# Patient Record
Sex: FEMALE | Race: WHITE | NOT HISPANIC OR LATINO | Employment: OTHER | URBAN - METROPOLITAN AREA
[De-identification: names, ages, dates, MRNs, and addresses within clinical notes are randomized per-mention and may not be internally consistent; named-entity substitution may affect disease eponyms.]

---

## 2017-02-23 DIAGNOSIS — F41.1 GENERALIZED ANXIETY DISORDER: ICD-10-CM

## 2017-02-23 RX ORDER — ESCITALOPRAM OXALATE 10 MG/1
10 TABLET ORAL DAILY
Qty: 30 TABLET | Refills: 0 | Status: SHIPPED | OUTPATIENT
Start: 2017-02-23 | End: 2017-04-25

## 2017-02-23 NOTE — TELEPHONE ENCOUNTER
escitalopram (LEXAPRO) 10 MG tablet  Last Written Prescription Date: 3/14/2016  Last Fill Quantity: 90, # refills: 3  Last Office Visit with [facility] primary care provider:  3/14/2016        Last PHQ-9 score on record= No flowsheet data found.

## 2017-03-13 DIAGNOSIS — I10 ESSENTIAL HYPERTENSION, BENIGN: ICD-10-CM

## 2017-03-13 NOTE — TELEPHONE ENCOUNTER
atenolol (TENORMIN) 100 MG tablet      Last Written Prescription Date: 3/14/16  Last Fill Quantity: 90, # refills: 3    Last Office Visit with [facility], [facility] or [facility] prescribing provider:  3/14/16   Future Office Visit:        BP Readings from Last 3 Encounters:   03/14/16 139/80   10/12/15 131/75   08/11/15 138/79

## 2017-03-14 RX ORDER — ATENOLOL 100 MG/1
100 TABLET ORAL DAILY
Qty: 30 TABLET | Refills: 0 | Status: SHIPPED | OUTPATIENT
Start: 2017-03-14 | End: 2017-04-25

## 2017-04-25 ENCOUNTER — OFFICE VISIT (OUTPATIENT)
Dept: FAMILY MEDICINE | Facility: CLINIC | Age: 82
End: 2017-04-25
Payer: COMMERCIAL

## 2017-04-25 VITALS — HEIGHT: 68 IN | BODY MASS INDEX: 29.22 KG/M2 | RESPIRATION RATE: 16 BRPM | WEIGHT: 192.8 LBS

## 2017-04-25 DIAGNOSIS — Z13.6 ENCOUNTER FOR LIPID SCREENING FOR CARDIOVASCULAR DISEASE: ICD-10-CM

## 2017-04-25 DIAGNOSIS — F41.1 GENERALIZED ANXIETY DISORDER: ICD-10-CM

## 2017-04-25 DIAGNOSIS — Z00.00 MEDICARE ANNUAL WELLNESS VISIT, SUBSEQUENT: Primary | ICD-10-CM

## 2017-04-25 DIAGNOSIS — I10 ESSENTIAL HYPERTENSION, BENIGN: ICD-10-CM

## 2017-04-25 DIAGNOSIS — Z13.220 ENCOUNTER FOR LIPID SCREENING FOR CARDIOVASCULAR DISEASE: ICD-10-CM

## 2017-04-25 LAB
ANION GAP SERPL CALCULATED.3IONS-SCNC: 8 MMOL/L (ref 3–14)
BUN SERPL-MCNC: 25 MG/DL (ref 7–30)
CALCIUM SERPL-MCNC: 9.3 MG/DL (ref 8.5–10.1)
CHLORIDE SERPL-SCNC: 104 MMOL/L (ref 94–109)
CHOLEST SERPL-MCNC: 223 MG/DL
CO2 SERPL-SCNC: 26 MMOL/L (ref 20–32)
CREAT SERPL-MCNC: 0.91 MG/DL (ref 0.52–1.04)
GFR SERPL CREATININE-BSD FRML MDRD: 60 ML/MIN/1.7M2
GLUCOSE SERPL-MCNC: 112 MG/DL (ref 70–99)
HDLC SERPL-MCNC: 70 MG/DL
POTASSIUM SERPL-SCNC: 4.4 MMOL/L (ref 3.4–5.3)
SODIUM SERPL-SCNC: 138 MMOL/L (ref 133–144)

## 2017-04-25 PROCEDURE — 83718 ASSAY OF LIPOPROTEIN: CPT | Performed by: FAMILY MEDICINE

## 2017-04-25 PROCEDURE — 82465 ASSAY BLD/SERUM CHOLESTEROL: CPT | Performed by: FAMILY MEDICINE

## 2017-04-25 PROCEDURE — G0439 PPPS, SUBSEQ VISIT: HCPCS | Performed by: FAMILY MEDICINE

## 2017-04-25 PROCEDURE — 80048 BASIC METABOLIC PNL TOTAL CA: CPT | Performed by: FAMILY MEDICINE

## 2017-04-25 PROCEDURE — 36415 COLL VENOUS BLD VENIPUNCTURE: CPT | Performed by: FAMILY MEDICINE

## 2017-04-25 RX ORDER — ATENOLOL 100 MG/1
100 TABLET ORAL DAILY
Qty: 90 TABLET | Refills: 3 | Status: SHIPPED | OUTPATIENT
Start: 2017-04-25 | End: 2018-05-11

## 2017-04-25 RX ORDER — ESCITALOPRAM OXALATE 10 MG/1
10 TABLET ORAL DAILY
Qty: 90 TABLET | Refills: 3 | Status: SHIPPED | OUTPATIENT
Start: 2017-04-25 | End: 2018-05-11

## 2017-04-25 RX ORDER — LOSARTAN POTASSIUM AND HYDROCHLOROTHIAZIDE 12.5; 1 MG/1; MG/1
1 TABLET ORAL DAILY
Qty: 90 TABLET | Refills: 3 | Status: SHIPPED | OUTPATIENT
Start: 2017-04-25 | End: 2018-05-11

## 2017-04-25 NOTE — PROGRESS NOTES
SUBJECTIVE:                                                            Bonita Villarreal is a 81 year old female who presents for Preventive Visit.      Are you in the first 12 months of your Medicare Part B coverage?  No    Healthy Habits:    Do you get at least three servings of calcium containing foods daily (dairy, green leafy vegetables, etc.)? yes    Amount of exercise or daily activities, outside of work: 3 day(s) per week    Problems taking medications regularly No    Medication side effects: No    Have you had an eye exam in the past two years? yes    Do you see a dentist twice per year? yes    Do you have sleep apnea, excessive snoring or daytime drowsiness?no    COGNITIVE SCREEN  1) Repeat 3 items (Banana, Sunrise, Chair)    2) Clock draw: NORMAL  3) 3 item recall: Recalls 3 objects  Results: 3 items recalled: COGNITIVE IMPAIRMENT LESS LIKELY    Mini-CogTM Copyright S Jonny. Licensed by the author for use in [facility]; reprinted with permission (juany@[facility]). All rights reserved.                Reviewed and updated as needed this visit by clinical staff  Tobacco  Allergies  Meds  Med Hx  Surg Hx  Fam Hx  Soc Hx        Reviewed and updated as needed this visit by Provider        Social History   Substance Use Topics     Smoking status: Never Smoker     Smokeless tobacco: Never Used     Alcohol use Yes      Comment: rare       The patient does not drink >3 drinks per day nor >7 drinks per week.    Today's PHQ-2 Score:   PHQ-2 ( 1999 Pfizer) 4/25/2017 3/14/2016   Q1: Little interest or pleasure in doing things 0 0   Q2: Feeling down, depressed or hopeless 0 0   PHQ-2 Score 0 0       Do you feel safe in your environment - Yes    Do you have a Health Care Directive?: Yes: Advance Directive has been received and scanned.    Current providers sharing in care for this patient include:   Patient Care Team:  Kiersten Lenz MD as PCP - General      Hearing impairment: No    Ability  "to successfully perform activities of daily living: Yes, no assistance needed     Fall risk:       Home safety:  none identified      The following health maintenance items are reviewed in Epic and correct as of today:  Health Maintenance   Topic Date Due     LIPID SCREEN Q5 YR FEMALE (SYSTEM ASSIGNED)  02/28/2017     FALL RISK ASSESSMENT  03/14/2017     INFLUENZA VACCINE (SYSTEM ASSIGNED)  09/01/2017     ADVANCE DIRECTIVE PLANNING Q5 YRS (NO INBASKET)  03/20/2021     TETANUS IMMUNIZATION (SYSTEM ASSIGNED)  03/14/2026     DEXA SCAN SCREENING (SYSTEM ASSIGNED)  Completed     PNEUMOCOCCAL  Completed         Mammogram Screening: Patient over age 75, has elected to stop mammography screening.     ROS:  Constitutional, HEENT, cardiovascular, pulmonary, GI, , musculoskeletal, neuro, skin, endocrine and psych systems are negative, except as otherwise noted.  She is feeling healthy, sleeps well, walks daily and plays golf, enjoys her [employer]'s social and [employer] activities very much.    OBJECTIVE:                                                            Resp 16  Ht 5' 7.5\" (1.715 m)  Wt 192 lb 12.8 oz (87.5 kg)  BMI 29.75 kg/m2 Estimated body mass index is 29.75 kg/(m^2) as calculated from the following:    Height as of this encounter: 5' 7.5\" (1.715 m).    Weight as of this encounter: 192 lb 12.8 oz (87.5 kg).  EXAM:   GENERAL APPEARANCE: healthy, alert and no distress  RESP: lungs clear to auscultation - no rales, rhonchi or wheezes  CV: regular rate and rhythm, normal S1 S2, no S3 or S4, no murmur, click or rub, no peripheral edema and peripheral pulses strong  MS: no musculoskeletal defects are noted and gait is age appropriate without ataxia  PSYCH: mentation appears normal and affect normal/bright      Results for orders placed or performed in visit on 04/25/17   Basic metabolic panel   Result Value Ref Range    Sodium 138 133 - 144 mmol/L    Potassium 4.4 3.4 - 5.3 mmol/L    Chloride 104 94 - 109 mmol/L    " "Carbon Dioxide 26 20 - 32 mmol/L    Anion Gap 8 3 - 14 mmol/L    Glucose 112 (H) 70 - 99 mg/dL    Urea Nitrogen 25 7 - 30 mg/dL    Creatinine 0.91 0.52 - 1.04 mg/dL    GFR Estimate 60 (L) >60 mL/min/1.7m2    GFR Estimate If Black 72 >60 mL/min/1.7m2    Calcium 9.3 8.5 - 10.1 mg/dL   HDL cholesterol   Result Value Ref Range    HDL Cholesterol 70 >49 mg/dL   Cholesterol   Result Value Ref Range    Cholesterol 223 (H) <200 mg/dL       ASSESSMENT / PLAN:                                                                ICD-10-CM    1. Medicare annual wellness visit, subsequent Z00.00    2. Essential hypertension, benign I10 atenolol (TENORMIN) 100 MG tablet     losartan-hydrochlorothiazide (HYZAAR) 100-12.5 MG per tablet     Basic metabolic panel   3. GENERALIZED ANXIETY DIS F41.1 escitalopram (LEXAPRO) 10 MG tablet   4. Encounter for lipid screening for cardiovascular disease Z13.220 HDL cholesterol    Z13.6 Cholesterol       End of Life Planning:  Patient currently has an advanced directive: Yes.  Practitioner is supportive of decision.    COUNSELING:  Continue current healthy lifestyle.        Estimated body mass index is 29.75 kg/(m^2) as calculated from the following:    Height as of this encounter: 5' 7.5\" (1.715 m).    Weight as of this encounter: 192 lb 12.8 oz (87.5 kg).     reports that she has never smoked. She has never used smokeless tobacco.      Appropriate preventive services were discussed with this patient, including applicable screening as appropriate for cardiovascular disease, diabetes, osteopenia/osteoporosis, and glaucoma.  As appropriate for age/gender, discussed screening for colorectal cancer, prostate cancer, breast cancer, and cervical cancer. Checklist reviewing preventive services available has been given to the patient.    Reviewed patients plan of care and provided an AVS. The Basic Care Plan (routine screening as documented in Health Maintenance) for Bonita meets the Care Plan requirement. " This Care Plan has been established and reviewed with the Patient.    Counseling Resources:  ATP IV Guidelines  Pooled Cohorts Equation Calculator  Breast Cancer Risk Calculator  FRAX Risk Assessment  ICSI Preventive Guidelines  Dietary Guidelines for Americans, 2010  USDA's MyPlate  ASA Prophylaxis  Lung CA Screening    Kiersten Lenz MD  [facility]

## 2017-04-25 NOTE — NURSING NOTE
"Chief Complaint   Patient presents with     Medicare Visit     medications refilled       Initial Resp 16  Ht 5' 7.5\" (1.715 m)  Wt 192 lb 12.8 oz (87.5 kg)  BMI 29.75 kg/m2 Estimated body mass index is 29.75 kg/(m^2) as calculated from the following:    Height as of this encounter: 5' 7.5\" (1.715 m).    Weight as of this encounter: 192 lb 12.8 oz (87.5 kg).  Medication Reconciliation: complete    "

## 2017-04-25 NOTE — LETTER
[facility]  [address]  [facility] [address]  Phone: [phone]    April 26, 2017    Bonita Villarreal  [address] [address] [place] [id number]          Dear Bonita,    The results of your recent lab tests were your basic chemistries, kidney functions, and cholesterol levels are all fine.  Enclosed is a copy of these results.  If you have any further questions or problems, please contact our office.  Results for orders placed or performed in visit on 04/25/17   Basic metabolic panel   Result Value Ref Range    Sodium 138 133 - 144 mmol/L    Potassium 4.4 3.4 - 5.3 mmol/L    Chloride 104 94 - 109 mmol/L    Carbon Dioxide 26 20 - 32 mmol/L    Anion Gap 8 3 - 14 mmol/L    Glucose 112 (H) 70 - 99 mg/dL    Urea Nitrogen 25 7 - 30 mg/dL    Creatinine 0.91 0.52 - 1.04 mg/dL    GFR Estimate 60 (L) >60 mL/min/1.7m2    GFR Estimate If Black 72 >60 mL/min/1.7m2    Calcium 9.3 8.5 - 10.1 mg/dL   HDL cholesterol   Result Value Ref Range    HDL Cholesterol 70 >49 mg/dL   Cholesterol   Result Value Ref Range    Cholesterol 223 (H) <200 mg/dL     Sincerely,      Kiersten Lenz MD/ llc

## 2017-04-25 NOTE — MR AVS SNAPSHOT
After Visit Summary   4/25/2017    Bonita Villarreal    MRN: [id number]           Patient Information     Date Of Birth          1935        Visit Information        Provider Department      4/25/2017 1:20 PM Kiersten Lenz MD [facility]        Today's Diagnoses     Medicare annual wellness visit, subsequent    -  1    Essential hypertension, benign        GENERALIZED ANXIETY DIS        Encounter for lipid screening for cardiovascular disease          Care Instructions      Preventive Health Recommendations    Female Ages 65 +    Yearly exam:     See your health care provider every year in order to  o Review health changes.   o Discuss preventive care.    o Review your medicines if your doctor has prescribed any.      You no longer need a yearly Pap test unless you've had an abnormal Pap test in the past 10 years. If you have vaginal symptoms, such as bleeding or discharge, be sure to talk with your provider about a Pap test.      Every 1 to 2 years, have a mammogram.  If you are over 69, talk with your health care provider about whether or not you want to continue having screening mammograms.      Every 10 years, have a colonoscopy. Or, have a yearly FIT test (stool test). These exams will check for colon cancer.       Have a cholesterol test every 5 years, or more often if your doctor advises it.       Have a diabetes test (fasting glucose) every three years. If you are at risk for diabetes, you should have this test more often.       At age 65, have a bone density scan (DEXA) to check for osteoporosis (brittle bone disease).    Shots:    Get a flu shot each year.    Get a tetanus shot every 10 years.    Talk to your doctor about your pneumonia vaccines. There are now two you should receive - Pneumovax (PPSV 23) and Prevnar (PCV 13).    Talk to your doctor about the shingles vaccine.    Talk to your doctor about the hepatitis B vaccine.    Nutrition:     Eat at least 5  "servings of fruits and vegetables each day.      Eat whole-grain bread, whole-wheat pasta and brown rice instead of white grains and rice.      Talk to your provider about Calcium and Vitamin D.     Lifestyle    Exercise at least 150 minutes a week (30 minutes a day, 5 days a week). This will help you control your weight and prevent disease.      Limit alcohol to one drink per day.      No smoking.       Wear sunscreen to prevent skin cancer.       See your dentist twice a year for an exam and cleaning.      See your eye doctor every 1 to 2 years to screen for conditions such as glaucoma, macular degeneration and cataracts.        Follow-ups after your visit        Who to contact     If you have questions or need follow up information about today's clinic visit or your schedule please contact [facility] directly at [phone].  Normal or non-critical lab and imaging results will be communicated to you by [employer], letter or phone within 4 business days after the clinic has received the results. If you do not hear from us within 7 days, please contact the clinic through [employer] or phone. If you have a critical or abnormal lab result, we will notify you by phone as soon as possible.  Submit refill requests through [employer] or call your pharmacy and they will forward the refill request to us. Please allow 3 business days for your refill to be completed.          Additional Information About Your Visit        [employer] Information     [employer] lets you send messages to your doctor, view your test results, renew your prescriptions, schedule appointments and more. To sign up, go to [email or web address] . Click on \"Log in\" on the left side of the screen, which will take you to the Welcome page. Then click on \"Sign up Now\" on the right side of the page.     You will be asked to enter the access code listed below, as well as some personal information. Please follow the directions to create your username " "and password.     Your access code is: [email or web address]  Expires: 2017  2:27 PM     Your access code will  in 90 days. If you need help or a new code, please call your [facility] clinic or [phone].        Care EveryWhere ID     This is your Care EveryWhere ID. This could be used by other organizations to access your [facility] medical records  [id number]        Your Vitals Were     Respirations Height BMI (Body Mass Index)             16 5' 7.5\" (1.715 m) 29.75 kg/m2          Blood Pressure from Last 3 Encounters:   16 139/80   10/12/15 131/75   08/11/15 138/79    Weight from Last 3 Encounters:   17 192 lb 12.8 oz (87.5 kg)   16 197 lb 9.6 oz (89.6 kg)   10/12/15 193 lb (87.5 kg)              We Performed the Following     Basic metabolic panel     Cholesterol     HDL cholesterol          Where to get your medicines      These medications were sent to [place] [facility] [place] [place] - [address]  [address], [place] [address]    Hours:  [place] [facility] Phone:  [phone]     atenolol 100 MG tablet    escitalopram 10 MG tablet    losartan-hydrochlorothiazide 100-12.5 MG per tablet          Primary Care Provider Office Phone # Fax #    Kiersten Lenz [phone]       [facility] [id number] [place] [address]        Thank you!     Thank you for choosing [facility]  for your care. Our goal is always to provide you with excellent care. Hearing back from our patients is one way we can continue to improve our services. Please take a few minutes to complete the written survey that you may receive in the mail after your visit with us. Thank you!             Your Updated Medication List - Protect others around you: Learn how to safely use, store and throw away your medicines at www.disposemymeds.org.          This list is accurate as of: 17  2:27 PM.  Always use your most recent med list. "                   Brand Name Dispense Instructions for use    amoxicillin-clavulanate 875-125 MG per tablet    AUGMENTIN    20 tablet    1 TABLET EVERY 12 HOURS WITH FOOD UNTIL GONE -- to be started in event of recurrent diverticular symptoms       atenolol 100 MG tablet    TENORMIN    90 tablet    Take 1 tablet (100 mg) by mouth daily (Needs follow-up appointment for this medication)       cholecalciferol 1000 UNIT tablet    vitamin D     Take 1 tablet by mouth daily.       escitalopram 10 MG tablet    LEXAPRO    90 tablet    Take 1 tablet (10 mg) by mouth daily (Needs follow-up appointment for this medication)       FISH OIL PO      1 tab daily       losartan-hydrochlorothiazide 100-12.5 MG per tablet    HYZAAR    90 tablet    Take 1 tablet by mouth daily       Multi-vitamin Tabs tablet   Generic drug:  multivitamin, therapeutic with minerals      1 DAILY

## 2017-07-05 DIAGNOSIS — K57.32 DIVERTICULITIS OF LARGE INTESTINE WITHOUT PERFORATION OR ABSCESS WITHOUT BLEEDING: ICD-10-CM

## 2017-07-05 NOTE — TELEPHONE ENCOUNTER
Augmentin      Last Written Prescription Date: 08/30/2016  Last Fill Quantity: 20,  # refills: 2   Last Office Visit with G, P or [facility] prescribing provider: 04/25/2017

## 2017-07-07 NOTE — TELEPHONE ENCOUNTER
Routing refill request to provider for review/approval because:  Drug not on the [facility] refill protocol     Thank you  Stefani PRESCOTT RN

## 2017-08-15 ENCOUNTER — OFFICE VISIT (OUTPATIENT)
Dept: FAMILY MEDICINE | Facility: CLINIC | Age: 82
End: 2017-08-15
Payer: COMMERCIAL

## 2017-08-15 VITALS
DIASTOLIC BLOOD PRESSURE: 75 MMHG | OXYGEN SATURATION: 93 % | TEMPERATURE: 97.4 F | BODY MASS INDEX: 29.01 KG/M2 | SYSTOLIC BLOOD PRESSURE: 125 MMHG | HEART RATE: 75 BPM | WEIGHT: 188 LBS

## 2017-08-15 DIAGNOSIS — R07.0 THROAT PAIN: ICD-10-CM

## 2017-08-15 DIAGNOSIS — K57.32 DIVERTICULITIS OF LARGE INTESTINE WITHOUT PERFORATION OR ABSCESS WITHOUT BLEEDING: ICD-10-CM

## 2017-08-15 DIAGNOSIS — R05.9 COUGH: Primary | ICD-10-CM

## 2017-08-15 LAB
DEPRECATED S PYO AG THROAT QL EIA: NORMAL
SPECIMEN SOURCE: NORMAL

## 2017-08-15 PROCEDURE — 99213 OFFICE O/P EST LOW 20 MIN: CPT | Performed by: FAMILY MEDICINE

## 2017-08-15 PROCEDURE — 87081 CULTURE SCREEN ONLY: CPT | Performed by: FAMILY MEDICINE

## 2017-08-15 PROCEDURE — 87880 STREP A ASSAY W/OPTIC: CPT | Performed by: FAMILY MEDICINE

## 2017-08-15 NOTE — PATIENT INSTRUCTIONS
Thank you for choosing [facility].  You may be receiving a survey in the mail from [facility] regarding your visit today.  Please take a few minutes to complete and return the survey to let us know how we are doing.      Our Clinic hours are:  Mondays    7:20 am - 7 pm  Tues -  Fri  7:20 am - 5 pm    Clinic Phone: [phone]    The clinic lab opens at 7:30 am Mon - Fri and appointments are required.    [facility]. [phone]  Monday-Thursday 8 am - 7pm  Tues/Wed/Fri 8 am - 5:30 pm

## 2017-08-15 NOTE — PROGRESS NOTES
SUBJECTIVE:                                                    Bonita Villarreal is a 82 year old female who presents to clinic today for the following health issues:    Chief Complaint   Patient presents with     Pharyngitis     sore throat and ear pain started last thursday. Great Grandaualidater had strep     ENT Symptoms           Symptoms: cc Present Absent Comment   Fever/Chills  x     Fatigue  x  Loss of appetite   Muscle Aches   x    Eye Irritation   x    Sneezing   x    Nasal Pipo/Drg  x     Sinus Pressure/Pain   x    Loss of smell   x    Dental pain   x    Sore Throat  x     Swollen Glands  x     Ear Pain/Fullness  x  Bilateral ear pain/pressure with swallowing   Cough  x  From dry throat   Wheeze   x    Chest Pain   x    Shortness of breath  x     Rash   x    Other   x      Symptom duration:  last Thursday 8/10/17   Symptom severity:  severe   Treatments tried:  cough drops   Contacts:  great granddaughter had strep     Bonita complains of a dry cough that started five days ago. This was progressive and developed into a sore throat. She now still has a throaty cough, feels her chest is clear. She denies sinus congestion or fever. The cough bothers her equally day and night, but does seem to be better this afternoon. She has been drinking fluids but cut back on eating due to throat pain.    OBJECTIVE: /75 (BP Location: Right arm, Patient Position: Chair, Cuff Size: Adult Regular)  Pulse 75  Temp 97.4  F (36.3  C) (Tympanic)  Wt 188 lb (85.3 kg)  SpO2 93%  BMI 29.01 kg/m2    PERRL, conjunctiva clear.  Nose is clear, sinuses nontender.  Ears are partially blocked with cerumen; visible portions of the TMs appear normal.  Throat appears normal.  Neck is without masses.  The lungs are clear without wheezes, rales, or rhonchi.    Rapid strep is negative.      ASSESSMENT: viral cough and sore throat    PLAN: May use OTC cough lozenges and Chloraseptic prn.   She also requests a refill of Augmentin  to keep on hand in case of recurrence of her divticulitis -- she has used this on occasion for return of symptoms.    Kiersten Lenz md

## 2017-08-15 NOTE — NURSING NOTE
".  Chief Complaint   Patient presents with     Pharyngitis     sore throat and ear pain started last thursday. Great Granddaughter had strep       Initial /75 (BP Location: Right arm, Patient Position: Chair, Cuff Size: Adult Regular)  Pulse 75  Temp 97.4  F (36.3  C) (Tympanic)  Wt 188 lb (85.3 kg)  SpO2 93%  BMI 29.01 kg/m2 Estimated body mass index is 29.01 kg/(m^2) as calculated from the following:    Height as of 4/25/17: 5' 7.5\" (1.715 m).    Weight as of this encounter: 188 lb (85.3 kg).  Medication Reconciliation: complete   Tamela Diana CMA    "

## 2017-08-15 NOTE — MR AVS SNAPSHOT
After Visit Summary   8/15/2017    Bonita Villarreal    MRN: [id number]           Patient Information     Date Of Birth          1935        Visit Information        Provider Department      8/15/2017 2:40 PM Kiersten eLnz MD [facility]        Today's Diagnoses     Cough    -  1    Throat pain        Recurrent diverticulitis          Care Instructions      Thank you for choosing [facility]  You may be receiving a survey in the mail from [facility] regarding your visit today.  Please take a few minutes to complete and return the survey to let us know how we are doing.      Our Clinic hours are:  Mondays    7:20 am - 7 pm  Tues -  Fri  7:20 am - 5 pm    Clinic Phone: [phone]    The clinic lab opens at 7:30 am Mon - Fri and appointments are required.    [facility] [place]  Ph. [phone]  Monday-Thursday 8 am - 7pm  Tues/Wed/Fri 8 am - 5:30 pm               Follow-ups after your visit        Who to contact     If you have questions or need follow up information about today's clinic visit or your schedule please contact [facility] directly at [phone].  Normal or non-critical lab and imaging results will be communicated to you by MyChart, letter or phone within 4 business days after the clinic has received the results. If you do not hear from us within 7 days, please contact the clinic through [employer] or phone. If you have a critical or abnormal lab result, we will notify you by phone as soon as possible.  Submit refill requests through Sendoid or call your pharmacy and they will forward the refill request to us. Please allow 3 business days for your refill to be completed.          Additional Information About Your Visit        MyChart Information     Sendoid lets you send messages to your doctor, view your test results, renew your prescriptions, schedule appointments and more. To sign up, go to [email or web address] .  "Click on \"Log in\" on the left side of the screen, which will take you to the Welcome page. Then click on \"Sign up Now\" on the right side of the page.     You will be asked to enter the access code listed below, as well as some personal information. Please follow the directions to create your username and password.     Your access code is: [email or web address]  Expires: 2017  3:44 PM     Your access code will  in 90 days. If you need help or a new code, please call your [facility] clinic or [phone].        Care EveryWhere ID     This is your Care EveryWhere ID. This could be used by other organizations to access your [facility] medical records  [id number]        Your Vitals Were     Pulse Temperature Pulse Oximetry BMI (Body Mass Index)          75 97.4  F (36.3  C) (Tympanic) 93% 29.01 kg/m2         Blood Pressure from Last 3 Encounters:   08/15/17 125/75   16 139/80   10/12/15 131/75    Weight from Last 3 Encounters:   08/15/17 188 lb (85.3 kg)   17 192 lb 12.8 oz (87.5 kg)   16 197 lb 9.6 oz (89.6 kg)              We Performed the Following     Beta strep group A culture     Strep, Rapid Screen        Primary Care Provider Office Phone # Fax #    Kiersten Adali Lenz [phone] [address] [facility] [address]        Equal Access to Services     [facility] CenterDIONICIO : Hadii aad ku hadasho Soomaali, waaxda luqadaha, qaybta kaalmada adeegyada, waxay caden gutierrez. So [facility] [phone].    ATENCIÓN: Si habla español, tiene a hylton disposición servicios gratuitos de asistencia lingüística. Llame al [phone].    We comply with applicable federal civil rights laws and [place] laws. We do not discriminate on the basis of race, color, national origin, age, disability sex, sexual orientation or gender identity.            Thank you!     Thank you for choosing [facility]  for your care. Our goal is always to provide you with excellent " care. Hearing back from our patients is one way we can continue to improve our services. Please take a few minutes to complete the written survey that you may receive in the mail after your visit with us. Thank you!             Your Updated Medication List - Protect others around you: Learn how to safely use, store and throw away your medicines at www.disposemymeds.org.          This list is accurate as of: 8/15/17  3:46 PM.  Always use your most recent med list.                   Brand Name Dispense Instructions for use Diagnosis    amoxicillin-clavulanate 875-125 MG per tablet    AUGMENTIN    20 tablet    1 TABLET EVERY 12 HOURS WITH FOOD UNTIL GONE -- to be started in event of recurrent diverticular symptoms    Diverticulitis of large intestine without perforation or abscess without bleeding       atenolol 100 MG tablet    TENORMIN    90 tablet    Take 1 tablet (100 mg) by mouth daily (Needs follow-up appointment for this medication)    Essential hypertension, benign       cholecalciferol 1000 UNIT tablet    vitamin D     Take 1 tablet by mouth daily.        escitalopram 10 MG tablet    LEXAPRO    90 tablet    Take 1 tablet (10 mg) by mouth daily (Needs follow-up appointment for this medication)    Generalized anxiety disorder       FISH OIL PO      1 tab daily        losartan-hydrochlorothiazide 100-12.5 MG per tablet    HYZAAR    90 tablet    Take 1 tablet by mouth daily    Essential hypertension, benign       Multi-vitamin Tabs tablet   Generic drug:  multivitamin, therapeutic with minerals      1 DAILY

## 2017-08-15 NOTE — LETTER
August 17, 2017        Bonita Villarreal  [address] [address] [place] [phone]            The results of your 24 hour throat culture were negative. If you have any further questions please contact your clinic.      Sincerely,        Kiersten Lenz MD

## 2017-08-16 LAB
BACTERIA SPEC CULT: NORMAL
SPECIMEN SOURCE: NORMAL

## 2017-09-26 ENCOUNTER — ALLIED HEALTH/NURSE VISIT (OUTPATIENT)
Dept: FAMILY MEDICINE | Facility: CLINIC | Age: 82
End: 2017-09-26
Payer: COMMERCIAL

## 2017-09-26 DIAGNOSIS — Z23 NEED FOR PROPHYLACTIC VACCINATION AND INOCULATION AGAINST INFLUENZA: Primary | ICD-10-CM

## 2017-09-26 PROCEDURE — G0008 ADMIN INFLUENZA VIRUS VAC: HCPCS

## 2017-09-26 PROCEDURE — 99207 ZZC NO CHARGE NURSE ONLY: CPT

## 2017-09-26 PROCEDURE — 90662 IIV NO PRSV INCREASED AG IM: CPT

## 2017-09-26 NOTE — PROGRESS NOTES
Injectable Influenza Immunization Documentation    1.  Is the person to be vaccinated sick today?   No    2. Does the person to be vaccinated have an allergy to a component   of the vaccine?   No    3. Has the person to be vaccinated ever had a serious reaction   to influenza vaccine in the past?   No    4. Has the person to be vaccinated ever had Guillain-Barré syndrome?   No    Form completed by Nica Resendiz CMA

## 2017-10-20 ENCOUNTER — OFFICE VISIT (OUTPATIENT)
Dept: FAMILY MEDICINE | Facility: CLINIC | Age: 82
End: 2017-10-20
Payer: COMMERCIAL

## 2017-10-20 VITALS
HEIGHT: 68 IN | DIASTOLIC BLOOD PRESSURE: 67 MMHG | SYSTOLIC BLOOD PRESSURE: 130 MMHG | HEART RATE: 63 BPM | BODY MASS INDEX: 29.46 KG/M2 | TEMPERATURE: 97.3 F | WEIGHT: 194.4 LBS

## 2017-10-20 DIAGNOSIS — M75.02 ADHESIVE CAPSULITIS OF LEFT SHOULDER: Primary | ICD-10-CM

## 2017-10-20 PROCEDURE — 99213 OFFICE O/P EST LOW 20 MIN: CPT | Performed by: FAMILY MEDICINE

## 2017-10-20 ASSESSMENT — PAIN SCALES - GENERAL: PAINLEVEL: NO PAIN (0)

## 2017-10-20 NOTE — PROGRESS NOTES
"  SUBJECTIVE:   Bonita Villarreal is a 82 year old female who presents to clinic today for the following health issues:      Problem:   Chief Complaint   Patient presents with     Establish Care       Location: left shoulder  Quality: achy and stiff  Severity: moderate  Duration: several months   Timing: persistent  Context: no injury  Modifying factors:   Associated signs and symptoms: decreased rom overhead.    ADDITIONAL HPI: 82 year old female here for above issue.      ROS: 10 point review of systems negative except as per HPI.    PAST MEDICAL HISTORY:  Past Medical History:   Diagnosis Date     Advance Care Planning 2/28/2012    Advance Care Planning 3/20/2016: Receipt of ACP document:  Received: Health Care Directive which was witnessed or notarized on 2/25/16.  Document not previously scanned.  Validation form completed and sent with document to be scanned.  Code Status needs to be updated to reflect choices in most recent ACP document.  Confirmed/documented designated decision maker(s).  Added by Cata Terrell RN, Advance Care Planning Liaison. Advance Care Planning 2/28/12: Patient does not have an Advance/Health Care Directive (HCD), given \"What is Advance Care Planning?\" flyer. Teena Peace       Arthritis      Diverticulitis of colon 6/28/2005    Patient keeps Rx for Augmentin to use PRN Problem list name updated by automated process. Provider to review     Diverticulosis of colon (without mention of hemorrhage)     history of recurrent diverticulitis     Hypertension         ACTIVE MEDICAL PROBLEMS:  Patient Active Problem List   Diagnosis     Essential hypertension, benign     Generalized anxiety disorder     Diverticulosis of large intestine     Osteopenia of spine     Family history of other cardiovascular diseases     CARDIOVASCULAR SCREENING; LDL GOAL LESS THAN 160     Muñoz's cyst of knee, right        FAMILY HISTORY:  Family History   Problem Relation Age of Onset     Lipids Brother  "     HEART DISEASE Brother       at 51 of an MI     CANCER Brother      bone, lung,  82     Prostate Cancer Brother      CANCER Sister      cervical     Lipids Sister      HEART DISEASE Sister      eldest sister, s/p MI x 2 with stents; diagnosed with AAA, postoperative complications and MI,  at 75     HEART DISEASE Brother      MI x 2     Lipids Brother      C.A.D. Father       at 39 of an MI     Family History Negative Mother      lived to age 95     Gynecology Sister      cervical Ca       SOCIAL HISTORY:  Social History     Social History     Marital status:      Spouse name: N/A     Number of children: N/A     Years of education: N/A     Occupational History     Not on file.     Social History Main Topics     Smoking status: Never Smoker     Smokeless tobacco: Never Used     Alcohol use Yes      Comment: rare     Drug use: No     Sexual activity: No     Other Topics Concern     Parent/Sibling W/ Cabg, Mi Or Angioplasty Before 65f 55m? Yes     sister bypass,brother bypass,brother MI     Social History Narrative       MEDICATIONS:  Current Outpatient Prescriptions   Medication Sig Dispense Refill     atenolol (TENORMIN) 100 MG tablet Take 1 tablet (100 mg) by mouth daily (Needs follow-up appointment for this medication) 90 tablet 3     escitalopram (LEXAPRO) 10 MG tablet Take 1 tablet (10 mg) by mouth daily (Needs follow-up appointment for this medication) 90 tablet 3     losartan-hydrochlorothiazide (HYZAAR) 100-12.5 MG per tablet Take 1 tablet by mouth daily 90 tablet 3     cholecalciferol (VITAMIN D) 1000 UNIT tablet Take 1 tablet by mouth daily.       FISH OIL OR 1 tab daily       MULTI-VITAMIN OR TABS 1 DAILY       amoxicillin-clavulanate (AUGMENTIN) 875-125 MG per tablet 1 TABLET EVERY 12 HOURS WITH FOOD UNTIL GONE -- to be started in event of recurrent diverticular symptoms 20 tablet 2       ALLERGIES:     Allergies   Allergen Reactions     Lisinopril Cough       Problem list,  "Medication list, Allergies, and Medical/Social/Surgical histories reviewed in [facility] and updated as appropriate.    OBJECTIVE:                                                    VITALS: /67 (BP Location: Right arm, Cuff Size: Adult Large)  Pulse 63  Temp 97.3  F (36.3  C) (Tympanic)  Ht 5' 7.5\" (1.715 m)  Wt 194 lb 6.4 oz (88.2 kg)  BMI 30 kg/m2 Body mass index is 30 kg/(m^2).  GENERAL: Pleasant, well appearing female.   MUSCULOSKELETAL:  Left shoulder shows no obvious deformities. No ligamentous laxity. No pain to palpation. She is unable to abduct or flex shoulder >90deg.    ASSESSMENT/PLAN:                                                    1. Adhesive capsulitis of left shoulder  Advised ice/heat, NSAIDs. Start physical therapy. Follow-up if not improving or if worsening.   - PHYSICAL THERAPY REFERRAL       "

## 2017-10-20 NOTE — NURSING NOTE
"Chief Complaint   Patient presents with     Establish Care       Initial /67 (BP Location: Right arm, Cuff Size: Adult Large)  Pulse 63  Temp 97.3  F (36.3  C) (Tympanic)  Ht 5' 7.5\" (1.715 m)  Wt 194 lb 6.4 oz (88.2 kg)  BMI 30 kg/m2 Estimated body mass index is 30 kg/(m^2) as calculated from the following:    Height as of this encounter: 5' 7.5\" (1.715 m).    Weight as of this encounter: 194 lb 6.4 oz (88.2 kg).  Medication Reconciliation: complete    "

## 2017-10-20 NOTE — MR AVS SNAPSHOT
After Visit Summary   10/20/2017    Bonita Villarreal    MRN: [id number]           Patient Information     Date Of Birth          1935        Visit Information        Provider Department      10/20/2017 12:40 PM Dewayne Santana MD [facility]        Today's Diagnoses     Adhesive capsulitis of left shoulder    -  1      Care Instructions          Thank you for choosing [facility].  You may be receiving a survey in the mail from [facility] regarding your visit today.  Please take a few minutes to complete and return the survey to let us know how we are doing.      Our Clinic hours are:  Mondays    7:20 am - 7 pm  Tues -  Fri  7:20 am - 5 pm    Clinic Phone: [phone]    The clinic lab opens at 7:30 am Mon - Fri and appointments are required.    [facility] [place]  Ph. [phone]  Monday-Thursday 8 am - 7pm  Tues/Wed/Fri 8 am - 5:30 pm                 Follow-ups after your visit        Additional Services     PHYSICAL THERAPY REFERRAL       *This therapy referral will be filtered to a centralized scheduling office at [facility] and the patient will receive a call to schedule an appointment at a [facility] location most convenient for them. *     [facility] provides Physical Therapy evaluation and treatment and many specialty services across the [facility] system.  If requesting a specialty program, please choose from the list below.    If you have not heard from the scheduling office within 2 business days, please call [phone] for all locations, with the exception of [place], please call [phone].  Treatment: Evaluation & Treatment  Special Instructions/Modalities:   Special Programs: None    Please be aware that coverage of these services is subject to the terms and limitations of your health insurance plan.  Call member services at your health plan with any benefit or coverage questions.   "    **Note to Provider:  If you are referring outside of [facility] for the therapy appointment, please list the name of the location in the \"special instructions\" above, print the referral and give to the patient to schedule the appointment.                  Who to contact     If you have questions or need follow up information about today's clinic visit or your schedule please contact [facility] directly at [phone].  Normal or non-critical lab and imaging results will be communicated to you by Lionicalhart, letter or phone within 4 business days after the clinic has received the results. If you do not hear from us within 7 days, please contact the clinic through [employer] or phone. If you have a critical or abnormal lab result, we will notify you by phone as soon as possible.  Submit refill requests through [employer] or call your pharmacy and they will forward the refill request to us. Please allow 3 business days for your refill to be completed.          Additional Information About Your Visit        LionicalharMatchbook Information     [employer] lets you send messages to your doctor, view your test results, renew your prescriptions, schedule appointments and more. To sign up, go to [email or web address] . Click on \"Log in\" on the left side of the screen, which will take you to the Welcome page. Then click on \"Sign up Now\" on the right side of the page.     You will be asked to enter the access code listed below, as well as some personal information. Please follow the directions to create your username and password.     Your access code is: [email or web address]  Expires: 2017  3:44 PM     Your access code will  in 90 days. If you need help or a new code, please call your [facility] clinic or [phone].        Care EveryWhere ID     This is your Care EveryWhere ID. This could be used by other organizations to access your [facility] medical records  [id number]        Your Vitals Were     Pulse Temperature " "Height BMI (Body Mass Index)          63 97.3  F (36.3  C) (Tympanic) 5' 7.5\" (1.715 m) 30 kg/m2         Blood Pressure from Last 3 Encounters:   10/20/17 130/67   08/15/17 125/75   03/14/16 139/80    Weight from Last 3 Encounters:   10/20/17 194 lb 6.4 oz (88.2 kg)   08/15/17 188 lb (85.3 kg)   04/25/17 192 lb 12.8 oz (87.5 kg)              We Performed the Following     PHYSICAL THERAPY REFERRAL        Primary Care Provider Office Phone # Fax #    Natashakimberly Lara Santana [phone] [address] [facility] [address]        Equal Access to Services     DARIEN YARBROUGH : Hadii aura collins hadasho Soomaali, waaxda luqadaha, qaybta kaalmada adeegyada, cameron negrete haymorales chung . So [facility] [phone].    ATENCIÓN: Si habla español, tiene a hylton disposición servicios gratuitos de asistencia lingüística. Llame al [phone].    We comply with applicable federal civil rights laws and [place] laws. We do not discriminate on the basis of race, color, national origin, age, disability, sex, sexual orientation, or gender identity.            Thank you!     Thank you for choosing [facility]  for your care. Our goal is always to provide you with excellent care. Hearing back from our patients is one way we can continue to improve our services. Please take a few minutes to complete the written survey that you may receive in the mail after your visit with us. Thank you!             Your Updated Medication List - Protect others around you: Learn how to safely use, store and throw away your medicines at www.disposemymeds.org.          This list is accurate as of: 10/20/17  1:09 PM.  Always use your most recent med list.                   Brand Name Dispense Instructions for use Diagnosis    amoxicillin-clavulanate 875-125 MG per tablet    AUGMENTIN    20 tablet    1 TABLET EVERY 12 HOURS WITH FOOD UNTIL GONE -- to be started in event of recurrent diverticular symptoms    " Diverticulitis of large intestine without perforation or abscess without bleeding       atenolol 100 MG tablet    TENORMIN    90 tablet    Take 1 tablet (100 mg) by mouth daily (Needs follow-up appointment for this medication)    Essential hypertension, benign       cholecalciferol 1000 UNIT tablet    vitamin D3     Take 1 tablet by mouth daily.        escitalopram 10 MG tablet    LEXAPRO    90 tablet    Take 1 tablet (10 mg) by mouth daily (Needs follow-up appointment for this medication)    Generalized anxiety disorder       FISH OIL PO      1 tab daily        losartan-hydrochlorothiazide 100-12.5 MG per tablet    HYZAAR    90 tablet    Take 1 tablet by mouth daily    Essential hypertension, benign       Multi-vitamin Tabs tablet   Generic drug:  multivitamin, therapeutic with minerals      1 DAILY

## 2017-10-20 NOTE — PATIENT INSTRUCTIONS
Thank you for choosing [facility].  You may be receiving a survey in the mail from [facility] regarding your visit today.  Please take a few minutes to complete and return the survey to let us know how we are doing.      Our Clinic hours are:  Mondays    7:20 am - 7 pm  Tues -  Fri  7:20 am - 5 pm    Clinic Phone: [phone]    The clinic lab opens at 7:30 am Mon - Fri and appointments are required.    [facility]. [phone]  Monday-Thursday 8 am - 7pm  Tues/Wed/Fri 8 am - 5:30 pm

## 2018-01-31 ENCOUNTER — TRANSFERRED RECORDS (OUTPATIENT)
Dept: HEALTH INFORMATION MANAGEMENT | Facility: CLINIC | Age: 83
End: 2018-01-31

## 2018-02-09 ENCOUNTER — OFFICE VISIT (OUTPATIENT)
Dept: FAMILY MEDICINE | Facility: CLINIC | Age: 83
End: 2018-02-09
Payer: COMMERCIAL

## 2018-02-09 VITALS
SYSTOLIC BLOOD PRESSURE: 133 MMHG | WEIGHT: 189 LBS | HEART RATE: 76 BPM | BODY MASS INDEX: 28.64 KG/M2 | HEIGHT: 68 IN | RESPIRATION RATE: 16 BRPM | DIASTOLIC BLOOD PRESSURE: 76 MMHG | TEMPERATURE: 97.3 F

## 2018-02-09 DIAGNOSIS — M17.11 OSTEOARTHRITIS OF RIGHT KNEE, UNSPECIFIED OSTEOARTHRITIS TYPE: ICD-10-CM

## 2018-02-09 DIAGNOSIS — I10 ESSENTIAL HYPERTENSION, BENIGN: ICD-10-CM

## 2018-02-09 DIAGNOSIS — Z01.818 PREOP GENERAL PHYSICAL EXAM: Primary | ICD-10-CM

## 2018-02-09 DIAGNOSIS — K57.32 DIVERTICULITIS OF LARGE INTESTINE WITHOUT PERFORATION OR ABSCESS WITHOUT BLEEDING: ICD-10-CM

## 2018-02-09 DIAGNOSIS — I44.7 LBBB (LEFT BUNDLE BRANCH BLOCK): ICD-10-CM

## 2018-02-09 LAB
ANION GAP SERPL CALCULATED.3IONS-SCNC: 6 MMOL/L (ref 3–14)
BUN SERPL-MCNC: 20 MG/DL (ref 7–30)
CALCIUM SERPL-MCNC: 9 MG/DL (ref 8.5–10.1)
CHLORIDE SERPL-SCNC: 104 MMOL/L (ref 94–109)
CO2 SERPL-SCNC: 28 MMOL/L (ref 20–32)
CREAT SERPL-MCNC: 0.83 MG/DL (ref 0.52–1.04)
GFR SERPL CREATININE-BSD FRML MDRD: 66 ML/MIN/1.7M2
GLUCOSE SERPL-MCNC: 100 MG/DL (ref 70–99)
POTASSIUM SERPL-SCNC: 4.1 MMOL/L (ref 3.4–5.3)
SODIUM SERPL-SCNC: 138 MMOL/L (ref 133–144)

## 2018-02-09 PROCEDURE — 36415 COLL VENOUS BLD VENIPUNCTURE: CPT | Performed by: FAMILY MEDICINE

## 2018-02-09 PROCEDURE — 93000 ELECTROCARDIOGRAM COMPLETE: CPT | Performed by: FAMILY MEDICINE

## 2018-02-09 PROCEDURE — 99215 OFFICE O/P EST HI 40 MIN: CPT | Performed by: FAMILY MEDICINE

## 2018-02-09 PROCEDURE — 80048 BASIC METABOLIC PNL TOTAL CA: CPT | Performed by: FAMILY MEDICINE

## 2018-02-09 NOTE — PROGRESS NOTES
[facility]  [address]  [facility] [address]  [phone]  Dept: [phone]    PRE-OP EVALUATION:  Today's date: 2018    Bonita Villarreal (: 1935) presents for pre-operative evaluation assessment as requested by Dr. Grover.  She requires evaluation and anesthesia risk assessment prior to undergoing surgery/procedure for treatment of Knee .  Proposed procedure: ARTHROPLASTY RIGHT KNEE    Date of Surgery/ Procedure: 2018  Time of Surgery/ Procedure: 3:00pm  Hospital/Surgical Facility: [facility]  Primary Physician: Dewayne Santana  Type of Anesthesia Anticipated: other    Patient has a Health Care Directive or Living Will:  YES [place]    1. NO - Do you have a history of heart attack, stroke, stent, bypass or surgery on an artery in the head, neck, heart or legs?  2. NO - Do you ever have any pain or discomfort in your chest?  3. NO - Do you have a history of  Heart Failure?  4. NO - Are you troubled by shortness of breath when: walking on the level, up a slight hill or at night?  5. NO - Do you currently have a cold, bronchitis or other respiratory infection?  6. NO - Do you have a cough, shortness of breath or wheezing?  7. NO - Do you sometimes get pains in the calves of your legs when you walk?  8. NO - Do you or anyone in your family have previous history of blood clots?  9. NO - Do you or does anyone in your family have a serious bleeding problem such as prolonged bleeding following surgeries or cuts?  10. NO - Have you ever had problems with anemia or been told to take iron pills?  11. NO - Have you had any abnormal blood loss such as black, tarry or bloody stools, or abnormal vaginal bleeding?  12. NO - Have you ever had a blood transfusion?  13. NO - Have you or any of your relatives ever had problems with anesthesia?  14. NO - Do you have sleep apnea, excessive snoring or daytime drowsiness?  15. NO - Do you have any prosthetic  heart valves?  16. NO - Do you have prosthetic joints?  17. NO - Is there any chance that you may be pregnant?      HPI:                                                      Brief HPI related to upcoming procedure: progressive right knee pain failed conservative therapy.    See problem list for active medical problems.  Problems all longstanding and stable, except as noted/documented.  See ROS for pertinent symptoms related to these conditions.                                                                                                  .  HYPERTENSION - Patient has longstanding history of mod-severe HTN , currently denies any symptoms referable to elevated blood pressure. Specifically denies chest pain, palpitations, dyspnea, orthopnea, PND or peripheral edema. Blood pressure readings have been in normal range. Current medication regimen is as listed below. Patient denies any side effects of medication.                                                                                                                                                                                          .    MEDICAL HISTORY:                                                      Patient Active Problem List    Diagnosis Date Noted     Baker's cyst of knee, right 08/12/2015     Priority: Medium     CARDIOVASCULAR SCREENING; LDL GOAL LESS THAN 160 10/31/2010     Priority: Medium     LDL in 140s, HDL in 60s  Roosevelt risk is 6% (average at her age is 14%)       Essential hypertension, benign 06/28/2005     Priority: Medium     had coronary CT scan 2003 --- no evidence of plaque  started on atenolol and ASA Jan 2005 for /94  physician in [place] switched her to Lotrel 5/10 due to her having dizzy spells  HCTZ added 3/05, pt felt mouth too dried out  4/05 pt more anxious and BP still elevated -- switched back to Atenolol       Diverticulosis of large intestine 06/28/2005     Priority: Medium     colonoscopy 2002, disease mod -  "severe  Problem list name updated by automated process. Provider to review       Osteopenia of spine 2005     Priority: Medium     T score -1.3  2003  Risk calculator based on 2003 T-score and personal data showed 1% risk of hip fracture over next 10 years, 14% risk of any fracture over next ten years; consider recheck DEXA at age 77, continue calcium and exercise until then (current recommendations to consider treatment if hip fx risk reaches 3% or any fracture risk reaches 20%)  Problem list name updated by automated process. Provider to review       Family history of other cardiovascular diseases 2005     Priority: Medium     father  at 39 of MI  brother  at 51 of MI  another brother -- MI x 2  eldest sister -- MI x 2 with stents  mother and maternal aunts lived to 80's and mid 90's  Problem list name updated by automated process. Provider to review and confirm  Problem list name updated by automated process. Provider to review       Generalized anxiety disorder 2005     Priority: Medium     onset after patient was diagnosed with hypertension fall         Past Medical History:   Diagnosis Date     Advance Care Planning 2012    Advance Care Planning 3/20/2016: Receipt of ACP document:  Received: Health Care Directive which was witnessed or notarized on 16.  Document not previously scanned.  Validation form completed and sent with document to be scanned.  Code Status needs to be updated to reflect choices in most recent ACP document.  Confirmed/documented designated decision maker(s).  Added by Cata Terrell RN, Advance Care Planning Liaison. Advance Care Planning 12: Patient does not have an Advance/Health Care Directive (HCD), given \"What is Advance Care Planning?\" flyer. Teena Peace       Arthritis      Diverticulitis of colon 2005    Patient keeps Rx for Augmentin to use PRN Problem list name updated by automated process. Provider to review     " "Diverticulosis of colon (without mention of hemorrhage)     history of recurrent diverticulitis     Hypertension      Past Surgical History:   Procedure Laterality Date     C APPENDECTOMY       COLONOSCOPY  04/15/02     COLONOSCOPY  3/25/2013    Procedure: COLONOSCOPY;  Colonoscopy  ;  Surgeon: Jamil Porras MD;  Location: [facility]     FLEXIBLE SIGMOIDOSCOPY  07/29/96     ORTHOPEDIC SURGERY  22-23 YEARS AGO    BACK     SURGICAL HISTORY OF -       lumbar surgery     Current Outpatient Prescriptions   Medication Sig Dispense Refill     amoxicillin-clavulanate (AUGMENTIN) 875-125 MG per tablet 1 TABLET EVERY 12 HOURS WITH FOOD UNTIL GONE -- to be started in event of recurrent diverticular symptoms 20 tablet 2     atenolol (TENORMIN) 100 MG tablet Take 1 tablet (100 mg) by mouth daily (Needs follow-up appointment for this medication) 90 tablet 3     escitalopram (LEXAPRO) 10 MG tablet Take 1 tablet (10 mg) by mouth daily (Needs follow-up appointment for this medication) 90 tablet 3     losartan-hydrochlorothiazide (HYZAAR) 100-12.5 MG per tablet Take 1 tablet by mouth daily 90 tablet 3     cholecalciferol (VITAMIN D) 1000 UNIT tablet Take 1 tablet by mouth daily.       FISH OIL OR 1 tab daily       MULTI-VITAMIN OR TABS 1 DAILY       OTC products: None, except as noted above    Allergies   Allergen Reactions     Lisinopril Cough      Latex Allergy: NO    Social History   Substance Use Topics     Smoking status: Never Smoker     Smokeless tobacco: Never Used     Alcohol use Yes      Comment: rare     History   Drug Use No       REVIEW OF SYSTEMS:                                                    Constitutional, neuro, ENT, endocrine, pulmonary, cardiac, gastrointestinal, genitourinary, musculoskeletal, integument and psychiatric systems are negative, except as otherwise noted.    EXAM:                                                    /76  Pulse 76  Temp 97.3  F (36.3  C) (Tympanic)  Resp 16  Ht 5' 7.5\" " (1.715 m)  Wt 189 lb (85.7 kg)  BMI 29.16 kg/m2    GENERAL APPEARANCE: healthy, alert and no distress     EYES: EOMI, PERRL     HENT: ear canals and TM's normal and nose and mouth without ulcers or lesions     NECK: no adenopathy, no asymmetry, masses, or scars and thyroid normal to palpation     RESP: lungs clear to auscultation - no rales, rhonchi or wheezes     CV: regular rates and rhythm, normal S1 S2, no S3 or S4 and no murmur, click or rub     ABDOMEN:  soft, nontender, no HSM or masses and bowel sounds normal     MS: extremities normal- no gross deformities noted, no evidence of inflammation in joints, FROM in all extremities.     SKIN: no suspicious lesions or rashes     NEURO: Normal strength and tone, sensory exam grossly normal, mentation intact and speech normal     PSYCH: mentation appears normal. and affect normal/bright     LYMPHATICS: No axillary, cervical, or supraclavicular nodes    DIAGNOSTICS:                                                      EKG: appears normal, NSR, normal axis, normal intervals, no acute ST/T changes c/w ischemia, no LVH by voltage criteria, Left Bundle Branch Block, unchanged from previous tracings  Labs Resulted Today:   Results for orders placed or performed in visit on 08/15/17   Strep, Rapid Screen   Result Value Ref Range    Specimen Description Throat     Rapid Strep A Screen       NEGATIVE: No Group A streptococcal antigen detected by immunoassay, await culture report.   Beta strep group A culture   Result Value Ref Range    Specimen Description Throat     Culture Micro No beta hemolytic Streptococcus Group A isolated        Recent Labs   Lab Test  04/25/17   1424  03/14/16   1048   01/30/14   1234   HGB   --    --    --   14.3   PLT   --    --    --   273   NA  138  139   < >  135   POTASSIUM  4.4  4.6   < >  4.8   CR  0.91  0.68   < >  0.76    < > = values in this interval not displayed.        IMPRESSION:                                                   "  Reason for surgery/procedure: right knee replacement  Diagnosis/reason for consult: Preoperative H&P     The proposed surgical procedure is considered INTERMEDIATE risk.    REVISED CARDIAC RISK INDEX  The patient has the following serious cardiovascular risks for perioperative complications such as (MI, PE, VFib and 3  AV Block):  No serious cardiac risks  INTERPRETATION: 0 risks: Class I (very low risk - 0.4% complication rate)    The patient has the following additional risks for perioperative complications:  Hypertension       ICD-10-CM    1. Preop general physical exam Z01.818    2. Osteoarthritis of right knee, unspecified osteoarthritis type M17.11    3. Essential hypertension, benign I10    4. Recurrent diverticulitis K57.32 amoxicillin-clavulanate (AUGMENTIN) 875-125 MG per tablet     CBC with platelets differential history, is not entirely clear that she is having multiple bouts of diverticulitis every year.  I recommended that with her next episode of typical \"diverticulitis\" symptoms that recheck a CBC and possibly abdominal CT to confirm diagnosis, rather than continue to have her take multiple rounds of antibiotics every few months.   5.  Asymptomatic left bundle branch block       given that she is currently asymptomatic, no additional treatment is needed at this time.  Monitor for symptoms of arrhythmia including lightheadedness, dizziness, palpitations, syncope, chest pain, shortness of breath.  Follow-up if any symptoms occur for further workup and evaluation.    RECOMMENDATIONS:                                                          --Patient is to take all scheduled medications on the day of surgery EXCEPT for modifications listed below.    Stop fish oil 2 weeks prior to surgery    ACE Inhibitor or Angiotensin Receptor Blocker (ARB) Use  Ace inhibitor or Angiotensin Receptor Blocker (ARB) and should HOLD this medication for the 24 hours prior to surgery.      APPROVAL GIVEN to proceed with " proposed procedure, without further diagnostic evaluation       Signed Electronically by: Dewayne Santana MD    Copy of this evaluation report is provided to requesting physician.    [place] Preop Guidelines

## 2018-02-09 NOTE — NURSING NOTE
"Chief Complaint   Patient presents with     Pre-Op Exam       Initial /76  Pulse 76  Temp 97.3  F (36.3  C) (Tympanic)  Resp 16  Ht 5' 7.5\" (1.715 m)  Wt 189 lb (85.7 kg)  BMI 29.16 kg/m2 Estimated body mass index is 29.16 kg/(m^2) as calculated from the following:    Height as of this encounter: 5' 7.5\" (1.715 m).    Weight as of this encounter: 189 lb (85.7 kg).  Medication Reconciliation: complete    "

## 2018-02-09 NOTE — PATIENT INSTRUCTIONS
Hold Hyzaar (losartan-HCTZ) AM of surgery.    Before Your Surgery      Call your surgeon if there is any change in your health. This includes signs of a cold or flu (such as a sore throat, runny nose, cough, rash or fever).    Do not smoke, drink alcohol or take over the counter medicine (unless your surgeon or primary care doctor tells you to) for the 24 hours before and after surgery.    If you take prescribed drugs: Follow your doctor s orders about which medicines to take and which to stop until after surgery.    Eating and drinking prior to surgery: follow the instructions from your surgeon    Take a shower or bath the night before surgery. Use the soap your surgeon gave you to gently clean your skin. If you do not have soap from your surgeon, use your regular soap. Do not shave or scrub the surgery site.  Wear clean pajamas and have clean sheets on your bed.

## 2018-02-09 NOTE — MR AVS SNAPSHOT
After Visit Summary   2/9/2018    Bonita Villarreal    MRN: [id number]           Patient Information     Date Of Birth          1935        Visit Information        Provider Department      2/9/2018 9:00 AM Dewayne Santana [facility]        Today's Diagnoses     Preop general physical exam    -  1    Osteoarthritis of right knee, unspecified osteoarthritis type        Essential hypertension, benign        Recurrent diverticulitis          Care Instructions    Hold Hyzaar (losartan-HCTZ) AM of surgery.    Before Your Surgery      Call your surgeon if there is any change in your health. This includes signs of a cold or flu (such as a sore throat, runny nose, cough, rash or fever).    Do not smoke, drink alcohol or take over the counter medicine (unless your surgeon or primary care doctor tells you to) for the 24 hours before and after surgery.    If you take prescribed drugs: Follow your doctor s orders about which medicines to take and which to stop until after surgery.    Eating and drinking prior to surgery: follow the instructions from your surgeon    Take a shower or bath the night before surgery. Use the soap your surgeon gave you to gently clean your skin. If you do not have soap from your surgeon, use your regular soap. Do not shave or scrub the surgery site.  Wear clean pajamas and have clean sheets on your bed.           Follow-ups after your visit        Your next 10 appointments already scheduled     Feb 28, 2018   Procedure with Anton Grover MD   [facility] (--)    [facility] [facility] [address]   [phone]           The medical center is located at [address] (between [address] in [place], [place] north of [place]).              Future tests that were ordered for you today     Open Future Orders        Priority Expected Expires Ordered    CBC with platelets differential Routine  2/9/2019  "2018            Who to contact     If you have questions or need follow up information about today's clinic visit or your schedule please contact [facility] directly at [phone].  Normal or non-critical lab and imaging results will be communicated to you by MyChart, letter or phone within 4 business days after the clinic has received the results. If you do not hear from us within 7 days, please contact the clinic through MyChart or phone. If you have a critical or abnormal lab result, we will notify you by phone as soon as possible.  Submit refill requests through [employer] or call your pharmacy and they will forward the refill request to us. Please allow 3 business days for your refill to be completed.          Additional Information About Your Visit        "[employer] Information     [employer] lets you send messages to your doctor, view your test results, renew your prescriptions, schedule appointments and more. To sign up, go to [email or web address] . Click on \"Log in\" on the left side of the screen, which will take you to the Welcome page. Then click on \"Sign up Now\" on the right side of the page.     You will be asked to enter the access code listed below, as well as some personal information. Please follow the directions to create your username and password.     Your access code is: [email or web address]  Expires: 5/10/2018  9:54 AM     Your access code will  in 90 days. If you need help or a new code, please call your [facility] or [phone].        Care EveryWhere ID     This is your Care EveryWhere ID. This could be used by other organizations to access your [facility] medical records  [id number]        Your Vitals Were     Pulse Temperature Respirations Height BMI (Body Mass Index)       76 97.3  F (36.3  C) (Tympanic) 16 5' 7.5\" (1.715 m) 29.16 kg/m2        Blood Pressure from Last 3 Encounters:   18 133/76   10/20/17 130/67   08/15/17 125/75    Weight from Last 3 " Encounters:   02/09/18 189 lb (85.7 kg)   10/20/17 194 lb 6.4 oz (88.2 kg)   08/15/17 188 lb (85.3 kg)              We Performed the Following     Basic metabolic panel     EKG 12-lead complete w/read - Clinics          Where to get your medicines      [facility] [place] [address]  [address], [place] [address]    [facility]:  [facility] Phone:  [phone]     amoxicillin-clavulanate 875-125 MG per tablet          Primary Care Provider Office Phone # Fax #    Buzz[facility] Lara Santana [phone] [address] [facility] [address]        Equal Access to Services     [facility]: Hadii aura collins hadasho Soomaali, waaxda luqadaha, qaybta kaalmada adeegyada, waxyessica chung . So [facility] [phone].    ATENCIÓN: Si habla español, tiene a hylton disposición servicios gratuitos de asistencia lingüística. Mika al [phone].    We comply with applicable federal civil rights laws and [place] laws. We do not discriminate on the basis of race, color, national origin, age, disability, sex, sexual orientation, or gender identity.            Thank you!     Thank you for choosing [facility]  for your care. Our goal is always to provide you with excellent care. Hearing back from our patients is one way we can continue to improve our services. Please take a few minutes to complete the written survey that you may receive in the mail after your visit with us. Thank you!             Your Updated Medication List - Protect others around you: Learn how to safely use, store and throw away your medicines at www.disposemymeds.org.          This list is accurate as of 2/9/18  9:54 AM.  Always use your most recent med list.                   Brand Name Dispense Instructions for use Diagnosis    amoxicillin-clavulanate 875-125 MG per tablet    AUGMENTIN    20 tablet    1 TABLET EVERY  12 HOURS WITH FOOD UNTIL GONE -- to be started in event of recurrent diverticular symptoms    Diverticulitis of large intestine without perforation or abscess without bleeding       atenolol 100 MG tablet    TENORMIN    90 tablet    Take 1 tablet (100 mg) by mouth daily (Needs follow-up appointment for this medication)    Essential hypertension, benign       cholecalciferol 1000 UNIT tablet    vitamin D3     Take 1 tablet by mouth daily.        escitalopram 10 MG tablet    LEXAPRO    90 tablet    Take 1 tablet (10 mg) by mouth daily (Needs follow-up appointment for this medication)    Generalized anxiety disorder       FISH OIL PO      1 tab daily        losartan-hydrochlorothiazide 100-12.5 MG per tablet    HYZAAR    90 tablet    Take 1 tablet by mouth daily    Essential hypertension, benign       Multi-vitamin Tabs tablet   Generic drug:  multivitamin, therapeutic with minerals      1 DAILY

## 2018-02-09 NOTE — LETTER
[facility]  [address]  [facility] [address]  Phone: [phone]      2/15/2018     Bonita Villarreal  [address] [place] [address]      Dear Bonita:    Thank you for allowing me to participate in your care. Your recent test results were reviewed and listed below.  Blood sugar is above optimum but not in diabetic range.  Limiting dietary sugar and carbohydrates and getting regular exercise can help improve this.  Monitor yearly. Otherwise labs look good.     Your results are provided below for your review  Results for orders placed or performed in visit on 02/09/18   Basic metabolic panel   Result Value Ref Range    Sodium 138 133 - 144 mmol/L    Potassium 4.1 3.4 - 5.3 mmol/L    Chloride 104 94 - 109 mmol/L    Carbon Dioxide 28 20 - 32 mmol/L    Anion Gap 6 3 - 14 mmol/L    Glucose 100 (H) 70 - 99 mg/dL    Urea Nitrogen 20 7 - 30 mg/dL    Creatinine 0.83 0.52 - 1.04 mg/dL    GFR Estimate 66 >60 mL/min/1.7m2    GFR Estimate If Black 80 >60 mL/min/1.7m2    Calcium 9.0 8.5 - 10.1 mg/dL                 Thank you for choosing [facility]. As a result, please continue with the treatment plan discussed in the office. Return as discussed or sooner if symptoms worsen or fail to improve. If you have any further questions or concerns, please do not hesitate to contact us.      Sincerely,        Dr. Dewayne Santana

## 2018-02-14 NOTE — PROGRESS NOTES
Please mail letter: Blood sugar is above optimum but not in diabetic range.  Limiting dietary sugar and carbohydrates and getting regular exercise can help improve this.  Monitor yearly. Otherwise labs look good.

## 2018-02-22 DIAGNOSIS — K57.32 DIVERTICULITIS OF LARGE INTESTINE WITHOUT PERFORATION OR ABSCESS WITHOUT BLEEDING: Primary | ICD-10-CM

## 2018-02-22 LAB
BASOPHILS # BLD AUTO: 0 10E9/L (ref 0–0.2)
BASOPHILS NFR BLD AUTO: 0.2 %
DIFFERENTIAL METHOD BLD: NORMAL
EOSINOPHIL # BLD AUTO: 0.2 10E9/L (ref 0–0.7)
EOSINOPHIL NFR BLD AUTO: 2.7 %
ERYTHROCYTE [DISTWIDTH] IN BLOOD BY AUTOMATED COUNT: 13 % (ref 10–15)
HCT VFR BLD AUTO: 41.2 % (ref 35–47)
HGB BLD-MCNC: 13.4 G/DL (ref 11.7–15.7)
LYMPHOCYTES # BLD AUTO: 2.3 10E9/L (ref 0.8–5.3)
LYMPHOCYTES NFR BLD AUTO: 27.8 %
MCH RBC QN AUTO: 30.3 PG (ref 26.5–33)
MCHC RBC AUTO-ENTMCNC: 32.5 G/DL (ref 31.5–36.5)
MCV RBC AUTO: 93 FL (ref 78–100)
MONOCYTES # BLD AUTO: 0.9 10E9/L (ref 0–1.3)
MONOCYTES NFR BLD AUTO: 11.4 %
NEUTROPHILS # BLD AUTO: 4.7 10E9/L (ref 1.6–8.3)
NEUTROPHILS NFR BLD AUTO: 57.9 %
PLATELET # BLD AUTO: 301 10E9/L (ref 150–450)
RBC # BLD AUTO: 4.42 10E12/L (ref 3.8–5.2)
WBC # BLD AUTO: 8.2 10E9/L (ref 4–11)

## 2018-02-22 PROCEDURE — 36415 COLL VENOUS BLD VENIPUNCTURE: CPT | Performed by: FAMILY MEDICINE

## 2018-02-22 PROCEDURE — 85025 COMPLETE CBC W/AUTO DIFF WBC: CPT | Performed by: FAMILY MEDICINE

## 2018-02-27 ENCOUNTER — TRANSFERRED RECORDS (OUTPATIENT)
Dept: HEALTH INFORMATION MANAGEMENT | Facility: CLINIC | Age: 83
End: 2018-02-27

## 2018-02-27 ENCOUNTER — ANESTHESIA EVENT (OUTPATIENT)
Dept: SURGERY | Facility: CLINIC | Age: 83
DRG: 470 | End: 2018-02-27
Payer: MEDICARE

## 2018-02-27 NOTE — ANESTHESIA PREPROCEDURE EVALUATION
Anesthesia Evaluation     . Pt has had prior anesthetic.            ROS/MED HX    ENT/Pulmonary:  - neg pulmonary ROS     Neurologic:  - neg neurologic ROS     Cardiovascular:     (+) Dyslipidemia, hypertension----. : . . . :. . Previous cardiac testing date:results:date: results:ECG reviewed date:2/918 results:Sinus Rhythm   -Left bundle branch block.     ABNORMAL date: results:          METS/Exercise Tolerance:  >4 METS   Hematologic:  - neg hematologic  ROS       Musculoskeletal:   (+) arthritis, , , other musculoskeletal- Osteopenia      GI/Hepatic:     (+) Inflammatory bowel disease,       Renal/Genitourinary:  - ROS Renal section negative       Endo:  - neg endo ROS       Psychiatric:     (+) psychiatric history anxiety      Infectious Disease:  - neg infectious disease ROS       Malignancy:      - no malignancy   Other:    - neg other ROS                 Physical Exam  Normal systems: cardiovascular, pulmonary and dental    Airway   Mallampati: II  TM distance: >3 FB  Neck ROM: full    Dental     Cardiovascular       Pulmonary                     Anesthesia Plan      History & Physical Review  History and physical reviewed and following examination; no interval change.    ASA Status:  2 .    NPO Status:  > 6 hours    Plan for Spinal with Intravenous and Propofol induction.   PONV prophylaxis:  Ondansetron (or other 5HT-3) and Dexamethasone or Solumedrol       Postoperative Care  Postoperative pain management:  IV analgesics, Oral pain medications and Multi-modal analgesia.      Consents  Anesthetic plan, risks, benefits and alternatives discussed with:  Patient..                          .

## 2018-02-28 ENCOUNTER — ANESTHESIA (OUTPATIENT)
Dept: SURGERY | Facility: CLINIC | Age: 83
DRG: 470 | End: 2018-02-28
Payer: MEDICARE

## 2018-02-28 ENCOUNTER — HOSPITAL ENCOUNTER (INPATIENT)
Facility: CLINIC | Age: 83
LOS: 2 days | Discharge: HOME | DRG: 470 | End: 2018-03-02
Attending: ORTHOPAEDIC SURGERY | Admitting: ORTHOPAEDIC SURGERY
Payer: MEDICARE

## 2018-02-28 ENCOUNTER — APPOINTMENT (OUTPATIENT)
Dept: GENERAL RADIOLOGY | Facility: CLINIC | Age: 83
DRG: 470 | End: 2018-02-28
Attending: ORTHOPAEDIC SURGERY
Payer: MEDICARE

## 2018-02-28 DIAGNOSIS — Z96.651 STATUS POST TOTAL RIGHT KNEE REPLACEMENT: Primary | ICD-10-CM

## 2018-02-28 PROBLEM — M17.11 RIGHT KNEE DJD: Status: ACTIVE | Noted: 2018-02-28

## 2018-02-28 LAB
BASOPHILS # BLD AUTO: 0 10E9/L (ref 0–0.2)
BASOPHILS NFR BLD AUTO: 0.1 %
DIFFERENTIAL METHOD BLD: NORMAL
EOSINOPHIL # BLD AUTO: 0.1 10E9/L (ref 0–0.7)
EOSINOPHIL NFR BLD AUTO: 1.6 %
ERYTHROCYTE [DISTWIDTH] IN BLOOD BY AUTOMATED COUNT: 12.5 % (ref 10–15)
HCT VFR BLD AUTO: 40.9 % (ref 35–47)
HGB BLD-MCNC: 13.7 G/DL (ref 11.7–15.7)
IMM GRANULOCYTES # BLD: 0 10E9/L (ref 0–0.4)
IMM GRANULOCYTES NFR BLD: 0.3 %
INR PPP: 0.94 (ref 0.86–1.14)
LYMPHOCYTES # BLD AUTO: 1.3 10E9/L (ref 0.8–5.3)
LYMPHOCYTES NFR BLD AUTO: 18.9 %
MCH RBC QN AUTO: 30.9 PG (ref 26.5–33)
MCHC RBC AUTO-ENTMCNC: 33.5 G/DL (ref 31.5–36.5)
MCV RBC AUTO: 92 FL (ref 78–100)
MONOCYTES # BLD AUTO: 0.6 10E9/L (ref 0–1.3)
MONOCYTES NFR BLD AUTO: 9.4 %
NEUTROPHILS # BLD AUTO: 4.6 10E9/L (ref 1.6–8.3)
NEUTROPHILS NFR BLD AUTO: 69.7 %
PLATELET # BLD AUTO: 280 10E9/L (ref 150–450)
RBC # BLD AUTO: 4.44 10E12/L (ref 3.8–5.2)
WBC # BLD AUTO: 6.7 10E9/L (ref 4–11)

## 2018-02-28 PROCEDURE — 25000132 ZZH RX MED GY IP 250 OP 250 PS 637: Mod: GY | Performed by: NURSE ANESTHETIST, CERTIFIED REGISTERED

## 2018-02-28 PROCEDURE — 25000125 ZZHC RX 250: Performed by: PHYSICIAN ASSISTANT

## 2018-02-28 PROCEDURE — A9270 NON-COVERED ITEM OR SERVICE: HCPCS | Mod: GY | Performed by: NURSE ANESTHETIST, CERTIFIED REGISTERED

## 2018-02-28 PROCEDURE — 25000128 H RX IP 250 OP 636: Performed by: PHYSICIAN ASSISTANT

## 2018-02-28 PROCEDURE — 40000305 ZZH STATISTIC PRE PROC ASSESS I: Performed by: ORTHOPAEDIC SURGERY

## 2018-02-28 PROCEDURE — 25000128 H RX IP 250 OP 636: Performed by: NURSE ANESTHETIST, CERTIFIED REGISTERED

## 2018-02-28 PROCEDURE — 27810169 ZZH OR IMPLANT GENERAL: Performed by: ORTHOPAEDIC SURGERY

## 2018-02-28 PROCEDURE — 37000008 ZZH ANESTHESIA TECHNICAL FEE, 1ST 30 MIN: Performed by: ORTHOPAEDIC SURGERY

## 2018-02-28 PROCEDURE — 25800025 ZZH RX 258: Performed by: ORTHOPAEDIC SURGERY

## 2018-02-28 PROCEDURE — A9270 NON-COVERED ITEM OR SERVICE: HCPCS | Mod: GY | Performed by: ORTHOPAEDIC SURGERY

## 2018-02-28 PROCEDURE — 71000012 ZZH RECOVERY PHASE 1 LEVEL 1 FIRST HR: Performed by: ORTHOPAEDIC SURGERY

## 2018-02-28 PROCEDURE — 71000013 ZZH RECOVERY PHASE 1 LEVEL 1 EA ADDTL HR: Performed by: ORTHOPAEDIC SURGERY

## 2018-02-28 PROCEDURE — 27110028 ZZH OR GENERAL SUPPLY NON-STERILE: Performed by: ORTHOPAEDIC SURGERY

## 2018-02-28 PROCEDURE — 25000132 ZZH RX MED GY IP 250 OP 250 PS 637: Mod: GY | Performed by: ORTHOPAEDIC SURGERY

## 2018-02-28 PROCEDURE — C1776 JOINT DEVICE (IMPLANTABLE): HCPCS | Performed by: ORTHOPAEDIC SURGERY

## 2018-02-28 PROCEDURE — 25000125 ZZHC RX 250: Performed by: NURSE ANESTHETIST, CERTIFIED REGISTERED

## 2018-02-28 PROCEDURE — 85025 COMPLETE CBC W/AUTO DIFF WBC: CPT | Performed by: PHYSICIAN ASSISTANT

## 2018-02-28 PROCEDURE — 12000000 ZZH R&B MED SURG/OB

## 2018-02-28 PROCEDURE — 85610 PROTHROMBIN TIME: CPT | Performed by: PHYSICIAN ASSISTANT

## 2018-02-28 PROCEDURE — 25000128 H RX IP 250 OP 636: Performed by: ORTHOPAEDIC SURGERY

## 2018-02-28 PROCEDURE — 25000132 ZZH RX MED GY IP 250 OP 250 PS 637: Mod: GY | Performed by: PHYSICIAN ASSISTANT

## 2018-02-28 PROCEDURE — 36000093 ZZH SURGERY LEVEL 4 1ST 30 MIN: Performed by: ORTHOPAEDIC SURGERY

## 2018-02-28 PROCEDURE — 36000063 ZZH SURGERY LEVEL 4 EA 15 ADDTL MIN: Performed by: ORTHOPAEDIC SURGERY

## 2018-02-28 PROCEDURE — 37000009 ZZH ANESTHESIA TECHNICAL FEE, EACH ADDTL 15 MIN: Performed by: ORTHOPAEDIC SURGERY

## 2018-02-28 PROCEDURE — A9270 NON-COVERED ITEM OR SERVICE: HCPCS | Mod: GY | Performed by: PHYSICIAN ASSISTANT

## 2018-02-28 PROCEDURE — 27210794 ZZH OR GENERAL SUPPLY STERILE: Performed by: ORTHOPAEDIC SURGERY

## 2018-02-28 PROCEDURE — 40000986 XR KNEE PORT RT 1/2 VW: Mod: RT

## 2018-02-28 PROCEDURE — 0SRC0J9 REPLACEMENT OF RIGHT KNEE JOINT WITH SYNTHETIC SUBSTITUTE, CEMENTED, OPEN APPROACH: ICD-10-PCS | Performed by: ORTHOPAEDIC SURGERY

## 2018-02-28 PROCEDURE — 36415 COLL VENOUS BLD VENIPUNCTURE: CPT | Performed by: PHYSICIAN ASSISTANT

## 2018-02-28 DEVICE — IMP COMP FEM STRK TRIATHLN CR RT 5 5510-F-502: Type: IMPLANTABLE DEVICE | Site: KNEE | Status: FUNCTIONAL

## 2018-02-28 DEVICE — BONE CEMENT PALACOS 00-1112-140-01: Type: IMPLANTABLE DEVICE | Site: KNEE | Status: FUNCTIONAL

## 2018-02-28 DEVICE — IMP INSERT TIBIAL HOWM TRI 4X11MM 5530-G-411: Type: IMPLANTABLE DEVICE | Site: KNEE | Status: FUNCTIONAL

## 2018-02-28 DEVICE — IMP BASEPLATE TIBIAL HOWM TRI 4 5520-B-400: Type: IMPLANTABLE DEVICE | Site: KNEE | Status: FUNCTIONAL

## 2018-02-28 DEVICE — IMP COMP PATELLA HOWM TRI ASYM 38X11MM 5551-G-381: Type: IMPLANTABLE DEVICE | Site: KNEE | Status: FUNCTIONAL

## 2018-02-28 RX ORDER — ATENOLOL 100 MG/1
100 TABLET ORAL DAILY
Status: DISCONTINUED | OUTPATIENT
Start: 2018-03-01 | End: 2018-03-02 | Stop reason: HOSPADM

## 2018-02-28 RX ORDER — LIDOCAINE 40 MG/G
CREAM TOPICAL
Status: DISCONTINUED | OUTPATIENT
Start: 2018-02-28 | End: 2018-02-28 | Stop reason: HOSPADM

## 2018-02-28 RX ORDER — AMOXICILLIN 250 MG
1 CAPSULE ORAL 2 TIMES DAILY PRN
Status: DISCONTINUED | OUTPATIENT
Start: 2018-02-28 | End: 2018-03-02 | Stop reason: HOSPADM

## 2018-02-28 RX ORDER — NALOXONE HYDROCHLORIDE 0.4 MG/ML
.1-.4 INJECTION, SOLUTION INTRAMUSCULAR; INTRAVENOUS; SUBCUTANEOUS
Status: DISCONTINUED | OUTPATIENT
Start: 2018-02-28 | End: 2018-03-02 | Stop reason: HOSPADM

## 2018-02-28 RX ORDER — ACETAMINOPHEN 500 MG
1000 TABLET ORAL ONCE
Status: COMPLETED | OUTPATIENT
Start: 2018-02-28 | End: 2018-02-28

## 2018-02-28 RX ORDER — ONDANSETRON 2 MG/ML
4 INJECTION INTRAMUSCULAR; INTRAVENOUS EVERY 30 MIN PRN
Status: DISCONTINUED | OUTPATIENT
Start: 2018-02-28 | End: 2018-02-28 | Stop reason: HOSPADM

## 2018-02-28 RX ORDER — LIDOCAINE 40 MG/G
CREAM TOPICAL
Status: DISCONTINUED | OUTPATIENT
Start: 2018-02-28 | End: 2018-03-02 | Stop reason: HOSPADM

## 2018-02-28 RX ORDER — ONDANSETRON 4 MG/1
4 TABLET, ORALLY DISINTEGRATING ORAL EVERY 30 MIN PRN
Status: DISCONTINUED | OUTPATIENT
Start: 2018-02-28 | End: 2018-02-28 | Stop reason: HOSPADM

## 2018-02-28 RX ORDER — SODIUM CHLORIDE, SODIUM LACTATE, POTASSIUM CHLORIDE, CALCIUM CHLORIDE 600; 310; 30; 20 MG/100ML; MG/100ML; MG/100ML; MG/100ML
INJECTION, SOLUTION INTRAVENOUS CONTINUOUS
Status: DISCONTINUED | OUTPATIENT
Start: 2018-02-28 | End: 2018-02-28 | Stop reason: HOSPADM

## 2018-02-28 RX ORDER — FENTANYL CITRATE 50 UG/ML
INJECTION, SOLUTION INTRAMUSCULAR; INTRAVENOUS PRN
Status: DISCONTINUED | OUTPATIENT
Start: 2018-02-28 | End: 2018-02-28

## 2018-02-28 RX ORDER — BUPIVACAINE HYDROCHLORIDE 7.5 MG/ML
INJECTION, SOLUTION INTRASPINAL PRN
Status: DISCONTINUED | OUTPATIENT
Start: 2018-02-28 | End: 2018-02-28

## 2018-02-28 RX ORDER — DIMENHYDRINATE 50 MG/ML
25 INJECTION, SOLUTION INTRAMUSCULAR; INTRAVENOUS
Status: DISCONTINUED | OUTPATIENT
Start: 2018-02-28 | End: 2018-02-28 | Stop reason: HOSPADM

## 2018-02-28 RX ORDER — FENTANYL CITRATE 50 UG/ML
25-50 INJECTION, SOLUTION INTRAMUSCULAR; INTRAVENOUS
Status: DISCONTINUED | OUTPATIENT
Start: 2018-02-28 | End: 2018-02-28 | Stop reason: HOSPADM

## 2018-02-28 RX ORDER — LOSARTAN POTASSIUM AND HYDROCHLOROTHIAZIDE 12.5; 1 MG/1; MG/1
1 TABLET ORAL DAILY
Status: DISCONTINUED | OUTPATIENT
Start: 2018-03-01 | End: 2018-02-28 | Stop reason: RX

## 2018-02-28 RX ORDER — NAPROXEN 250 MG/1
250 TABLET ORAL EVERY 12 HOURS PRN
Status: DISCONTINUED | OUTPATIENT
Start: 2018-02-28 | End: 2018-03-02 | Stop reason: HOSPADM

## 2018-02-28 RX ORDER — LIDOCAINE HYDROCHLORIDE 10 MG/ML
INJECTION, SOLUTION INFILTRATION; PERINEURAL PRN
Status: DISCONTINUED | OUTPATIENT
Start: 2018-02-28 | End: 2018-02-28

## 2018-02-28 RX ORDER — HYDROCODONE BITARTRATE AND ACETAMINOPHEN 5; 325 MG/1; MG/1
1-2 TABLET ORAL EVERY 4 HOURS PRN
Status: DISCONTINUED | OUTPATIENT
Start: 2018-02-28 | End: 2018-03-02 | Stop reason: HOSPADM

## 2018-02-28 RX ORDER — MEPERIDINE HYDROCHLORIDE 25 MG/ML
12.5 INJECTION INTRAMUSCULAR; INTRAVENOUS; SUBCUTANEOUS EVERY 5 MIN PRN
Status: DISCONTINUED | OUTPATIENT
Start: 2018-02-28 | End: 2018-02-28 | Stop reason: HOSPADM

## 2018-02-28 RX ORDER — ZOLPIDEM TARTRATE 5 MG/1
5 TABLET ORAL
Status: DISCONTINUED | OUTPATIENT
Start: 2018-03-01 | End: 2018-03-02 | Stop reason: HOSPADM

## 2018-02-28 RX ORDER — GABAPENTIN 100 MG/1
100 CAPSULE ORAL
Status: COMPLETED | OUTPATIENT
Start: 2018-02-28 | End: 2018-02-28

## 2018-02-28 RX ORDER — DEXAMETHASONE SODIUM PHOSPHATE 4 MG/ML
4 INJECTION, SOLUTION INTRA-ARTICULAR; INTRALESIONAL; INTRAMUSCULAR; INTRAVENOUS; SOFT TISSUE
Status: DISCONTINUED | OUTPATIENT
Start: 2018-02-28 | End: 2018-02-28 | Stop reason: HOSPADM

## 2018-02-28 RX ORDER — CEFAZOLIN SODIUM 2 G/100ML
2 INJECTION, SOLUTION INTRAVENOUS
Status: COMPLETED | OUTPATIENT
Start: 2018-02-28 | End: 2018-02-28

## 2018-02-28 RX ORDER — ACETAMINOPHEN 325 MG/1
975 TABLET ORAL ONCE
Status: DISCONTINUED | OUTPATIENT
Start: 2018-02-28 | End: 2018-02-28 | Stop reason: HOSPADM

## 2018-02-28 RX ORDER — ONDANSETRON 2 MG/ML
INJECTION INTRAMUSCULAR; INTRAVENOUS PRN
Status: DISCONTINUED | OUTPATIENT
Start: 2018-02-28 | End: 2018-02-28

## 2018-02-28 RX ORDER — HYDROMORPHONE HYDROCHLORIDE 1 MG/ML
.3-.5 INJECTION, SOLUTION INTRAMUSCULAR; INTRAVENOUS; SUBCUTANEOUS
Status: DISCONTINUED | OUTPATIENT
Start: 2018-02-28 | End: 2018-03-02 | Stop reason: HOSPADM

## 2018-02-28 RX ORDER — GABAPENTIN 100 MG/1
100 CAPSULE ORAL 3 TIMES DAILY
Status: DISCONTINUED | OUTPATIENT
Start: 2018-02-28 | End: 2018-03-02 | Stop reason: HOSPADM

## 2018-02-28 RX ORDER — HYDROXYZINE HYDROCHLORIDE 10 MG/1
10 TABLET, FILM COATED ORAL EVERY 6 HOURS PRN
Status: DISCONTINUED | OUTPATIENT
Start: 2018-02-28 | End: 2018-03-02 | Stop reason: HOSPADM

## 2018-02-28 RX ORDER — ONDANSETRON 2 MG/ML
4 INJECTION INTRAMUSCULAR; INTRAVENOUS EVERY 6 HOURS PRN
Status: DISCONTINUED | OUTPATIENT
Start: 2018-02-28 | End: 2018-03-02 | Stop reason: HOSPADM

## 2018-02-28 RX ORDER — HYDROXYZINE HYDROCHLORIDE 25 MG/1
25 TABLET, FILM COATED ORAL
Status: DISCONTINUED | OUTPATIENT
Start: 2018-02-28 | End: 2018-02-28 | Stop reason: DRUGHIGH

## 2018-02-28 RX ORDER — HYDROCHLOROTHIAZIDE 25 MG/1
25 TABLET ORAL DAILY
Status: DISCONTINUED | OUTPATIENT
Start: 2018-03-01 | End: 2018-03-02 | Stop reason: HOSPADM

## 2018-02-28 RX ORDER — ACETAMINOPHEN 325 MG/1
975 TABLET ORAL ONCE
Status: DISCONTINUED | OUTPATIENT
Start: 2018-02-28 | End: 2018-02-28

## 2018-02-28 RX ORDER — HYDROMORPHONE HYDROCHLORIDE 1 MG/ML
.3-.5 INJECTION, SOLUTION INTRAMUSCULAR; INTRAVENOUS; SUBCUTANEOUS EVERY 5 MIN PRN
Status: DISCONTINUED | OUTPATIENT
Start: 2018-02-28 | End: 2018-02-28 | Stop reason: HOSPADM

## 2018-02-28 RX ORDER — LOSARTAN POTASSIUM 50 MG/1
100 TABLET ORAL DAILY
Status: DISCONTINUED | OUTPATIENT
Start: 2018-03-01 | End: 2018-03-02 | Stop reason: HOSPADM

## 2018-02-28 RX ORDER — CEFAZOLIN SODIUM 2 G/100ML
2 INJECTION, SOLUTION INTRAVENOUS EVERY 8 HOURS
Status: COMPLETED | OUTPATIENT
Start: 2018-03-01 | End: 2018-03-01

## 2018-02-28 RX ORDER — CELECOXIB 200 MG/1
200 CAPSULE ORAL ONCE
Status: DISCONTINUED | OUTPATIENT
Start: 2018-02-28 | End: 2018-02-28

## 2018-02-28 RX ORDER — ONDANSETRON 4 MG/1
4 TABLET, ORALLY DISINTEGRATING ORAL EVERY 6 HOURS PRN
Status: DISCONTINUED | OUTPATIENT
Start: 2018-02-28 | End: 2018-03-02 | Stop reason: HOSPADM

## 2018-02-28 RX ORDER — DEXAMETHASONE SODIUM PHOSPHATE 4 MG/ML
INJECTION, SOLUTION INTRA-ARTICULAR; INTRALESIONAL; INTRAMUSCULAR; INTRAVENOUS; SOFT TISSUE PRN
Status: DISCONTINUED | OUTPATIENT
Start: 2018-02-28 | End: 2018-02-28

## 2018-02-28 RX ORDER — NALOXONE HYDROCHLORIDE 0.4 MG/ML
.1-.4 INJECTION, SOLUTION INTRAMUSCULAR; INTRAVENOUS; SUBCUTANEOUS
Status: DISCONTINUED | OUTPATIENT
Start: 2018-02-28 | End: 2018-02-28

## 2018-02-28 RX ORDER — PROPOFOL 10 MG/ML
INJECTION, EMULSION INTRAVENOUS CONTINUOUS PRN
Status: DISCONTINUED | OUTPATIENT
Start: 2018-02-28 | End: 2018-02-28

## 2018-02-28 RX ORDER — AMOXICILLIN 250 MG
2 CAPSULE ORAL 2 TIMES DAILY PRN
Status: DISCONTINUED | OUTPATIENT
Start: 2018-02-28 | End: 2018-03-02 | Stop reason: HOSPADM

## 2018-02-28 RX ORDER — ESCITALOPRAM OXALATE 10 MG/1
10 TABLET ORAL DAILY
Status: DISCONTINUED | OUTPATIENT
Start: 2018-03-01 | End: 2018-03-02 | Stop reason: HOSPADM

## 2018-02-28 RX ORDER — CELECOXIB 200 MG/1
200 CAPSULE ORAL ONCE
Status: COMPLETED | OUTPATIENT
Start: 2018-02-28 | End: 2018-02-28

## 2018-02-28 RX ADMIN — ONDANSETRON 4 MG: 2 INJECTION INTRAMUSCULAR; INTRAVENOUS at 17:05

## 2018-02-28 RX ADMIN — BUPIVACAINE HYDROCHLORIDE IN DEXTROSE 1.6 ML: 7.5 INJECTION, SOLUTION SUBARACHNOID at 17:19

## 2018-02-28 RX ADMIN — EPINEPHRINE 0.2 MG: 0.1 INJECTION, SOLUTION ENDOTRACHEAL; INTRACARDIAC; INTRAVENOUS at 17:19

## 2018-02-28 RX ADMIN — FENTANYL CITRATE 100 MCG: 50 INJECTION, SOLUTION INTRAMUSCULAR; INTRAVENOUS at 17:07

## 2018-02-28 RX ADMIN — RANITIDINE 150 MG: 150 TABLET ORAL at 20:49

## 2018-02-28 RX ADMIN — SODIUM CHLORIDE, POTASSIUM CHLORIDE, SODIUM LACTATE AND CALCIUM CHLORIDE: 600; 310; 30; 20 INJECTION, SOLUTION INTRAVENOUS at 14:50

## 2018-02-28 RX ADMIN — PHENYLEPHRINE HYDROCHLORIDE 150 MCG: 10 INJECTION, SOLUTION INTRAMUSCULAR; INTRAVENOUS; SUBCUTANEOUS at 17:57

## 2018-02-28 RX ADMIN — SODIUM CHLORIDE, POTASSIUM CHLORIDE, SODIUM LACTATE AND CALCIUM CHLORIDE: 600; 310; 30; 20 INJECTION, SOLUTION INTRAVENOUS at 17:26

## 2018-02-28 RX ADMIN — GABAPENTIN 100 MG: 100 CAPSULE ORAL at 20:49

## 2018-02-28 RX ADMIN — DEXAMETHASONE SODIUM PHOSPHATE 4 MG: 4 INJECTION, SOLUTION INTRA-ARTICULAR; INTRALESIONAL; INTRAMUSCULAR; INTRAVENOUS; SOFT TISSUE at 17:22

## 2018-02-28 RX ADMIN — LIDOCAINE HYDROCHLORIDE 30 MG: 10 INJECTION, SOLUTION INFILTRATION; PERINEURAL at 17:14

## 2018-02-28 RX ADMIN — PHENYLEPHRINE HYDROCHLORIDE 150 MCG: 10 INJECTION, SOLUTION INTRAMUSCULAR; INTRAVENOUS; SUBCUTANEOUS at 18:06

## 2018-02-28 RX ADMIN — PHENYLEPHRINE HYDROCHLORIDE 150 MCG: 10 INJECTION, SOLUTION INTRAMUSCULAR; INTRAVENOUS; SUBCUTANEOUS at 18:14

## 2018-02-28 RX ADMIN — LIDOCAINE HYDROCHLORIDE 100 MG: 10 INJECTION, SOLUTION INFILTRATION; PERINEURAL at 17:22

## 2018-02-28 RX ADMIN — PHENYLEPHRINE HYDROCHLORIDE 100 MCG: 10 INJECTION, SOLUTION INTRAMUSCULAR; INTRAVENOUS; SUBCUTANEOUS at 17:28

## 2018-02-28 RX ADMIN — TRANEXAMIC ACID 1 G: 100 INJECTION, SOLUTION INTRAVENOUS at 17:27

## 2018-02-28 RX ADMIN — PHENYLEPHRINE HYDROCHLORIDE 200 MCG: 10 INJECTION, SOLUTION INTRAMUSCULAR; INTRAVENOUS; SUBCUTANEOUS at 17:36

## 2018-02-28 RX ADMIN — ACETAMINOPHEN 1000 MG: 500 TABLET ORAL at 14:13

## 2018-02-28 RX ADMIN — PHENYLEPHRINE HYDROCHLORIDE 200 MCG: 10 INJECTION, SOLUTION INTRAMUSCULAR; INTRAVENOUS; SUBCUTANEOUS at 18:43

## 2018-02-28 RX ADMIN — PHENYLEPHRINE HYDROCHLORIDE 100 MCG: 10 INJECTION, SOLUTION INTRAMUSCULAR; INTRAVENOUS; SUBCUTANEOUS at 18:22

## 2018-02-28 RX ADMIN — LIDOCAINE HYDROCHLORIDE 1 ML: 10 INJECTION, SOLUTION EPIDURAL; INFILTRATION; INTRACAUDAL; PERINEURAL at 14:50

## 2018-02-28 RX ADMIN — GABAPENTIN 100 MG: 100 CAPSULE ORAL at 14:13

## 2018-02-28 RX ADMIN — PHENYLEPHRINE HYDROCHLORIDE 150 MCG: 10 INJECTION, SOLUTION INTRAMUSCULAR; INTRAVENOUS; SUBCUTANEOUS at 17:42

## 2018-02-28 RX ADMIN — PROPOFOL 150 MCG/KG/MIN: 10 INJECTION, EMULSION INTRAVENOUS at 17:22

## 2018-02-28 RX ADMIN — CEFAZOLIN SODIUM 2 G: 2 INJECTION, SOLUTION INTRAVENOUS at 17:02

## 2018-02-28 RX ADMIN — CELECOXIB 200 MG: 200 CAPSULE ORAL at 14:13

## 2018-02-28 NOTE — ANESTHESIA PROCEDURE NOTES
Peripheral nerve/Neuraxial procedure note : intrathecal  Pre-Procedure  Performed by  ALBERTA CHAUDHARI   Location: OR      Pre-Anesthestic Checklist: patient identified, IV checked, risks and benefits discussed, informed consent, monitors and equipment checked and pre-op evaluation    Timeout  Correct Patient: Yes   Correct Procedure: Yes   Correct Site: Yes   Correct Laterality: N/A   Correct Position: Yes   Site Marked: N/A   .   Procedure Documentation  ASA 2  .    Procedure:    Intrathecal.  Insertion Site:L3-4  (midline approach)      Patient Prep;mask, sterile gloves, povidone-iodine 7.5% surgical scrub, patient draped.  .  Needle: Ellen tip Spinal Needle (gauge): 22  Spinal/LP Needle Length (inches): 3.5 # of attempts: 1 and  # of redirects:  2 Introducer used Introducer: 20 G .       Assessment/Narrative  .  .  clear CSF fluid removed while sitting   . Time Injected: 17:19

## 2018-02-28 NOTE — DISCHARGE INSTRUCTIONS
1.  Follow up with Dante Butler PA-C.  in 2 weeks for post op check and x rays as scheduled.  Call [phone] if appointment needed or questions  2.  Use pain medication as directed  3.  Keep incision clean, covered and dry until post op appointment.  You may shower and get incision wet if no drainage is present.  You may change your dressing as needed.  No additional adhesives to be put on knee (including bandages).  Only use dry  guaze over Prineo glue tape and then apply 4 inch ACE to hold dressings in place.   4.  Continue physical therapy as soon as possible.  You will need to call a therapy department of your choice to arrange future appointments.  Your order for physical therapy is included in your discharge paperwork.   5.  Take Aspirin 325 mg  daily  for 42 days for anticoagulation. If you develop abdominal pain or have signs of bleeding - blood in stool or black stools, stop taking the aspirin and seek medical care.

## 2018-02-28 NOTE — IP AVS SNAPSHOT
MRN:[id number]                      After Visit Summary   2/28/2018    Bonita Villarreal    MRN: [id number]           Thank you!     Thank you for choosing [facility] for your care. Our goal is always to provide you with excellent care. Hearing back from our patients is one way we can continue to improve our services. Please take a few minutes to complete the written survey that you may receive in the mail after you visit with us. Thank you!        Patient Information     Date Of Birth          1935        Designated Caregiver       Most Recent Value    Caregiver    Will someone help with your care after discharge? yes    Name of designated caregiver Rocio    Phone number of caregiver [phone]    Caregiver address [place]      About your hospital stay     You were admitted on:  February 28, 2018 You last received care in the:  [facility]    You were discharged on:  March 2, 2018        Reason for your hospital stay       Right total knee arthroplasty                  Who to Call     For medical emergencies, please call 911.  For non-urgent questions about your medical care, please call your primary care provider or clinic, [phone]  For questions related to your surgery, please call your surgery clinic        Attending Provider     Provider Specialty    Nica Chris MD Internal Medicine    Comfort, Anton MIJARES MD Orthopedics       Primary Care Provider Office Phone # Fax #    Dewayne Lara Santana [phone]       When to contact your care team       Call your Orthopedic surgeon at [facility]  if you have any of the following: temperature greater than 100.4,  increased shortness of breath, increased drainage, increased swelling or increased pain.                  After Care Instructions     Activity       Your activity upon discharge:   Activity as tolerated, no driving until off narcotic pain medication. You may return to work  when cleared by your orthopedic surgeon or physician assistant.   You may weight bear as tolerated on your affected extremity.            Diet       Follow this diet upon discharge: Orders Placed This Encounter      Advance Diet as Tolerated: Regular Diet Adult              Wound care and dressings       Instructions to care for your wound at home: as directed, daily dressing changes, ice to area for comfort, keep wound clean and dry and may get incision wet in shower but do not soak or scrub. Prenio dressing to remain intact until follow up.                  Follow-up Appointments     Follow-up and recommended labs and tests       Follow up with  Dante Butler PA-C at  [facility], within 2 weeks to evaluate after surgery and for hospital follow- up.                  Your next 10 appointments already scheduled     Mar 06, 2018  8:30 AM CST   Ortho Eval with Oli Osman PT   [facility]    [address] [address]   [phone]            Mar 08, 2018  1:30 PM CST   Ortho Treatment with Oli Osman PT   [facility] ([facility])    [address] [address]   [phone]              Additional Services     Physical Therapy Referral       *This therapy referral will be filtered to a centralized scheduling office at [facility] and the patient will receive a call to schedule an appointment at a [facility] location most convenient for them. *     [facility] provides Physical Therapy evaluation and treatment and many specialty services across the [facility] system.  If requesting a specialty program, please choose from the list below.    If you have not heard from the scheduling office within 2 business days, please call [phone] for all locations, with the exception of [place], please call [phone].  Treatment: Evaluation &  "Treatment  Special Instructions/Modalities: none  Special Programs: None    Please be aware that coverage of these services is subject to the terms and limitations of your health insurance plan.  Call member services at your health plan with any benefit or coverage questions.      **Note to Provider:  If you are referring outside of [facility] for the therapy appointment, please list the name of the location in the \"special instructions\" above, print the referral and give to the patient to schedule the appointment.                  Further instructions from your care team       1.  Follow up with Dante Butler PA-C.  in 2 weeks for post op check and x rays as scheduled.  Call [phone] if appointment needed or questions  2.  Use pain medication as directed  3.  Keep incision clean, covered and dry until post op appointment.  You may shower and get incision wet if no drainage is present.  You may change your dressing as needed.  No additional adhesives to be put on knee (including bandages).  Only use dry  guaze over Prineo glue tape and then apply 4 inch ACE to hold dressings in place.   4.  Continue physical therapy as soon as possible.  You will need to call a therapy department of your choice to arrange future appointments.  Your order for physical therapy is included in your discharge paperwork.   5.  Take Aspirin 325 mg  daily  for 42 days for anticoagulation. If you develop abdominal pain or have signs of bleeding - blood in stool or black stools, stop taking the aspirin and seek medical care.              Pending Results     No orders found from 2/26/2018 to 3/1/2018.            Statement of Approval     Ordered          03/02/18 1049  I have reviewed and agree with all the recommendations and orders detailed in this document.  EFFECTIVE NOW     Approved and electronically signed by:  Deanna Higgins PA-C             Admission Information     Date & Time Provider Department Dept. Phone    2/28/2018 " "Comfort, Anton MIJARES MD [facility] [phone]      Your Vitals Were     Blood Pressure Pulse Temperature Respirations Height Weight    125/61 (BP Location: Left arm) 71 97.9  F (36.6  C) (Oral) 18 1.702 m (5' 7\") 86.4 kg (190 lb 7.6 oz)    Pulse Oximetry BMI (Body Mass Index)                93% 29.83 kg/m2          MyChart Information     [employer] lets you send messages to your doctor, view your test results, renew your prescriptions, schedule appointments and more. To sign up, go to [email or web address] . Click on \"Log in\" on the left side of the screen, which will take you to the Welcome page. Then click on \"Sign up Now\" on the right side of the page.     You will be asked to enter the access code listed below, as well as some personal information. Please follow the directions to create your username and password.     Your access code is: [email or web address]  Expires: 5/10/2018  9:54 AM     Your access code will  in 90 days. If you need help or a new code, please call your [facility] clinic or [phone].        Care EveryWhere ID     This is your Care EveryWhere ID. This could be used by other organizations to access your [facility] medical records  [id number]        Equal Access to Services     DARIEN YARBROUGH AH: Hadken Rodriges, waaxda luqadaha, qaybta kaalmada jaden, cameron gutierrez. So [facility] [phone].    ATENCIÓN: Si habla español, tiene a hylton disposición servicios gratuitos de asistencia lingüística. Mika al [phone].    We comply with applicable federal civil rights laws and [place] laws. We do not discriminate on the basis of race, color, national origin, age, disability, sex, sexual orientation, or gender identity.               Review of your medicines      START taking        Dose / Directions    aspirin  MG EC tablet        Dose:  325 mg   Take 1 tablet (325 mg) by mouth daily   Quantity:  42 tablet   Refills:  0       " HYDROcodone-acetaminophen 5-325 MG per tablet   Commonly known as:  NORCO        Dose:  1-2 tablet   Take 1-2 tablets by mouth every 4 hours as needed for moderate to severe pain   Quantity:  40 tablet   Refills:  0       order for DME        Equipment being ordered: Walker Wheels () and Walker () Treatment Diagnosis: s/p TKA   Quantity:  1 Units   Refills:  0       senna-docusate 8.6-50 MG per tablet   Commonly known as:  SENOKOT-S;PERICOLACE        Dose:  2 tablet   Take 2 tablets by mouth 2 times daily as needed for constipation   Quantity:  100 tablet   Refills:  0         CONTINUE these medicines which have NOT CHANGED        Dose / Directions    atenolol 100 MG tablet   Commonly known as:  TENORMIN   Used for:  Essential hypertension, benign        Dose:  100 mg   Take 1 tablet (100 mg) by mouth daily (Needs follow-up appointment for this medication)   Quantity:  90 tablet   Refills:  3       cholecalciferol 1000 UNIT tablet   Commonly known as:  vitamin D3        Dose:  1 tablet   Take 1 tablet by mouth daily.   Refills:  0       escitalopram 10 MG tablet   Commonly known as:  LEXAPRO   Used for:  Generalized anxiety disorder        Dose:  10 mg   Take 1 tablet (10 mg) by mouth daily (Needs follow-up appointment for this medication)   Quantity:  90 tablet   Refills:  3       FISH OIL PO        1 tab daily   Refills:  0       losartan-hydrochlorothiazide 100-12.5 MG per tablet   Commonly known as:  HYZAAR   Used for:  Essential hypertension, benign        Dose:  1 tablet   Take 1 tablet by mouth daily   Quantity:  90 tablet   Refills:  3       Multi-vitamin Tabs tablet   Generic drug:  multivitamin, therapeutic with minerals        1 DAILY   Refills:  0            Where to get your medicines      These medications were sent to [facility] [place], [place] - [address]  [address] [address]     Phone:  [phone]     aspirin  MG EC tablet    senna-docusate  8.6-50 MG per tablet         Some of these will need a paper prescription and others can be bought over the counter. Ask your nurse if you have questions.     Bring a paper prescription for each of these medications     HYDROcodone-acetaminophen 5-325 MG per tablet    order for DME                Protect others around you: Learn how to safely use, store and throw away your medicines at www.disposemymeds.org.        Information about OPIOIDS     PRESCRIPTION OPIOIDS: WHAT YOU NEED TO KNOW    Prescription opioids can be used to help relieve moderate to severe pain and are often prescribed following a surgery or injury, or for certain health conditions. These medications can be an important part of treatment but also come with serious risks. It is important to work with your health care provider to make sure you are getting the safest, most effective care.    WHAT ARE THE RISKS AND SIDE EFFECTS OF OPIOID USE?  Prescription opioids carry serious risks of addiction and overdose, especially with prolonged use. An opioid overdose, often marked by slowed breathing can cause sudden death. The use of prescription opioids can have a number of side effects as well, even when taken as directed:      Tolerance - meaning you might need to take more of a medication for the same pain relief    Physical dependence - meaning you have symptoms of withdrawal when a medication is stopped    Increased sensitivity to pain    Constipation    Nausea, vomiting, and dry mouth    Sleepiness and dizziness    Confusion    Depression    Low levels of testosterone that can result in lower sex drive, energy, and strength    Itching and sweating    RISKS ARE GREATER WITH:    History of drug misuse, substance use disorder, or overdose    Mental health conditions (such as depression or anxiety)    Sleep apnea    Older age (65 years or older)    Pregnancy    Avoid alcohol while taking prescription opioids.   Also, unless specifically advised by your  health care provider, medications to avoid include:    Benzodiazepines (such as Xanax or Valium)    Muscle relaxants (such as Soma or Flexeril)    Hypnotics (such as Ambien or Lunesta)    Other prescription opioids    KNOW YOUR OPTIONS:  Talk to your health care provider about ways to manage your pain that do not involve prescription opioids. Some of these options may actually work better and have fewer risks and side effects:    Pain relievers such as acetaminophen, ibuprofen, and naproxen    Some medications that are also used for depression or seizures    Physical therapy and exercise    Cognitive behavioral therapy, a psychological, goal-directed approach, in which patients learn how to modify physical, behavioral, and emotional triggers of pain and stress    IF YOU ARE PRESCRIBED OPIOIDS FOR PAIN:    Never take opioids in greater amounts or more often than prescribed    Follow up with your primary health care provider and work together to create a plan on how to manage your pain.    Talk about ways to help manage your pain that do not involve prescription opioids    Talk about all concerns and side effects    Help prevent misuse and abuse    Never sell or share prescription opioids    Never use another person's prescription opioids    Store prescription opioids in a secure place and out of reach of others (this may include visitors, children, friends, and family)    Visit www.cdc.gov/drugoverdose to learn about risks of opioid abuse and overdose    If you believe you may be struggling with addiction, tell your health care provider and ask for guidance or call [facility]'s National Helpline at 4-816-081-HELP    LEARN MORE / www.cdc.gov/drugoverdose/prescribing/guideline.html    Safely dispose of unused prescription opioids: Find your local drug take-back programs and more information about the importance of safe disposal at www.doseofreality.[place].gov             Medication List: This is a list of all your medications  and when to take them. Check marks below indicate your daily home schedule. Keep this list as a reference.      Medications           Morning Afternoon Evening Bedtime As Needed    aspirin  MG EC tablet   Take 1 tablet (325 mg) by mouth daily                                atenolol 100 MG tablet   Commonly known as:  TENORMIN   Take 1 tablet (100 mg) by mouth daily (Needs follow-up appointment for this medication)   Last time this was given:  100 mg on 3/2/2018  8:30 AM                                cholecalciferol 1000 UNIT tablet   Commonly known as:  vitamin D3   Take 1 tablet by mouth daily.                                escitalopram 10 MG tablet   Commonly known as:  LEXAPRO   Take 1 tablet (10 mg) by mouth daily (Needs follow-up appointment for this medication)   Last time this was given:  10 mg on 3/2/2018  8:30 AM                                FISH OIL PO   1 tab daily                                HYDROcodone-acetaminophen 5-325 MG per tablet   Commonly known as:  NORCO   Take 1-2 tablets by mouth every 4 hours as needed for moderate to severe pain   Last time this was given:  2 tablets on 3/2/2018  2:44 PM                                losartan-hydrochlorothiazide 100-12.5 MG per tablet   Commonly known as:  HYZAAR   Take 1 tablet by mouth daily                                Multi-vitamin Tabs tablet   1 DAILY   Generic drug:  multivitamin, therapeutic with minerals                                order for DME   Equipment being ordered: Walker Wheels () and Walker () Treatment Diagnosis: s/p TKA                                senna-docusate 8.6-50 MG per tablet   Commonly known as:  SENOKOT-S;PERICOLACE   Take 2 tablets by mouth 2 times daily as needed for constipation

## 2018-02-28 NOTE — IP AVS SNAPSHOT
[facility]    [address] [address]    Phone:  [phone]    Fax:  [phone]                                       After Visit Summary   2/28/2018    Bonita Villarreal    MRN: [id number]           After Visit Summary Signature Page     I have received my discharge instructions, and my questions have been answered. I have discussed any challenges I see with this plan with the nurse or doctor.    ..........................................................................................................................................  Patient/Patient Representative Signature      ..........................................................................................................................................  Patient Representative Print Name and Relationship to Patient    ..................................................               ................................................  Date                                            Time    ..........................................................................................................................................  Reviewed by Signature/Title    ...................................................              ..............................................  Date                                                            Time

## 2018-02-28 NOTE — H&P (VIEW-ONLY)
[facility]  [address]  [facility] [address]  [phone]  Dept: [phone]    PRE-OP EVALUATION:  Today's date: 2018    Bonita Villarreal (: 1935) presents for pre-operative evaluation assessment as requested by Dr. Grover.  She requires evaluation and anesthesia risk assessment prior to undergoing surgery/procedure for treatment of Knee .  Proposed procedure: ARTHROPLASTY RIGHT KNEE    Date of Surgery/ Procedure: 2018  Time of Surgery/ Procedure: 3:00pm  Hospital/Surgical Facility: [facility]  Primary Physician: Dewayne Santana  Type of Anesthesia Anticipated: other    Patient has a Health Care Directive or Living Will:  YES [place]    1. NO - Do you have a history of heart attack, stroke, stent, bypass or surgery on an artery in the head, neck, heart or legs?  2. NO - Do you ever have any pain or discomfort in your chest?  3. NO - Do you have a history of  Heart Failure?  4. NO - Are you troubled by shortness of breath when: walking on the level, up a slight hill or at night?  5. NO - Do you currently have a cold, bronchitis or other respiratory infection?  6. NO - Do you have a cough, shortness of breath or wheezing?  7. NO - Do you sometimes get pains in the calves of your legs when you walk?  8. NO - Do you or anyone in your family have previous history of blood clots?  9. NO - Do you or does anyone in your family have a serious bleeding problem such as prolonged bleeding following surgeries or cuts?  10. NO - Have you ever had problems with anemia or been told to take iron pills?  11. NO - Have you had any abnormal blood loss such as black, tarry or bloody stools, or abnormal vaginal bleeding?  12. NO - Have you ever had a blood transfusion?  13. NO - Have you or any of your relatives ever had problems with anesthesia?  14. NO - Do you have sleep apnea, excessive snoring or daytime drowsiness?  15. NO - Do you have any prosthetic  heart valves?  16. NO - Do you have prosthetic joints?  17. NO - Is there any chance that you may be pregnant?      HPI:                                                      Brief HPI related to upcoming procedure: progressive right knee pain failed conservative therapy.    See problem list for active medical problems.  Problems all longstanding and stable, except as noted/documented.  See ROS for pertinent symptoms related to these conditions.                                                                                                  .  HYPERTENSION - Patient has longstanding history of mod-severe HTN , currently denies any symptoms referable to elevated blood pressure. Specifically denies chest pain, palpitations, dyspnea, orthopnea, PND or peripheral edema. Blood pressure readings have been in normal range. Current medication regimen is as listed below. Patient denies any side effects of medication.                                                                                                                                                                                          .    MEDICAL HISTORY:                                                      Patient Active Problem List    Diagnosis Date Noted     Baker's cyst of knee, right 08/12/2015     Priority: Medium     CARDIOVASCULAR SCREENING; LDL GOAL LESS THAN 160 10/31/2010     Priority: Medium     LDL in 140s, HDL in 60s  Apex risk is 6% (average at her age is 14%)       Essential hypertension, benign 06/28/2005     Priority: Medium     had coronary CT scan 2003 --- no evidence of plaque  started on atenolol and ASA Jan 2005 for /94  physician in [place] switched her to Lotrel 5/10 due to her having dizzy spells  HCTZ added 3/05, pt felt mouth too dried out  4/05 pt more anxious and BP still elevated -- switched back to Atenolol       Diverticulosis of large intestine 06/28/2005     Priority: Medium     colonoscopy 2002, disease mod -  "severe  Problem list name updated by automated process. Provider to review       Osteopenia of spine 2005     Priority: Medium     T score -1.3  2003  Risk calculator based on 2003 T-score and personal data showed 1% risk of hip fracture over next 10 years, 14% risk of any fracture over next ten years; consider recheck DEXA at age 77, continue calcium and exercise until then (current recommendations to consider treatment if hip fx risk reaches 3% or any fracture risk reaches 20%)  Problem list name updated by automated process. Provider to review       Family history of other cardiovascular diseases 2005     Priority: Medium     father  at 39 of MI  brother  at 51 of MI  another brother -- MI x 2  eldest sister -- MI x 2 with stents  mother and maternal aunts lived to 80's and mid 90's  Problem list name updated by automated process. Provider to review and confirm  Problem list name updated by automated process. Provider to review       Generalized anxiety disorder 2005     Priority: Medium     onset after patient was diagnosed with hypertension fall         Past Medical History:   Diagnosis Date     Advance Care Planning 2012    Advance Care Planning 3/20/2016: Receipt of ACP document:  Received: Health Care Directive which was witnessed or notarized on 16.  Document not previously scanned.  Validation form completed and sent with document to be scanned.  Code Status needs to be updated to reflect choices in most recent ACP document.  Confirmed/documented designated decision maker(s).  Added by Cata Terrell RN, Advance Care Planning Liaison. Advance Care Planning 12: Patient does not have an Advance/Health Care Directive (HCD), given \"What is Advance Care Planning?\" flyer. Teena Peace       Arthritis      Diverticulitis of colon 2005    Patient keeps Rx for Augmentin to use PRN Problem list name updated by automated process. Provider to review     " "Diverticulosis of colon (without mention of hemorrhage)     history of recurrent diverticulitis     Hypertension      Past Surgical History:   Procedure Laterality Date     C APPENDECTOMY       COLONOSCOPY  04/15/02     COLONOSCOPY  3/25/2013    Procedure: COLONOSCOPY;  Colonoscopy  ;  Surgeon: Jamil Porras MD;  Location: [facility]     FLEXIBLE SIGMOIDOSCOPY  07/29/96     ORTHOPEDIC SURGERY  22-23 YEARS AGO    BACK     SURGICAL HISTORY OF -       lumbar surgery     Current Outpatient Prescriptions   Medication Sig Dispense Refill     amoxicillin-clavulanate (AUGMENTIN) 875-125 MG per tablet 1 TABLET EVERY 12 HOURS WITH FOOD UNTIL GONE -- to be started in event of recurrent diverticular symptoms 20 tablet 2     atenolol (TENORMIN) 100 MG tablet Take 1 tablet (100 mg) by mouth daily (Needs follow-up appointment for this medication) 90 tablet 3     escitalopram (LEXAPRO) 10 MG tablet Take 1 tablet (10 mg) by mouth daily (Needs follow-up appointment for this medication) 90 tablet 3     losartan-hydrochlorothiazide (HYZAAR) 100-12.5 MG per tablet Take 1 tablet by mouth daily 90 tablet 3     cholecalciferol (VITAMIN D) 1000 UNIT tablet Take 1 tablet by mouth daily.       FISH OIL OR 1 tab daily       MULTI-VITAMIN OR TABS 1 DAILY       OTC products: None, except as noted above    Allergies   Allergen Reactions     Lisinopril Cough      Latex Allergy: NO    Social History   Substance Use Topics     Smoking status: Never Smoker     Smokeless tobacco: Never Used     Alcohol use Yes      Comment: rare     History   Drug Use No       REVIEW OF SYSTEMS:                                                    Constitutional, neuro, ENT, endocrine, pulmonary, cardiac, gastrointestinal, genitourinary, musculoskeletal, integument and psychiatric systems are negative, except as otherwise noted.    EXAM:                                                    /76  Pulse 76  Temp 97.3  F (36.3  C) (Tympanic)  Resp 16  Ht 5' 7.5\" " (1.715 m)  Wt 189 lb (85.7 kg)  BMI 29.16 kg/m2    GENERAL APPEARANCE: healthy, alert and no distress     EYES: EOMI, PERRL     HENT: ear canals and TM's normal and nose and mouth without ulcers or lesions     NECK: no adenopathy, no asymmetry, masses, or scars and thyroid normal to palpation     RESP: lungs clear to auscultation - no rales, rhonchi or wheezes     CV: regular rates and rhythm, normal S1 S2, no S3 or S4 and no murmur, click or rub     ABDOMEN:  soft, nontender, no HSM or masses and bowel sounds normal     MS: extremities normal- no gross deformities noted, no evidence of inflammation in joints, FROM in all extremities.     SKIN: no suspicious lesions or rashes     NEURO: Normal strength and tone, sensory exam grossly normal, mentation intact and speech normal     PSYCH: mentation appears normal. and affect normal/bright     LYMPHATICS: No axillary, cervical, or supraclavicular nodes    DIAGNOSTICS:                                                      EKG: appears normal, NSR, normal axis, normal intervals, no acute ST/T changes c/w ischemia, no LVH by voltage criteria, Left Bundle Branch Block, unchanged from previous tracings  Labs Resulted Today:   Results for orders placed or performed in visit on 08/15/17   Strep, Rapid Screen   Result Value Ref Range    Specimen Description Throat     Rapid Strep A Screen       NEGATIVE: No Group A streptococcal antigen detected by immunoassay, await culture report.   Beta strep group A culture   Result Value Ref Range    Specimen Description Throat     Culture Micro No beta hemolytic Streptococcus Group A isolated        Recent Labs   Lab Test  04/25/17   1424  03/14/16   1048   01/30/14   1234   HGB   --    --    --   14.3   PLT   --    --    --   273   NA  138  139   < >  135   POTASSIUM  4.4  4.6   < >  4.8   CR  0.91  0.68   < >  0.76    < > = values in this interval not displayed.        IMPRESSION:                                                   "  Reason for surgery/procedure: right knee replacement  Diagnosis/reason for consult: Preoperative H&P     The proposed surgical procedure is considered INTERMEDIATE risk.    REVISED CARDIAC RISK INDEX  The patient has the following serious cardiovascular risks for perioperative complications such as (MI, PE, VFib and 3  AV Block):  No serious cardiac risks  INTERPRETATION: 0 risks: Class I (very low risk - 0.4% complication rate)    The patient has the following additional risks for perioperative complications:  Hypertension       ICD-10-CM    1. Preop general physical exam Z01.818    2. Osteoarthritis of right knee, unspecified osteoarthritis type M17.11    3. Essential hypertension, benign I10    4. Recurrent diverticulitis K57.32 amoxicillin-clavulanate (AUGMENTIN) 875-125 MG per tablet     CBC with platelets differential history, is not entirely clear that she is having multiple bouts of diverticulitis every year.  I recommended that with her next episode of typical \"diverticulitis\" symptoms that recheck a CBC and possibly abdominal CT to confirm diagnosis, rather than continue to have her take multiple rounds of antibiotics every few months.   5.  Asymptomatic left bundle branch block       given that she is currently asymptomatic, no additional treatment is needed at this time.  Monitor for symptoms of arrhythmia including lightheadedness, dizziness, palpitations, syncope, chest pain, shortness of breath.  Follow-up if any symptoms occur for further workup and evaluation.    RECOMMENDATIONS:                                                          --Patient is to take all scheduled medications on the day of surgery EXCEPT for modifications listed below.    Stop fish oil 2 weeks prior to surgery    ACE Inhibitor or Angiotensin Receptor Blocker (ARB) Use  Ace inhibitor or Angiotensin Receptor Blocker (ARB) and should HOLD this medication for the 24 hours prior to surgery.      APPROVAL GIVEN to proceed with " proposed procedure, without further diagnostic evaluation       Signed Electronically by: Dewayne Santana MD    Copy of this evaluation report is provided to requesting physician.    [facility] Preop Guidelines

## 2018-03-01 ENCOUNTER — APPOINTMENT (OUTPATIENT)
Dept: OCCUPATIONAL THERAPY | Facility: CLINIC | Age: 83
DRG: 470 | End: 2018-03-01
Attending: ORTHOPAEDIC SURGERY
Payer: MEDICARE

## 2018-03-01 ENCOUNTER — APPOINTMENT (OUTPATIENT)
Dept: PHYSICAL THERAPY | Facility: CLINIC | Age: 83
DRG: 470 | End: 2018-03-01
Attending: ORTHOPAEDIC SURGERY
Payer: MEDICARE

## 2018-03-01 LAB — HGB BLD-MCNC: 11.7 G/DL (ref 11.7–15.7)

## 2018-03-01 PROCEDURE — A9270 NON-COVERED ITEM OR SERVICE: HCPCS | Mod: GY | Performed by: ORTHOPAEDIC SURGERY

## 2018-03-01 PROCEDURE — 25000132 ZZH RX MED GY IP 250 OP 250 PS 637: Mod: GY | Performed by: ORTHOPAEDIC SURGERY

## 2018-03-01 PROCEDURE — 40000133 ZZH STATISTIC OT WARD VISIT

## 2018-03-01 PROCEDURE — 85018 HEMOGLOBIN: CPT | Performed by: ORTHOPAEDIC SURGERY

## 2018-03-01 PROCEDURE — 97165 OT EVAL LOW COMPLEX 30 MIN: CPT | Mod: GO

## 2018-03-01 PROCEDURE — 97116 GAIT TRAINING THERAPY: CPT | Mod: GP

## 2018-03-01 PROCEDURE — 97161 PT EVAL LOW COMPLEX 20 MIN: CPT | Mod: GP

## 2018-03-01 PROCEDURE — 40000193 ZZH STATISTIC PT WARD VISIT

## 2018-03-01 PROCEDURE — 99231 SBSQ HOSP IP/OBS SF/LOW 25: CPT | Performed by: PHYSICIAN ASSISTANT

## 2018-03-01 PROCEDURE — 25000132 ZZH RX MED GY IP 250 OP 250 PS 637: Mod: GY | Performed by: PHYSICIAN ASSISTANT

## 2018-03-01 PROCEDURE — 12000000 ZZH R&B MED SURG/OB

## 2018-03-01 PROCEDURE — 25000128 H RX IP 250 OP 636: Performed by: ORTHOPAEDIC SURGERY

## 2018-03-01 PROCEDURE — A9270 NON-COVERED ITEM OR SERVICE: HCPCS | Mod: GY | Performed by: PHYSICIAN ASSISTANT

## 2018-03-01 PROCEDURE — 97110 THERAPEUTIC EXERCISES: CPT | Mod: GP

## 2018-03-01 PROCEDURE — 97535 SELF CARE MNGMENT TRAINING: CPT | Mod: GO

## 2018-03-01 RX ADMIN — HYDROCODONE BITARTRATE AND ACETAMINOPHEN 1 TABLET: 5; 325 TABLET ORAL at 06:22

## 2018-03-01 RX ADMIN — HYDROCODONE BITARTRATE AND ACETAMINOPHEN 1 TABLET: 5; 325 TABLET ORAL at 13:09

## 2018-03-01 RX ADMIN — GABAPENTIN 100 MG: 100 CAPSULE ORAL at 20:08

## 2018-03-01 RX ADMIN — ATENOLOL 100 MG: 100 TABLET ORAL at 09:05

## 2018-03-01 RX ADMIN — LOSARTAN POTASSIUM 100 MG: 50 TABLET ORAL at 09:04

## 2018-03-01 RX ADMIN — ENOXAPARIN SODIUM 40 MG: 40 INJECTION SUBCUTANEOUS at 18:57

## 2018-03-01 RX ADMIN — CEFAZOLIN SODIUM 2 G: 2 INJECTION, SOLUTION INTRAVENOUS at 00:27

## 2018-03-01 RX ADMIN — ESCITALOPRAM OXALATE 10 MG: 10 TABLET ORAL at 09:04

## 2018-03-01 RX ADMIN — HYDROCODONE BITARTRATE AND ACETAMINOPHEN 1 TABLET: 5; 325 TABLET ORAL at 21:34

## 2018-03-01 RX ADMIN — HYDROCHLOROTHIAZIDE 25 MG: 25 TABLET ORAL at 09:04

## 2018-03-01 RX ADMIN — RANITIDINE 150 MG: 150 TABLET ORAL at 20:08

## 2018-03-01 RX ADMIN — GABAPENTIN 100 MG: 100 CAPSULE ORAL at 15:10

## 2018-03-01 RX ADMIN — CEFAZOLIN SODIUM 2 G: 2 INJECTION, SOLUTION INTRAVENOUS at 09:08

## 2018-03-01 RX ADMIN — RANITIDINE 150 MG: 150 TABLET ORAL at 09:04

## 2018-03-01 RX ADMIN — GABAPENTIN 100 MG: 100 CAPSULE ORAL at 09:04

## 2018-03-01 RX ADMIN — HYDROCODONE BITARTRATE AND ACETAMINOPHEN 1 TABLET: 5; 325 TABLET ORAL at 17:02

## 2018-03-01 NOTE — PROGRESS NOTES
SPIRITUAL HEALTH SERVICES  SPIRITUAL ASSESSMENT Progress Note  [facility] [place]    PRIMARY FOCUS:     Assessment of emotional/spiritual/Temple distress    Support for coping    ILLNESS CIRCUMSTANCES:   Reviewed documentation. Reflective conversation shared with Bonita Villarreal which integrated elements of illness and family narratives.     Context of Serious Illness/Symptom(s) - TKA    Resources for Support - Avinash stated she built a mother-in-law apartment on her son's house so she has someone always nearby    DISTRESS:     Emotional/Existential/Relational Distress - Just the opposite - she was very happy with how she was doing following surgery     Spiritual/Buddhist Distress - None identified    Social/Cultural/Economic Distress - None identified    SPIRITUAL/Sabianism COPING:     Rastafari/Veronica - Evangelical Latter-day in [place]    Spiritual Practice(s) - Avinash stated she enjoys her Latter-day very much and has been involved in many different areas; she welcomed prayer together    Emotional/Existential/Relational Connections - She stated her  of many years is going to be retiring soon.  She is going to miss him and his wife greatly.  But she reflected that God will provide another.  [place] much she enjoys golfing and taking walks.    GOALS OF CARE:    Goals of Care - Getting home and going through rehab    Meaning/Sense-Making - Family and veronica, plus outdoor activity are some of her significant sources of meaning    PLAN: No follow up visit planned but patient is aware of the availability of spiritual support on request.    Cristhian Watkins M.A., [facility]  Staff   [facility]  Office: [phone]  Cell: [phone]  Pager [phone]

## 2018-03-01 NOTE — PROGRESS NOTES
"[facility] ADMISSION NOTE    Patient admitted to room 2300 at approximately 2045 via cart from surgery. Patient was accompanied by nurse.     Verbal SBAR report received from AFIA Fraser  prior to patient arrival.     Patient trasferred to bed via air julissa. Patient alert and oriented X 3. The patient is not having any pain.  . Admission vital signs: Blood pressure 158/75, pulse 79, temperature 97.7  F (36.5  C), temperature source Oral, resp. rate 18, height 1.702 m (5' 7\"), weight 86.4 kg (190 lb 7.6 oz), SpO2 96 %, not currently breastfeeding. Patient was oriented to plan of care, call light, bed controls, tv, telephone, bathroom and visiting hours.     The following safety risks were identified during admission: fall. Yellow risk band applied: YES.     Pt has cold sore on upper lip and new incision. Skin intact all other areas.     Ana Velázquez"

## 2018-03-01 NOTE — PLAN OF CARE
Problem: Knee Arthroplasty (Total, Partial) (Adult)  Goal: Signs and Symptoms of Listed Potential Problems Will be Absent, Minimized or Managed (Knee Arthroplasty)  Signs and symptoms of listed potential problems will be absent, minimized or managed by discharge/transition of care (reference Knee Arthroplasty (Total, Partial) (Adult) CPG).   Outcome: Improving  Patient has been up in room, ambulating and providing self cares. She states she has very little pain (has only taken one Norco this shift). She is in agreement to probable DC tomorrow and voices no concerns today. She is doing extremely well post op and should continue to do well once home/TCU.

## 2018-03-01 NOTE — OP NOTE
Total Knee Arthroplasty Operative Note        PLAN:  Weight bearing status: Weight bearing as tolerated   Activity: Activity as tolerated  Patient may move about with assist as indicated or with supervision   Anticoagulation plan:                 Lovenox inpatient and then  mg daily at discharge  for 42 days  Follow up plan                           Follow up in 2 week(s)        Name: Bonita Villarreal    PCP: Dewayne Santana    Procedure Date: 2/28/2018    Pre-operative diagnosis: Right knee degenerative joint disease   Post-operative diagnosis: Same   Procedure: Total knee arthoplasty (Right)   Surgeon: Anton Grover MD     Assistant(s): Dante Butler PA-C   Anesthesia: Spinal Anesthesia   Estimated blood loss: Less than 50 ml   Drains: Hemovac   Specimens: None       Findings: See full dictated operative note for details   Complications: None       Comments: See dictated operative report for full details         Procedure and Findings:    After being informed of risks, benefits, alternatives to the procedure, patient desired to proceed, brought to the operating suite where they were placed under spinal anesthetic. Patient received 2 grams of intravenous Ancef.  Dante Butler PA-C was present for the entire length of the case for the purposes of proper patient positioning, surgical exposure, and patient safety. A time-out verification step was completed.    Attention was directed towards the patella. A midline incision, vastus splitting minimally invasive approach was utilized. The patella was everted. It was measured at 38 mm. It was resected, remeasured and a 38 mm asymmetric patella was positioned. A protector plate was placed on the patella. Attention was directed toward the distal femur. IM guider farhad was placed. An 8 mm 5 degree distal femoral cut was completed. The IM guide farhad was then placed in the tibia. External guide farhad and tower were utilized and 9 mm button stylus was referenced off  the lateral tibial plateau and cuts were completed. The tibia was sized to a 5. Attention was directed towards the distal femur. It was sized to a 4. The 4-in-1 cutting block was placed along the epicondylar axis. It was secured with 2 pins, anterior, posterior and chamfer cuts were completed. Soft tissue balancing was then carried out. Medial release was completed. Ultimately a 11 mm insert gave excellent range of motion and stability throughout the range of motion. Patella was noted to track symmetrically throughout the range to motion. The tibial tray rotation was marked, size was verified, it was secured with 2 pins and punched. Trial components were then removed. The cancellous surfaces were pulsatile lavaged. One batch of Palacos cement was mixed under vacuum. The tibia, femur and patella were sequentially cemented in place. The knee was held in extension with axial compression until the cement had hardened. The 11 mm insert was ultimately selected and secured Care was taken to remove any excess cement. Joint capsular injection with anesthetic solution was performed. Excellent range of motion and stability was demonstrated. A Hemovac drain was placed. The joint was copiously irrigated. Joint capsules were closed with interrupted number 1 running Stratafix. Subcutaneous tissues were closed with 2-0 Vicryl and skin was closed with 3-0 running Stratifix and Prineo wound closure. A sterile dressing was applied. Patient tolerated the procedure well without complication and returned to the PAR in stable condition.      Anton Grover    Date: 2/28/2018 Time: 6:44 PM  ?    CONFIDENTIALITY NOTICE This message and any included attachments are from [facility] and are intended only for the addressee. The information contained in this message is confidential and may constitute inside or non-public information under international, federal, or state securities laws. Unauthorized forwarding, printing,  copying, distribution, or use of such information is strictly prohibited and may be unlawful. If you are not the addressee, please promptly delete this message and notify the sender of the delivery error by e-mail.

## 2018-03-01 NOTE — ANESTHESIA POSTPROCEDURE EVALUATION
Patient: Bonita Villarreal    Procedure(s):  Right total knee arthroplasty - Wound Class: I-Clean    Diagnosis:Right knee degenerative joint disease  Diagnosis Additional Information: No value filed.    Anesthesia Type:  Spinal    Note:  Anesthesia Post Evaluation    Patient location during evaluation: Bedside  Patient participation: Able to fully participate in evaluation  Level of consciousness: awake and alert  Pain management: adequate  Airway patency: patent  Cardiovascular status: acceptable  Respiratory status: acceptable  Hydration status: acceptable  PONV: none     Anesthetic complications: None          Last vitals:  Vitals:    02/28/18 1933 02/28/18 1946 02/28/18 2048   BP: 131/69 130/74 163/72   Pulse:   64   Resp: 16 16 16   Temp:   36.5  C (97.7  F)   SpO2: 97% 96% 96%         Electronically Signed By: America Clifford CRNA, APRN VICENTE  February 28, 2018  9:41 PM

## 2018-03-01 NOTE — PROGRESS NOTES
"[facility] Orthopaedics Progress Note      Post-operative Day: 1 Day Post-Op    Procedure(s):  Right total knee arthroplasty - Wound Class: I-Clean      Subjective:    Pain: moderate  aching to R knee. Denies shooting pain, parasthesias, numbness and weakness.   denies Chest pain, SOB, O2 required: None  denies nausea/ emesis  denies lightheadedness, dizziness and weakness  BM -, passing gas +. Urinating well, Fong in place: no.       Objective:  Blood pressure 144/74, pulse 71, temperature 96.6  F (35.9  C), temperature source Oral, resp. rate 18, height 1.702 m (5' 7\"), weight 86.4 kg (190 lb 7.6 oz), SpO2 94 %, not currently breastfeeding.    Patient Vitals for the past 24 hrs:   BP Temp Temp src Pulse Heart Rate Resp SpO2 Height Weight   03/01/18 0730 144/74 96.6  F (35.9  C) Oral 71 - 18 94 % - -   03/01/18 0539 141/72 97.2  F (36.2  C) Oral 74 - 18 96 % - -   03/01/18 0018 158/75 - - - - - - - -   03/01/18 0006 (!) 133/95 97.7  F (36.5  C) Oral 79 - 18 96 % - -   03/01/18 0000 - - - 75 - - 93 % - -   02/28/18 2345 - - - 73 - - 93 % - -   02/28/18 2330 - - - 73 - - 93 % - -   02/28/18 2315 - - - 66 - - 95 % - -   02/28/18 2305 128/62 - - - - - 96 % - -   02/28/18 2300 - - - 69 - - 95 % - -   02/28/18 2245 - - - 68 - - 96 % - -   02/28/18 2230 - - - 67 - - 96 % - -   02/28/18 2215 - - - 67 - - 96 % - -   02/28/18 2205 129/64 - - 68 - - 96 % - -   02/28/18 2200 - - - 68 - - 96 % - -   02/28/18 2145 - - - 66 - - 97 % - -   02/28/18 2130 - - - 60 - - 98 % - -   02/28/18 2105 142/72 - - 66 - - 97 % - -   02/28/18 2100 142/72 - - 66 - - 97 % - -   02/28/18 2050 173/88 - - 63 - - 98 % - -   02/28/18 2048 163/72 97.7  F (36.5  C) Oral 64 - 16 96 % 1.702 m (5' 7\") 86.4 kg (190 lb 7.6 oz)   02/28/18 1946 130/74 - - - 67 16 96 % - -   02/28/18 1933 131/69 - - - 69 16 97 % - -   02/28/18 1920 95/50 - - - 73 16 95 % - -   02/28/18 1905 117/67 98.7  F (37.1  C) Oral - 81 16 96 % - -   02/28/18 1344 (!) 184/108 98.1  F " "(36.7  C) Oral 84 - 16 98 % 1.714 m (5' 7.48\") 85.7 kg (188 lb 15 oz)       Wt Readings from Last 4 Encounters:   02/28/18 86.4 kg (190 lb 7.6 oz)   02/09/18 85.7 kg (189 lb)   10/20/17 88.2 kg (194 lb 6.4 oz)   08/15/17 85.3 kg (188 lb)     General: Alert and orientated. No apparent distress. Non-labored breathing. Appropriate affect.   MSK: R knee: dressing Clean, dry, and intact. Skin intact, no ecchymosis, no erythema. nontender to palpation to incision site. ROM appropriate for post op. Distal neurovascularly intact. Compartments soft and non-tender. No calf pain. Homans negative. PF/DF and EHL intact.       Pertinent Labs   Lab Results: personally reviewed.     Recent Labs   Lab Test  02/28/18   1349  02/22/18   1639  02/09/18   1012  04/25/17   1424  03/14/16   1048   01/30/14   1234   INR  0.94   --    --    --    --    --    --    HGB  13.7  13.4   --    --    --    --   14.3   HCT  40.9  41.2   --    --    --    --   41.6   MCV  92  93   --    --    --    --   88   PLT  280  301   --    --    --    --   273   NA   --    --   138  138  139   < >  135    < > = values in this interval not displayed.         Procedure(s):  Right total knee arthroplasty - Wound Class: I-Clean  Plan: Anticoagulation protocol: Lovenox inpatient and then  mg daily at discharge  x 42  days            Pain medications:  norco            Weight bearing status:  WBAT            Dressing Change:dry dressing change with gauze and ACE. Pranayo to remain intact until follow up.            Disposition:  Home in 1-2 days             Follow up: 2 week with ALLEY            Continue cares and rehabilitation     Report completed by:  Deanna Higgins PA-C  Date: 3/1/2018  Time: 1:17 PM    "

## 2018-03-01 NOTE — ANESTHESIA CARE TRANSFER NOTE
Patient: Bonita Villarreal    Procedure(s):  Right total knee arthroplasty - Wound Class: I-Clean    Diagnosis: Right knee degenerative joint disease  Diagnosis Additional Information: No value filed.    Anesthesia Type:   Spinal     Note:    Patient transferred to:Phase II  Handoff Report: Identifed the Patient, Identified the Reponsible Provider, Reviewed the pertinent medical history, Discussed the surgical course, Reviewed Intra-OP anesthesia mangement and issues during anesthesia, Set expectations for post-procedure period and Allowed opportunity for questions and acknowledgement of understanding      Vitals: (Last set prior to Anesthesia Care Transfer)    CRNA VITALS  2/28/2018 1831 - 2/28/2018 1931      2/28/2018             Pulse: 73    SpO2: 98 %                Electronically Signed By: America Clifford CRNA, APRN VICENTE  February 28, 2018  9:41 PM

## 2018-03-01 NOTE — PROGRESS NOTES
03/01/18 1300   Quick Adds   Type of Visit Initial Occupational Therapy Evaluation   Living Environment   Lives With alone   Living Arrangements house   Home Accessibility stairs to enter home  (walk-in shower with shower chair. )   Number of Stairs to Enter Home 1   Number of Stairs Within Home 0   Living Environment Comment Pt lives in mother-in-law suit attached to son's home. She lives in her own separate area. Reports son and daughter-in-law can assist with ADLs/IADLs as needed.    Self-Care   Equipment Currently Used at Home shower chair   Functional Level Prior   Ambulation 0-->independent   Transferring 0-->independent   Toileting 0-->independent  (high rise toilet with vanity to the Left)   Bathing 0-->independent   Dressing 0-->independent   Eating 0-->independent   Communication 0-->understands/communicates without difficulty   Swallowing 0-->swallows foods/liquids without difficulty   Cognition 0 - no cognition issues reported   Fall history within last six months no   Which of the above functional risks had a recent onset or change? none   General Information   Onset of Illness/Injury or Date of Surgery - Date 02/28/18   Referring Physician Anton Grover MD   Patient/Family Goals Statement To return home.    Additional Occupational Profile Info/Pertinent History of Current Problem s/p R TKA   Precautions/Limitations (knee precautions)   Weight-Bearing Status - RLE weight-bearing as tolerated   Cognitive Status Examination   Orientation orientation to person, place and time   Level of Consciousness alert   Pain Assessment   Patient Currently in Pain Yes, see Vital Sign flowsheet   Transfer Skill: Bed to Chair/Chair to Bed   Level of [place]: Bed to Chair stand-by assist   Physical Assist/Nonphysical Assist: Bed to Chair 1 person assist   Weight-Bearing Restrictions weight-bearing as tolerated   Assistive Device - Transfer Skill Bed to Chair Chair to Bed Rehab Eval standard walker   Transfer  "Skill: Sit to Stand   Level of [place]: Sit/Stand stand-by assist   Physical Assist/Nonphysical Assist: Sit/Stand 1 person assist   Transfer Skill: Sit to Stand weight-bearing as tolerated   Assistive Device for Transfer: Sit/Stand standard walker   Transfer Skill: Toilet Transfer   Level of [place]: Toilet stand-by assist   Physical Assist/Nonphysical Assist: Toilet 1 person assist   Weight-Bearing Restrictions: Toilet weight-bearing as tolerated   Assistive Device standard walker   Upper Body Dressing   Level of [place]: Dress Upper Body independent   Lower Body Dressing   Level of [place]: Dress Lower Body minimum assist (75% patients effort)  (assist with R sock only)   Physical Assist/Nonphysical Assist: Dress Lower Body 1 person assist   Assistive Device (no interested in AE- reports daughter-in-law can assist. )   Instrumental Activities of Daily Living (IADL)   IADL Comments Pt reports family can assist with IADLs as needed upon discharge.    Activities of Daily Living Analysis   Impairments Contributing to Impaired Activities of Daily Living pain;post surgical precautions   General Therapy Interventions   Planned Therapy Interventions ADL retraining   Clinical Impression   Criteria for Skilled Therapeutic Interventions Met yes, treatment indicated   OT Diagnosis decreased independence with ADLs.    Influenced by the following impairments decreased ROM in knee, pain   Assessment of Occupational Performance 1-3 Performance Deficits   Identified Performance Deficits LB dressing, toilet transfer, shower transfer   Clinical Decision Making (Complexity) Low complexity   Therapy Frequency daily   Predicted Duration of Therapy Intervention (days/wks) 1x treat   Anticipated Discharge Disposition Home with Assist   Risks and Benefits of Treatment have been explained. Yes   Patient, Family & other staff in agreement with plan of care Yes   [facility] AM-PAC  \"6 Clicks\" Daily Activity " Inpatient Short Form   1. Putting on and taking off regular lower body clothing? 3 - A Little   2. Bathing (including washing, rinsing, drying)? 3 - A Little   3. Toileting, which includes using toilet, bedpan or urinal? 4 - None   4. Putting on and taking off regular upper body clothing? 4 - None   5. Taking care of personal grooming such as brushing teeth? 4 - None   6. Eating meals? 4 - None   Daily Activity Raw Score (Score out of 24.Lower scores equate to lower levels of function) 22   Total Evaluation Time   Total Evaluation Time (Minutes) 10

## 2018-03-01 NOTE — PLAN OF CARE
"Problem: Knee Arthroplasty (Total, Partial) (Adult)  Goal: Signs and Symptoms of Listed Potential Problems Will be Absent, Minimized or Managed (Knee Arthroplasty)  Signs and symptoms of listed potential problems will be absent, minimized or managed by discharge/transition of care (reference Knee Arthroplasty (Total, Partial) (Adult) CPG).   Outcome: Improving  Patient is alert, oriented, assist 1 with walker. Fong removed this am. Prn norco given x 1. LS clear. Hemovac intact, 200 ml output. Denies nausea, SOB. CMS +, dressing CDI. /72 (BP Location: Left arm)  Pulse 74  Temp 97.2  F (36.2  C) (Oral)  Resp 18  Ht 1.702 m (5' 7\")  Wt 86.4 kg (190 lb 7.6 oz)  SpO2 96%  BMI 29.83 kg/m2        "

## 2018-03-01 NOTE — PROGRESS NOTES
I did do the skin assessment with the admitting nurse.  Other than the cold sore and new incision, her skin is in great shape and I agree with the admitting nurses' assessment.  PORFIRIO Pérez RN

## 2018-03-01 NOTE — PLAN OF CARE
Problem: Patient Care Overview  Goal: Plan of Care/Patient Progress Review  Discharge Planner OT   Patient plan for discharge: Home with assist from family    Current status: Completes ADLs with supervision using FWW. Does need assist don/doff R sock- pt not interested in AE reports family can assist upon discharge.     Barriers to return to prior living situation: None, has good family support and walk-in shower with shower chair to use upon discharge.     Recommendations for discharge: Home with assist from family as needed.     Rationale for recommendations: Able to complete ADLs in order to return home safely. Met all IP OT goals.     Occupational Therapy Discharge Summary    Reason for therapy discharge:    All goals and outcomes met, no further needs identified.    Progress towards therapy goal(s). See goals on Care Plan in [facility] electronic health record for goal details.  Goals met    Therapy recommendation(s):    No further OT recommended at this time.              Entered by: Cyn Peace 03/01/2018 2:48 PM

## 2018-03-01 NOTE — PROGRESS NOTES
Discharge Planner PT   Patient plan for discharge: Home with OP PT  Current status: Transfers sit <> supine with min to CGA, sit <>stand with CGA. Amb with RW and CGA 50'. Performed LE exs x 10. R knee ROM 15-75*.  Barriers to return to prior living situation: none  Recommendations for discharge: Home with OP PT  Rationale for recommendations: good mobility and motivation       Entered by: Elsy Murdock 03/01/2018 10:21 AM     Physical Therapy Evaluation     03/01/18 0800   Quick Adds   Type of Visit Initial PT Evaluation   Living Environment   Lives With child(obdulio), adult   Living Arrangements house   Home Accessibility stairs to enter home   Number of Stairs to Enter Home 1   Number of Stairs Within Home 0   Functional Level Prior   Ambulation 0-->independent   Transferring 0-->independent   Toileting 0-->independent   Bathing 0-->independent   Dressing 0-->independent   Eating 0-->independent   Communication 0-->understands/communicates without difficulty   Swallowing 0-->swallows foods/liquids without difficulty   Cognition 0 - no cognition issues reported   Fall history within last six months no   Which of the above functional risks had a recent onset or change? none   General Information   Onset of Illness/Injury or Date of Surgery - Date 02/28/18   Referring Physician Dr Grover   Patient/Family Goals Statement wants to return home to independent living   Pertinent History of Current Problem (include personal factors and/or comorbidities that impact the POC) Pt admitted with R TKA   Weight-Bearing Status - RLE weight-bearing as tolerated   Cognitive Status Examination   Orientation orientation to person, place and time   Level of Consciousness alert   Follows Commands and Answers Questions 100% of the time   Personal Safety and Judgment intact   Pain Assessment   Patient Currently in Pain Yes, see Vital Sign flowsheet  (R knee pain rated at 2-3/10 at rest, 4-5/10 w/amb)   Range of Motion (ROM)   ROM Quick Adds  "Knee, Right   ROM Comment WFL except R knee   Right Knee Extension/Flexion ROM   Right Knee Extension/Flexion PROM - degrees 15-75*   Strength   Strength Comments WFL except R LE at least antigravity strength   Bed Mobility   Bed Mobility Comments sit <> supine with min to SBA   Transfer Skills   Transfer Comments sit <> stand with CGA   Gait   Gait Gait Skill   Gait Skills   Level of [place]: Gait contact guard   Physical Assist/Nonphysical Assist: Gait 1 person assist   Weight-Bearing Restrictions: Gait weight-bearing as tolerated   Assistive Device for Transfer: Gait rolling walker   Gait Distance 50 feet   Balance   Balance Comments good with RW   General Therapy Interventions   Planned Therapy Interventions gait training;ROM;strengthening;stretching   Clinical Impression   Criteria for Skilled Therapeutic Intervention yes, treatment indicated   PT Diagnosis R TKA   Influenced by the following impairments pain and weakness   Functional limitations due to impairments mobility and gait   Clinical Presentation Stable/Uncomplicated   Clinical Presentation Rationale clinical judgement   Clinical Decision Making (Complexity) Low complexity   Therapy Frequency` 2 times/day   Predicted Duration of Therapy Intervention (days/wks) 3 days   Anticipated Discharge Disposition Home with Outpatient Therapy   Risk & Benefits of therapy have been explained Yes   Patient, Family & other staff in agreement with plan of care Yes   [facility] [employer] TM \"6 Clicks\"   2016, Trustees of [facility], under license to [employer].  All rights reserved.   6 Clicks Short Forms Basic Mobility Inpatient Short Form   [facility] [employer]AC  \"6 Clicks\" V.2 Basic Mobility Inpatient Short Form   1. Turning from your back to your side while in a flat bed without using bedrails? 3 - A Little   2. Moving from lying on your back to sitting on the side of a flat bed without using bedrails? 3 - A Little   3. Moving to and from a " bed to a chair (including a wheelchair)? 3 - A Little   4. Standing up from a chair using your arms (e.g., wheelchair, or bedside chair)? 4 - None   5. To walk in hospital room? 3 - A Little   6. Climbing 3-5 steps with a railing? 3 - A Little   Basic Mobility Raw Score (Score out of 24.Lower scores equate to lower levels of function) 19   Total Evaluation Time   Total Evaluation Time (Minutes) 15     See Care Plan for Goals.    Elsy Murdock PT

## 2018-03-01 NOTE — PROGRESS NOTES
"[facility] Medicine Progress Note  Date of Service: 03/01/2018    Assessment & Plan   Bonita Villarreal is a 82 year old female who presented on 2/28/2018 for scheduled Procedure(s):  ARTHROPLASTY KNEE by Nica Chris MD and is being followed by the hospital medicine service for co-management of acute and/or chronic perioperative medical problems.      S/p Procedure(s):  ARTHROPLASTY KNEE   1 Day Post-Op    Pain little more sore after therapy but overall well managed. Hg 11.7. Pre-op 13.7.   - pain control, wound cares, physical therapy, occupational therapy and DVT prophylaxis per orthopedic surgery service    Hypertension   Chronic and stable. Blood pressures reviewed, stable.   -continue PTA losartan-HCTZ, atenolol    Generalized anxiety disorder  - continue home escitalopram    History of asymptomatic left bundle branch block  Asymptomatic, monitoring for symptoms.    DVT Prophylaxis: as per orthopedic surgery service - Defer to primary service, enoxaparin inpatient, then ASA at discharge  Code Status: Full Code    Lines: PIV left hand   Fong catheter: None    Discussion: Medically, the patient appears stable. VSS.    Disposition: Anticipate discharge 1-2 days, per primary service     Attestation:  I have reviewed today's vital signs, notes, medications, labs and imaging.  Total time: 15 minutes    This patient was discussed with Dr. Nica Chris. Plan as above.    Anita Jimenez PA-C  [facility] Medicine      Interval History   Patient was seen this afternoon. She is feeling \"really good\".    Pain has been well managed, a little more sore after therapy.  Has been doing well getting up to use the bathroom and in therapy.  Fair appetite.  Sleeping well.  No BM, is passing gas. Voiding regularly.    Denies HA, lightheadedness, dizziness, fever, chills, chest pain, palpitations, SOB, cough, wheezes, abdominal pain, N/V/D, numbness or tingling in bilateral lower " extremities.    Physical Exam   Temp:  [96.6  F (35.9  C)-98.7  F (37.1  C)] 96.6  F (35.9  C)  Pulse:  [60-79] 71  Heart Rate:  [67-81] 67  Resp:  [16-18] 18  BP: ()/(50-95) 144/74  SpO2:  [93 %-98 %] 94 %    Weights:   Vitals:    02/28/18 1344 02/28/18 2048   Weight: 85.7 kg (188 lb 15 oz) 86.4 kg (190 lb 7.6 oz)    Body mass index is 29.83 kg/(m^2).    General: Appears well, sitting up comfortably in bed. Alert and orientated. Pleasant and cooperative. NAD. Non-toxic. Appears stated age.   CV: Regular rate, normal rhythm. Radial pulses are 2+ bilaterally. Distal pulses intact. Slightly Lower extremity edema to lower calf.  Respiratory: No accessory muscle usage. Clear to auscultation bilaterally. No wheezes, crackles or rhonchi.   GI: Soft, non-tender, non-distended. Bowel sounds are normoactive in a quadrants.  Skin: Warm, dry, intact. Did not assess incision, dressing appear clean and dry.  Musculoskeletal: Muscle tone is appropriate. Moves all extremities freely with limited range of motion right lower extremity due to pain and bandaged.  Neuro: Sensation to light touch of bilateral lower extremities is grossly intact.     Data     Recent Labs  Lab 03/01/18  0639 02/28/18  1349 02/22/18  1639   WBC  --  6.7 8.2   HGB 11.7 13.7 13.4   MCV  --  92 93   PLT  --  280 301   INR  --  0.94  --      No results for input(s): GLC, BGM in the last 168 hours.     Unresulted Labs Ordered in the Past 30 Days of this Admission     No orders found for last 61 day(s).         Imaging  Recent Results (from the past 24 hour(s))   XR Knee Port Right 1/2 Views    Narrative    KNEE PORTABLE RIGHT ONE - TWO VIEW  2/28/2018 7:18 PM     HISTORY: Postop total knee.        Impression    IMPRESSION: Two views of the right knee are performed. Patient is  status post total right knee replacement. No evidence of hardware  complication. Bones appear in near-anatomic alignment.    LAURA GARCIA MD      I reviewed all new labs and imaging  results over the last 24 hours. I personally reviewed no images or EKG's today.    Medications       atenolol  100 mg Oral Daily     escitalopram  10 mg Oral Daily     sodium chloride (PF)  3 mL Intracatheter Q8H     enoxaparin  40 mg Subcutaneous Q24H     ranitidine  150 mg Oral Q12H    Or     famotidine  20 mg Intravenous Q12H     gabapentin  100 mg Oral TID     losartan  100 mg Oral Daily    And     hydrochlorothiazide  25 mg Oral Daily     I have discussed patient with Dr. Nica Chris.    Anita Jimenez, [facility] Medicine

## 2018-03-02 ENCOUNTER — APPOINTMENT (OUTPATIENT)
Dept: PHYSICAL THERAPY | Facility: CLINIC | Age: 83
DRG: 470 | End: 2018-03-02
Attending: ORTHOPAEDIC SURGERY
Payer: MEDICARE

## 2018-03-02 VITALS
BODY MASS INDEX: 29.9 KG/M2 | DIASTOLIC BLOOD PRESSURE: 61 MMHG | RESPIRATION RATE: 18 BRPM | TEMPERATURE: 97.9 F | SYSTOLIC BLOOD PRESSURE: 125 MMHG | HEART RATE: 71 BPM | HEIGHT: 67 IN | OXYGEN SATURATION: 93 % | WEIGHT: 190.48 LBS

## 2018-03-02 LAB — HGB BLD-MCNC: 11.7 G/DL (ref 11.7–15.7)

## 2018-03-02 PROCEDURE — 97110 THERAPEUTIC EXERCISES: CPT | Mod: GP

## 2018-03-02 PROCEDURE — 97116 GAIT TRAINING THERAPY: CPT | Mod: GP

## 2018-03-02 PROCEDURE — 36415 COLL VENOUS BLD VENIPUNCTURE: CPT | Performed by: ORTHOPAEDIC SURGERY

## 2018-03-02 PROCEDURE — A9270 NON-COVERED ITEM OR SERVICE: HCPCS | Mod: GY | Performed by: PHYSICIAN ASSISTANT

## 2018-03-02 PROCEDURE — 25000132 ZZH RX MED GY IP 250 OP 250 PS 637: Mod: GY | Performed by: PHYSICIAN ASSISTANT

## 2018-03-02 PROCEDURE — 99231 SBSQ HOSP IP/OBS SF/LOW 25: CPT | Performed by: PHYSICIAN ASSISTANT

## 2018-03-02 PROCEDURE — A9270 NON-COVERED ITEM OR SERVICE: HCPCS | Mod: GY | Performed by: ORTHOPAEDIC SURGERY

## 2018-03-02 PROCEDURE — 85018 HEMOGLOBIN: CPT | Performed by: ORTHOPAEDIC SURGERY

## 2018-03-02 PROCEDURE — 25000132 ZZH RX MED GY IP 250 OP 250 PS 637: Mod: GY | Performed by: ORTHOPAEDIC SURGERY

## 2018-03-02 PROCEDURE — 40000193 ZZH STATISTIC PT WARD VISIT

## 2018-03-02 RX ORDER — HYDROCODONE BITARTRATE AND ACETAMINOPHEN 5; 325 MG/1; MG/1
1-2 TABLET ORAL EVERY 4 HOURS PRN
Qty: 40 TABLET | Refills: 0 | Status: SHIPPED | OUTPATIENT
Start: 2018-03-02 | End: 2018-06-28

## 2018-03-02 RX ORDER — AMOXICILLIN 250 MG
2 CAPSULE ORAL 2 TIMES DAILY PRN
Qty: 100 TABLET | Refills: 0 | Status: SHIPPED | OUTPATIENT
Start: 2018-03-02 | End: 2018-06-28

## 2018-03-02 RX ADMIN — HYDROCODONE BITARTRATE AND ACETAMINOPHEN 2 TABLET: 5; 325 TABLET ORAL at 06:25

## 2018-03-02 RX ADMIN — RANITIDINE 150 MG: 150 TABLET ORAL at 08:30

## 2018-03-02 RX ADMIN — ATENOLOL 100 MG: 100 TABLET ORAL at 08:30

## 2018-03-02 RX ADMIN — HYDROCHLOROTHIAZIDE 25 MG: 25 TABLET ORAL at 08:30

## 2018-03-02 RX ADMIN — GABAPENTIN 100 MG: 100 CAPSULE ORAL at 08:30

## 2018-03-02 RX ADMIN — HYDROCODONE BITARTRATE AND ACETAMINOPHEN 2 TABLET: 5; 325 TABLET ORAL at 02:06

## 2018-03-02 RX ADMIN — HYDROCODONE BITARTRATE AND ACETAMINOPHEN 2 TABLET: 5; 325 TABLET ORAL at 10:42

## 2018-03-02 RX ADMIN — GABAPENTIN 100 MG: 100 CAPSULE ORAL at 14:44

## 2018-03-02 RX ADMIN — LOSARTAN POTASSIUM 100 MG: 50 TABLET ORAL at 08:30

## 2018-03-02 RX ADMIN — ESCITALOPRAM OXALATE 10 MG: 10 TABLET ORAL at 08:30

## 2018-03-02 RX ADMIN — HYDROCODONE BITARTRATE AND ACETAMINOPHEN 2 TABLET: 5; 325 TABLET ORAL at 14:44

## 2018-03-02 NOTE — PROGRESS NOTES
"[facility] Orthopaedics Progress Note      Post-operative Day: 2 Days Post-Op    Procedure(s):  Right total knee arthroplasty - Wound Class: I-Clean      Subjective:    Pain: moderate  aching to R knee. Denies shooting pain, parasthesias, numbness and weakness.   denies Chest pain, SOB, O2 required: None  denies nausea/ emesis  denies lightheadedness, dizziness and weakness  BM -, passing gas +. Urinating well, Fong in place: no.       Objective:  Blood pressure 125/61, pulse 71, temperature 97.9  F (36.6  C), temperature source Oral, resp. rate 18, height 1.702 m (5' 7\"), weight 86.4 kg (190 lb 7.6 oz), SpO2 93 %, not currently breastfeeding.    Patient Vitals for the past 24 hrs:   BP Temp Temp src Heart Rate Resp SpO2   03/02/18 0728 125/61 97.9  F (36.6  C) Oral 70 18 93 %   03/01/18 2342 155/75 98.8  F (37.1  C) Oral 60 16 97 %   03/01/18 1700 132/76 98  F (36.7  C) Oral 73 16 96 %       Wt Readings from Last 4 Encounters:   02/28/18 86.4 kg (190 lb 7.6 oz)   02/09/18 85.7 kg (189 lb)   10/20/17 88.2 kg (194 lb 6.4 oz)   08/15/17 85.3 kg (188 lb)     General: Alert and orientated. No apparent distress. Non-labored breathing. Appropriate affect.   MSK: R knee: dressing Clean, dry, and intact. Skin intact, no ecchymosis, no erythema. nontender to palpation to incision site. ROM appropriate for post op. Distal neurovascularly intact. Compartments soft and non-tender. No calf pain. Homans negative. PF/DF and EHL intact.       Pertinent Labs   Lab Results: personally reviewed.     Recent Labs   Lab Test  03/02/18   0628  03/01/18   0639  02/28/18   1349  02/22/18   1639  02/09/18   1012  04/25/17   1424  03/14/16   1048   01/30/14   1234   INR   --    --   0.94   --    --    --    --    --    --    HGB  11.7  11.7  13.7  13.4   --    --    --    --   14.3   HCT   --    --   40.9  41.2   --    --    --    --   41.6   MCV   --    --   92  93   --    --    --    --   88   PLT   --    --   280  301   --    --    --    " --   273   NA   --    --    --    --   138  138  139   < >  135    < > = values in this interval not displayed.         Procedure(s):  Right total knee arthroplasty - Wound Class: I-Clean  Plan: Anticoagulation protocol: Lovenox inpatient and then  mg daily at discharge  x 42  days            Pain medications:  norco            Weight bearing status:  WBAT            Dressing Change: dry dressing change with gauze and ACE. Prineo to remain intact until follow up.             Disposition:  Home today             Follow up: 2 week with ALLEY            Continue cares and rehabilitation     Report completed by:  Deanna Higigns PA-C  Date: 3/2/2018  Time: 10:46 AM

## 2018-03-02 NOTE — PLAN OF CARE
Problem: Patient Care Overview  Goal: Plan of Care/Patient Progress Review  Physical Therapy Discharge Summary    Reason for therapy discharge:    Discharged to home with outpatient therapy.    Progress towards therapy goal(s). See goals on Care Plan in [facility] electronic health record for goal details.  Goals met    Therapy recommendation(s):    Continued therapy is recommended.  Rationale/Recommendations:  R knee ROM and strengthening, and gait training.     Elsy Murdock PT

## 2018-03-02 NOTE — PROGRESS NOTES
[facility] DISCHARGE NOTE    Patient discharged to home at 3:49 PM via wheel chair. Accompanied by son and staff. Discharge instructions reviewed with patient, opportunity offered to ask questions. Prescriptions sent to patients preferred pharmacy. All belongings sent with patient.    Pao Connor RN

## 2018-03-02 NOTE — PROGRESS NOTES
Planning for discharge to home with family about 1600, discharge instructions were reviewed with pt and questions answered.

## 2018-03-02 NOTE — PROGRESS NOTES
[facility] Medicine Progress Note  Date of Service: 03/02/2018    Assessment & Plan   Bonita Villarreal is a 82 year old female who presented on 2/28/2018 for scheduled Procedure(s):  ARTHROPLASTY KNEE by Anton Grover MD and is being followed by the hospital medicine service for co-management of acute and/or chronic perioperative medical problems.      S/p Procedure(s):  ARTHROPLASTY KNEE   2 Days Post-Op    Pain a little worse overnight, improved with medications. Hg 11.7. Stable.   - pain control, wound cares, physical therapy, occupational therapy and DVT prophylaxis per orthopedic surgery service    Hypertension  Chronic and stable. Blood pressures reviewed, stable.   -continue PTA losartan-HCTZ, atenolol     Generalized anxiety disorder  - continue home escitalopram     History of asymptomatic left bundle branch block  Asymptomatic, monitoring for symptoms as outpatient.    DVT Prophylaxis: as per orthopedic surgery service - Enoxaparin (Lovenox) SQ inpatient then  mg at discharge  Code Status: Full Code    Discussion: Medically, the patient appears stable. VSS.    Disposition: Anticipate discharge today, per orthopedic service     Attestation:  I have reviewed today's vital signs, notes, medications, labs and imaging.  Total time: 15 minutes    This patient was discussed with Dr. Pierson. Plan as above.    ALESHA GarciaC  [facility] Medicine      Interval History   Patient was seen this morning. States she is feeling pretty good.     Pain worse overnight. Feels worn out after PT. Improved with medications. Slight nausea during PT after taking 2 pain pills instead of the usual 1 that she had been taking.   She has been doing well moving around and participating in therapies.  Good appetite.  Did not sleep well due to pain  No BM, is passing gas. Voiding regularly.    Denies HA, lightheadedness, dizziness, fever, chills, chest pain, palpitations, SOB,  cough, wheezes, abdominal pain, V/D, numbness or tingling in bilateral lower extremities.    Physical Exam   Temp:  [97.9  F (36.6  C)-98.8  F (37.1  C)] 97.9  F (36.6  C)  Heart Rate:  [60-73] 70  Resp:  [16-18] 18  BP: (125-155)/(61-76) 125/61  SpO2:  [93 %-97 %] 93 %    Weights:   Vitals:    02/28/18 1344 02/28/18 2048   Weight: 85.7 kg (188 lb 15 oz) 86.4 kg (190 lb 7.6 oz)    Body mass index is 29.83 kg/(m^2).    General: Appears well, sitting up comfortably in bed. Alert and orientated. Pleasant and cooperative. NAD. Non-toxic. Appears stated age.   CV: Regular rate, normal rhythm. Radial pulses are 2+ bilaterally. Distal pulses intact. No Lower extremity edema   Respiratory: No accessory muscle usage. Clear to auscultation bilaterally. No wheezes, crackles or rhonchi.   GI: Soft, non-tender, non-distended. Bowel sounds are normoactive in a quadrants.  Skin: Warm, dry, intact. Did not assess incision, dressing appear clean and dry.  Musculoskeletal: Muscle tone is appropriate. Moves all extremities freely with limited range of motion right lower extremity due to pain and bandaged.  Neuro: Sensation to light touch of bilateral lower extremities is grossly intact.     Data     Recent Labs  Lab 03/02/18  0628 03/01/18  0639 02/28/18  1349   WBC  --   --  6.7   HGB 11.7 11.7 13.7   MCV  --   --  92   PLT  --   --  280   INR  --   --  0.94     No results for input(s): GLC, BGM in the last 168 hours.     Unresulted Labs Ordered in the Past 30 Days of this Admission     No orders found from 12/30/2017 to 3/1/2018.           Imaging  No results found for this or any previous visit (from the past 24 hour(s)).     I reviewed all new labs and imaging results over the last 24 hours. I personally reviewed no images or EKG's today.    Medications       atenolol  100 mg Oral Daily     escitalopram  10 mg Oral Daily     sodium chloride (PF)  3 mL Intracatheter Q8H     enoxaparin  40 mg Subcutaneous Q24H     ranitidine  150 mg  Oral Q12H    Or     famotidine  20 mg Intravenous Q12H     gabapentin  100 mg Oral TID     losartan  100 mg Oral Daily    And     hydrochlorothiazide  25 mg Oral Daily     I have discussed patient with Dr. Nica Chris.    Anita Jimenez, [facility] Medicine

## 2018-03-02 NOTE — PLAN OF CARE
Problem: Patient Care Overview  Goal: Plan of Care/Patient Progress Review  Outcome: Improving  VSS, lungs clear, HR regular.  Removed Hemovac drain this evening, had 70 cc in it.  Pain managed well with one tab Norco every 4 hours.  Patient moving around quite well with walker and SBA x1.  Voiding WDL, appetite/intake good.  Will continue to monitor.

## 2018-03-02 NOTE — PROGRESS NOTES
Pt A & O X 3, appropriate mood and affect, pleasant demeanor.  Norco effective pain control at surgical site, (R) knee, dressing and ACE wrap CDI.   CMS intact  SBA w/transfers and ambulation using walker, steady on feet.   VSS

## 2018-03-02 NOTE — DISCHARGE SUMMARY
[facility] Orthopedics Discharge Summary                                  [facility]     KEN IBRAHIM [id number]   Age: 82 year old  PCP: Dewayne Santana, [phone] 1935     Date of Admission:  2/28/2018  Date of Discharge::  3/2/2018  Discharge Physician:  Deanna Higgins    Code status:  Full Code    Admission Information:  Admission Diagnosis:  Right knee degenerative joint disease  Right knee DJD    Post-Operative Day: 2 Days Post-Op     Reason for admission:  The patient was admitted for the following:Procedure(s) (LRB):  ARTHROPLASTY KNEE (Right)    Principal Problem:    Status post total right knee replacement  Active Problems:    Essential hypertension, benign    Generalized anxiety disorder    Right knee DJD      Allergies:  Lisinopril    Following the procedure noted above the patient was transferred to the post-op floor and started on:    Therapy:  physical therapy and occupational therapy  Anticoagulation Plan: Lovenox inpatient and then  mg daily at discharge  for 42 days  Pain Management: norco  Weight bearing status: Weight bearing as tolerated     The patient was followed and co-managed by the hospitalist service during the inpatient treatment course  Complications:  None  Consultations:  None     Pertinent Labs   Lab Results: personally reviewed.     Recent Labs   Lab Test  03/02/18   0628  03/01/18   0639  02/28/18   1349  02/22/18   1639  02/09/18   1012  04/25/17   1424  03/14/16   1048   01/30/14   1234   INR   --    --   0.94   --    --    --    --    --    --    HGB  11.7  11.7  13.7  13.4   --    --    --    --   14.3   HCT   --    --   40.9  41.2   --    --    --    --   41.6   MCV   --    --   92  93   --    --    --    --   88   PLT   --    --   280  301   --    --    --    --   273   NA   --    --    --    --   138  138  139   < >  135    < > = values in this interval not displayed.          Discharge Information:  Condition at discharge:  Stable  Discharge destination:  Discharged to home     Medications at discharge:  Current Discharge Medication List      START taking these medications    Details   HYDROcodone-acetaminophen (NORCO) 5-325 MG per tablet Take 1-2 tablets by mouth every 4 hours as needed for moderate to severe pain  Qty: 40 tablet, Refills: 0    Associated Diagnoses: Status post total right knee replacement      senna-docusate (SENOKOT-S;PERICOLACE) 8.6-50 MG per tablet Take 2 tablets by mouth 2 times daily as needed for constipation  Qty: 100 tablet, Refills: 0    Associated Diagnoses: Status post total right knee replacement      aspirin  MG EC tablet Take 1 tablet (325 mg) by mouth daily  Qty: 42 tablet, Refills: 0    Associated Diagnoses: Status post total right knee replacement      order for DME Equipment being ordered: Walker Wheels () and Walker ()  Treatment Diagnosis: s/p TKA  Qty: 1 Units, Refills: 0    Associated Diagnoses: Status post total right knee replacement         CONTINUE these medications which have NOT CHANGED    Details   atenolol (TENORMIN) 100 MG tablet Take 1 tablet (100 mg) by mouth daily (Needs follow-up appointment for this medication)  Qty: 90 tablet, Refills: 3    Associated Diagnoses: Essential hypertension, benign      escitalopram (LEXAPRO) 10 MG tablet Take 1 tablet (10 mg) by mouth daily (Needs follow-up appointment for this medication)  Qty: 90 tablet, Refills: 3    Associated Diagnoses: Generalized anxiety disorder      losartan-hydrochlorothiazide (HYZAAR) 100-12.5 MG per tablet Take 1 tablet by mouth daily  Qty: 90 tablet, Refills: 3    Associated Diagnoses: Essential hypertension, benign      cholecalciferol (VITAMIN D) 1000 UNIT tablet Take 1 tablet by mouth daily.      FISH OIL OR 1 tab daily      MULTI-VITAMIN OR TABS 1 DAILY                        Follow-Up Care:  Patient should be seen in the office in 10-14 days by the Orthopedic Surgeon/Physician Assistant.  Call  [phone] for appointment or questions.    Deanna Higgins

## 2018-03-05 ENCOUNTER — TELEPHONE (OUTPATIENT)
Dept: FAMILY MEDICINE | Facility: CLINIC | Age: 83
End: 2018-03-05

## 2018-03-05 NOTE — TELEPHONE ENCOUNTER
ED/UC/IP follow up phone call:    Right Knee DJD, Right Knee Degenerative Joint Disease   [facility]   3/2/2018  RN please call to follow up.    Number of ED visits in past 12 months = 0    Bria Bernal

## 2018-03-06 ENCOUNTER — HOSPITAL ENCOUNTER (OUTPATIENT)
Dept: PHYSICAL THERAPY | Facility: CLINIC | Age: 83
Setting detail: THERAPIES SERIES
End: 2018-03-06
Attending: ORTHOPAEDIC SURGERY
Payer: MEDICARE

## 2018-03-06 PROCEDURE — 97110 THERAPEUTIC EXERCISES: CPT | Mod: GP | Performed by: PHYSICAL THERAPIST

## 2018-03-06 PROCEDURE — G8978 MOBILITY CURRENT STATUS: HCPCS | Mod: GP,CK | Performed by: PHYSICAL THERAPIST

## 2018-03-06 PROCEDURE — 40000718 ZZHC STATISTIC PT DEPARTMENT ORTHO VISIT: Performed by: PHYSICAL THERAPIST

## 2018-03-06 PROCEDURE — 97161 PT EVAL LOW COMPLEX 20 MIN: CPT | Mod: GP | Performed by: PHYSICAL THERAPIST

## 2018-03-06 PROCEDURE — G8979 MOBILITY GOAL STATUS: HCPCS | Mod: GP,CI | Performed by: PHYSICAL THERAPIST

## 2018-03-06 NOTE — PROGRESS NOTES
Bonita Villarreal  1935 Physical Therapy Initial Evaluation  03/06/18 0800   General Information   Type of Visit Initial OP Ortho PT Evaluation   Start of Care Date 03/06/18   Referring Physician Deanna Higgins PA-C   Patient/Family Goals Statement Walk without a walker or limp   Orders Evaluate and Treat   Date of Order 03/02/18   Insurance Type Medicare;Other  ([employer])   Insurance Comments/Visits Authorized [id number]   Medical Diagnosis Status post total right knee replacement   Surgical/Medical history reviewed Yes   Precautions/Limitations no known precautions/limitations   Body Part(s)   Body Part(s) Knee   Presentation and Etiology   Pertinent history of current problem (include personal factors and/or comorbidities that impact the POC) Surgery on 2/28/2018. Has had a burning sensation on side of leg which is worse when she lays down at night. HEP going well at home. Has knee wrapped. Told to leave that on until next appointment with surgeon. Using ice and elevating in reclining. Icing 2 times / day.  / Comorbidities - Generalized anxiety disorder, Osteopenia of spine   Impairments C. Swelling;E. Decreased flexibility;J. Burning;L. Tingling   Functional Limitations perform activities of daily living;perform desired leisure / sports activities   Symptom Location Right knee   How/Where did it occur Other  (Surgery)   Onset date of current episode/exacerbation 02/28/18  (Date of Surgery)   Chronicity Chronic   Pain rating (0-10 point scale) Best (/10);Worst (/10)   Best (/10) 3   Worst (/10) 6   Pain quality C. Aching;D. Burning   Frequency of pain/symptoms B. Intermittent   Pain/symptoms are: Worse during the night   Pain/symptoms exacerbated by C. Lifting;E. Rest;G. Certain positions   Pain/symptoms eased by A. Sitting;E. Changing positions;H. Cold   Prior Level of Function   Functional Level Prior Comment Ind   Current Level of Function   Patient role/employment history F. Retired   Living environment  [place]   Home/community accessibility 1 step into the house   Current equipment-Gait/Locomotion Walker  (Not using at today's visit)   Fall Risk Screen   Fall screen completed by PT   Have you fallen 2 or more times in the past year? No   Have you fallen and had an injury in the past year? No   Is patient a fall risk? No   Knee Objective Findings   Side (if bilateral, select both right and left) Right   Integumentary  No signs of infection noted   Gait/Locomotion Presents to physical therapy without AD with noticeable limp and reaching for coutners and walls for support   Right Knee Extension AROM 13 degrees short of TKE   Right Knee Flexion PROM 92   Right Knee Flexion Strength <3/5 due to ROM deficits   Right Knee Extension Strength <3/5 due to ROM deficits   Right Hip Abduction Strength 4-/5   Right Quad Set Strength Moderate deficit   Right Hamstring Flexibility Moderately restricted   Planned Therapy Interventions   Planned Therapy Interventions manual therapy;neuromuscular re-education;ROM;strengthening;stretching   Planned Modality Interventions   Planned Modality Interventions Cryotherapy;Hot packs;TENS;Ultrasound   Clinical Impression   Criteria for Skilled Therapeutic Interventions Met yes, treatment indicated   PT Diagnosis TKA leading to ROM and strength deficits and difficulty with stairs and ambulation.    Influenced by the following impairments Pain, ROM deficits, strength deficits   Functional limitations due to impairments Difficulty ambulating, stairs, sleeping   Clinical Presentation Stable/Uncomplicated   Clinical Presentation Rationale 2 comorbidities impacting PT / 1 body system / Stable   Clinical Decision Making (Complexity) Low complexity   Therapy Frequency 2 times/Week   Predicted Duration of Therapy Intervention (days/wks) 8 weeks   Risk & Benefits of therapy have been explained Yes   Patient, Family & other staff in agreement with plan of care Yes   Education Assessment    Preferred Learning Style Listening;Reading;Pictures/video;Demonstration   Barriers to Learning No barriers   ORTHO GOALS   PT Ortho Eval Goals 1;2;3;4   Ortho Goal 1   Goal Identifier HEP   Goal Description Pt will be independent in HEP in order to achieve and maintain long term treatment goals.   Target Date 03/20/18   Ortho Goal 2   Goal Identifier Ambulation   Goal Description Pt will be able to ambulate community distances in order to run errands without an assitive device safely.   Target Date 05/01/18   Ortho Goal 3   Goal Identifier Stairs   Goal Description Pt will be able to ascend and descend 1 flight of stairs with a handrail assist safely.   Target Date 05/01/18   Ortho Goal 4   Goal Identifier Sleeping   Goal Description Pt will be able to sleep through the night without waking up due to LE pain.   Target Date 05/01/18   Total Evaluation Time   Total Evaluation Time 15   Therapy Certification   Certification date from 03/06/18   Certification date to 05/01/18   Medical Diagnosis Status post total right knee replacement     Kp Osman, PT, DPT

## 2018-03-06 NOTE — PROGRESS NOTES
[facility]          OUTPATIENT PHYSICAL THERAPY ORTHOPEDIC EVALUATION  PLAN OF TREATMENT FOR OUTPATIENT REHABILITATION  (COMPLETE FOR INITIAL CLAIMS ONLY)  Patient's Last Name, First Name, M.I.  YOB: 1935  Bonita Villarreal    Provider s Name:  [facility]   Medical Record No.  [id number]   Start of Care Date:  03/06/18   Onset Date:  02/28/18 (Date of Surgery)   Type:     _X__PT   ___OT   ___SLP Medical Diagnosis:  Status post total right knee replacement     PT Diagnosis:  TKA leading to ROM and strength deficits and difficulty with stairs and ambulation.    Visits from SOC:  1      _________________________________________________________________________________  Plan of Treatment/Functional Goals:  manual therapy, neuromuscular re-education, ROM, strengthening, stretching     Cryotherapy, Hot packs, TENS, Ultrasound     Goals  Goal Identifier: HEP  Goal Description: Pt will be independent in [facility] in order to achieve and maintain long term treatment goals.  Target Date: 03/20/18    Goal Identifier: Ambulation  Goal Description: Pt will be able to ambulate community distances in order to run errands without an assitive device safely.  Target Date: 05/01/18    Goal Identifier: Stairs  Goal Description: Pt will be able to ascend and descend 1 flight of stairs with a handrail assist safely.  Target Date: 05/01/18    Goal Identifier: Sleeping  Goal Description: Pt will be able to sleep through the night without waking up due to LE pain.  Target Date: 05/01/18                                                Therapy Frequency:  2 times/Week  Predicted Duration of Therapy Intervention:  8 weeks    Oli Osman, PT                 I CERTIFY THE NEED FOR THESE SERVICES FURNISHED UNDER        THIS PLAN OF TREATMENT AND WHILE UNDER MY CARE .             Physician Signature               Date    X_____________________________________________________                              Certification Date From:  03/06/18   Certification Date To:  05/01/18    Referring Provider:  Deanna Higgins PA-C    Initial Assessment        See Epic Evaluation Start of Care Date: 03/06/18

## 2018-03-13 ENCOUNTER — HOSPITAL ENCOUNTER (OUTPATIENT)
Dept: PHYSICAL THERAPY | Facility: CLINIC | Age: 83
Setting detail: THERAPIES SERIES
End: 2018-03-13
Attending: ORTHOPAEDIC SURGERY
Payer: MEDICARE

## 2018-03-13 PROCEDURE — 40000718 ZZHC STATISTIC PT DEPARTMENT ORTHO VISIT: Performed by: PHYSICAL THERAPIST

## 2018-03-13 PROCEDURE — 97110 THERAPEUTIC EXERCISES: CPT | Mod: GP | Performed by: PHYSICAL THERAPIST

## 2018-03-13 NOTE — PROGRESS NOTES
PROGRESS NOTE FOR MD 03/13/18 1200   Signing Clinician's Name / Credentials   Signing clinician's name / credentials Conchita Salcido, PT, MTC    Session Number   Session Number 2 - [facility]   Progress Note/Recertification   Progress Note Due Date 04/06/18   Recertification Due Date 05/01/18   PT Medicare Only G-code   G-code Mobility: Walking & Moving Around   Mobility: Walking & Moving Around   Mobility Current Status,  (eval/re-eval & every progress note) CK: 40-59% impairment   Current Mobility Modifier Rationale Based on clinical judgment and the patient's ability to perform transfers, ambulate, and perform exercises within PT session. Also based on the patient's subjective report of ability to perform functional activities.   Mobility Goal,  (eval/re-eval, every progress note, & discharge) CI: 1-19% impairment   Adult Goals   PT Ortho Eval Goals 1;2;3;4   Ortho Goal 1   Goal Identifier HEP   Goal Description Pt will be independent in HEP in order to achieve and maintain long term treatment goals.   Target Date 03/20/18   Ortho Goal 2   Goal Identifier Ambulation   Goal Description Pt will be able to ambulate community distances in order to run errands without an assitive device safely.   Target Date 05/01/18   Ortho Goal 3   Goal Identifier Stairs   Goal Description Pt will be able to ascend and descend 1 flight of stairs with a handrail assist safely.   Target Date 05/01/18   Ortho Goal 4   Goal Identifier Sleeping   Goal Description Pt will be able to sleep through the night without waking up due to LE pain.   Target Date 05/01/18   Subjective Report   Subjective Report Sees MD tomorrow. Do you think I'm doing well? Leg feels tired on leg bike.    Objective Measures   Objective Measures Objective Measure 1;Objective Measure 2;Objective Measure 3   Objective Measure 1   Objective Measure LEFS   Details INITIALLY: 24/80   Objective Measure 2   Objective Measure AROM:    Details 3/13/18 Supine Ext  -16, Supine Flex 108, Sitting Ext -18, Sitting Flex 98.    Objective Measure 3   Objective Measure PROM   Details Supine Ext -12, Supine Flex 113, Sitting Flex 106.    Treatment Interventions   Interventions Therapeutic Procedure/Exercise;Manual Therapy;Gait Training   Therapeutic Procedure/exercise   Minutes 30   Skilled Intervention ROM and strengthening exercise instruction   Patient Response C/O fatigue after short time on bike. Comes to dept w/o walker or cane.    Treatment Detail UBE Bike for legs x 2'. Added Prone Ext stretch and AAROM for Ext in sitting. Went through GS, QS, Ankle Pumps, SLR, Heel slides, Ab/Ad of Hip Sitting AAROM for Flex, HS Stretch in sitting.    Gait Training   Minutes 0   Education   Education Comments H/O's given for ex's started today.    Plan   Home program HEP as above.    Plan for next session ROM for flexion and extenstion / Bike / STM to reduce swelling if indicated   Total Session Time   Timed Code Treatment Minutes 30 Ex x 2   Total Treatment Time (sum of timed and untimed services) 30   Conchita Salcido PT, MTC (#[id number])  [facility]           [phone]  Fax          [phone]  Appt #      [phone]

## 2018-03-15 ENCOUNTER — HOSPITAL ENCOUNTER (OUTPATIENT)
Dept: PHYSICAL THERAPY | Facility: CLINIC | Age: 83
Setting detail: THERAPIES SERIES
End: 2018-03-15
Attending: ORTHOPAEDIC SURGERY
Payer: MEDICARE

## 2018-03-15 PROCEDURE — 97110 THERAPEUTIC EXERCISES: CPT | Mod: GP | Performed by: PHYSICAL THERAPIST

## 2018-03-15 PROCEDURE — 40000718 ZZHC STATISTIC PT DEPARTMENT ORTHO VISIT: Performed by: PHYSICAL THERAPIST

## 2018-03-19 ENCOUNTER — HOSPITAL ENCOUNTER (OUTPATIENT)
Dept: PHYSICAL THERAPY | Facility: CLINIC | Age: 83
Setting detail: THERAPIES SERIES
End: 2018-03-19
Attending: ORTHOPAEDIC SURGERY
Payer: MEDICARE

## 2018-03-19 PROCEDURE — 40000718 ZZHC STATISTIC PT DEPARTMENT ORTHO VISIT: Performed by: PHYSICAL THERAPIST

## 2018-03-19 PROCEDURE — 97110 THERAPEUTIC EXERCISES: CPT | Mod: GP | Performed by: PHYSICAL THERAPIST

## 2018-03-22 ENCOUNTER — HOSPITAL ENCOUNTER (OUTPATIENT)
Dept: PHYSICAL THERAPY | Facility: CLINIC | Age: 83
Setting detail: THERAPIES SERIES
End: 2018-03-22
Attending: ORTHOPAEDIC SURGERY
Payer: MEDICARE

## 2018-03-22 PROCEDURE — 97110 THERAPEUTIC EXERCISES: CPT | Mod: GP | Performed by: PHYSICAL THERAPIST

## 2018-03-22 PROCEDURE — 40000718 ZZHC STATISTIC PT DEPARTMENT ORTHO VISIT: Performed by: PHYSICAL THERAPIST

## 2018-03-26 ENCOUNTER — HOSPITAL ENCOUNTER (OUTPATIENT)
Dept: PHYSICAL THERAPY | Facility: CLINIC | Age: 83
Setting detail: THERAPIES SERIES
End: 2018-03-26
Attending: PHYSICIAN ASSISTANT
Payer: MEDICARE

## 2018-03-26 PROCEDURE — 97110 THERAPEUTIC EXERCISES: CPT | Mod: GP | Performed by: PHYSICAL THERAPIST

## 2018-03-26 PROCEDURE — 40000718 ZZHC STATISTIC PT DEPARTMENT ORTHO VISIT: Performed by: PHYSICAL THERAPIST

## 2018-03-29 ENCOUNTER — HOSPITAL ENCOUNTER (OUTPATIENT)
Dept: PHYSICAL THERAPY | Facility: CLINIC | Age: 83
Setting detail: THERAPIES SERIES
End: 2018-03-29
Attending: PHYSICIAN ASSISTANT
Payer: MEDICARE

## 2018-03-29 PROCEDURE — 97110 THERAPEUTIC EXERCISES: CPT | Mod: GP | Performed by: PHYSICAL THERAPIST

## 2018-03-29 PROCEDURE — 40000718 ZZHC STATISTIC PT DEPARTMENT ORTHO VISIT: Performed by: PHYSICAL THERAPIST

## 2018-03-29 PROCEDURE — 97140 MANUAL THERAPY 1/> REGIONS: CPT | Mod: GP | Performed by: PHYSICAL THERAPIST

## 2018-04-05 ENCOUNTER — HOSPITAL ENCOUNTER (OUTPATIENT)
Dept: PHYSICAL THERAPY | Facility: CLINIC | Age: 83
Setting detail: THERAPIES SERIES
End: 2018-04-05
Attending: PHYSICIAN ASSISTANT
Payer: MEDICARE

## 2018-04-05 PROCEDURE — 97140 MANUAL THERAPY 1/> REGIONS: CPT | Mod: GP | Performed by: PHYSICAL THERAPIST

## 2018-04-05 PROCEDURE — 40000718 ZZHC STATISTIC PT DEPARTMENT ORTHO VISIT: Performed by: PHYSICAL THERAPIST

## 2018-04-05 PROCEDURE — 97110 THERAPEUTIC EXERCISES: CPT | Mod: GP | Performed by: PHYSICAL THERAPIST

## 2018-04-05 PROCEDURE — G8978 MOBILITY CURRENT STATUS: HCPCS | Mod: GP,CI | Performed by: PHYSICAL THERAPIST

## 2018-04-05 PROCEDURE — G8979 MOBILITY GOAL STATUS: HCPCS | Mod: GP,CI | Performed by: PHYSICAL THERAPIST

## 2018-04-05 NOTE — PROGRESS NOTES
Bonita Villarreal  1935  Diagnosis - Status post total right knee replacement Physical Therapy Progress Note  04/05/18 1100   Signing Clinician's Name / Credentials   Signing clinician's name / credentials Kp Osman, PT, DPT   Session Number   Session Number 8 (Start of Care Date - 3/6/2018)   Progress Note/Recertification   Progress Note Due Date 04/06/18   Progress Note Completed Date 04/05/18   Recertification Due Date 05/01/18   PT Medicare Only G-code   G-code Mobility: Walking & Moving Around   Mobility: Walking & Moving Around   Mobility Current Status,  (eval/re-eval & every progress note) CI: 1-19% impairment   Current Mobility Modifier Rationale Based on clinical judgment and the patient's ability to perform transfers, ambulate, and perform exercises within PT session. Also based on the patient's subjective report of ability to perform functional activities.   Mobility Goal,  (eval/re-eval, every progress note, & discharge) CI: 1-19% impairment   Adult Goals   PT Ortho Eval Goals 1;2;3;4   Ortho Goal 1   Goal Identifier HEP   Goal Description Pt will be independent in HEP in order to achieve and maintain long term treatment goals.   Target Date 03/20/18   Date Met 03/19/18   Ortho Goal 2   Goal Identifier Ambulation   Goal Description Pt will be able to ambulate community distances in order to run errands without an assitive device safely.   Target Date 05/01/18   Date Met 03/29/18   Ortho Goal 3   Goal Identifier Stairs   Goal Description Pt will be able to ascend and descend 1 flight of stairs with a handrail assist safely.   Target Date 05/01/18   Date Met 03/29/18   Ortho Goal 4   Goal Identifier Sleeping   Goal Description Pt will be able to sleep through the night without waking up due to LE pain.  (Mostly met. Able to go back to sleep quickly.)   Target Date 05/01/18   Subjective Report   Subjective Report Worked on straightening leg out this week. Walking has been going  better.    Objective Measures   Objective Measures Objective Measure 1;Objective Measure 2;Objective Measure 3   Objective Measure 2   Objective Measure ROM   Details    Objective Measure 3   Objective Measure AROM (uninvolved LE)   Details Extension on left - 10 degrees short of TKE / Flexion - 123   Treatment Interventions   Interventions Therapeutic Procedure/Exercise;Manual Therapy;Gait Training   Therapeutic Procedure/exercise   Minutes 15   Skilled Intervention ROM and strengthening exercise instruction   Patient Response Cues to perform squats slower to emphasize good technique   Treatment Detail AAROM for knee flexion x6 with 15 second holds / Overpressure for knee extension / Mini Squats at sink 2x15 / Mini Lunges at counter x15 B /    Manual Therapy   Minutes 12   Skilled Intervention STM   Patient Response Tone reduction   Treatment Detail STM to hamstrings to improve flexibility and extension ROM   Plan   Homework Sees surgeon on April 11th   Home program Mini lunges / LAQ / Mini Squats at sink / Standing hip abduction / Standing hip extension / LAQ / Knee extension in prone / Quad sets x10 with 5 second holds with overpressure from uninolved LE / Heel slides x15 / SLR in supine x10 / AAROM for knee flexion x10 with 10 second holds   Updates to plan of care 1 time / week   Plan for next session ROM for flexion and extenstion / STM to reduce swelling if indicated   Comments   Comments Pt is doing well in physical therapy and has met most goals. Pt still has restricted extension, however this is comparable to uninvolved LE. Pt would benefit from skilled PT in order to meet remaining goal and further improve extension ROM.   Total Session Time   Timed Code Treatment Minutes 27   Total Treatment Time (sum of timed and untimed services) 27 (1TE 1MT)     Referring Physician - Deanna Higgins

## 2018-04-12 ENCOUNTER — HOSPITAL ENCOUNTER (OUTPATIENT)
Dept: PHYSICAL THERAPY | Facility: CLINIC | Age: 83
Setting detail: THERAPIES SERIES
End: 2018-04-12
Attending: PHYSICIAN ASSISTANT
Payer: MEDICARE

## 2018-04-12 PROCEDURE — 40000718 ZZHC STATISTIC PT DEPARTMENT ORTHO VISIT: Performed by: PHYSICAL THERAPIST

## 2018-04-12 PROCEDURE — G8979 MOBILITY GOAL STATUS: HCPCS | Mod: GP,CI | Performed by: PHYSICAL THERAPIST

## 2018-04-12 PROCEDURE — G8980 MOBILITY D/C STATUS: HCPCS | Mod: GP,CI | Performed by: PHYSICAL THERAPIST

## 2018-04-12 PROCEDURE — 97110 THERAPEUTIC EXERCISES: CPT | Mod: GP | Performed by: PHYSICAL THERAPIST

## 2018-04-12 NOTE — PROGRESS NOTES
Bonita Villarreal  1935  Diagnosis - Status post total right knee replacement Physical Therapy Discharge Note  04/12/18 1600   Signing Clinician's Name / Credentials   Signing clinician's name / credentials Kp Osman, PT, DPT   Session Number   Session Number 9 (Start of Care Date - 3/6/2018)   Progress Note/Recertification   Progress Note Due Date 05/01/18   Progress Note Completed Date 04/12/18   Recertification Due Date 05/01/18   PT Medicare Only G-code   G-code Mobility: Walking & Moving Around   Mobility: Walking & Moving Around   Mobility Goal,  (eval/re-eval, every progress note, & discharge) CI: 1-19% impairment   Mobility Discharge Status,  (discharge) CI: 1-19% impairment   Discharge Mobility Modifier Rationale Based on clinical judgment and the patient's ability to perform transfers, ambulate, and perform exercises within PT session. Also based on the patient's subjective report of ability to perform functional activities.   Adult Goals   PT Ortho Eval Goals 1;2;3;4   Ortho Goal 1   Goal Identifier HEP   Goal Description Pt will be independent in HEP in order to achieve and maintain long term treatment goals.   Target Date 03/20/18   Date Met 03/19/18   Ortho Goal 2   Goal Identifier Ambulation   Goal Description Pt will be able to ambulate community distances in order to run errands without an assitive device safely.   Target Date 05/01/18   Date Met 03/29/18   Ortho Goal 3   Goal Identifier Stairs   Goal Description Pt will be able to ascend and descend 1 flight of stairs with a handrail assist safely.   Target Date 05/01/18   Date Met 03/29/18   Ortho Goal 4   Goal Identifier Sleeping   Goal Description Pt will be able to sleep through the night without waking up due to LE pain.   Target Date 05/01/18   Date Met 04/12/18   Subjective Report   Subjective Report Saw the surgeon and was told that everything looked good. Having an easier time bringing her foot up to her opposite knee  to tie her shoes.    Objective Measures   Objective Measures Objective Measure 1;Objective Measure 2;Objective Measure 3   Objective Measure 1   Objective Measure Gait   Details Ambulates without AD with good balance and good heel strike and toe off   Objective Measure 2   Objective Measure ROM   Details 9-124   Objective Measure 3   Objective Measure AROM (uninvolved LE)   Details Extension on left - 10 degrees short of TKE / Flexion - 123   Treatment Interventions   Interventions Therapeutic Procedure/Exercise;Manual Therapy;Gait Training   Therapeutic Procedure/exercise   Minutes 29   Skilled Intervention ROM and strengthening exercise instruction   Patient Response Performed well with minimal cueing required regarding proper technique for hip abduction and extension   Treatment Detail AAROM for knee flexion x7 with 15 second holds / Overpressure for knee extension x10 with 10 second holds / Standing hip abduction 2x10 B with YTB / Standing hip extension 2x10 B with YTB / Mini Squats at sink 2x15 /    Plan   Homework Sees surgeon on April 11th   Home program Mini lunges / LAQ / Mini Squats at sink / Standing hip abduction / Standing hip extension / LAQ / Knee extension in prone / Quad sets x10 with 5 second holds with overpressure from uninolved LE / Heel slides x15 / SLR in supine x10 / AAROM for knee flexion x10 with 10 second holds   Updates to plan of care 1 time / week   Plan for next session ROM for flexion and extenstion / STM to reduce swelling if indicated   Comments   Comments Pt is doing well and has met all physical therapy goals. Pt does not have full extension ROM however it is equal to uninvolved LE. Pt has no difficulty with ambulation at this time. Pt will be [facility] to [facility] at this time. Pt is in agreement with this plan.   Total Session Time   Timed Code Treatment Minutes 29   Total Treatment Time (sum of timed and untimed services) 29 (2TE)     Referring Physician - Deanna Higgins

## 2018-05-11 ENCOUNTER — OFFICE VISIT (OUTPATIENT)
Dept: FAMILY MEDICINE | Facility: CLINIC | Age: 83
End: 2018-05-11
Payer: COMMERCIAL

## 2018-05-11 VITALS
TEMPERATURE: 98.2 F | BODY MASS INDEX: 29.79 KG/M2 | DIASTOLIC BLOOD PRESSURE: 78 MMHG | HEART RATE: 74 BPM | RESPIRATION RATE: 16 BRPM | WEIGHT: 189.8 LBS | HEIGHT: 67 IN | SYSTOLIC BLOOD PRESSURE: 127 MMHG | OXYGEN SATURATION: 95 %

## 2018-05-11 DIAGNOSIS — F41.1 GENERALIZED ANXIETY DISORDER: ICD-10-CM

## 2018-05-11 DIAGNOSIS — Z00.00 WELL ADULT EXAM: Primary | ICD-10-CM

## 2018-05-11 DIAGNOSIS — Z13.220 LIPID SCREENING: ICD-10-CM

## 2018-05-11 DIAGNOSIS — I10 ESSENTIAL HYPERTENSION, BENIGN: ICD-10-CM

## 2018-05-11 LAB
CHOLEST SERPL-MCNC: 242 MG/DL
HDLC SERPL-MCNC: 62 MG/DL
LDLC SERPL CALC-MCNC: 142 MG/DL
NONHDLC SERPL-MCNC: 180 MG/DL
TRIGL SERPL-MCNC: 189 MG/DL

## 2018-05-11 PROCEDURE — 36415 COLL VENOUS BLD VENIPUNCTURE: CPT | Performed by: FAMILY MEDICINE

## 2018-05-11 PROCEDURE — 80061 LIPID PANEL: CPT | Performed by: FAMILY MEDICINE

## 2018-05-11 PROCEDURE — 99397 PER PM REEVAL EST PAT 65+ YR: CPT | Performed by: FAMILY MEDICINE

## 2018-05-11 RX ORDER — ESCITALOPRAM OXALATE 10 MG/1
10 TABLET ORAL DAILY
Qty: 90 TABLET | Refills: 3 | Status: SHIPPED | OUTPATIENT
Start: 2018-05-11 | End: 2018-07-13

## 2018-05-11 RX ORDER — ATENOLOL 100 MG/1
100 TABLET ORAL DAILY
Qty: 90 TABLET | Refills: 3 | Status: SHIPPED | OUTPATIENT
Start: 2018-05-11 | End: 2019-05-30

## 2018-05-11 RX ORDER — LOSARTAN POTASSIUM AND HYDROCHLOROTHIAZIDE 12.5; 1 MG/1; MG/1
1 TABLET ORAL DAILY
Qty: 90 TABLET | Refills: 3 | Status: SHIPPED | OUTPATIENT
Start: 2018-05-11 | End: 2019-05-22

## 2018-05-11 ASSESSMENT — ANXIETY QUESTIONNAIRES
1. FEELING NERVOUS, ANXIOUS, OR ON EDGE: NOT AT ALL
7. FEELING AFRAID AS IF SOMETHING AWFUL MIGHT HAPPEN: NOT AT ALL
GAD7 TOTAL SCORE: 0
2. NOT BEING ABLE TO STOP OR CONTROL WORRYING: NOT AT ALL
3. WORRYING TOO MUCH ABOUT DIFFERENT THINGS: NOT AT ALL
5. BEING SO RESTLESS THAT IT IS HARD TO SIT STILL: NOT AT ALL
6. BECOMING EASILY ANNOYED OR IRRITABLE: NOT AT ALL

## 2018-05-11 ASSESSMENT — PAIN SCALES - GENERAL: PAINLEVEL: NO PAIN (0)

## 2018-05-11 ASSESSMENT — PATIENT HEALTH QUESTIONNAIRE - PHQ9: 5. POOR APPETITE OR OVEREATING: NOT AT ALL

## 2018-05-11 NOTE — LETTER
[facility]  [address]  [facility] [address]  Phone: [phone]      5/25/2018     Bonita Villarreal  [address] [facility] [address]      Dear Bonita:    Thank you for allowing me to participate in your care. Your recent test results were reviewed and listed below.  Cholesterol mildly elevated.  Not abnormal enough at this point to warrant medication changes but I would recommend: increasing daily exercise, increasing dietary fiber, eliminating trans fats and reducing saturated fats.    Your results are provided below for your review  Results for orders placed or performed in visit on 05/11/18   Lipid Profile (Chol, Trig, HDL, LDL calc)   Result Value Ref Range    Cholesterol 242 (H) <200 mg/dL    Triglycerides 189 (H) <150 mg/dL    HDL Cholesterol 62 >49 mg/dL    LDL Cholesterol Calculated 142 (H) <100 mg/dL    Non HDL Cholesterol 180 (H) <130 mg/dL                 Thank you for choosing [facility]. As a result, please continue with the treatment plan discussed in the office. Return as discussed or sooner if symptoms worsen or fail to improve. If you have any further questions or concerns, please do not hesitate to contact us.      Sincerely,        Dr. Dewayne Santana

## 2018-05-11 NOTE — PROGRESS NOTES
SUBJECTIVE:   Bonita Villarreal is a 82 year old female who presents for Preventive Visit.      Are you in the first 12 months of your Medicare Part B coverage?  No    Healthy Habits:    Do you get at least three servings of calcium containing foods daily (dairy, green leafy vegetables, etc.)? yes    Amount of exercise or daily activities, outside of work: 3 day(s) per week    Problems taking medications regularly No    Medication side effects: No    Have you had an eye exam in the past two years? yes    Do you see a dentist twice per year? Once yearly    Do you have sleep apnea, excessive snoring or daytime drowsiness?no      Ability to successfully perform activities of daily living: Yes, no assistance needed    Home safety:  lack of grab bars in the bathroom     Hearing impairment: No    Fall risk:          COGNITIVE SCREEN  1) Repeat 3 items (Banana, Sunrise, Chair)    2) Clock draw: NORMAL  3) 3 item recall: Recalls 3 objects  Results: 3 items recalled: COGNITIVE IMPAIRMENT LESS LIKELY    Mini-CogTM Copyright ISAC Fuller. Licensed by the author for use in [facility]; reprinted with permission (juany@[email or web address]). All rights reserved.            Chief Complaint   Patient presents with     Wellness Visit     rx refill on all medications        Reviewed and updated as needed this visit by clinical staff  Tobacco  Allergies  Meds  Med Hx  Surg Hx  Fam Hx  Soc Hx        Reviewed and updated as needed this visit by Provider        Social History   Substance Use Topics     Smoking status: Never Smoker     Smokeless tobacco: Never Used     Alcohol use Yes      Comment: rare       If you drink alcohol do you typically have >3 drinks per day or >7 drinks per week? No                        Today's PHQ-2 Score:   PHQ-2 ( 1999 [employer]) 10/20/2017 4/25/2017   Q1: Little interest or pleasure in doing things 0 0   Q2: Feeling down, depressed or hopeless 0 0   PHQ-2 Score 0 0       Do you feel safe in your  environment - Yes    Do you have a Health Care Directive?: Yes: Advance Directive has been received and scanned.    Current providers sharing in care for this patient include:   Patient Care Team:  Dewayne Santana MD as PCP - General (Family Practice)    The following health maintenance items are reviewed in Epic and correct as of today:  Health Maintenance   Topic Date Due     FALL RISK ASSESSMENT  10/20/2018     ADVANCE DIRECTIVE PLANNING Q5 YRS  10/20/2022     TETANUS IMMUNIZATION (SYSTEM ASSIGNED)  2026     DEXA SCAN SCREENING (SYSTEM ASSIGNED)  Completed     PNEUMOCOCCAL  Completed     INFLUENZA VACCINE  Completed     Labs reviewed in EPIC  Patient Active Problem List   Diagnosis     Essential hypertension, benign     Generalized anxiety disorder     Diverticulosis of large intestine     Osteopenia of spine     Family history of other cardiovascular diseases     CARDIOVASCULAR SCREENING; LDL GOAL LESS THAN 160     Status post total right knee replacement     Right knee DJD     Past Surgical History:   Procedure Laterality Date     ARTHROPLASTY KNEE Right 2018    Procedure: ARTHROPLASTY KNEE;  Right total knee arthroplasty;  Surgeon: Anton Grover MD;  Location: [facility]      APPENDECTOMY       COLONOSCOPY  04/15/02     COLONOSCOPY  3/25/2013    Procedure: COLONOSCOPY;  Colonoscopy  ;  Surgeon: Jamil Porras MD;  Location: [facility]     FLEXIBLE SIGMOIDOSCOPY  96     ORTHOPEDIC SURGERY  22-23 YEARS AGO    BACK     SURGICAL HISTORY OF -       lumbar surgery       Social History   Substance Use Topics     Smoking status: Never Smoker     Smokeless tobacco: Never Used     Alcohol use Yes      Comment: rare     Family History   Problem Relation Age of Onset     Lipids Brother      HEART DISEASE Brother       at 51 of an MI     CANCER Brother      bone, lung,  82     Prostate Cancer Brother      CANCER Sister      cervical     Lipids Sister      HEART DISEASE Sister       "eldest sister, s/p MI x 2 with stents; diagnosed with AAA, postoperative complications and MI,  at 75     HEART DISEASE Brother      MI x 2     Lipids Brother      C.A.D. Father       at 39 of an MI     Family History Negative Mother      lived to age 95     Gynecology Sister      cervical Ca         Current Outpatient Prescriptions   Medication Sig Dispense Refill     atenolol (TENORMIN) 100 MG tablet Take 1 tablet (100 mg) by mouth daily 90 tablet 3     cholecalciferol (VITAMIN D) 1000 UNIT tablet Take 1 tablet by mouth daily.       escitalopram (LEXAPRO) 10 MG tablet Take 1 tablet (10 mg) by mouth daily 90 tablet 3     FISH OIL OR 1 tab daily       HYDROcodone-acetaminophen (NORCO) 5-325 MG per tablet Take 1-2 tablets by mouth every 4 hours as needed for moderate to severe pain 40 tablet 0     losartan-hydrochlorothiazide (HYZAAR) 100-12.5 MG per tablet Take 1 tablet by mouth daily 90 tablet 3     MULTI-VITAMIN OR TABS 1 DAILY       order for DME Equipment being ordered: Walker Wheels () and Walker ()  Treatment Diagnosis: s/p TKA 1 Units 0     senna-docusate (SENOKOT-S;PERICOLACE) 8.6-50 MG per tablet Take 2 tablets by mouth 2 times daily as needed for constipation (Patient not taking: Reported on 2018) 100 tablet 0     Allergies   Allergen Reactions     Lisinopril Cough       Mammogram Screening: Patient over age 75, has elected to stop mammography screening.    ROS:  Constitutional, neuro, ENT, endocrine, pulmonary, cardiac, gastrointestinal, genitourinary, musculoskeletal, integument and psychiatric systems are negative, except as otherwise noted.     OBJECTIVE:   /78 (BP Location: Right arm, Cuff Size: Adult Regular)  Pulse 74  Temp 98.2  F (36.8  C) (Tympanic)  Resp 16  Ht 5' 7\" (1.702 m)  Wt 189 lb 12.8 oz (86.1 kg)  SpO2 95%  Breastfeeding? No  BMI 29.73 kg/m2 Estimated body mass index is 29.73 kg/(m^2) as calculated from the following:    Height as of this encounter: " "5' 7\" (1.702 m).    Weight as of this encounter: 189 lb 12.8 oz (86.1 kg).  EXAM:   GENERAL APPEARANCE: healthy, alert and no distress  EYES: Eyes grossly normal to inspection, PERRL and conjunctivae and sclerae normal  HENT: ear canals and TM's normal, nose and mouth without ulcers or lesions, oropharynx clear and oral mucous membranes moist  NECK: no adenopathy, no asymmetry, masses, or scars and thyroid normal to palpation  RESP: lungs clear to auscultation - no rales, rhonchi or wheezes  BREAST: normal without masses, tenderness or nipple discharge and no palpable axillary masses or adenopathy  CV: regular rate and rhythm, normal S1 S2, no S3 or S4, no murmur, click or rub, no peripheral edema and peripheral pulses strong  ABDOMEN: soft, nontender, no hepatosplenomegaly, no masses and bowel sounds normal  MS: no musculoskeletal defects are noted and gait is age appropriate without ataxia  SKIN: no suspicious lesions or rashes  NEURO: Normal strength and tone, sensory exam grossly normal, mentation intact and speech normal  PSYCH: mentation appears normal and affect normal/bright    ASSESSMENT / PLAN:   1. Well adult exam    2. Essential hypertension, benign  Well controlled. Refilled medication.   Labs up to date   - atenolol (TENORMIN) 100 MG tablet; Take 1 tablet (100 mg) by mouth daily  Dispense: 90 tablet; Refill: 3  - losartan-hydrochlorothiazide (HYZAAR) 100-12.5 MG per tablet; Take 1 tablet by mouth daily  Dispense: 90 tablet; Refill: 3    3. GENERALIZED ANXIETY DIS  Well controlled. Refilled medication.     - escitalopram (LEXAPRO) 10 MG tablet; Take 1 tablet (10 mg) by mouth daily  Dispense: 90 tablet; Refill: 3    4. Lipid screening  - Lipid Profile (Chol, Trig, HDL, LDL calc)    End of Life Planning:  Patient currently has an advanced directive: Yes.  Practitioner is supportive of decision.    COUNSELING:  Reviewed preventive health counseling, as reflected in patient instructions        Estimated " "body mass index is 29.73 kg/(m^2) as calculated from the following:    Height as of this encounter: 5' 7\" (1.702 m).    Weight as of this encounter: 189 lb 12.8 oz (86.1 kg).       reports that she has never smoked. She has never used smokeless tobacco.      Appropriate preventive services were discussed with this patient, including applicable screening as appropriate for cardiovascular disease, diabetes, osteopenia/osteoporosis, and glaucoma.  As appropriate for age/gender, discussed screening for colorectal cancer, prostate cancer, breast cancer, and cervical cancer. Checklist reviewing preventive services available has been given to the patient.    Reviewed patients plan of care and provided an AVS. The Intermediate Care Plan ( asthma action plan, low back pain action plan, and migraine action plan) for Bonita meets the Care Plan requirement. This Care Plan has been established and reviewed with the Patient.    Counseling Resources:  ATP IV Guidelines  Pooled Cohorts Equation Calculator  Breast Cancer Risk Calculator  FRAX Risk Assessment  ICSI Preventive Guidelines  Dietary Guidelines for Americans, 2010  USDA's MyPlate  ASA Prophylaxis  Lung CA Screening    Dewayne Santana MD  [facility]      "

## 2018-05-11 NOTE — MR AVS SNAPSHOT
After Visit Summary   5/11/2018    Bonita Villarreal    MRN: [id number]           Patient Information     Date Of Birth          1935        Visit Information        Provider Department      5/11/2018 9:20 AM Dewayne Santana MD [facility]        Today's Diagnoses     Well adult exam    -  1    Essential hypertension, benign        GENERALIZED ANXIETY DIS        Lipid screening          Care Instructions      Preventive Health Recommendations    Female Ages 65 +    Yearly exam:     See your health care provider every year in order to  o Review health changes.   o Discuss preventive care.    o Review your medicines if your doctor has prescribed any.      You no longer need a yearly Pap test unless you've had an abnormal Pap test in the past 10 years. If you have vaginal symptoms, such as bleeding or discharge, be sure to talk with your provider about a Pap test.      Every 1 to 2 years, have a mammogram.  If you are over 69, talk with your health care provider about whether or not you want to continue having screening mammograms.      Every 10 years, have a colonoscopy. Or, have a yearly FIT test (stool test). These exams will check for colon cancer.       Have a cholesterol test every 5 years, or more often if your doctor advises it.       Have a diabetes test (fasting glucose) every three years. If you are at risk for diabetes, you should have this test more often.       At age 65, have a bone density scan (DEXA) to check for osteoporosis (brittle bone disease).    Shots:    Get a flu shot each year.    Get a tetanus shot every 10 years.    Talk to your doctor about your pneumonia vaccines. There are now two you should receive - Pneumovax (PPSV 23) and Prevnar (PCV 13).    Talk to your doctor about the shingles vaccine.    Talk to your doctor about the hepatitis B vaccine.    Nutrition:     Eat at least 5 servings of fruits and vegetables each day.      Eat  "whole-grain bread, whole-wheat pasta and brown rice instead of white grains and rice.      Talk to your provider about Calcium and Vitamin D.     Lifestyle    Exercise at least 150 minutes a week (30 minutes a day, 5 days a week). This will help you control your weight and prevent disease.      Limit alcohol to one drink per day.      No smoking.       Wear sunscreen to prevent skin cancer.       See your dentist twice a year for an exam and cleaning.      See your eye doctor every 1 to 2 years to screen for conditions such as glaucoma, macular degeneration and cataracts.          Follow-ups after your visit        Who to contact     If you have questions or need follow up information about today's clinic visit or your schedule please contact [facility]note 3[facility] directly at [phone].  Normal or non-critical lab and imaging results will be communicated to you by 4Soilshart, letter or phone within 4 business days after the clinic has received the results. If you do not hear from us within 7 days, please contact the clinic through 4Soilshart or phone. If you have a critical or abnormal lab result, we will notify you by phone as soon as possible.  Submit refill requests through hereO or call your pharmacy and they will forward the refill request to us. Please allow 3 business days for your refill to be completed.          Additional Information About Your Visit        hereO Information     hereO lets you send messages to your doctor, view your test results, renew your prescriptions, schedule appointments and more. To sign up, go to [email or web address] . Click on \"Log in\" on the left side of the screen, which will take you to the Welcome page. Then click on \"Sign up Now\" on the right side of the page.     You will be asked to enter the access code listed below, as well as some personal information. Please follow the directions to create your username and password.     Your access code is: " "[email or web address]  Expires: 2018 10:03 AM     Your access code will  in 90 days. If you need help or a new code, please call your [facility] clinic or [phone].        Care EveryWhere ID     This is your Care EveryWhere ID. This could be used by other organizations to access your [facility] medical records  [id number]        Your Vitals Were     Pulse Temperature Respirations Height Pulse Oximetry Breastfeeding?    74 98.2  F (36.8  C) (Tympanic) 16 5' 7\" (1.702 m) 95% No    BMI (Body Mass Index)                   29.73 kg/m2            Blood Pressure from Last 3 Encounters:   18 127/78   18 125/61   18 133/76    Weight from Last 3 Encounters:   18 189 lb 12.8 oz (86.1 kg)   18 190 lb 7.6 oz (86.4 kg)   18 189 lb (85.7 kg)              We Performed the Following     Lipid Profile (Chol, Trig, HDL, LDL calc)          Today's Medication Changes          These changes are accurate as of 18 10:03 AM.  If you have any questions, ask your nurse or doctor.               These medicines have changed or have updated prescriptions.        Dose/Directions    atenolol 100 MG tablet   Commonly known as:  TENORMIN   This may have changed:  additional instructions   Used for:  Essential hypertension, benign   Changed by:  Dewayne Santana MD        Dose:  100 mg   Take 1 tablet (100 mg) by mouth daily   Quantity:  90 tablet   Refills:  3       escitalopram 10 MG tablet   Commonly known as:  LEXAPRO   This may have changed:  additional instructions   Used for:  Generalized anxiety disorder   Changed by:  Dewayne Santana MD        Dose:  10 mg   Take 1 tablet (10 mg) by mouth daily   Quantity:  90 tablet   Refills:  3            Where to get your medicines      These medications were sent to [facility] - [address]  [address] [address]    Hours:  [facility] Phone:  [phone]     atenolol " 100 MG tablet    escitalopram 10 MG tablet    losartan-hydrochlorothiazide 100-12.5 MG per tablet                Primary Care Provider Office Phone # Fax #    [facility] Lara Santana [phone] [address] [facility] [address]        Equal Access to Services     SARILANRE ZENON : Hadii aad ku hadasho Soomaali, waaxda luqadaha, qaybta kaalmada adeegyada, waxay idiin hayaan adesapna khelianash larima gutierrez. So [facility] [phone].    ATENCIÓN: Si habla español, tiene a hylton disposición servicios gratuitos de asistencia lingüística. Mika al [phone].    We comply with applicable federal civil rights laws and [place] laws. We do not discriminate on the basis of race, color, national origin, age, disability, sex, sexual orientation, or gender identity.            Thank you!     Thank you for choosing [facility]  for your care. Our goal is always to provide you with excellent care. Hearing back from our patients is one way we can continue to improve our services. Please take a few minutes to complete the written survey that you may receive in the mail after your visit with us. Thank you!             Your Updated Medication List - Protect others around you: Learn how to safely use, store and throw away your medicines at www.disposemymeds.org.          This list is accurate as of 5/11/18 10:03 AM.  Always use your most recent med list.                   Brand Name Dispense Instructions for use Diagnosis    atenolol 100 MG tablet    TENORMIN    90 tablet    Take 1 tablet (100 mg) by mouth daily    Essential hypertension, benign       cholecalciferol 1000 UNIT tablet    vitamin D3     Take 1 tablet by mouth daily.        escitalopram 10 MG tablet    LEXAPRO    90 tablet    Take 1 tablet (10 mg) by mouth daily    Generalized anxiety disorder       FISH OIL PO      1 tab daily        HYDROcodone-acetaminophen 5-325 MG per tablet    NORCO    40 tablet    Take 1-2 tablets by mouth every 4  hours as needed for moderate to severe pain    Status post total right knee replacement       losartan-hydrochlorothiazide 100-12.5 MG per tablet    HYZAAR    90 tablet    Take 1 tablet by mouth daily    Essential hypertension, benign       Multi-vitamin Tabs tablet   Generic drug:  multivitamin, therapeutic with minerals      1 DAILY        order for DME     1 Units    Equipment being ordered: Walker Wheels () and Walker () Treatment Diagnosis: s/p TKA    Status post total right knee replacement       senna-docusate 8.6-50 MG per tablet    SENOKOT-S;PERICOLACE    100 tablet    Take 2 tablets by mouth 2 times daily as needed for constipation    Status post total right knee replacement

## 2018-05-12 ASSESSMENT — ANXIETY QUESTIONNAIRES: GAD7 TOTAL SCORE: 0

## 2018-05-12 ASSESSMENT — PATIENT HEALTH QUESTIONNAIRE - PHQ9: SUM OF ALL RESPONSES TO PHQ QUESTIONS 1-9: 0

## 2018-05-25 NOTE — PROGRESS NOTES
Please mail letter: Cholesterol mildly elevated.  Not abnormal enough at this point to warrant medication changes but I would recommend: increasing daily exercise, increasing dietary fiber, eliminating trans fats and reducing saturated fats.

## 2018-06-28 ENCOUNTER — HOSPITAL ENCOUNTER (INPATIENT)
Facility: CLINIC | Age: 83
LOS: 2 days | Discharge: HOME | DRG: 690 | End: 2018-06-30
Attending: EMERGENCY MEDICINE | Admitting: INTERNAL MEDICINE
Payer: MEDICARE

## 2018-06-28 ENCOUNTER — ANESTHESIA EVENT (OUTPATIENT)
Dept: EMERGENCY MEDICINE | Facility: CLINIC | Age: 83
DRG: 690 | End: 2018-06-28
Payer: MEDICARE

## 2018-06-28 ENCOUNTER — ANESTHESIA (OUTPATIENT)
Dept: EMERGENCY MEDICINE | Facility: CLINIC | Age: 83
DRG: 690 | End: 2018-06-28
Payer: MEDICARE

## 2018-06-28 ENCOUNTER — APPOINTMENT (OUTPATIENT)
Dept: CT IMAGING | Facility: CLINIC | Age: 83
DRG: 690 | End: 2018-06-28
Attending: EMERGENCY MEDICINE
Payer: MEDICARE

## 2018-06-28 ENCOUNTER — APPOINTMENT (OUTPATIENT)
Dept: GENERAL RADIOLOGY | Facility: CLINIC | Age: 83
DRG: 690 | End: 2018-06-28
Attending: EMERGENCY MEDICINE
Payer: MEDICARE

## 2018-06-28 ENCOUNTER — OFFICE VISIT (OUTPATIENT)
Dept: FAMILY MEDICINE | Facility: CLINIC | Age: 83
End: 2018-06-28
Payer: COMMERCIAL

## 2018-06-28 VITALS
DIASTOLIC BLOOD PRESSURE: 87 MMHG | OXYGEN SATURATION: 100 % | SYSTOLIC BLOOD PRESSURE: 158 MMHG | TEMPERATURE: 101 F | HEART RATE: 99 BPM

## 2018-06-28 DIAGNOSIS — R93.89 ABNORMAL CXR: ICD-10-CM

## 2018-06-28 DIAGNOSIS — R50.9 ACUTE FEBRILE ILLNESS: ICD-10-CM

## 2018-06-28 DIAGNOSIS — R51.9 NONINTRACTABLE HEADACHE, UNSPECIFIED CHRONICITY PATTERN, UNSPECIFIED HEADACHE TYPE: ICD-10-CM

## 2018-06-28 DIAGNOSIS — R50.9 FEVER, UNSPECIFIED FEVER CAUSE: Primary | ICD-10-CM

## 2018-06-28 DIAGNOSIS — R51.9 ACUTE INTRACTABLE HEADACHE, UNSPECIFIED HEADACHE TYPE: ICD-10-CM

## 2018-06-28 DIAGNOSIS — E87.1 HYPONATREMIA: ICD-10-CM

## 2018-06-28 DIAGNOSIS — R82.81 BACTERIURIA WITH PYURIA: Primary | ICD-10-CM

## 2018-06-28 DIAGNOSIS — R11.2 NAUSEA AND VOMITING, INTRACTABILITY OF VOMITING NOT SPECIFIED, UNSPECIFIED VOMITING TYPE: ICD-10-CM

## 2018-06-28 DIAGNOSIS — R51.9 HEADACHE: ICD-10-CM

## 2018-06-28 DIAGNOSIS — R82.71 BACTERIURIA WITH PYURIA: Primary | ICD-10-CM

## 2018-06-28 PROBLEM — G03.9 MENINGITIS: Status: ACTIVE | Noted: 2018-06-28

## 2018-06-28 LAB
ALBUMIN SERPL-MCNC: 2.9 G/DL (ref 3.4–5)
ALBUMIN UR-MCNC: 30 MG/DL
ALP SERPL-CCNC: 121 U/L (ref 40–150)
ALT SERPL W P-5'-P-CCNC: 31 U/L (ref 0–50)
ANION GAP SERPL CALCULATED.3IONS-SCNC: 7 MMOL/L (ref 3–14)
ANION GAP SERPL CALCULATED.3IONS-SCNC: 8 MMOL/L (ref 3–14)
APPEARANCE CSF: CLEAR
APPEARANCE UR: ABNORMAL
AST SERPL W P-5'-P-CCNC: 39 U/L (ref 0–45)
BACTERIA #/AREA URNS HPF: ABNORMAL /HPF
BASOPHILS # BLD AUTO: 0 10E9/L (ref 0–0.2)
BASOPHILS # BLD AUTO: 0 10E9/L (ref 0–0.2)
BASOPHILS NFR BLD AUTO: 0.2 %
BASOPHILS NFR BLD AUTO: 0.2 %
BILIRUB SERPL-MCNC: 0.9 MG/DL (ref 0.2–1.3)
BILIRUB UR QL STRIP: NEGATIVE
BUN SERPL-MCNC: 21 MG/DL (ref 7–30)
BUN SERPL-MCNC: 21 MG/DL (ref 7–30)
CALCIUM SERPL-MCNC: 8.5 MG/DL (ref 8.5–10.1)
CALCIUM SERPL-MCNC: 8.9 MG/DL (ref 8.5–10.1)
CHLORIDE SERPL-SCNC: 97 MMOL/L (ref 94–109)
CHLORIDE SERPL-SCNC: 98 MMOL/L (ref 94–109)
CO2 SERPL-SCNC: 23 MMOL/L (ref 20–32)
CO2 SERPL-SCNC: 25 MMOL/L (ref 20–32)
COLOR CSF: COLORLESS
COLOR UR AUTO: ABNORMAL
CREAT SERPL-MCNC: 0.98 MG/DL (ref 0.52–1.04)
CREAT SERPL-MCNC: 1 MG/DL (ref 0.52–1.04)
CRP SERPL-MCNC: >1900 MG/L (ref 0–8)
DIFFERENTIAL METHOD BLD: ABNORMAL
DIFFERENTIAL METHOD BLD: ABNORMAL
EOSINOPHIL # BLD AUTO: 0 10E9/L (ref 0–0.7)
EOSINOPHIL # BLD AUTO: 0 10E9/L (ref 0–0.7)
EOSINOPHIL NFR BLD AUTO: 0 %
EOSINOPHIL NFR BLD AUTO: 0 %
ERYTHROCYTE [DISTWIDTH] IN BLOOD BY AUTOMATED COUNT: 12.8 % (ref 10–15)
ERYTHROCYTE [DISTWIDTH] IN BLOOD BY AUTOMATED COUNT: 12.9 % (ref 10–15)
ERYTHROCYTE [SEDIMENTATION RATE] IN BLOOD BY WESTERGREN METHOD: 33 MM/H (ref 0–30)
GFR SERPL CREATININE-BSD FRML MDRD: 53 ML/MIN/1.7M2
GFR SERPL CREATININE-BSD FRML MDRD: 55 ML/MIN/1.7M2
GLUCOSE CSF-MCNC: 60 MG/DL (ref 40–70)
GLUCOSE SERPL-MCNC: 111 MG/DL (ref 70–99)
GLUCOSE SERPL-MCNC: 116 MG/DL (ref 70–99)
GLUCOSE UR STRIP-MCNC: NEGATIVE MG/DL
HCT VFR BLD AUTO: 39.5 % (ref 35–47)
HCT VFR BLD AUTO: 39.8 % (ref 35–47)
HGB BLD-MCNC: 12.8 G/DL (ref 11.7–15.7)
HGB BLD-MCNC: 13 G/DL (ref 11.7–15.7)
HGB UR QL STRIP: ABNORMAL
IMM GRANULOCYTES # BLD: 0.1 10E9/L (ref 0–0.4)
IMM GRANULOCYTES # BLD: 0.1 10E9/L (ref 0–0.4)
IMM GRANULOCYTES NFR BLD: 1.1 %
IMM GRANULOCYTES NFR BLD: 1.1 %
KETONES UR STRIP-MCNC: NEGATIVE MG/DL
LACTATE BLD-SCNC: 1.3 MMOL/L (ref 0.7–2)
LEUKOCYTE ESTERASE UR QL STRIP: ABNORMAL
LYMPHOCYTES # BLD AUTO: 0.4 10E9/L (ref 0.8–5.3)
LYMPHOCYTES # BLD AUTO: 0.4 10E9/L (ref 0.8–5.3)
LYMPHOCYTES NFR BLD AUTO: 3.1 %
LYMPHOCYTES NFR BLD AUTO: 3.2 %
MCH RBC QN AUTO: 29 PG (ref 26.5–33)
MCH RBC QN AUTO: 29.4 PG (ref 26.5–33)
MCHC RBC AUTO-ENTMCNC: 32.2 G/DL (ref 31.5–36.5)
MCHC RBC AUTO-ENTMCNC: 32.9 G/DL (ref 31.5–36.5)
MCV RBC AUTO: 89 FL (ref 78–100)
MCV RBC AUTO: 90 FL (ref 78–100)
MONOCYTES # BLD AUTO: 0.6 10E9/L (ref 0–1.3)
MONOCYTES # BLD AUTO: 0.6 10E9/L (ref 0–1.3)
MONOCYTES NFR BLD AUTO: 4.5 %
MONOCYTES NFR BLD AUTO: 5.2 %
MUCOUS THREADS #/AREA URNS LPF: PRESENT /LPF
NEUTROPHILS # BLD AUTO: 11 10E9/L (ref 1.6–8.3)
NEUTROPHILS # BLD AUTO: 12.1 10E9/L (ref 1.6–8.3)
NEUTROPHILS NFR BLD AUTO: 90.3 %
NEUTROPHILS NFR BLD AUTO: 91.1 %
NITRATE UR QL: POSITIVE
NRBC # BLD AUTO: 0 10*3/UL
NRBC # BLD AUTO: 0 10*3/UL
NRBC BLD AUTO-RTO: 0 /100
NRBC BLD AUTO-RTO: 0 /100
OSMOLALITY SERPL: 274 MMOL/KG (ref 280–301)
PH UR STRIP: 5 PH (ref 5–7)
PLATELET # BLD AUTO: 250 10E9/L (ref 150–450)
PLATELET # BLD AUTO: 254 10E9/L (ref 150–450)
POTASSIUM SERPL-SCNC: 4.2 MMOL/L (ref 3.4–5.3)
POTASSIUM SERPL-SCNC: 4.2 MMOL/L (ref 3.4–5.3)
PROT CSF-MCNC: 45 MG/DL (ref 15–60)
PROT SERPL-MCNC: 7.2 G/DL (ref 6.8–8.8)
RBC # BLD AUTO: 4.42 10E12/L (ref 3.8–5.2)
RBC # BLD AUTO: 4.42 10E12/L (ref 3.8–5.2)
RBC # CSF MANUAL: 0 /UL (ref 0–2)
RBC #/AREA URNS AUTO: 2 /HPF (ref 0–2)
SODIUM SERPL-SCNC: 129 MMOL/L (ref 133–144)
SODIUM SERPL-SCNC: 129 MMOL/L (ref 133–144)
SOURCE: ABNORMAL
SP GR UR STRIP: 1.02 (ref 1–1.03)
SPECIMEN VOL CSF: 3 ML
SQUAMOUS #/AREA URNS AUTO: 1 /HPF (ref 0–1)
TUBE # CSF: 4 #
UROBILINOGEN UR STRIP-MCNC: 4 MG/DL (ref 0–2)
WBC # BLD AUTO: 12.2 10E9/L (ref 4–11)
WBC # BLD AUTO: 13.3 10E9/L (ref 4–11)
WBC # CSF MANUAL: 0 /UL (ref 0–5)
WBC #/AREA URNS AUTO: 26 /HPF (ref 0–5)

## 2018-06-28 PROCEDURE — 99285 EMERGENCY DEPT VISIT HI MDM: CPT | Mod: 25 | Performed by: EMERGENCY MEDICINE

## 2018-06-28 PROCEDURE — 99285 EMERGENCY DEPT VISIT HI MDM: CPT | Mod: 25

## 2018-06-28 PROCEDURE — 87070 CULTURE OTHR SPECIMN AEROBIC: CPT | Performed by: EMERGENCY MEDICINE

## 2018-06-28 PROCEDURE — 25000125 ZZHC RX 250: Performed by: NURSE ANESTHETIST, CERTIFIED REGISTERED

## 2018-06-28 PROCEDURE — 36415 COLL VENOUS BLD VENIPUNCTURE: CPT | Performed by: FAMILY MEDICINE

## 2018-06-28 PROCEDURE — 83930 ASSAY OF BLOOD OSMOLALITY: CPT | Performed by: EMERGENCY MEDICINE

## 2018-06-28 PROCEDURE — 93005 ELECTROCARDIOGRAM TRACING: CPT

## 2018-06-28 PROCEDURE — 85025 COMPLETE CBC W/AUTO DIFF WBC: CPT | Performed by: FAMILY MEDICINE

## 2018-06-28 PROCEDURE — 40000671 ZZH STATISTIC ANESTHESIA CASE

## 2018-06-28 PROCEDURE — 87529 HSV DNA AMP PROBE: CPT

## 2018-06-28 PROCEDURE — 99214 OFFICE O/P EST MOD 30 MIN: CPT | Performed by: FAMILY MEDICINE

## 2018-06-28 PROCEDURE — 96375 TX/PRO/DX INJ NEW DRUG ADDON: CPT

## 2018-06-28 PROCEDURE — 81001 URINALYSIS AUTO W/SCOPE: CPT | Performed by: EMERGENCY MEDICINE

## 2018-06-28 PROCEDURE — 80048 BASIC METABOLIC PNL TOTAL CA: CPT | Performed by: FAMILY MEDICINE

## 2018-06-28 PROCEDURE — 12000007 ZZH R&B INTERMEDIATE

## 2018-06-28 PROCEDURE — 70450 CT HEAD/BRAIN W/O DYE: CPT

## 2018-06-28 PROCEDURE — 71046 X-RAY EXAM CHEST 2 VIEWS: CPT

## 2018-06-28 PROCEDURE — 83605 ASSAY OF LACTIC ACID: CPT | Performed by: EMERGENCY MEDICINE

## 2018-06-28 PROCEDURE — 89050 BODY FLUID CELL COUNT: CPT | Performed by: EMERGENCY MEDICINE

## 2018-06-28 PROCEDURE — 25000128 H RX IP 250 OP 636: Performed by: EMERGENCY MEDICINE

## 2018-06-28 PROCEDURE — 009U3ZX DRAINAGE OF SPINAL CANAL, PERCUTANEOUS APPROACH, DIAGNOSTIC: ICD-10-PCS | Performed by: NURSE ANESTHETIST, CERTIFIED REGISTERED

## 2018-06-28 PROCEDURE — 99223 1ST HOSP IP/OBS HIGH 75: CPT | Mod: AI | Performed by: INTERNAL MEDICINE

## 2018-06-28 PROCEDURE — A9270 NON-COVERED ITEM OR SERVICE: HCPCS | Mod: GY | Performed by: EMERGENCY MEDICINE

## 2018-06-28 PROCEDURE — 25000128 H RX IP 250 OP 636: Performed by: INTERNAL MEDICINE

## 2018-06-28 PROCEDURE — 25000132 ZZH RX MED GY IP 250 OP 250 PS 637: Mod: GY | Performed by: EMERGENCY MEDICINE

## 2018-06-28 PROCEDURE — 93010 ELECTROCARDIOGRAM REPORT: CPT | Mod: Z6 | Performed by: EMERGENCY MEDICINE

## 2018-06-28 PROCEDURE — 99207 ZZC CDG-CHARGE REQUIRED MANUAL ENTRY: CPT | Performed by: INTERNAL MEDICINE

## 2018-06-28 PROCEDURE — 87040 BLOOD CULTURE FOR BACTERIA: CPT | Performed by: FAMILY MEDICINE

## 2018-06-28 PROCEDURE — 87040 BLOOD CULTURE FOR BACTERIA: CPT | Performed by: EMERGENCY MEDICINE

## 2018-06-28 PROCEDURE — 85025 COMPLETE CBC W/AUTO DIFF WBC: CPT | Performed by: EMERGENCY MEDICINE

## 2018-06-28 PROCEDURE — 84157 ASSAY OF PROTEIN OTHER: CPT | Performed by: EMERGENCY MEDICINE

## 2018-06-28 PROCEDURE — 86618 LYME DISEASE ANTIBODY: CPT | Performed by: EMERGENCY MEDICINE

## 2018-06-28 PROCEDURE — 86140 C-REACTIVE PROTEIN: CPT | Performed by: EMERGENCY MEDICINE

## 2018-06-28 PROCEDURE — 87086 URINE CULTURE/COLONY COUNT: CPT

## 2018-06-28 PROCEDURE — 84300 ASSAY OF URINE SODIUM: CPT | Performed by: EMERGENCY MEDICINE

## 2018-06-28 PROCEDURE — 82945 GLUCOSE OTHER FLUID: CPT | Performed by: EMERGENCY MEDICINE

## 2018-06-28 PROCEDURE — 80053 COMPREHEN METABOLIC PANEL: CPT | Performed by: EMERGENCY MEDICINE

## 2018-06-28 PROCEDURE — 62270 DX LMBR SPI PNXR: CPT | Performed by: NURSE ANESTHETIST, CERTIFIED REGISTERED

## 2018-06-28 PROCEDURE — 96365 THER/PROPH/DIAG IV INF INIT: CPT

## 2018-06-28 PROCEDURE — 87205 SMEAR GRAM STAIN: CPT | Performed by: EMERGENCY MEDICINE

## 2018-06-28 PROCEDURE — 87498 ENTEROVIRUS PROBE&REVRS TRNS: CPT | Performed by: EMERGENCY MEDICINE

## 2018-06-28 PROCEDURE — 85652 RBC SED RATE AUTOMATED: CPT | Performed by: FAMILY MEDICINE

## 2018-06-28 RX ORDER — ONDANSETRON 4 MG/1
4 TABLET, ORALLY DISINTEGRATING ORAL EVERY 6 HOURS PRN
Status: DISCONTINUED | OUTPATIENT
Start: 2018-06-28 | End: 2018-06-30 | Stop reason: HOSPADM

## 2018-06-28 RX ORDER — NALOXONE HYDROCHLORIDE 0.4 MG/ML
.1-.4 INJECTION, SOLUTION INTRAMUSCULAR; INTRAVENOUS; SUBCUTANEOUS
Status: DISCONTINUED | OUTPATIENT
Start: 2018-06-28 | End: 2018-06-28

## 2018-06-28 RX ORDER — ATENOLOL 100 MG/1
100 TABLET ORAL DAILY
Status: DISCONTINUED | OUTPATIENT
Start: 2018-06-29 | End: 2018-06-30 | Stop reason: HOSPADM

## 2018-06-28 RX ORDER — CEFTRIAXONE SODIUM 2 G/50ML
2 INJECTION, SOLUTION INTRAVENOUS EVERY 12 HOURS
Status: DISCONTINUED | OUTPATIENT
Start: 2018-06-28 | End: 2018-06-29

## 2018-06-28 RX ORDER — ONDANSETRON 2 MG/ML
4 INJECTION INTRAMUSCULAR; INTRAVENOUS EVERY 6 HOURS PRN
Status: DISCONTINUED | OUTPATIENT
Start: 2018-06-28 | End: 2018-06-30 | Stop reason: HOSPADM

## 2018-06-28 RX ORDER — OXYCODONE HYDROCHLORIDE 5 MG/1
5-10 TABLET ORAL
Status: DISCONTINUED | OUTPATIENT
Start: 2018-06-28 | End: 2018-06-30 | Stop reason: HOSPADM

## 2018-06-28 RX ORDER — ONDANSETRON 2 MG/ML
4 INJECTION INTRAMUSCULAR; INTRAVENOUS EVERY 6 HOURS PRN
Status: DISCONTINUED | OUTPATIENT
Start: 2018-06-28 | End: 2018-06-28

## 2018-06-28 RX ORDER — CALCIUM CARBONATE 500(1250)
1 TABLET ORAL DAILY
COMMUNITY
End: 2020-02-27

## 2018-06-28 RX ORDER — PROCHLORPERAZINE MALEATE 5 MG
5 TABLET ORAL EVERY 6 HOURS PRN
Status: DISCONTINUED | OUTPATIENT
Start: 2018-06-28 | End: 2018-06-30 | Stop reason: HOSPADM

## 2018-06-28 RX ORDER — SODIUM CHLORIDE 9 MG/ML
1000 INJECTION, SOLUTION INTRAVENOUS CONTINUOUS
Status: DISCONTINUED | OUTPATIENT
Start: 2018-06-28 | End: 2018-06-28

## 2018-06-28 RX ORDER — HYDROCHLOROTHIAZIDE 12.5 MG/1
12.5 CAPSULE ORAL DAILY
Status: DISCONTINUED | OUTPATIENT
Start: 2018-06-29 | End: 2018-06-30 | Stop reason: HOSPADM

## 2018-06-28 RX ORDER — ONDANSETRON 2 MG/ML
4 INJECTION INTRAMUSCULAR; INTRAVENOUS EVERY 30 MIN PRN
Status: DISCONTINUED | OUTPATIENT
Start: 2018-06-28 | End: 2018-06-30 | Stop reason: DRUGHIGH

## 2018-06-28 RX ORDER — NALOXONE HYDROCHLORIDE 0.4 MG/ML
.1-.4 INJECTION, SOLUTION INTRAMUSCULAR; INTRAVENOUS; SUBCUTANEOUS
Status: DISCONTINUED | OUTPATIENT
Start: 2018-06-28 | End: 2018-06-30 | Stop reason: HOSPADM

## 2018-06-28 RX ORDER — ESCITALOPRAM OXALATE 10 MG/1
10 TABLET ORAL DAILY
Status: DISCONTINUED | OUTPATIENT
Start: 2018-06-29 | End: 2018-06-30 | Stop reason: HOSPADM

## 2018-06-28 RX ORDER — HYDROMORPHONE HYDROCHLORIDE 1 MG/ML
0.5 INJECTION, SOLUTION INTRAMUSCULAR; INTRAVENOUS; SUBCUTANEOUS EVERY 30 MIN PRN
Status: DISCONTINUED | OUTPATIENT
Start: 2018-06-28 | End: 2018-06-28

## 2018-06-28 RX ORDER — ACETAMINOPHEN 325 MG/1
650 TABLET ORAL EVERY 4 HOURS PRN
Status: DISCONTINUED | OUTPATIENT
Start: 2018-06-28 | End: 2018-06-30 | Stop reason: HOSPADM

## 2018-06-28 RX ORDER — SODIUM CHLORIDE 9 MG/ML
INJECTION, SOLUTION INTRAVENOUS CONTINUOUS
Status: DISCONTINUED | OUTPATIENT
Start: 2018-06-28 | End: 2018-06-30 | Stop reason: HOSPADM

## 2018-06-28 RX ORDER — ACETAMINOPHEN 500 MG
1000 TABLET ORAL ONCE
Status: COMPLETED | OUTPATIENT
Start: 2018-06-28 | End: 2018-06-28

## 2018-06-28 RX ORDER — CEFTRIAXONE SODIUM 2 G/50ML
2 INJECTION, SOLUTION INTRAVENOUS EVERY 12 HOURS
Status: DISCONTINUED | OUTPATIENT
Start: 2018-06-28 | End: 2018-06-28

## 2018-06-28 RX ORDER — LOSARTAN POTASSIUM AND HYDROCHLOROTHIAZIDE 12.5; 1 MG/1; MG/1
1 TABLET ORAL DAILY
Status: DISCONTINUED | OUTPATIENT
Start: 2018-06-29 | End: 2018-06-28 | Stop reason: CLARIF

## 2018-06-28 RX ORDER — LIDOCAINE HYDROCHLORIDE 10 MG/ML
INJECTION, SOLUTION EPIDURAL; INFILTRATION; INTRACAUDAL; PERINEURAL PRN
Status: DISCONTINUED | OUTPATIENT
Start: 2018-06-28 | End: 2018-06-28

## 2018-06-28 RX ORDER — PROCHLORPERAZINE 25 MG
12.5 SUPPOSITORY, RECTAL RECTAL EVERY 12 HOURS PRN
Status: DISCONTINUED | OUTPATIENT
Start: 2018-06-28 | End: 2018-06-30 | Stop reason: HOSPADM

## 2018-06-28 RX ORDER — HYDROMORPHONE HYDROCHLORIDE 1 MG/ML
.3-.5 INJECTION, SOLUTION INTRAMUSCULAR; INTRAVENOUS; SUBCUTANEOUS
Status: DISCONTINUED | OUTPATIENT
Start: 2018-06-28 | End: 2018-06-29

## 2018-06-28 RX ORDER — ONDANSETRON 4 MG/1
4 TABLET, ORALLY DISINTEGRATING ORAL EVERY 6 HOURS PRN
Status: DISCONTINUED | OUTPATIENT
Start: 2018-06-28 | End: 2018-06-28

## 2018-06-28 RX ORDER — LOSARTAN POTASSIUM 50 MG/1
100 TABLET ORAL DAILY
Status: DISCONTINUED | OUTPATIENT
Start: 2018-06-29 | End: 2018-06-30 | Stop reason: HOSPADM

## 2018-06-28 RX ADMIN — HYDROMORPHONE HYDROCHLORIDE 0.5 MG: 1 INJECTION, SOLUTION INTRAMUSCULAR; INTRAVENOUS; SUBCUTANEOUS at 17:36

## 2018-06-28 RX ADMIN — ACETAMINOPHEN 1000 MG: 500 TABLET ORAL at 17:35

## 2018-06-28 RX ADMIN — ACETAMINOPHEN 650 MG: 325 TABLET, FILM COATED ORAL at 23:54

## 2018-06-28 RX ADMIN — CEFTRIAXONE SODIUM 2 G: 2 INJECTION, SOLUTION INTRAVENOUS at 20:01

## 2018-06-28 RX ADMIN — ONDANSETRON 4 MG: 2 INJECTION INTRAMUSCULAR; INTRAVENOUS at 17:25

## 2018-06-28 RX ADMIN — VANCOMYCIN HYDROCHLORIDE 1500 MG: 10 INJECTION, POWDER, LYOPHILIZED, FOR SOLUTION INTRAVENOUS at 21:56

## 2018-06-28 RX ADMIN — LIDOCAINE HYDROCHLORIDE 5 ML: 10 INJECTION, SOLUTION EPIDURAL; INFILTRATION; INTRACAUDAL; PERINEURAL at 19:25

## 2018-06-28 RX ADMIN — ACYCLOVIR SODIUM 800 MG: 50 INJECTION, SOLUTION INTRAVENOUS at 20:33

## 2018-06-28 RX ADMIN — SODIUM CHLORIDE: 9 INJECTION, SOLUTION INTRAVENOUS at 21:55

## 2018-06-28 ASSESSMENT — ENCOUNTER SYMPTOMS
MUSCULOSKELETAL NEGATIVE: 1
CARDIOVASCULAR NEGATIVE: 1
EYES NEGATIVE: 1
RESPIRATORY NEGATIVE: 1
NAUSEA: 1
HEMATOLOGIC/LYMPHATIC NEGATIVE: 1
ENDOCRINE NEGATIVE: 1
VOMITING: 1
ALLERGIC/IMMUNOLOGIC NEGATIVE: 1
HEADACHES: 1
PSYCHIATRIC NEGATIVE: 1

## 2018-06-28 NOTE — ANESTHESIA PREPROCEDURE EVALUATION
Anesthesia Evaluation     . Pt has had prior anesthetic. Type: Regional, MAC and General    No history of anesthetic complications          ROS/MED HX    ENT/Pulmonary:       Neurologic: Comment: Current headache for 2 days- not usual for pt      Cardiovascular:     (+) hypertension----. : . . . :. . Previous cardiac testing date:results:date: results:ECG reviewed date:6-2018 results:Sinus Rhythm   -Incomplete left bundle branch block.    -Poor R-wave progression -may be secondary to conduction defect consider old anterior infarct.    -Anterior ST-elevations -nondiagnostic -may be secondary to conduction defect -consider injury.    -  T-abnormality  -Possible lateral ischemia.     ABNORMAL    date: results:          METS/Exercise Tolerance:     Hematologic:         Musculoskeletal:   (+) arthritis, , , -       GI/Hepatic:     (+) Other GI/Hepatic diverticulosis      Renal/Genitourinary:         Endo:         Psychiatric:     (+) psychiatric history anxiety      Infectious Disease:   (+) Recent Fever,       Malignancy:         Other:                                    Anesthesia Plan  Procedure only, no anesthetic delivered    History & Physical Review      ASA Status:  2 .             Postoperative Care      Consents                          .

## 2018-06-28 NOTE — LETTER
"Transition Communication Hand-off for Care Transitions to Next Level of Care Provider    Name: Bonita Villarreal  : 1935  MRN #: [id number]  Primary Care Provider: Dewayne Santana  Primary Care MD Name: Dr. Dewayne Santana  Primary Clinic: [address] [address]  Primary Care Clinic Name: [facility]  Reason for Hospitalization:  Hyponatremia [E87.1]  Abnormal CXR [R93.8]  Acute febrile illness [R50.9]  Headache [R51]  Nonintractable headache, unspecified chronicity pattern, unspecified headache type [R51]  Nausea and vomiting, intractability of vomiting not specified, unspecified vomiting type [R11.2]  Admit Date/Time: 2018  4:59 PM  Discharge Date: 18  Payor Source: Payor: MEDICA / Plan: MEDICA PRIME SOLUTION / Product Type: Indemnity /     Readmission Assessment Measure (BRAEDEN) Risk Score/category: Average         Reason for Communication Hand-off Referral: Fragility    Discharge Plan:    Concern for non-adherence with plan of care:   Y/N - NO    Follow-up plan:  No future appointments.           Key Recommendations:    This writer met with pt  introduced self and role. Discussed discharge planning and medicare guidelines in regards to home care and SNF benefits.  Pt states she lives in a \"mother-in-law\" apartment attached to her son [place] house.  States she is independent with ADL's and IADL's and does not require much help from her son other than transportation.  Offered and discussed home care and [facility] and pt declined both. States is she needs anything her son in on the other side of the door and he checks on her often.  Patients goal is to go home today, family will provide transportation.      Melonie Berg BSN, RN, PHN  RN Care Coordinator  Care Transitions Department  [facility] [phone]  [facility] [phone]      AVS/Discharge Summary is the source of truth; this is a helpful guide for improved communication of " patient story

## 2018-06-28 NOTE — IP AVS SNAPSHOT
[facility]    [address] [address]    Phone:  [phone]    Fax:  [phone]                                       After Visit Summary   6/28/2018    Bonita Villarreal    MRN: [id number]           After Visit Summary Signature Page     I have received my discharge instructions, and my questions have been answered. I have discussed any challenges I see with this plan with the nurse or doctor.    ..........................................................................................................................................  Patient/Patient Representative Signature      ..........................................................................................................................................  Patient Representative Print Name and Relationship to Patient    ..................................................               ................................................  Date                                            Time    ..........................................................................................................................................  Reviewed by Signature/Title    ...................................................              ..............................................  Date                                                            Time

## 2018-06-28 NOTE — IP AVS SNAPSHOT
MRN:[id number]                      After Visit Summary   6/28/2018    Bonita Villarreal    MRN: [id number]           Thank you!     Thank you for choosing [facility] for your care. Our goal is always to provide you with excellent care. Hearing back from our patients is one way we can continue to improve our services. Please take a few minutes to complete the written survey that you may receive in the mail after you visit with us. Thank you!        Patient Information     Date Of Birth          1935        Designated Caregiver       Most Recent Value    Caregiver    Will someone help with your care after discharge? no      About your hospital stay     You were admitted on:  June 28, 2018 You last received care in the:  [facility]    You were discharged on:  June 30, 2018        Reason for your hospital stay       You came in with fever and a headache, causing some concern that you had meningitis.  Fortunately, your spinal tap fluid came back normal, excluding meningitis.  You have a urinary tract infection which explains the fever and your headache.  You have responded well to antibiotics, and are ready for discharge home to complete your course of antibiotic therapy.                  Who to Call     For medical emergencies, please call 911.  For non-urgent questions about your medical care, please call your primary care provider or clinic, [phone]          Attending Provider     Provider Specialty    Raymundo Choudhury MD Emergency Medicine    Jacky Anne MD Internal Medicine    Markie Hogan MD Pulmonary       Primary Care Provider Office Phone # Fax #    Nakulkyle Lara Santana [phone]      After Care Instructions     Activity       Your activity upon discharge: activity as tolerated.            Diet       Follow this diet upon discharge: Orders Placed This Encounter      Advance Diet as Tolerated: Regular Diet Adult              "     Follow-up Appointments     Follow-up and recommended labs and tests        Follow up with primary care provider, Dewayne Santana, within 7 days for hospital follow-up.  No follow up labs or test are needed.                  Pending Results     Date and Time Order Name Status Description    6/28/2018 1821 CSF Culture Aerobic Bacterial Preliminary     6/28/2018 1738 Blood culture (one site) Preliminary     6/28/2018 1622 BLOOD CULTURE Preliminary             Statement of Approval     Ordered          06/30/18 1539  I have reviewed and agree with all the recommendations and orders detailed in this document.  EFFECTIVE NOW     Approved and electronically signed by:  Markie Hogan MD             Admission Information     Date & Time Provider Department Dept. Phone    6/28/2018 Markie Hogan MD [facility] [phone]      Your Vitals Were     Blood Pressure Pulse Temperature Respirations Height Weight    145/81 (BP Location: Left arm) 93 99.5  F (37.5  C) (Oral) 18 1.727 m (5' 8\") 85.6 kg (188 lb 11.4 oz)    Pulse Oximetry BMI (Body Mass Index)                92% 28.69 kg/m2          Care EveryWhere ID     This is your Care EveryWhere ID. This could be used by other organizations to access your [facility] medical records  [id number]        Equal Access to Services     DARIEN YARBROGUH AH: Hadii aura treadwello Soenrike, waaxda luqadaha, qaybta kaalmada adeegyada, cameron gutierrez. So [facility] [phone].    ATENCIÓN: Si habla español, tiene a hylton disposición servicios gratuitos de asistencia lingüística. Llame al [phone].    We comply with applicable federal civil rights laws and [place] laws. We do not discriminate on the basis of race, color, national origin, age, disability, sex, sexual orientation, or gender identity.               Review of your medicines      START taking        Dose / Directions    amoxicillin-clavulanate 875-125 MG per tablet "   Commonly known as:  AUGMENTIN        Dose:  1 tablet   Take 1 tablet by mouth 2 times daily   Quantity:  10 tablet   Refills:  0         CONTINUE these medicines which have NOT CHANGED        Dose / Directions    atenolol 100 MG tablet   Commonly known as:  TENORMIN   Used for:  Essential hypertension, benign        Dose:  100 mg   Take 1 tablet (100 mg) by mouth daily   Quantity:  90 tablet   Refills:  3       calcium carbonate 500 MG tablet   Commonly known as:  OS-JEFF 500 mg [place]. Ca        Dose:  1 tablet   Take 1 tablet by mouth daily   Refills:  0       escitalopram 10 MG tablet   Commonly known as:  LEXAPRO   Used for:  Generalized anxiety disorder        Dose:  10 mg   Take 1 tablet (10 mg) by mouth daily   Quantity:  90 tablet   Refills:  3       FISH OIL PO        1 tab daily   Refills:  0       losartan-hydrochlorothiazide 100-12.5 MG per tablet   Commonly known as:  HYZAAR   Used for:  Essential hypertension, benign        Dose:  1 tablet   Take 1 tablet by mouth daily   Quantity:  90 tablet   Refills:  3       Multi-vitamin Tabs tablet   Generic drug:  multivitamin, therapeutic with minerals        1 DAILY   Refills:  0       order for DME   Used for:  Status post total right knee replacement        Equipment being ordered: Walker Wheels () and Walker () Treatment Diagnosis: s/p TKA   Quantity:  1 Units   Refills:  0            Where to get your medicines      These medications were sent to [facility] [place], [place] - [address]  [address] [address]     Phone:  [phone]     amoxicillin-clavulanate 875-125 MG per tablet                Protect others around you: Learn how to safely use, store and throw away your medicines at www.disposemymeds.org.        ANTIBIOTIC INSTRUCTION     You've Been Prescribed an Antibiotic - Now What?  Your healthcare team thinks that you or your loved one might have an infection. Some infections can be treated with  antibiotics, which are powerful, life-saving drugs. Like all medications, antibiotics have side effects and should only be used when necessary. There are some important things you should know about your antibiotic treatment.      Your healthcare team may run tests before you start taking an antibiotic.    Your team may take samples (e.g., from your blood, urine or other areas) to run tests to look for bacteria. These test can be important to determine if you need an antibiotic at all and, if you do, which antibiotic will work best.      Within a few days, your healthcare team might change or even stop your antibiotic.    Your team may start you on an antibiotic while they are working to find out what is making you sick.    Your team might change your antibiotic because test results show that a different antibiotic would be better to treat your infection.    In some cases, once your team has more information, they learn that you do not need an antibiotic at all. They may find out that you don't have an infection, or that the antibiotic you're taking won't work against your infection. For example, an infection caused by a virus can't be treated with antibiotics. Staying on an antibiotic when you don't need it is more likely to be harmful than helpful.      You may experience side effects from your antibiotic.    Like all medications, antibiotics have side effects. Some of these can be serious.    Let you healthcare team know if you have any known allergies when you are admitted to the hospital.    One significant side effect of nearly all antibiotics is the risk of severe and sometimes deadly diarrhea caused by Clostridium difficile (C. Difficile). This occurs when a person takes antibiotics because some good germs are destroyed. Antibiotic use allows C. diificile to take over, putting patients at high risk for this serious infection.    As a patient or caregiver, it is important to understand your or your loved one's  antibiotic treatment. It is especially important for caregivers to speak up when patients can't speak for themselves. Here are some important questions to ask your healthcare team.    What infection is this antibiotic treating and how do you know I have that infection?    What side effects might occur from this antibiotic?    How long will I need to take this antibiotic?    Is it safe to take this antibiotic with other medications or supplements (e.g., vitamins) that I am taking?     Are there any special directions I need to know about taking this antibiotic? For example, should I take it with food?    How will I be monitored to know whether my infection is responding to the antibiotic?    What tests may help to make sure the right antibiotic is prescribed for me?      Information provided by:  www.cdc.gov/getsmart  U.S. Department of Health and Human Services  Centers for disease Control and Prevention  National Center for Emerging and Zoonotic Infectious Diseases  Division of Healthcare Quality Promotion             Medication List: This is a list of all your medications and when to take them. Check marks below indicate your daily home schedule. Keep this list as a reference.      Medications           Morning Afternoon Evening Bedtime As Needed    amoxicillin-clavulanate 875-125 MG per tablet   Commonly known as:  AUGMENTIN   Take 1 tablet by mouth 2 times daily                                atenolol 100 MG tablet   Commonly known as:  TENORMIN   Take 1 tablet (100 mg) by mouth daily   Last time this was given:  100 mg on 6/30/2018  7:51 AM                                calcium carbonate 500 MG tablet   Commonly known as:  OS-JEFF 500 mg [place]. Ca   Take 1 tablet by mouth daily                                escitalopram 10 MG tablet   Commonly known as:  LEXAPRO   Take 1 tablet (10 mg) by mouth daily   Last time this was given:  10 mg on 6/30/2018  7:51 AM                                FISH OIL PO   1 tab  daily                                losartan-hydrochlorothiazide 100-12.5 MG per tablet   Commonly known as:  HYZAAR   Take 1 tablet by mouth daily                                Multi-vitamin Tabs tablet   1 DAILY   Generic drug:  multivitamin, therapeutic with minerals                                order for DME   Equipment being ordered: Walker Wheels () and Walker () Treatment Diagnosis: s/p TKA

## 2018-06-28 NOTE — MR AVS SNAPSHOT
After Visit Summary   6/28/2018    Bonita Villarreal    MRN: [id number]           Patient Information     Date Of Birth          1935        Visit Information        Provider Department      6/28/2018 3:40 PM DIONICIO Jerome MD [facility]        Today's Diagnoses     Fever, unspecified fever cause    -  1    Acute intractable headache, unspecified headache type           Follow-ups after your visit        Who to contact     If you have questions or need follow up information about today's clinic visit or your schedule please contact [facility] directly at [phone].  Normal or non-critical lab and imaging results will be communicated to you by MyChart, letter or phone within 4 business days after the clinic has received the results. If you do not hear from us within 7 days, please contact the clinic through MyChart or phone. If you have a critical or abnormal lab result, we will notify you by phone as soon as possible.  Submit refill requests through [employer] or call your pharmacy and they will forward the refill request to us. Please allow 3 business days for your refill to be completed.          Additional Information About Your Visit        Care EveryWhere ID     This is your Care EveryWhere ID. This could be used by other organizations to access your [facility] medical records  [id number]        Your Vitals Were     Pulse Temperature Pulse Oximetry             99 101  F (38.3  C) 100%          Blood Pressure from Last 3 Encounters:   06/28/18 158/87   05/11/18 127/78   03/02/18 125/61    Weight from Last 3 Encounters:   05/11/18 189 lb 12.8 oz (86.1 kg)   02/28/18 190 lb 7.6 oz (86.4 kg)   02/09/18 189 lb (85.7 kg)              We Performed the Following     Basic metabolic panel     Blood culture     CBC with platelets differential     Erythrocyte sedimentation rate auto        Primary Care Provider Office Phone # Fax #    Dewayne Santana MD  [phone]       [address] [place] [place] [address]        Equal Access to Services     DARIEN YARBROUGH : Hadii aura collins saloni Rodriges, waraphaelda luqelliott, qaybta kaalmada jaden, cameron Guaajrdo [facility] [phone].    ATENCIÓN: Si habla español, tiene a hylton disposición servicios gratuitos de asistencia lingüística. Llame al [phone].    We comply with applicable federal civil rights laws and [place] laws. We do not discriminate on the basis of race, color, national origin, age, disability, sex, sexual orientation, or gender identity.            Thank you!     Thank you for choosing [facility]  for your care. Our goal is always to provide you with excellent care. Hearing back from our patients is one way we can continue to improve our services. Please take a few minutes to complete the written survey that you may receive in the mail after your visit with us. Thank you!             Your Updated Medication List - Protect others around you: Learn how to safely use, store and throw away your medicines at www.disposemymeds.org.          This list is accurate as of 6/28/18  4:41 PM.  Always use your most recent med list.                   Brand Name Dispense Instructions for use Diagnosis    atenolol 100 MG tablet    TENORMIN    90 tablet    Take 1 tablet (100 mg) by mouth daily    Essential hypertension, benign       cholecalciferol 1000 UNIT tablet    vitamin D3     Take 1 tablet by mouth daily.        escitalopram 10 MG tablet    LEXAPRO    90 tablet    Take 1 tablet (10 mg) by mouth daily    Generalized anxiety disorder       FISH OIL PO      1 tab daily        HYDROcodone-acetaminophen 5-325 MG per tablet    NORCO    40 tablet    Take 1-2 tablets by mouth every 4 hours as needed for moderate to severe pain    Status post total right knee replacement       losartan-hydrochlorothiazide 100-12.5 MG per tablet    HYZAAR    90 tablet    Take 1 tablet  by mouth daily    Essential hypertension, benign       Multi-vitamin Tabs tablet   Generic drug:  multivitamin, therapeutic with minerals      1 DAILY        order for DME     1 Units    Equipment being ordered: Walker Wheels () and Walker () Treatment Diagnosis: s/p TKA    Status post total right knee replacement       senna-docusate 8.6-50 MG per tablet    SENOKOT-S;PERICOLACE    100 tablet    Take 2 tablets by mouth 2 times daily as needed for constipation    Status post total right knee replacement

## 2018-06-28 NOTE — ED NOTES
Chief Complaint   Patient presents with     Blood Draw     VPT right arm, vitals taken      Flower Javier CMA     PT comes in today with a h/a for the past 3 days. She has tried tylenol/ibuprofen with a little help. Has had some nausea and abd pain with a few emesis. She is taking in fluids but not eating well. Is having fever/chills. Denies neck pain, CP, SOB, diarrhea or UTI s/sx, or visual trouble. She states her h/a is across her forehead with no light sensitivity. Pt is very fidgety but states this is her normal. She is a/o x 4. Family at bedside.

## 2018-06-28 NOTE — ED PROVIDER NOTES
"  History     Chief Complaint   Patient presents with     Headache     fever, n/v     HPI  Bonita Villarreal is a 83 year old female with a history of hypertension, diverticulitis, osteopenia of the spine, generalized anxiety disorder who was referred from clinic by Dr. Chilo maxwell for concern for meningitis with fever nausea vomiting and headache.  Report was provided to Stefani Ramirez RN prior to ED arrival.  By report patient had blood work in clinic including one blood culture, sed rate, CBC and a basic metabolic profile.  Sed rate was 33.  Patient arrived by private car with her daughter-in-law stating she has had symptoms for about 2 days.  Her frontal headache began about 2 days ago and is steadily be present.  She rates as a dull discomfort \"it is an ache that is annoying and is always there\".  She reports she has had a fever on and off.  She reports she is felt nauseous but reports she has had no neck pain.  No fall.  She also reports no history of meningitis no history of recent neck manipulation.  She is currently on losartan, metoprolol, Hyzaar.  [place] [employer] but does not report any exposure to ticks.  She has not noticed any rashes.  She does report she has had a mild cough.  She has no abdominal pain.    Problem List:    Patient Active Problem List    Diagnosis Date Noted     Status post total right knee replacement 02/28/2018     Priority: Medium     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     Priority: Medium     CARDIOVASCULAR SCREENING; LDL GOAL LESS THAN 160 10/31/2010     Priority: Medium     LDL in 140s, HDL in 60s  Neponset risk is 6% (average at her age is 14%)       Essential hypertension, benign 06/28/2005     Priority: Medium     had coronary CT scan 2003 --- no evidence of plaque  started on atenolol and ASA Jan 2005 for /94  physician in [place] switched her to Lotrel 5/10 due to her having dizzy spells  HCTZ added 3/05, pt felt mouth " too dried out   pt more anxious and BP still elevated -- switched back to Atenolol       Diverticulosis of large intestine 2005     Priority: Medium     colonoscopy , disease mod - severe  Problem list name updated by automated process. Provider to review       Osteopenia of spine 2005     Priority: Medium     T score -1.3    Risk calculator based on 2003 T-score and personal data showed 1% risk of hip fracture over next 10 years, 14% risk of any fracture over next ten years; consider recheck DEXA at age 77, continue calcium and exercise until then (current recommendations to consider treatment if hip fx risk reaches 3% or any fracture risk reaches 20%)  Problem list name updated by automated process. Provider to review       Family history of other cardiovascular diseases 2005     Priority: Medium     father  at 39 of MI  brother  at 51 of MI  another brother -- MI x 2  eldest sister -- MI x 2 with stents  mother and maternal aunts lived to 80's and mid 90's  Problem list name updated by automated process. Provider to review and confirm  Problem list name updated by automated process. Provider to review       Generalized anxiety disorder 2005     Priority: Medium     onset after patient was diagnosed with hypertension fall           Past Medical History:    Past Medical History:   Diagnosis Date     Advance Care Planning 2012     Arthritis      Diverticulitis of colon 2005     Diverticulosis of colon (without mention of hemorrhage)      Hypertension        Past Surgical History:    Past Surgical History:   Procedure Laterality Date     ARTHROPLASTY KNEE Right 2018    Procedure: ARTHROPLASTY KNEE;  Right total knee arthroplasty;  Surgeon: Anton Grover MD;  Location: [facility]      APPENDECTOMY       COLONOSCOPY  04/15/02     COLONOSCOPY  3/25/2013    Procedure: COLONOSCOPY;  Colonoscopy  ;  Surgeon: Jamil Porras MD;  Location: [facility]      "FLEXIBLE SIGMOIDOSCOPY  96     ORTHOPEDIC SURGERY  22-23 YEARS AGO    BACK     SURGICAL HISTORY OF -       lumbar surgery       Family History:    Family History   Problem Relation Age of Onset     Lipids Brother      HEART DISEASE Brother       at 51 of an MI     Cancer Brother      bone, lung,  82     Prostate Cancer Brother      Cancer Sister      cervical     Lipids Sister      HEART DISEASE Sister      eldest sister, s/p MI x 2 with stents; diagnosed with AAA, postoperative complications and MI,  at 75     HEART DISEASE Brother      MI x 2     Lipids Brother      C.A.D. Father       at 39 of an MI     Family History Negative Mother      lived to age 95     Gynecology Sister      cervical Ca       Social History:  Marital Status:   [5]  Social History   Substance Use Topics     Smoking status: Never Smoker     Smokeless tobacco: Never Used     Alcohol use Yes      Comment: rare        Medications:      atenolol (TENORMIN) 100 MG tablet   calcium carbonate (OS-JEFF 500 MG [place] [place]) 500 MG tablet   escitalopram (LEXAPRO) 10 MG tablet   FISH OIL OR   losartan-hydrochlorothiazide (HYZAAR) 100-12.5 MG per tablet   MULTI-VITAMIN OR TABS   order for DME         Review of Systems   Constitutional:        Feeling unwell.   HENT: Negative.    Eyes: Negative.    Respiratory: Negative.    Cardiovascular: Negative.    Gastrointestinal: Positive for nausea and vomiting.   Endocrine: Negative.    Genitourinary: Negative.    Musculoskeletal: Negative.    Skin: Negative.    Allergic/Immunologic: Negative.    Neurological: Positive for headaches.   Hematological: Negative.    Psychiatric/Behavioral: Negative.    All other systems reviewed and are negative.      Physical Exam   BP: 157/80  Pulse: 110  Heart Rate: 103  Temp: 100.2  F (37.9  C)  Resp: 20  Height: 172.7 cm (5' 8\")  Weight: 83 kg (183 lb)  SpO2: 95 %      Physical Exam   Constitutional: She is oriented to person, place, and time. She " appears well-developed and well-nourished.   HENT:   Head: Normocephalic and atraumatic.   Eyes: Conjunctivae and EOM are normal. Pupils are equal, round, and reactive to light. Right eye exhibits no discharge. Left eye exhibits no discharge. No scleral icterus.   Neck: Normal range of motion. Neck supple. No JVD present. No tracheal deviation present. No thyromegaly present.   Cardiovascular: Tachycardia present.    Pulmonary/Chest: Effort normal and breath sounds normal. No stridor. No respiratory distress. She has no wheezes. She has no rales. She exhibits no tenderness.   Abdominal: Soft. Bowel sounds are normal. She exhibits no distension and no mass. There is no tenderness. There is no rebound and no guarding.   Lymphadenopathy:     She has no cervical adenopathy.   Neurological: She is alert and oriented to person, place, and time. She displays normal reflexes. No cranial nerve deficit. She exhibits normal muscle tone. Coordination normal.   Skin: Skin is warm. No rash noted. No erythema. No pallor.   Psychiatric: She has a normal mood and affect. Her behavior is normal. Judgment and thought content normal.       ED Course     ED Course     Procedures                  EKG Interpretation:      Interpreted by Raymundo Choudhury  Time reviewed:17:25  Symptoms at time of EKG: Fever, headache, nausea and vomiting   Rhythm: Normal sinus  and Sinus tachycardia  Rate: 100-110  Axis: Normal  Ectopy: None  Conduction: Left bundle branch block (complete)  ST Segments/ T Waves: Non-specific ST-T wave changes  Q Waves: None and Nonspecific  Comparison to prior: When compared with EKG dated February 9, 2018 sinus tachycardia is present.  T-wave changes also present with an old left bundle branch block    Clinical Impression: Left bundle branch block with T wave abnormality      Critical Care time:  none               ED medications:  Medications   ondansetron (ZOFRAN) injection 4 mg (4 mg Intravenous Given 6/28/18 4441)    HYDROmorphone (PF) (DILAUDID) injection 0.5 mg (0.5 mg Intravenous Given 6/28/18 1736)   vancomycin (VANCOCIN) 1,500 mg in sodium chloride 0.9 % 250 mL intermittent infusion (not administered)   acyclovir (ZOVIRAX) 800 mg in D5W 250 mL intermittent infusion (not administered)   cefTRIAXone IN D5W (ROCEPHIN) intermittent infusion 2 g (2 g Intravenous New Bag 6/28/18 2001)   acetaminophen (TYLENOL) tablet 1,000 mg (1,000 mg Oral Given 6/28/18 1735)     ED labs and imaging:  Results for orders placed or performed during the hospital encounter of 06/28/18   Head CT w/o contrast    Narrative    CT HEAD W/O CONTRAST  6/28/2018 6:11 PM    HISTORY: Headache and fever for 2 days.    TECHNIQUE: Scans were obtained through the head without IV contrast.   Radiation dose for this scan was reduced using automated exposure  control, adjustment of the mA and/or kV according to patient size, or  iterative reconstruction technique.    COMPARISON: None.    FINDINGS: Mild atrophy. Ventricular size is proportionate to the  atrophy. Mild chronic white matter disease.  No hemorrhage, mass  lesion, or focal area of acute infarction identified. Paranasal  sinuses are normal. No bony abnormality.      Impression    IMPRESSION:   1. Mild atrophy and chronic white matter disease.  2. Nothing acute.    KARLI RENDON MD   Chest XR,  PA & LAT    Narrative    CHEST TWO VIEWS    6/28/2018 6:19 PM     HISTORY: Fever, headache, cough, feeling unwell.  Evaluate for acute  cardiothoracic process.     COMPARISON: None.    FINDINGS: 5 cm area of patchy opacity in the right lung base  posteromedially best seen on the lateral view. This is consistent with  infiltrate. Left lung is clear.      Impression    IMPRESSION: Right base infiltrate.    KARLI RENDON MD   Lactic acid whole blood   Result Value Ref Range    Lactic Acid 1.3 0.7 - 2.0 mmol/L   CRP Inflammation   Result Value Ref Range    CRP Inflammation >1900.0 (H) 0.0 - 8.0 mg/L   CBC with  platelets differential   Result Value Ref Range    WBC 13.3 (H) 4.0 - 11.0 10e9/L    RBC Count 4.42 3.8 - 5.2 10e12/L    Hemoglobin 13.0 11.7 - 15.7 g/dL    Hematocrit 39.5 35.0 - 47.0 %    MCV 89 78 - 100 fl    MCH 29.4 26.5 - 33.0 pg    MCHC 32.9 31.5 - 36.5 g/dL    RDW 12.8 10.0 - 15.0 %    Platelet Count 250 150 - 450 10e9/L    Diff Method Automated Method     % Neutrophils 91.1 %    % Lymphocytes 3.1 %    % Monocytes 4.5 %    % Eosinophils 0.0 %    % Basophils 0.2 %    % Immature Granulocytes 1.1 %    Nucleated RBCs 0 0 /100    Absolute Neutrophil 12.1 (H) 1.6 - 8.3 10e9/L    Absolute Lymphocytes 0.4 (L) 0.8 - 5.3 10e9/L    Absolute Monocytes 0.6 0.0 - 1.3 10e9/L    Absolute Eosinophils 0.0 0.0 - 0.7 10e9/L    Absolute Basophils 0.0 0.0 - 0.2 10e9/L    Abs Immature Granulocytes 0.1 0 - 0.4 10e9/L    Absolute Nucleated RBC 0.0    Comprehensive metabolic panel   Result Value Ref Range    Sodium 129 (L) 133 - 144 mmol/L    Potassium 4.2 3.4 - 5.3 mmol/L    Chloride 98 94 - 109 mmol/L    Carbon Dioxide 23 20 - 32 mmol/L    Anion Gap 8 3 - 14 mmol/L    Glucose 116 (H) 70 - 99 mg/dL    Urea Nitrogen 21 7 - 30 mg/dL    Creatinine 0.98 0.52 - 1.04 mg/dL    GFR Estimate 55 (L) >60 mL/min/1.7m2    GFR Estimate If Black 66 >60 mL/min/1.7m2    Calcium 8.5 8.5 - 10.1 mg/dL    Bilirubin Total 0.9 0.2 - 1.3 mg/dL    Albumin 2.9 (L) 3.4 - 5.0 g/dL    Protein Total 7.2 6.8 - 8.8 g/dL    Alkaline Phosphatase 121 40 - 150 U/L    ALT 31 0 - 50 U/L    AST 39 0 - 45 U/L   UA reflex to Microscopic   Result Value Ref Range    Color Urine Sandra     Appearance Urine Slightly Cloudy     Glucose Urine Negative NEG^Negative mg/dL    Bilirubin Urine Negative NEG^Negative    Ketones Urine Negative NEG^Negative mg/dL    Specific Gravity Urine 1.023 1.003 - 1.035    Blood Urine Small (A) NEG^Negative    pH Urine 5.0 5.0 - 7.0 pH    Protein Albumin Urine 30 (A) NEG^Negative mg/dL    Urobilinogen mg/dL 4.0 (H) 0.0 - 2.0 mg/dL    Nitrite Urine  "Positive (A) NEG^Negative    Leukocyte Esterase Urine Large (A) NEG^Negative    Source Midstream Urine     RBC Urine 2 0 - 2 /HPF    WBC Urine 26 (H) 0 - 5 /HPF    Bacteria Urine Few (A) NEG^Negative /HPF    Squamous Epithelial /HPF Urine 1 0 - 1 /HPF    Mucous Urine Present (A) NEG^Negative /LPF   Osmolality   Result Value Ref Range    Osmolality 274 (L) 280 - 301 mmol/kg   Blood culture (one site)   Result Value Ref Range    Specimen Description Blood Left Arm     Special Requests Aerobic and anaerobic bottles received     Culture Micro No growth after 1 hour    Cell count with differential CSF: Tube 4   Result Value Ref Range    WBC CSF 0 0 - 5 /uL    RBC CSF 0 0 - 2 /uL    Tube Number 4 #    Volume 3 mL    Color CSF Colorless CLRL^Colorless    Appearance CSF Clear CLER^Clear   Glucose CSF: Tube 2   Result Value Ref Range    Glucose CSF 60 40 - 70 mg/dL   Protein total CSF: Tube 2   Result Value Ref Range    Protein Total CSF 45 15 - 60 mg/dL   Gram stain   Result Value Ref Range    Specimen Description Cerebrospinal fluid     Gram Stain       No organisms seen  Gram stain result is preliminary and awaits review of Microbiology Staff.           ED Vitals:  Vitals:    06/28/18 1654 06/28/18 1715 06/28/18 1845   BP: 157/80 152/86 (!) 116/101   Pulse: 110     Resp: 20     Temp: 100.2  F (37.9  C)  99.5  F (37.5  C)   TempSrc:   Axillary   SpO2: 95% 94% 91%   Weight: 83 kg (183 lb)     Height: 1.727 m (5' 8\")           Assessments & Plan (with Medical Decision Making)   Clinical impression: 83-year-old female who was referred from clinic for evaluation for headache, fever nausea and vomiting.  Symptoms are likely multifactorial with hyponatremia which is presumed acute.  Symptoms suspicious for viral encephalitis/meningitis    She was seen in clinic earlier today by Dr. Chilo Jerome for  Fever, headache, and neck discomfort.  She had an elevated sed rate in clinic and had blood work- (CBC and a basic metabolic profile " drawn).  She was referred to the emergency room for additional workup including consideration of a lumbar puncture.  Patient is here by private car with her daughter-in-law.  She tells me over the last 2 days she has felt unwell.  Her headache is in the frontal scalp that began suddenly and is steadily worsened.  She reports it is a dull discomfort that is constantly present.  She rates her headache at 9 out of 10.  She reports no history of headaches.  She reports she is currently on metoprolol, losartan, Hyzaar.  She reports an allergy to lisinopril.  She reports no neck pain.  She also reports no tinnitus, she has no change in her vision, she reports no recent fall or trauma.  She reports she does have a cabin up [facility] and was last at her cabin about 3 weeks ago.  She does not recall if she has had any tick bites but denies any exposures.  She also reports no rashes.  She has had a mild cough but reports no abdominal pain no chest pain, no trouble urinating.  On my exam GCS is 15.  Her pupils are equal reactive to light.  She has a normal complete neurologic exam with no extremity weakness or numbness no tingling numbness normal face.  TMs are clear with soft cerumen.  Her neck is supple with normal range of motion.  No meningismus.  Her initial blood pressure on arrival in triage is 157/80.  She had some resting tachycardia.  EKG on ED arrival confirms an old left bundle branch block with sinus tachycardia.  There was T-wave abnormalities noted.       ED course and Plan:  We discussed differential diagnosis for headache with fever without neck pain with nausea and vomiting and feeling unwell.  Differential diagnosis includes meningitis or encephalitis, viral syndrome, intracranial emergency including hemorrhage versus mass lesion.  We discussed blood work, neuroimaging and lumbar puncture.  Patient was sent from clinic for consideration for lumbar puncture.   She was given IV Dilaudid for headache which  she rated a 9 out of 10.  Head CT was obtained to exclude any acute intracranial process.  Patient was given Tylenol for temperature 100.2 F.  Patient ambulated to the bathroom independently without any gait abnormality. Her labs today show hyponatremia with a sodium of 129.  White count is 13.3.  Review of her medical records does not show any prior history of hyponatremia in the last 2 years. Headache could be related to hyponatremia.  Plasma osmolality and urine sodium pending.  Clinically patient appears euvolemic.  CT of the head today does not show any acute process specifically no mass lesion or bleed no ventriculomegaly.  Patient has an elevated CRP today of > 1900 with elevated sed rate in clinic. With report of headache and fever without neck pain or stiffness I felt a lumbar puncture was the next best step to evaluate for both opening pressure, and to exclude meningitis or encephalitis.    We discussed risk and benefit of complete lumbar puncture given fever with headache.  After discussing risk and benefit patient elected to proceed with lumbar puncture. Lumbar puncture was completed by Aggie Oliver CRNA. See procedure note for additional details.  Opening pressure was 23mmHg, clear fluid by report.  Chest x-ray did show concern for possible right base infiltrate.  Please see the interpreting radiologist report above.    I spoke with Dr DYLLAN Rico- admitting hospitalist at 6.25PM. who agreed to assume care on admission. I reviewed rationale for admission, patient's clinical history,presentation, ED course and workup, interventions in the emergency department.  We discussed steroids prior to IV antibiotics for empiric coverage for meningitis or encephalitis.  I consulted with infectious disease given her elevated CRP, about steroids in addition of ampicillin pending CSF studies. I spoke with Dr. JERMAINE Wallis- on-call infectious disease at 7.10PM she suggested it was reasonable to add ampicillin if there was CSF  pleocytosis.  I elected not to add dexamethasone to her empiric antibiotic management as my clinical suspicion for bacterial meningitis is low. Lyme screen is pending. CSF enterovirus and culture is pending.             Disclaimer: This note consists of symbols derived from keyboarding, dictation and/or voice recognition software. As a result, there may be errors in the script that have gone undetected. Please consider this when interpreting information found in this chart.  I have reviewed the nursing notes.    I have reviewed the findings, diagnosis, plan and need for follow up with the patient.       New Prescriptions    No medications on file       Final diagnoses:   Nonintractable headache, unspecified chronicity pattern, unspecified headache type - Headache over the last 2 days with steady persistence dull over the forehead   Acute febrile illness - 2 days of fever   Nausea and vomiting, intractability of vomiting not specified, unspecified vomiting type   Hyponatremia - presumed acute. No known prior history   Headache   Abnormal CXR - Concern for possible right base infiltrate       6/28/2018   [facility] EMERGENCY DEPARTMENT     Raymundo Choudhury MD  06/29/18 0156

## 2018-06-28 NOTE — NURSING NOTE
RN was asked to triage patient prior to MD evaluation due to HA, abdominal pain and nausea.  Patient reports FARLEY started Tuesday and has continued until today.  HA is relieved by tylenol and ibuprofen however once it wears off the HA will return.  Rates this HA 8/10 on pain scale when HA is present.  She has felt nauseas however this morning she did have one episode of emesis after eating breakfast bar, yogurt and apple juice.  She states she is hydrating well.  Denies blood in emesis, b/b problems, vision changes, one-sided weakness, dizziness/lightheadedness, head injury, chest pain, SOB or any other symptoms at this time.    Theresa GREENE RN

## 2018-06-28 NOTE — PROGRESS NOTES
Subjective:  Bonita Villarreal is a 83 year old female   Chief Complaint   Patient presents with     Headache     And fever     Generally healthy woman who was in her usual state of health until yesterday when she developed a severe headache which has persisted.  This is unusual for her to get headaches.  It is associated with nausea and vomiting ×2 today.  She has tried Tylenol and/or Advil with only temporary relief.  She did not realize until she was checked here at the clinic that she has been running a fever.  Denies any other obvious symptoms of infection such as cough, sore throat, nasal congestion, diarrhea or abdominal pain.  No one else in her family with similar symptoms      Encounter Diagnoses   Name Primary?     Fever, unspecified fever cause Yes     Acute intractable headache, unspecified headache type        ROS:other than noted above, general, HEENT, respiratory, cardiac, gastrointestinal systems are negative    Medical, surgical, social, and family histories, medications and allergies reviewed and updated.    Objective:  Exam:/87 (BP Location: Right arm, Patient Position: Sitting, Cuff Size: Adult Regular)  Pulse 99  Temp 101  F (38.3  C)  SpO2 100%     GENERAL APPEARANCE ADULT: alert, appears ill  EYES: PERRL, EOM normal, conjunctiva and lids normal, Fundi normal  HENT: Ears and TMs normal, oral mucosa and posterior oropharynx normal  NECK: No adenopathy,masses or thyromegaly, no marked nuchal rigidity but she did resist when I tried at the flexor  RESP: lungs clear to auscultation   CV: normal rate, regular rhythm, no murmur or gallop  ABDOMEN: soft, no organomegaly, masses or tenderness  SKIN: no suspicious lesions or rashes  NEURO: Alert, oriented, speech and mentation normal    Lab White count 11.94 with 91.2% neutrophils and 3.2% lymphocytes    Blood cultures were drawn in the clinic    ASSESSMENT:  1. Fever, unspecified fever cause    2. Acute intractable headache, unspecified  headache type      Severe headache in the female who is not prone to headaches with fever and neutrophilia.  Eventual diagnosis would include meningitis or encephalitis.      PLAN:  Orders Placed This Encounter     CBC with platelets differential     Basic metabolic panel     Erythrocyte sedimentation rate auto     I discussed with her in her daughter-in-law that she needs further evaluation cannot be done here in the clinic including possibly a CT scan and lumbar puncture.  I recommend she go directly to the emergency room and her daughter-in-law will drive her there.  I called the ED and talked to the charge nurse.

## 2018-06-28 NOTE — PHARMACY-VANCOMYCIN DOSING SERVICE
Pharmacy Vancomycin Initial Note  Date of Service 2018  Patient's  [id number]  83 year old, female    Indication: Meningitis    Current estimated CrCl = Estimated Creatinine Clearance: 50.8 mL/min (based on Cr of 0.98).    Creatinine for last 3 days  2018:  5:10 PM Creatinine 0.98 mg/dL    Recent Vancomycin Level(s) for last 3 days  No results found for requested labs within last 72 hours.      Vancomycin IV Administrations (past 72 hours)      No vancomycin orders with administrations in past 72 hours.                Nephrotoxins and other renal medications (Future)    Start     Dose/Rate Route Frequency Ordered Stop    18 1831  vancomycin (VANCOCIN) 1,500 mg in sodium chloride 0.9 % 250 mL intermittent infusion      1,500 mg  over 90 Minutes Intravenous EVERY 24 HOURS 18 1830            Contrast Orders - past 72 hours     None                Plan:  1.  Start vancomycin  1500 mg IV q24h.   2.  Goal Trough Level: 15-20 mg/L   3.  Pharmacy will check trough levels as appropriate in 1-3 Days.    4. Serum creatinine levels will be ordered daily for the first week of therapy and at least twice weekly for subsequent weeks.    5. [place] method utilized to dose vancomycin therapy: Method 2    Ashley Schoen

## 2018-06-29 PROBLEM — G03.9 MENINGITIS: Status: RESOLVED | Noted: 2018-06-28 | Resolved: 2018-06-29

## 2018-06-29 PROBLEM — R82.71 BACTERIURIA WITH PYURIA: Status: ACTIVE | Noted: 2018-06-29

## 2018-06-29 PROBLEM — R82.81 BACTERIURIA WITH PYURIA: Status: ACTIVE | Noted: 2018-06-29

## 2018-06-29 PROBLEM — R91.8 PULMONARY INFILTRATE IN RIGHT LUNG ON CHEST X-RAY: Status: ACTIVE | Noted: 2018-06-29

## 2018-06-29 LAB
ANION GAP SERPL CALCULATED.3IONS-SCNC: 6 MMOL/L (ref 3–14)
B BURGDOR IGG+IGM SER QL: <0.01 (ref 0–0.89)
BUN SERPL-MCNC: 16 MG/DL (ref 7–30)
CALCIUM SERPL-MCNC: 7.9 MG/DL (ref 8.5–10.1)
CHLORIDE SERPL-SCNC: 104 MMOL/L (ref 94–109)
CO2 SERPL-SCNC: 24 MMOL/L (ref 20–32)
CREAT SERPL-MCNC: 0.84 MG/DL (ref 0.52–1.04)
CRP SERPL-MCNC: 242 MG/L (ref 0–8)
ERYTHROCYTE [DISTWIDTH] IN BLOOD BY AUTOMATED COUNT: 13.1 % (ref 10–15)
EV RNA SPEC QL NAA+PROBE: NEGATIVE
GFR SERPL CREATININE-BSD FRML MDRD: 65 ML/MIN/1.7M2
GLUCOSE SERPL-MCNC: 85 MG/DL (ref 70–99)
GRAM STN SPEC: NORMAL
HCT VFR BLD AUTO: 37.7 % (ref 35–47)
HGB BLD-MCNC: 11.9 G/DL (ref 11.7–15.7)
LACTATE BLD-SCNC: 1.6 MMOL/L (ref 0.4–1.9)
MCH RBC QN AUTO: 29.2 PG (ref 26.5–33)
MCHC RBC AUTO-ENTMCNC: 31.6 G/DL (ref 31.5–36.5)
MCV RBC AUTO: 93 FL (ref 78–100)
PLATELET # BLD AUTO: 187 10E9/L (ref 150–450)
POTASSIUM SERPL-SCNC: 3.9 MMOL/L (ref 3.4–5.3)
RBC # BLD AUTO: 4.07 10E12/L (ref 3.8–5.2)
SODIUM SERPL-SCNC: 134 MMOL/L (ref 133–144)
SODIUM UR-SCNC: 21 MMOL/L
SPECIMEN SOURCE: NORMAL
SPECIMEN SOURCE: NORMAL
WBC # BLD AUTO: 8.6 10E9/L (ref 4–11)

## 2018-06-29 PROCEDURE — 25000128 H RX IP 250 OP 636

## 2018-06-29 PROCEDURE — 25000128 H RX IP 250 OP 636: Performed by: INTERNAL MEDICINE

## 2018-06-29 PROCEDURE — 80048 BASIC METABOLIC PNL TOTAL CA: CPT | Performed by: INTERNAL MEDICINE

## 2018-06-29 PROCEDURE — A9270 NON-COVERED ITEM OR SERVICE: HCPCS | Mod: GY | Performed by: INTERNAL MEDICINE

## 2018-06-29 PROCEDURE — 36415 COLL VENOUS BLD VENIPUNCTURE: CPT | Performed by: INTERNAL MEDICINE

## 2018-06-29 PROCEDURE — 25000128 H RX IP 250 OP 636: Performed by: EMERGENCY MEDICINE

## 2018-06-29 PROCEDURE — 36415 COLL VENOUS BLD VENIPUNCTURE: CPT

## 2018-06-29 PROCEDURE — 25000125 ZZHC RX 250: Performed by: INTERNAL MEDICINE

## 2018-06-29 PROCEDURE — 86140 C-REACTIVE PROTEIN: CPT | Performed by: INTERNAL MEDICINE

## 2018-06-29 PROCEDURE — 99233 SBSQ HOSP IP/OBS HIGH 50: CPT

## 2018-06-29 PROCEDURE — 12000000 ZZH R&B MED SURG/OB

## 2018-06-29 PROCEDURE — 25000132 ZZH RX MED GY IP 250 OP 250 PS 637: Mod: GY | Performed by: EMERGENCY MEDICINE

## 2018-06-29 PROCEDURE — 83605 ASSAY OF LACTIC ACID: CPT

## 2018-06-29 PROCEDURE — 25000132 ZZH RX MED GY IP 250 OP 250 PS 637: Mod: GY | Performed by: INTERNAL MEDICINE

## 2018-06-29 PROCEDURE — A9270 NON-COVERED ITEM OR SERVICE: HCPCS | Mod: GY | Performed by: EMERGENCY MEDICINE

## 2018-06-29 PROCEDURE — 85027 COMPLETE CBC AUTOMATED: CPT | Performed by: INTERNAL MEDICINE

## 2018-06-29 RX ORDER — CEFTRIAXONE SODIUM 1 G/50ML
1 INJECTION, SOLUTION INTRAVENOUS EVERY 12 HOURS
Status: DISCONTINUED | OUTPATIENT
Start: 2018-06-29 | End: 2018-06-30

## 2018-06-29 RX ADMIN — PROCHLORPERAZINE EDISYLATE 5 MG: 5 INJECTION INTRAMUSCULAR; INTRAVENOUS at 09:40

## 2018-06-29 RX ADMIN — ACETAMINOPHEN 650 MG: 325 TABLET, FILM COATED ORAL at 03:48

## 2018-06-29 RX ADMIN — ONDANSETRON 4 MG: 2 INJECTION INTRAMUSCULAR; INTRAVENOUS at 23:14

## 2018-06-29 RX ADMIN — ACYCLOVIR SODIUM 800 MG: 50 INJECTION, SOLUTION INTRAVENOUS at 17:49

## 2018-06-29 RX ADMIN — ACETAMINOPHEN 650 MG: 325 TABLET, FILM COATED ORAL at 14:08

## 2018-06-29 RX ADMIN — SODIUM CHLORIDE: 9 INJECTION, SOLUTION INTRAVENOUS at 23:17

## 2018-06-29 RX ADMIN — ATENOLOL 100 MG: 100 TABLET ORAL at 11:41

## 2018-06-29 RX ADMIN — PROCHLORPERAZINE EDISYLATE 5 MG: 5 INJECTION INTRAMUSCULAR; INTRAVENOUS at 16:10

## 2018-06-29 RX ADMIN — ACYCLOVIR SODIUM 800 MG: 50 INJECTION, SOLUTION INTRAVENOUS at 06:21

## 2018-06-29 RX ADMIN — LOSARTAN POTASSIUM 100 MG: 50 TABLET, FILM COATED ORAL at 11:42

## 2018-06-29 RX ADMIN — ONDANSETRON 4 MG: 4 TABLET, ORALLY DISINTEGRATING ORAL at 07:10

## 2018-06-29 RX ADMIN — SODIUM CHLORIDE: 9 INJECTION, SOLUTION INTRAVENOUS at 11:16

## 2018-06-29 RX ADMIN — CEFTRIAXONE SODIUM 1 G: 1 INJECTION, SOLUTION INTRAVENOUS at 09:42

## 2018-06-29 RX ADMIN — HYDROCHLOROTHIAZIDE 12.5 MG: 12.5 CAPSULE ORAL at 11:42

## 2018-06-29 RX ADMIN — ACETAMINOPHEN 650 MG: 325 TABLET, FILM COATED ORAL at 09:57

## 2018-06-29 RX ADMIN — ACETAMINOPHEN 650 MG: 325 TABLET, FILM COATED ORAL at 17:51

## 2018-06-29 RX ADMIN — CEFTRIAXONE SODIUM 1 G: 1 INJECTION, SOLUTION INTRAVENOUS at 21:22

## 2018-06-29 RX ADMIN — ESCITALOPRAM OXALATE 10 MG: 10 TABLET ORAL at 11:42

## 2018-06-29 NOTE — ANESTHESIA CARE TRANSFER NOTE
Patient: Bonita Villarreal    * No procedures listed *    Diagnosis: * No pre-op diagnosis entered *  Diagnosis Additional Information: No value filed.    Anesthesia Type:   No value filed.     Note:  Airway :Room Air  Patient transferred to:Emergency Department  Comments: Patient's VSS. Spontaneous respirations. IV patent. Report to RN.Handoff Report: Identifed the Patient, Identified the Reponsible Provider, Reviewed the pertinent medical history, Discussed the surgical course, Reviewed Intra-OP anesthesia mangement and issues during anesthesia, Set expectations for post-procedure period and Allowed opportunity for questions and acknowledgement of understanding      Vitals: (Last set prior to Anesthesia Care Transfer)              Electronically Signed By: JESI Aguilar CRNA  June 28, 2018  9:09 PM

## 2018-06-29 NOTE — PROGRESS NOTES
[facility] Medicine Progress Note  Date of Service: 06/29/2018    Assessment & Plan   Bonita Villarreal is a 83 year old female who presented on 6/28/2018 with fever and headache.    Principal Problem:    Headache - associated with fever, Tmax 101.0.  Concern was for meningitis/encephalitis, so LP done in ED 6/28/18.  CSF shows 0 RBC, 0 WBC, protein 45, glucose 60.  Gram stain shows 0 WBC and no bacteria.  Enterovirus PCR negative.  Does not appear that HSV was ordered on CSF.  Results do not suggest meningitis/encephalitis, though would like to see a negative HSV before stopping acyclovir.  - Will see if HSV can be run on existing CSF in the lab.  - Drop vancomycin.  - Continue CTAX vs probable UTI; see below.    Active Problems:    Essential hypertension, benign - generally good control on home atenolol.  - Continue Rx.      Generalized anxiety disorder - appears mild presently.  - Continue escitalopram.      Bacteriuria with pyuria - per UA 6/28/18, as well as positive nitrite and large leuk esterase.  Culture was not set up.  - Continue CTAX empirically.  - Repeat urine for culture will be sent.      Infiltrate on CXR RLL - patient denies cough, sputum production, so clinically not pneumonia.  - Will be on antibiotics for UTI anyway, so no additional workup or expansion of therapy warranted.      DVT Prophylaxis: Low Risk/Ambulatory with no VTE prophylaxis indicated  Code Status: Prior    Lines: Peripheral.   Fong catheter: Not needed.  Restraints: Not needed.    Discussion: Meningitis/encephalitis very unlikely given CSF results, fever is probably due to UTI, and headache may be due to acute illness.    Disposition: Anticipate discharge tomorrow if afebrile and headache mild or absent.     Attestation:  I have reviewed today's vital signs, notes, medications, labs and imaging.  Amount of time performed on this follow-up visit: 35 minutes.    Markie Hogan  MD      Interval History   Bifrontal headache persists, not as severe, 7/10 at its worst, 0/10 after acetaminophen.  Has not required hydromorphone or oxycodone.  Nausea improved, awaiting breakfast.  Denies flank pain, dysuria.    Physical Exam   Temp:  [98.1  F (36.7  C)-101  F (38.3  C)] 98.8  F (37.1  C)  Pulse:  [] 85  Heart Rate:  [] 87  Resp:  [16-20] 16  BP: ()/() 140/68  SpO2:  [90 %-100 %] 95 %    Weights:   Vitals:    06/28/18 1654 06/28/18 2105   Weight: 83 kg (183 lb) 85.6 kg (188 lb 11.4 oz)    Body mass index is 28.69 kg/(m^2).    GENERAL: Pleasant woman who looks well.  EYES: Eyes grossly normal to inspection, extraocular movements intact  HENT: Nares patent bilaterally.  Nasal mucosa normal, no discharge.   NECK: Trachea midline, no stridor.    RESP: No accessory muscle use.  Symmetrical breath sounds.  Lungs clear throughout on inspiration and expiration.  Expiration not prolonged, no wheeze.  CV: Regular rate and rhythm, non-tachycardic.  Normal S1 S2, no murmur or extra sound.  No lower extremity edema.  ABDOMEN: Soft, non-tender, no guarding.  Liver and spleen not enlarged, no masses palpable.  Bowel sounds positive.  MS: No bony deformities noted.  No red or inflamed joints.  SKIN: Warm and dry, no rashes where skin visible.  NEURO: Alert, oriented, conversant.  Cranial nerves II - XII grossly intact.  No gross motor or sensory deficits.  Gait not tested.  PSYCH: Alert, conversant.  Able to articulate logical thoughts, no tangential thoughts, no hallucinations or delusions.  Affect trends toward a little anxious and vigilant.        Data     Recent Labs  Lab 06/29/18  0607 06/28/18  1710 06/28/18  1608   WBC 8.6 13.3* 12.2*   HGB 11.9 13.0 12.8   MCV 93 89 90    250 254    129* 129*   POTASSIUM 3.9 4.2 4.2   CHLORIDE 104 98 97   CO2 24 23 25   BUN 16 21 21   CR 0.84 0.98 1.00   ANIONGAP 6 8 7   JEFF 7.9* 8.5 8.9   GLC 85 116* 111*   ALBUMIN  --  2.9*  --     PROTTOTAL  --  7.2  --    BILITOTAL  --  0.9  --    ALKPHOS  --  121  --    ALT  --  31  --    AST  --  39  --          Recent Labs  Lab 06/29/18  0607 06/28/18  1710 06/28/18  1608   GLC 85 116* 111*        Unresulted Labs Ordered in the Past 30 Days of this Admission     Date and Time Order Name Status Description    6/28/2018 1822 Lyme Disease Debo with reflex to WB Serum In process     6/28/2018 1821 CSF Culture Aerobic Bacterial In process     6/28/2018 1805 Sodium random urine In process     6/28/2018 1738 Blood culture (one site) Preliminary     6/28/2018 1622 BLOOD CULTURE Preliminary            Imaging  Recent Results (from the past 24 hour(s))   Head CT w/o contrast    Narrative    CT HEAD W/O CONTRAST  6/28/2018 6:11 PM    HISTORY: Headache and fever for 2 days.    TECHNIQUE: Scans were obtained through the head without IV contrast.   Radiation dose for this scan was reduced using automated exposure  control, adjustment of the mA and/or kV according to patient size, or  iterative reconstruction technique.    COMPARISON: None.    FINDINGS: Mild atrophy. Ventricular size is proportionate to the  atrophy. Mild chronic white matter disease.  No hemorrhage, mass  lesion, or focal area of acute infarction identified. Paranasal  sinuses are normal. No bony abnormality.      Impression    IMPRESSION:   1. Mild atrophy and chronic white matter disease.  2. Nothing acute.    KARLI RENDON MD   Chest XR,  PA & LAT    Narrative    CHEST TWO VIEWS    6/28/2018 6:19 PM     HISTORY: Fever, headache, cough, feeling unwell.  Evaluate for acute  cardiothoracic process.     COMPARISON: None.    FINDINGS: 5 cm area of patchy opacity in the right lung base  posteromedially best seen on the lateral view. This is consistent with  infiltrate. Left lung is clear.      Impression    IMPRESSION: Right base infiltrate.    KARLI RENDON MD        I reviewed all new labs and imaging results over the last 24 hours. I personally  reviewed the chest x-ray image(s) showing a vague area of airspace disease in the medial right base, and is otherwise clear.    Medications     sodium chloride 100 mL/hr at 06/28/18 2155       acyclovir (ZOVIRAX) IV  10 mg/kg Intravenous Q12H     atenolol  100 mg Oral Daily     cefTRIAXone  2 g Intravenous Q12H     escitalopram  10 mg Oral Daily     losartan  100 mg Oral Daily    And     hydrochlorothiazide  12.5 mg Oral Daily     vancomycin (VANCOCIN) IV  1,500 mg Intravenous Q24H       Markie Hogan MD

## 2018-06-29 NOTE — H&P
"[facility] History and Physical    Bonita Villarreal MRN# [id number]   Age: 83 year old YOB: 1935     Date of Admission:  6/28/2018    Home clinic: [facility]  Primary care provider: Dewayne Santana          Chief Complaint:   Headache     History is obtained from the patient          History of Present Illness:   Pt gives hx similar to ED. As per ED hx  This patient is a 83 year old  female with a significant past medical history of hypertension who was referred from clinic by Dr. Chilo maxwell for concern for meningitis with fever nausea vomiting and headache.  Report was provided to Stefani Ramirez RN prior to ED arrival.  By report patient had blood work in clinic including one blood culture, sed rate, CBC and a basic metabolic profile.  Sed rate was 33.  Patient arrived by private car with her daughter-in-law stating she has had symptoms for about 2 days.  Her frontal headache began about 2 days ago and is steadily be present.  She rates as a dull discomfort \"it is an ache that is annoying and is always there\".  She reports she has had a fever on and off.  She reports she is felt nauseous but reports she has had no neck pain.  No fall.  She also reports no history of meningitis no history of recent neck manipulation.  She is currently on losartan, metoprolol, Hyzaar.  He [place] on [facility] but does not report any exposure to ticks.  She has not noticed any rashes.  She does report she has had a mild cough.  She has no abdominal pain.    No vision changes, numbness/ tingling, confusion, speech changes. No dysuria/ hematuria/hemetemesis/melena.            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     Priority: Medium     CARDIOVASCULAR SCREENING; LDL GOAL LESS THAN 160 " 10/31/2010     Priority: Medium     LDL in 140s, HDL in 60s  [place] risk is 6% (average at her age is 14%)       Essential hypertension, benign 2005     Priority: Medium     had coronary CT scan  --- no evidence of plaque  started on atenolol and ASA 2005 for /94  physician in [place] switched her to Lotrel 5/10 due to her having dizzy spells  HCTZ added 3/05, pt felt mouth too dried out   pt more anxious and BP still elevated -- switched back to Atenolol       Diverticulosis of large intestine 2005     Priority: Medium     colonoscopy , disease mod - severe  Problem list name updated by automated process. Provider to review       Osteopenia of spine 2005     Priority: Medium     T score -1.3    Risk calculator based on  T-score and personal data showed 1% risk of hip fracture over next 10 years, 14% risk of any fracture over next ten years; consider recheck DEXA at age 77, continue calcium and exercise until then (current recommendations to consider treatment if hip fx risk reaches 3% or any fracture risk reaches 20%)  Problem list name updated by automated process. Provider to review       Family history of other cardiovascular diseases 2005     Priority: Medium     father  at 39 of MI  brother  at 51 of MI  another brother -- MI x 2  eldest sister -- MI x 2 with stents  mother and maternal aunts lived to 80's and mid 90's  Problem list name updated by automated process. Provider to review and confirm  Problem list name updated by automated process. Provider to review       Generalized anxiety disorder 2005     Priority: Medium     onset after patient was diagnosed with hypertension fall                  Past Surgical History:      Past Surgical History:   Procedure Laterality Date     ARTHROPLASTY KNEE Right 2018    Procedure: ARTHROPLASTY KNEE;  Right total knee arthroplasty;  Surgeon: Anton Grover MD;  [facility]  APPENDECTOMY       COLONOSCOPY  04/15/02     COLONOSCOPY  3/25/2013    Procedure: COLONOSCOPY;  Colonoscopy  ;  Surgeon: Jamil Porras MD;  Location: [facility]     FLEXIBLE SIGMOIDOSCOPY  96     ORTHOPEDIC SURGERY  22-23 YEARS AGO    BACK     SURGICAL HISTORY OF -       lumbar surgery             Social History:     Social History     Social History     Marital status:      Spouse name: N/A     Number of children: N/A     Years of education: N/A     Occupational History     Not on file.     Social History Main Topics     Smoking status: Never Smoker     Smokeless tobacco: Never Used     Alcohol use Yes      Comment: rare     Drug use: No     Sexual activity: No     Other Topics Concern     Parent/Sibling W/ Cabg, Mi Or Angioplasty Before 65f 55m? Yes     sister bypass,brother bypass,brother MI     Social History Narrative             Family History:     Family History   Problem Relation Age of Onset     Lipids Brother      HEART DISEASE Brother       at 51 of an MI     Cancer Brother      bone, lung,  82     Prostate Cancer Brother      Cancer Sister      cervical     Lipids Sister      HEART DISEASE Sister      eldest sister, s/p MI x 2 with stents; diagnosed with AAA, postoperative complications and MI,  at 75     HEART DISEASE Brother      MI x 2     Lipids Brother      C.A.D. Father       at 39 of an MI     Family History Negative Mother      lived to age 95     Gynecology Sister      cervical Ca             Allergies:     Allergies   Allergen Reactions     Lisinopril Cough             Medications:     Prior to Admission medications    Medication Sig Last Dose Taking? Auth Provider   atenolol (TENORMIN) 100 MG tablet Take 1 tablet (100 mg) by mouth daily 2018 at 0900 Yes Dewayne Santana MD   calcium carbonate (OS-JEFF 500 MG [place]) 500 MG tablet Take 1 tablet by mouth daily 2018 at am Yes Reported, Patient   escitalopram (LEXAPRO) 10 MG tablet Take 1 tablet  "(10 mg) by mouth daily 6/28/2018 at 0900 Yes Dewayne Santana MD   FISH OIL OR 1 tab daily Past Week at Unknown time Yes Reported, Patient   losartan-hydrochlorothiazide (HYZAAR) 100-12.5 MG per tablet Take 1 tablet by mouth daily 6/28/2018 at 0900 Yes Dewayne Santana MD   MULTI-VITAMIN OR TABS 1 DAILY 6/28/2018 at Unknown time Yes Reported, Patient   order for DME Equipment being ordered: Walker Wheels () and Walker ()  Treatment Diagnosis: s/p Deanna Alexandra PA-C            Review of Systems:   The Review of Systems is negative in ALL other than noted in the HPI          Physical Exam:   Blood pressure (!) 116/101, pulse 110, temperature 99.5  F (37.5  C), temperature source Axillary, resp. rate 20, height 1.727 m (5' 8\"), weight 83 kg (183 lb), SpO2 91 %, not currently breastfeeding.  GENERAL APPEARANCE: healthy, alert and no distress  EYES: conjunctiva clear, eyes grossly normal  HENT: external ears and nose normal   NECK: supple, no masses or adenopathy  RESP: lungs clear to auscultation - no rales, rhonchi or wheezes  CV: regular rate and rhythm, normal S1 S2, no S3 or S4 and no murmur, click or rub   ABDOMEN: soft, nontender, no HSM or masses and bowel sounds normal  MS: no clubbing, cyanosis; no edema  SKIN: clear without significant rashes or lesions  NEURO: Normal strength and tone, sensory exam grossly normal, mentation intact and speech normal         Data:     Lab Results   Component Value Date    WBC 13.3 (H) 06/28/2018    HGB 13.0 06/28/2018    HCT 39.5 06/28/2018    MCV 89 06/28/2018     06/28/2018     Lab Results   Component Value Date     (L) 06/28/2018    CO2 23 06/28/2018     Lab Results   Component Value Date    BUN 21 06/28/2018     No components found for: SEDRATE  No components found for: DDIMER  No results found for: BNP  Lab Results   Component Value Date    TSH 2.68 04/29/2005     Lab Results   Component Value Date    TROPONIN <0.02 " 04/29/2005     UA RESULTS:  Recent Labs   Lab Test  08/12/11   1551   COLOR  Yellow   APPEARANCE  Clear   URINEGLC  Negative   URINEBILI  Negative   URINEKETONE  Negative   SG  1.020   UBLD  Trace*   URINEPH  5.0   PROTEIN  Negative   UROBILINOGEN  0.2   NITRITE  Positive*   LEUKEST  Moderate*   RBCU  2-5*   WBCU  10-25*     Liver Function Studies -   Recent Labs   Lab Test  06/28/18   1710   PROTTOTAL  7.2   ALBUMIN  2.9*   BILITOTAL  0.9   ALKPHOS  121   AST  39   ALT  31       RADIOLOGY:  CT head-negative                           CXR -FINDINGS: 5 cm area of patchy opacity in the right lung base  posteromedially best seen on the lateral view. This is consistent with  infiltrate. Left lung is clear     A/P  POSSIBLE MENINGITIS  Presenting with HA x 2-3 days. No other significant sx. Has leucocytosis and significantly elevated CRP. CSF sent for routine cx. ED discussed with ID at . Pt started on vanc/ rocephin/acyclovir. If CSF WBC is elevated, need to add ampicillin.  -con antbx, follow CSF studies    RLL INFILTRATE-POSSIBLE CAP  Has no sx of cough/ fever. Has crackles R base on exam. CXR as above  -continue antbx as above. If CSF studies negative, would add zithromax or levaquin for CAP.    HYPO NA  Mild. Likely due to infection.  -hydrate, follow labs    HTN  Stable  -continue meds    ANXIETY  Continue meds    DISPO  Will be in hospital 1-2 days.      DVT PROF-     Jacky Anne MD  [phone]

## 2018-06-29 NOTE — PLAN OF CARE
Problem: Patient Care Overview  Goal: Plan of Care/Patient Progress Review  Outcome: Improving  Patient reports tylenol 650 mg and ice pack to neck is adequate pain control for headache.

## 2018-06-29 NOTE — ANESTHESIA POSTPROCEDURE EVALUATION
Patient: Bonita Villarreal    * No procedures listed *    Diagnosis:* No pre-op diagnosis entered *  Diagnosis Additional Information: No value filed.    Anesthesia Type:  No value filed.    Note:  Anesthesia Post Evaluation    Patient location during evaluation: Bedside  Patient participation: Able to fully participate in evaluation  Level of consciousness: awake and alert  Airway patency: patent  Cardiovascular status: acceptable  Respiratory status: acceptable  PONV: none     Anesthetic complications: None          Last vitals:  Vitals:    06/28/18 2000 06/28/18 2015 06/28/18 2105   BP: 127/66 122/63 106/67   Pulse:   85   Resp:   18   Temp:   36.7  C (98.1  F)   SpO2: 90% 92% 92%         Electronically Signed By: JESI Aguilar CRNA  June 28, 2018  9:10 PM

## 2018-06-29 NOTE — PLAN OF CARE
Problem: Patient Care Overview  Goal: Plan of Care/Patient Progress Review  Outcome: Improving  Patient temp 102.3 this morning.  .  Lactic drawn = 1.6.  Tylenol administered. Temp 98.5 this afternoon.  Continues to complain of dull headache.  Given Tylenol and ice pack.  Up to bathroom with stand by assist.

## 2018-06-29 NOTE — PLAN OF CARE
"[facility] ADMISSION NOTE    Patient admitted to room 2312 at approximately 2109 via cart from emergency room. Patient was accompanied by transport tech.     Verbal SBAR report received from AFIA Joya prior to patient arrival.     Patient ambulated to bed with stand-by assist. Patient alert and oriented X 3. Pain is controlled with current analgesics.  Medication(s) being used: narcotic analgesics including hydromorphone (Dilaudid). 0-10 Pain Scale: 9. Admission vital signs: Blood pressure 106/67, pulse 85, temperature 98.1  F (36.7  C), temperature source Oral, resp. rate 18, height 1.727 m (5' 8\"), weight 85.6 kg (188 lb 11.4 oz), SpO2 92 %, not currently breastfeeding. Patient was oriented to plan of care, call light, bed controls, tv, telephone, bathroom and visiting hours.     Risk Assessment    The following safety risks were identified during admission: fall. Yellow risk band applied: YES.     Skin Initial Assessment    This writer admitted this patient and completed a full skin assessment and Neal score in the Adult PCS flowsheet. Appropriate interventions initiated as needed.     Secondary skin check completed by Melisa TORRES RN.    Skin  Inspection of bony prominences: Full  Inspection under devices: Full  Skin WDL:  WDL except, characteristics  Skin Temperature: warm  Skin Moisture: dry  Skin Elasticity: quick return to original state  Skin Integrity: bruise(s)    Neal Risk Assessment  Sensory Perception: 4-->no impairment  Moisture: 4-->rarely moist  Activity: 4-->walks frequently  Mobility: 4-->no limitation  Nutrition: 3-->adequate  Friction and Shear: 3-->no apparent problem  Neal Score: 22    Vanessa Nunes RN    "

## 2018-06-29 NOTE — ANESTHESIA PROCEDURE NOTES
Peripheral nerve/Neuraxial procedure note : lumbar puncture  Pre-Procedure  Performed by  SANDY KERR   Location: ED      Pre-Anesthestic Checklist: patient identified, IV checked, risks and benefits discussed, informed consent, monitors and equipment checked and pre-op evaluation    Timeout  Correct Patient: Yes   Correct Procedure: Yes   Correct Site: Yes   Correct Laterality: N/A   Correct Position: Yes   Site Marked: N/A   .   Procedure Documentation  ASA 2  Diagnosis:fever, headache.    Procedure:    Lumbar puncture.  Insertion Site:L3-4  (midline approach)      Patient Prep;mask, sterile gloves, povidone-iodine 7.5% surgical scrub, patient draped.  .  Needle: Ellen tip Spinal Needle (gauge): 22  Spinal/LP Needle Length (inches): 3.5 # of attempts: 1 and # of redirects:  No introducer used .       Assessment/Narrative  Paresthesias: No.  .  .  12 mL of clear CSF fluid removed while lateral   . Comments:  Pt tolerated procedure well.

## 2018-06-29 NOTE — PLAN OF CARE
Problem: Patient Care Overview  Goal: Plan of Care/Patient Progress Review  Outcome: Improving  Updated Dr. Anne on UA results. No new orders.

## 2018-06-30 VITALS
TEMPERATURE: 99.5 F | RESPIRATION RATE: 18 BRPM | DIASTOLIC BLOOD PRESSURE: 81 MMHG | WEIGHT: 188.71 LBS | BODY MASS INDEX: 28.6 KG/M2 | OXYGEN SATURATION: 92 % | HEIGHT: 68 IN | HEART RATE: 93 BPM | SYSTOLIC BLOOD PRESSURE: 145 MMHG

## 2018-06-30 LAB
BACTERIA SPEC CULT: NORMAL
CREAT SERPL-MCNC: 0.77 MG/DL (ref 0.52–1.04)
GFR SERPL CREATININE-BSD FRML MDRD: 72 ML/MIN/1.7M2
HSV1 DNA CSF QL NAA+PROBE: NOT DETECTED
HSV2 DNA CSF QL NAA+PROBE: NOT DETECTED
Lab: NORMAL
MICROBIOLOGIST REVIEW: NORMAL
SPECIMEN SOURCE: NORMAL

## 2018-06-30 PROCEDURE — 25000128 H RX IP 250 OP 636: Performed by: EMERGENCY MEDICINE

## 2018-06-30 PROCEDURE — 36415 COLL VENOUS BLD VENIPUNCTURE: CPT | Performed by: EMERGENCY MEDICINE

## 2018-06-30 PROCEDURE — A9270 NON-COVERED ITEM OR SERVICE: HCPCS | Mod: GY | Performed by: EMERGENCY MEDICINE

## 2018-06-30 PROCEDURE — 82565 ASSAY OF CREATININE: CPT | Performed by: EMERGENCY MEDICINE

## 2018-06-30 PROCEDURE — 99207 ZZC CDG-CODE INCORRECT PER BILLING BASED ON TIME: CPT

## 2018-06-30 PROCEDURE — 25000132 ZZH RX MED GY IP 250 OP 250 PS 637: Mod: GY | Performed by: INTERNAL MEDICINE

## 2018-06-30 PROCEDURE — A9270 NON-COVERED ITEM OR SERVICE: HCPCS | Mod: GY | Performed by: INTERNAL MEDICINE

## 2018-06-30 PROCEDURE — 25000128 H RX IP 250 OP 636

## 2018-06-30 PROCEDURE — 25000128 H RX IP 250 OP 636: Performed by: INTERNAL MEDICINE

## 2018-06-30 PROCEDURE — 25000132 ZZH RX MED GY IP 250 OP 250 PS 637: Mod: GY | Performed by: EMERGENCY MEDICINE

## 2018-06-30 PROCEDURE — 99238 HOSP IP/OBS DSCHRG MGMT 30/<: CPT

## 2018-06-30 RX ORDER — SULFAMETHOXAZOLE/TRIMETHOPRIM 800-160 MG
1 TABLET ORAL 2 TIMES DAILY
Status: DISCONTINUED | OUTPATIENT
Start: 2018-06-30 | End: 2018-06-30 | Stop reason: HOSPADM

## 2018-06-30 RX ADMIN — PROCHLORPERAZINE EDISYLATE 5 MG: 5 INJECTION INTRAMUSCULAR; INTRAVENOUS at 00:00

## 2018-06-30 RX ADMIN — ACYCLOVIR SODIUM 800 MG: 50 INJECTION, SOLUTION INTRAVENOUS at 06:17

## 2018-06-30 RX ADMIN — LOSARTAN POTASSIUM 100 MG: 50 TABLET, FILM COATED ORAL at 07:51

## 2018-06-30 RX ADMIN — HYDROCHLOROTHIAZIDE 12.5 MG: 12.5 CAPSULE ORAL at 07:51

## 2018-06-30 RX ADMIN — ACETAMINOPHEN 650 MG: 325 TABLET, FILM COATED ORAL at 06:23

## 2018-06-30 RX ADMIN — ATENOLOL 100 MG: 100 TABLET ORAL at 07:51

## 2018-06-30 RX ADMIN — ESCITALOPRAM OXALATE 10 MG: 10 TABLET ORAL at 07:51

## 2018-06-30 RX ADMIN — CEFTRIAXONE SODIUM 1 G: 1 INJECTION, SOLUTION INTRAVENOUS at 09:18

## 2018-06-30 NOTE — CONSULTS
"CARE TRANSITION RN INITIAL ASSESSMENT:  Reason For Consult: discharge planning   Met with: Patient.    DATA  Principal Problem:    Headache  Active Problems:    Essential hypertension, benign    Generalized anxiety disorder    Bacteriuria with pyuria    Pulmonary infiltrate in right lung on chest x-ray       Primary Care Clinic Name: [facility]  Primary Care MD Name: Dr. Dewayne Santana  Contact information and PCP information verified: Yes      ASSESSMENT  Cognitive Status: awake, alert and oriented.       Lives With: child(obdulio), adult  Living Arrangements: apartment (mother-in-law appt connected to sons home)     Description of Support System: Supportive   Who is your support system?: Children   Support Assessment: Adequate family and caregiver support   Insurance Concerns: No Insurance issues identified      This writer met with pt  introduced self and role. Discussed discharge planning and medicare guidelines in regards to home care and SNF benefits.  Pt states she lives in a \"mother-in-law\" apartment attached to her son [place] house.  States she is independent with ADL's and IADL's and does not require much help from her son other than transportation.  Offered and discussed home care and [facility] and pt declined both. States is she needs anything her son in on the other side of the door and he checks on her often.  Patients goal is to go home today, family will provide transportation.    As a system [place] wants to ensure that across the care continuum that you have support through care coordination services that include nurses and social workers in the outpatient setting.  Due to your recent hospitalization and fragility.  I feel it is important you have this support when you discharge.  They will be calling you within 24-48 hours of your discharge.   A brochure describing the services was provided.  If you have questions you can reach out to your clinic directly and ask for the Care Coordinator " assigned to your care    PLAN    Discharge home    Discharge Planner   Discharge Plans in progress: home  Barriers to discharge plan: medical stability  Follow up plan: PCP        Entered by: Victoria Berg 06/30/2018 11:13 AM

## 2018-06-30 NOTE — PLAN OF CARE
Problem: Patient Care Overview  Goal: Plan of Care/Patient Progress Review  Outcome: Improving  Patient reports headache has improved.  Declined need for Tylenol this shift.  Tolerating PO.  Reporting loose stools today.  Hoping to DC this afternoon.

## 2018-06-30 NOTE — PROGRESS NOTES
[facility] DISCHARGE NOTE    Patient discharged to home at 3:56 PM via wheel chair. Accompanied by son and staff. Discharge instructions reviewed with patient and son, opportunity offered to ask questions. Prescriptions filled and sent with patient upon discharge. All belongings sent with patient.    Katarzyna Sutton

## 2018-06-30 NOTE — PLAN OF CARE
Problem: Pain, Acute (Adult)  Goal: Identify Related Risk Factors and Signs and Symptoms  Related risk factors and signs and symptoms are identified upon initiation of Human Response Clinical Practice Guideline (CPG).   Outcome: Improving  Patient felt HEADACHE almost totally gone.  Took Tylenol this am to see if help her relax.  During nite patient had emesis, felt anxious, few exp. Slight wheeze  Dr. Collins called, IV turned dedrick through out nite to 50/hr.  Patient transferred to 2302 (closer to desk), set off BA few times.  Patient stated she was feeling better today.  Note on white board for something for anxiety.

## 2018-06-30 NOTE — PLAN OF CARE
Problem: Patient Care Overview  Goal: Plan of Care/Patient Progress Review  Outcome: Improving  Patient continues to have a headache less now than earlier in the day. Patient given tylenol for pain . Cool cloth on forehead. Patient did c/o nausea compazine given. Patient states she is very tired and just wants to sleep.patient is up to bathroom voiding well. Patient uses her call light appropriately. Call light within reach.

## 2018-06-30 NOTE — DISCHARGE SUMMARY
[facility]    Discharge Summary  Hospital Medicine    Date of Admission:  6/28/2018  Date of Discharge:  6/30/2018   Discharging Provider: Markie Hogan  Date of Service: 6/30/2018      Primary Care     Dewayne Santana  [address] [address] [facility] [address]      Identification and Chief Compaint: Bonita Villarreal is a 83 year old female who presented on 6/28/2018 with fever and headache.    Discharge Diagnoses       Headache    Essential hypertension, benign    Generalized anxiety disorder    Bacteriuria with pyuria    Pulmonary infiltrate in right lung on chest x-ray      Discharge Disposition   Discharged to home    Discharge Orders     Reason for your hospital stay   You came in with fever and a headache, causing some concern that you had meningitis.  Fortunately, your spinal tap fluid came back normal, excluding meningitis.  You have a urinary tract infection which explains the fever and your headache.  You have responded well to antibiotics, and are ready for discharge home to complete your course of antibiotic therapy.     Follow-up and recommended labs and tests    Follow up with primary care provider, Dewayne Santana, within 7 days for hospital follow-up.  No follow up labs or test are needed.     Activity   Your activity upon discharge: activity as tolerated.     Full Code     Diet   Follow this diet upon discharge: Orders Placed This Encounter     Advance Diet as Tolerated: Regular Diet Adult          Discharge Medications   Current Discharge Medication List      START taking these medications    Details   amoxicillin-clavulanate (AUGMENTIN) 875-125 MG per tablet Take 1 tablet by mouth 2 times daily  Qty: 10 tablet, Refills: 0    Associated Diagnoses: Bacteriuria with pyuria         CONTINUE these medications which have NOT CHANGED    Details   atenolol (TENORMIN) 100 MG tablet Take 1 tablet (100 mg) by mouth daily  Qty: 90 tablet, Refills: 3     Associated Diagnoses: Essential hypertension, benign      calcium carbonate (OS-JEFF 500 MG [place]. [place]) 500 MG tablet Take 1 tablet by mouth daily      escitalopram (LEXAPRO) 10 MG tablet Take 1 tablet (10 mg) by mouth daily  Qty: 90 tablet, Refills: 3    Associated Diagnoses: Generalized anxiety disorder      FISH OIL OR 1 tab daily      losartan-hydrochlorothiazide (HYZAAR) 100-12.5 MG per tablet Take 1 tablet by mouth daily  Qty: 90 tablet, Refills: 3    Associated Diagnoses: Essential hypertension, benign      MULTI-VITAMIN OR TABS 1 DAILY      order for DME Equipment being ordered: Walker Wheels () and Walker ()  Treatment Diagnosis: s/p TKA  Qty: 1 Units, Refills: 0    Associated Diagnoses: Status post total right knee replacement           Allergies   Allergies   Allergen Reactions     Lisinopril Cough       Consultations This Hospital Stay       [facility] RN/SW IP CONSULT    Significant Results and Procedures   Procedures  Peripheral nerve/Neuraxial procedure note : lumbar puncture  Pre-Procedure  Performed by  SANDY KERR   Location: ED       Pre-Anesthestic Checklist: patient identified, IV checked, risks and benefits discussed, informed consent, monitors and equipment checked and pre-op evaluation    Timeout  Correct Patient: Yes   Correct Procedure: Yes   Correct Site: Yes   Correct Laterality: N/A   Correct Position: Yes   Site Marked: N/A   .   Procedure Documentation  ASA 2  Diagnosis:fever, headache.    Procedure:    Lumbar puncture.  Insertion Site:L3-4  (midline approach)       Patient Prep;mask, sterile gloves, povidone-iodine 7.5% surgical scrub, patient draped.  .  Needle: Ellen tip Spinal Needle (gauge): 22  Spinal/LP Needle Length (inches): 3.5 # of attempts: 1 and # of redirects:  No introducer used .      Assessment/Narrative  Paresthesias: No.  .  .  12 mL of clear CSF fluid removed while lateral   . Comments:  Pt tolerated procedure  well.    Data   Results for orders placed or performed during the hospital encounter of 06/28/18   Head CT w/o contrast    Narrative    CT HEAD W/O CONTRAST  6/28/2018 6:11 PM    HISTORY: Headache and fever for 2 days.    TECHNIQUE: Scans were obtained through the head without IV contrast.   Radiation dose for this scan was reduced using automated exposure  control, adjustment of the mA and/or kV according to patient size, or  iterative reconstruction technique.    COMPARISON: None.    FINDINGS: Mild atrophy. Ventricular size is proportionate to the  atrophy. Mild chronic white matter disease.  No hemorrhage, mass  lesion, or focal area of acute infarction identified. Paranasal  sinuses are normal. No bony abnormality.      Impression    IMPRESSION:   1. Mild atrophy and chronic white matter disease.  2. Nothing acute.    KARLI RENDON MD   Chest XR,  PA & LAT    Narrative    CHEST TWO VIEWS    6/28/2018 6:19 PM     HISTORY: Fever, headache, cough, feeling unwell.  Evaluate for acute  cardiothoracic process.     COMPARISON: None.    FINDINGS: 5 cm area of patchy opacity in the right lung base  posteromedially best seen on the lateral view. This is consistent with  infiltrate. Left lung is clear.      Impression    IMPRESSION: Right base infiltrate.    KARLI RENDON MD       History of Present Illness   (From H&P) Pt gives hx similar to ED. As per ED hx  This patient is a 83 year old  female with a significant past medical history of hypertension who was referred from clinic by Dr. Chilo maxwell for concern for meningitis with fever nausea vomiting and headache.  Report was provided to Stefani Ramirez RN prior to ED arrival.  By report patient had blood work in clinic including one blood culture, sed rate, CBC and a basic metabolic profile.  Sed rate was 33.  Patient arrived by private car with her daughter-in-law stating she has had symptoms for about 2 days.  Her frontal headache began about 2 days ago  "and is steadily be present.  She rates as a dull discomfort \"it is an ache that is annoying and is always there\".  She reports she has had a fever on and off.  She reports she is felt nauseous but reports she has had no neck pain.  No fall.  She also reports no history of meningitis no history of recent neck manipulation.  She is currently on losartan, metoprolol, Hyzaar.  [place] but does not report any exposure to ticks.  She has not noticed any rashes.  She does report she has had a mild cough.  She has no abdominal pain.     No vision changes, numbness/ tingling, confusion, speech changes. No dysuria/ hematuria/hemetemesis/melena.     Hospital Course   Bonita Villarreal was admitted on 6/28/2018.  The following problems were addressed during her hospitalization:    Principal Problem:    Headache - associated with fever, Tmax 101.0.  Concern was for meningitis/encephalitis, so LP done in ED 6/28/18.  CSF shows 0 RBC, 0 WBC, protein 45, glucose 60.  Gram stain shows 0 WBC and no bacteria.  Enterovirus PCR negative.  HSV 1 and 2 PCR negative.  CSF culture negative.  Results do not suggest meningitis/encephalitis.  Suspect headache was secondary to febrile syndrome from urinary tract infection; see below.  - Empiric CTAX, vanco, and acyclovir are all discontinued.     Active Problems:    Essential hypertension, benign - generally good control on home atenolol.  - Continue Rx.       Generalized anxiety disorder - appears mild presently.  - Continue escitalopram.       Bacteriuria with pyuria - per UA 6/28/18, as well as positive nitrite and large leuk esterase.  Culture was not initally set up, patient got a few doses of antibiotics before culture was sent, resulting in a negative urine culture despite high likelihood of UTI clinically and per UA results.  Will treat as UTI.  - D/C CTAX, begin Augmentin 875 mg BID for 5 days to complete oral " therapy.       Infiltrate on CXR RLL - patient denies cough, sputum production, so clinically this is not pneumonia.  - Will be on antibiotics for UTI anyway, so no additional workup or expansion of therapy warranted.    Pending Results   Unresulted Labs Ordered in the Past 30 Days of this Admission     Date and Time Order Name Status Description    6/28/2018 1821 CSF Culture Aerobic Bacterial Preliminary     6/28/2018 1738 Blood culture (one site) Preliminary     6/28/2018 1622 BLOOD CULTURE Preliminary           Physical Exam   Temp:  [98.5  F (36.9  C)-99.5  F (37.5  C)] 99.5  F (37.5  C)  Pulse:  [] 93  Heart Rate:  [89] 89  Resp:  [18] 18  BP: (145-173)/(81-87) 145/81  SpO2:  [92 %-95 %] 92 %  Vitals:    06/28/18 1654 06/28/18 2105   Weight: 83 kg (183 lb) 85.6 kg (188 lb 11.4 oz)       GENERAL: Pleasant woman who looks well.  EYES: Eyes grossly normal to inspection, extraocular movements intact  HENT: Nares patent bilaterally.  Nasal mucosa normal, no discharge.   NECK: Trachea midline, no stridor.    RESP: No accessory muscle use.  Symmetrical breath sounds.  Lungs clear throughout on inspiration and expiration.  Expiration not prolonged, no wheeze.  CV: Regular rate and rhythm, non-tachycardic.  Normal S1 S2, no murmur or extra sound.  No lower extremity edema.  ABDOMEN: Soft, non-tender, no guarding.  Liver and spleen not enlarged, no masses palpable.  Bowel sounds positive.  MS: No bony deformities noted.  No red or inflamed joints.  SKIN: Warm and dry, no rashes where skin visible.  NEURO: Alert, oriented, conversant.  Cranial nerves II - XII grossly intact.  No gross motor or sensory deficits.  Gait not tested.  PSYCH: Alert, conversant.  Able to articulate logical thoughts, no tangential thoughts, no hallucinations or delusions.  Affect trends toward a little anxious.       The discharge plan was discussed with the patient and her son.    Total time on this discharge was 30 minutes.    Markie Apple  [place] BAER

## 2018-07-02 ENCOUNTER — PATIENT OUTREACH (OUTPATIENT)
Dept: CARE COORDINATION | Facility: CLINIC | Age: 83
End: 2018-07-02

## 2018-07-02 NOTE — PROGRESS NOTES
Clinic Care Coordination Contact  [facility]/Voicemail    Referral Source: IP Handoff  Clinical Data: Care Coordinator Outreach  Outreach attempted x 1.  Left message on voicemail with call back information and requested return call.  Plan: Care Coordinator will mail out care coordination introduction letter with care coordinator contact information and explanation of care coordination services. Care Coordinator will try to reach patient again in 1-2 business days.  Denzel Reyes RN  Clinic Care Coordinator  [phone] or [phone]

## 2018-07-02 NOTE — LETTER
[place] CARE COORDINATION  [facility]  [address]  [facility] [address]  Phone: [phone]    July 2, 2018    Bonita Villarreal  [address] [address] [place] [id number]      Dear Bonita,    I am a clinic care coordinator who works with Dewayne Santana MD at [facility]. I recently tried to call and was unable to reach you. I wanted to introduce myself and provide you with my contact information so that you can call me with questions or concerns about your health care. Below is a description of clinic care coordination and how I can further assist you.     The clinic care coordinator is a registered nurse and/or  who understand the health care system. The goal of clinic care coordination is to help you manage your health and improve access to the [facility] system in the most efficient manner. The registered nurse can assist you in meeting your health care goals by providing education, coordinating services, and strengthening the communication among your providers. The  can assist you with financial, behavioral, psychosocial, chemical dependency, counseling, and/or psychiatric resources.    Please feel free to contact me at [phone], [phone], with any questions or concerns. We at [facility] are focused on providing you with the highest-quality healthcare experience possible and that all starts with you.     Sincerely,     Denzel Reyes RN  Clinic Care Coordinator    Enclosed: I have enclosed a copy of a 24 Hour Access Plan. This has helpful phone numbers for you to call when needed. Please keep this in an easy to access place to use as needed.

## 2018-07-02 NOTE — LETTER
Health Care Home - Access Care Plan    About Me  Patient Name:  Bonita Villarreal    YOB: 1935  Age:                             83 year old   [place] MRN:            [id number] Telephone Information:     Home Phone [phone]   Mobile [phone]       Address:    [address] [address] [place] [id number] Email address:  No e-mail address on record      Emergency Contact(s)  Name Relationship Lgl Grd Work Phone Home Phone Mobile Phone   1. SONIA VILLARREAL Son   [phone]    2. ASH VILLARREAL Son   [phone]              Health Maintenance:      My Access Plan  Medical Emergency 911   Questions or concerns during clinic hours Primary Clinic Line, I will call the clinic directly: [facility] - [phone]   24 Hour Appointment Line [phone] or  [phone]) (toll free)   24 Hour Nurse Line [phone] (toll free)   Questions or concerns outside clinic hours 24 Hour Appointment Line, I will call the after-hours on-call line:   [facility] [phone] or [phone]) (toll-free)   Preferred Urgent Care [facility], [phone]   Preferred Hospital [place] [place]  [phone]   [facility]acy [facility] - - [place] - [address] [facility]     Behavioral Health Crisis Line The National Suicide Prevention Lifeline at 1-531.154.6605 or 911     My Care Team Members  Patient Care Team       Relationship Specialty Notifications Start End    Dewayne Santana MD PCP - General Family Practice  10/16/17     Phone: [phone] Fax: [phone] [address] [facility] [address]           My Medical and Care Information  Problem List   Patient Active Problem List   Diagnosis     Essential hypertension, benign     Generalized anxiety disorder     Diverticulosis of large intestine     Osteopenia of spine     Family history of other  cardiovascular diseases     CARDIOVASCULAR SCREENING; LDL GOAL LESS THAN 160     Status post total right knee replacement     Right knee DJD     Headache     Bacteriuria with pyuria     Pulmonary infiltrate in right lung on chest x-ray      Current Medications and Allergies:  See printed Medication Report

## 2018-07-04 LAB
BACTERIA SPEC CULT: NO GROWTH
Lab: NORMAL
Lab: NORMAL
SPECIMEN SOURCE: NORMAL

## 2018-07-06 NOTE — PROGRESS NOTES
Clinic Care Coordination Contact  [facility]/Voicemail    Referral Source: IP Handoff  Clinical Data: Care Coordinator Outreach  Outreach attempted x 2.  Left message on voicemail with call back information and requested return call.  Plan: Care Coordinator mailed out care coordination introduction letter on 7-2. Care Coordinator will do no further outreaches at this time.  Denzel Reyes RN  Clinic Care Coordinator  [phone] or [phone]

## 2018-07-12 ENCOUNTER — TELEPHONE (OUTPATIENT)
Dept: FAMILY MEDICINE | Facility: CLINIC | Age: 83
End: 2018-07-12

## 2018-07-12 ENCOUNTER — HOSPITAL ENCOUNTER (EMERGENCY)
Facility: CLINIC | Age: 83
Discharge: HOME | End: 2018-07-12
Attending: EMERGENCY MEDICINE | Admitting: EMERGENCY MEDICINE
Payer: MEDICARE

## 2018-07-12 VITALS
DIASTOLIC BLOOD PRESSURE: 80 MMHG | HEART RATE: 69 BPM | SYSTOLIC BLOOD PRESSURE: 148 MMHG | RESPIRATION RATE: 16 BRPM | OXYGEN SATURATION: 97 % | TEMPERATURE: 98 F

## 2018-07-12 DIAGNOSIS — R45.89 FEELING ANXIOUS: ICD-10-CM

## 2018-07-12 DIAGNOSIS — R94.31 ABNORMAL ELECTROCARDIOGRAM: ICD-10-CM

## 2018-07-12 DIAGNOSIS — R42 LIGHTHEADEDNESS: ICD-10-CM

## 2018-07-12 LAB
ALBUMIN SERPL-MCNC: 3.5 G/DL (ref 3.4–5)
ALBUMIN UR-MCNC: NEGATIVE MG/DL
ALP SERPL-CCNC: 89 U/L (ref 40–150)
ALT SERPL W P-5'-P-CCNC: 22 U/L (ref 0–50)
ANION GAP SERPL CALCULATED.3IONS-SCNC: 8 MMOL/L (ref 3–14)
APPEARANCE UR: ABNORMAL
AST SERPL W P-5'-P-CCNC: 19 U/L (ref 0–45)
BASOPHILS # BLD AUTO: 0 10E9/L (ref 0–0.2)
BASOPHILS NFR BLD AUTO: 0.3 %
BILIRUB SERPL-MCNC: 0.5 MG/DL (ref 0.2–1.3)
BILIRUB UR QL STRIP: NEGATIVE
BUN SERPL-MCNC: 14 MG/DL (ref 7–30)
CALCIUM SERPL-MCNC: 9.6 MG/DL (ref 8.5–10.1)
CHLORIDE SERPL-SCNC: 104 MMOL/L (ref 94–109)
CO2 SERPL-SCNC: 26 MMOL/L (ref 20–32)
COLOR UR AUTO: YELLOW
CREAT SERPL-MCNC: 0.76 MG/DL (ref 0.52–1.04)
DIFFERENTIAL METHOD BLD: NORMAL
EOSINOPHIL # BLD AUTO: 0 10E9/L (ref 0–0.7)
EOSINOPHIL NFR BLD AUTO: 0.3 %
ERYTHROCYTE [DISTWIDTH] IN BLOOD BY AUTOMATED COUNT: 13.8 % (ref 10–15)
GFR SERPL CREATININE-BSD FRML MDRD: 73 ML/MIN/1.7M2
GLUCOSE SERPL-MCNC: 117 MG/DL (ref 70–99)
GLUCOSE UR STRIP-MCNC: NEGATIVE MG/DL
HCT VFR BLD AUTO: 41.5 % (ref 35–47)
HGB BLD-MCNC: 13.3 G/DL (ref 11.7–15.7)
HGB UR QL STRIP: NEGATIVE
IMM GRANULOCYTES # BLD: 0.1 10E9/L (ref 0–0.4)
IMM GRANULOCYTES NFR BLD: 0.9 %
KETONES UR STRIP-MCNC: NEGATIVE MG/DL
LEUKOCYTE ESTERASE UR QL STRIP: ABNORMAL
LYMPHOCYTES # BLD AUTO: 0.9 10E9/L (ref 0.8–5.3)
LYMPHOCYTES NFR BLD AUTO: 12.8 %
MCH RBC QN AUTO: 29.4 PG (ref 26.5–33)
MCHC RBC AUTO-ENTMCNC: 32 G/DL (ref 31.5–36.5)
MCV RBC AUTO: 92 FL (ref 78–100)
MONOCYTES # BLD AUTO: 0.4 10E9/L (ref 0–1.3)
MONOCYTES NFR BLD AUTO: 5.9 %
MUCOUS THREADS #/AREA URNS LPF: PRESENT /LPF
NEUTROPHILS # BLD AUTO: 5.4 10E9/L (ref 1.6–8.3)
NEUTROPHILS NFR BLD AUTO: 79.8 %
NITRATE UR QL: NEGATIVE
NRBC # BLD AUTO: 0 10*3/UL
NRBC BLD AUTO-RTO: 0 /100
PH UR STRIP: 7 PH (ref 5–7)
PLATELET # BLD AUTO: 350 10E9/L (ref 150–450)
POTASSIUM SERPL-SCNC: 4.1 MMOL/L (ref 3.4–5.3)
PROT SERPL-MCNC: 7.3 G/DL (ref 6.8–8.8)
RBC # BLD AUTO: 4.53 10E12/L (ref 3.8–5.2)
RBC #/AREA URNS AUTO: 2 /HPF (ref 0–2)
SODIUM SERPL-SCNC: 138 MMOL/L (ref 133–144)
SOURCE: ABNORMAL
SP GR UR STRIP: 1.01 (ref 1–1.03)
SQUAMOUS #/AREA URNS AUTO: 3 /HPF (ref 0–1)
TRANS CELLS #/AREA URNS HPF: 1 /HPF (ref 0–1)
TSH SERPL DL<=0.005 MIU/L-ACNC: 2.05 MU/L (ref 0.4–4)
UROBILINOGEN UR STRIP-MCNC: 0 MG/DL (ref 0–2)
WBC # BLD AUTO: 6.8 10E9/L (ref 4–11)
WBC #/AREA URNS AUTO: 5 /HPF (ref 0–5)

## 2018-07-12 PROCEDURE — 80053 COMPREHEN METABOLIC PANEL: CPT | Performed by: EMERGENCY MEDICINE

## 2018-07-12 PROCEDURE — 99284 EMERGENCY DEPT VISIT MOD MDM: CPT | Performed by: EMERGENCY MEDICINE

## 2018-07-12 PROCEDURE — 99285 EMERGENCY DEPT VISIT HI MDM: CPT | Mod: 25 | Performed by: EMERGENCY MEDICINE

## 2018-07-12 PROCEDURE — 93010 ELECTROCARDIOGRAM REPORT: CPT | Mod: Z6 | Performed by: EMERGENCY MEDICINE

## 2018-07-12 PROCEDURE — 84443 ASSAY THYROID STIM HORMONE: CPT | Performed by: EMERGENCY MEDICINE

## 2018-07-12 PROCEDURE — 85025 COMPLETE CBC W/AUTO DIFF WBC: CPT | Performed by: EMERGENCY MEDICINE

## 2018-07-12 PROCEDURE — 93005 ELECTROCARDIOGRAM TRACING: CPT | Performed by: EMERGENCY MEDICINE

## 2018-07-12 PROCEDURE — 81001 URINALYSIS AUTO W/SCOPE: CPT | Performed by: EMERGENCY MEDICINE

## 2018-07-12 RX ORDER — ALPRAZOLAM 0.5 MG
0.5 TABLET ORAL EVERY 8 HOURS PRN
Qty: 3 TABLET | Refills: 0 | Status: SHIPPED | OUTPATIENT
Start: 2018-07-12 | End: 2018-07-13

## 2018-07-12 ASSESSMENT — ENCOUNTER SYMPTOMS
NEUROLOGICAL NEGATIVE: 1
EYES NEGATIVE: 1
ALLERGIC/IMMUNOLOGIC NEGATIVE: 1
NERVOUS/ANXIOUS: 1
CONSTITUTIONAL NEGATIVE: 1
MUSCULOSKELETAL NEGATIVE: 1
RESPIRATORY NEGATIVE: 1
GASTROINTESTINAL NEGATIVE: 1
CARDIOVASCULAR NEGATIVE: 1
HEMATOLOGIC/LYMPHATIC NEGATIVE: 1
ENDOCRINE NEGATIVE: 1

## 2018-07-12 NOTE — ED AVS SNAPSHOT
[facility] Emergency Department    [address] [facility] [address]    Phone:  [phone]    Fax:  [phone]                                       Bonita Villarreal   MRN: [id number]    Department:  [facility] Emergency Department   Date of Visit:  7/12/2018           Patient Information     Date Of Birth          1935        Your diagnoses for this visit were:     Feeling anxious Patient reporting feeling shaky inside over the last 3-4 days.  Patient reports a history of anxiety has been on Lexapro 10 mg a day    Lightheadedness Has felt lightheaded over the last 3-4 days    Abnormal electrocardiogram Incomplete bundle branch block on EKG from June 20, 2018.  EKG appears to be concerning for complete bundle branch block today.  Distant history of cardiac evaluation at [facility] by report about 13 years ago.       You were seen by Raymundo Choudhury MD.      Follow-up Information     Follow up with DIONICIO Jerome MD.    Specialty:  Family Practice    Why:  Please keep your appointment tomorrow morning at 8.40am as reported.  Exact cause of your symptom of feeling shaky inside and lightheaded is not clear.  You have an abnormal EKG today, it is somewhat similar from your EKG from June 2018    Contact information:    [address] [facility] [address] [address]  [phone]          Follow up with DIONICIO Jerome MD.    Specialty:  Family Practice    Why:  You will need to follow-up with Dr. Jerome to discuss if you Lexapro dose needs to be increased and follow-up for your abnormal EKG specifically if stress testing or additional referral to cardiology may be helpful    Contact information:    [address] [facility] [address]  [phone]          Follow up with [facility] Emergency Department.    Specialty:  EMERGENCY MEDICINE    Why:  If symptoms worsen-or if you develop palpitations, you have a fainting spell, he developed chest pain or pressure feel short of  breath with activity or have any new concerns    Contact information:    [facility] [facility] [address] [phone]    Additional information:    The medical center is located at   [address] (between [address] and   [address] in [place], [place] north   of [place]).      Discharge References/Attachments     SYMPTOMS WITH UNCERTAIN CAUSE (ENGLISH)    ALPRAZOLAM ORAL TABLET (ENGLISH)      Your next 10 appointments already scheduled     Jul 13, 2018  8:40 AM CDT   Office Visit with DIONICIO Jerome MD   [facility]    [address] [facility] [id number] [phone]           Bring a current list of meds and any records pertaining to this visit. For Physicals, please bring immunization records and any forms needing to be filled out. Please arrive 10 minutes early to complete paperwork.            Jul 20, 2018  8:00 AM CDT   SHORT with Dewayne Santana MD   [facility]    [address] [address] [address]   [phone]              24 Hour Appointment Hotline       To make an appointment at any [facility] ([facility]), call [phone] ([phone]). If you don't have a family doctor or clinic, we will help you find one. [facility] are conveniently located to serve the needs of you and your family.             Review of your medicines      START taking        Dose / Directions Last dose taken    ALPRAZolam 0.5 MG tablet   Commonly known as:  XANAX   Dose:  0.5 mg   Quantity:  3 tablet        Take 1 tablet (0.5 mg) by mouth every 8 hours as needed for anxiety   Refills:  0          Our records show that you are taking the medicines listed below. If these are incorrect, please call your family doctor or clinic.        Dose / Directions Last dose taken    atenolol 100 MG tablet   Commonly known as:  TENORMIN   Dose:  100 mg   Quantity:  90 tablet        Take 1  tablet (100 mg) by mouth daily   Refills:  3        calcium carbonate 500 MG tablet   Commonly known as:  OS-JEFF 500 mg [place]. Ca   Dose:  1 tablet        Take 1 tablet by mouth daily   Refills:  0        escitalopram 10 MG tablet   Commonly known as:  LEXAPRO   Dose:  10 mg   Quantity:  90 tablet        Take 1 tablet (10 mg) by mouth daily   Refills:  3        FISH OIL PO        1 tab daily   Refills:  0        losartan-hydrochlorothiazide 100-12.5 MG per tablet   Commonly known as:  HYZAAR   Dose:  1 tablet   Quantity:  90 tablet        Take 1 tablet by mouth daily   Refills:  3        Multi-vitamin Tabs tablet   Generic drug:  multivitamin, therapeutic with minerals        1 DAILY   Refills:  0        order for DME   Quantity:  1 Units        Equipment being ordered: Walker Wheels () and Walker () Treatment Diagnosis: s/p TKA   Refills:  0                Prescriptions were sent or printed at these locations (1 Prescription)                   [facility] - - [facility] [id number] [address]    Telephone:  [phone]   Fax:  [phone]   Hours:  [facility]                Printed at Department/Unit printer (1 of 1)         ALPRAZolam (XANAX) 0.5 MG tablet                Procedures and tests performed during your visit     CBC with platelets differential    Comprehensive metabolic panel    EKG 12 lead    Orthostatic blood pressure and pulse    TSH with free T4 reflex    UA reflex to Microscopic      Orders Needing Specimen Collection     None      Pending Results     No orders found from 7/10/2018 to 7/13/2018.            Pending Culture Results     No orders found from 7/10/2018 to 7/13/2018.            Pending Results Instructions     If you had any lab results that were not finalized at the time of your Discharge, you can call the ED Lab Result RN at [phone]. You will be contacted by this team for any positive  Lab results or changes in treatment. The nurses are available 7 days a week from 10A to 6:30P.  You can leave a message 24 hours per day and they will return your call.        Test Results From Your Hospital Stay        7/12/2018  1:44 PM      Component Results     Component Value Ref Range & Units Status    WBC 6.8 4.0 - 11.0 10e9/L Final    RBC Count 4.53 3.8 - 5.2 10e12/L Final    Hemoglobin 13.3 11.7 - 15.7 g/dL Final    Hematocrit 41.5 35.0 - 47.0 % Final    MCV 92 78 - 100 fl Final    MCH 29.4 26.5 - 33.0 pg Final    MCHC 32.0 31.5 - 36.5 g/dL Final    RDW 13.8 10.0 - 15.0 % Final    Platelet Count 350 150 - 450 10e9/L Final    Diff Method Automated Method  Final    % Neutrophils 79.8 % Final    % Lymphocytes 12.8 % Final    % Monocytes 5.9 % Final    % Eosinophils 0.3 % Final    % Basophils 0.3 % Final    % Immature Granulocytes 0.9 % Final    Nucleated RBCs 0 0 /100 Final    Absolute Neutrophil 5.4 1.6 - 8.3 10e9/L Final    Absolute Lymphocytes 0.9 0.8 - 5.3 10e9/L Final    Absolute Monocytes 0.4 0.0 - 1.3 10e9/L Final    Absolute Eosinophils 0.0 0.0 - 0.7 10e9/L Final    Absolute Basophils 0.0 0.0 - 0.2 10e9/L Final    Abs Immature Granulocytes 0.1 0 - 0.4 10e9/L Final    Absolute Nucleated RBC 0.0  Final         7/12/2018  2:04 PM      Component Results     Component Value Ref Range & Units Status    Sodium 138 133 - 144 mmol/L Final    Potassium 4.1 3.4 - 5.3 mmol/L Final    Chloride 104 94 - 109 mmol/L Final    Carbon Dioxide 26 20 - 32 mmol/L Final    Anion Gap 8 3 - 14 mmol/L Final    Glucose 117 (H) 70 - 99 mg/dL Final    Urea Nitrogen 14 7 - 30 mg/dL Final    Creatinine 0.76 0.52 - 1.04 mg/dL Final    GFR Estimate 73 >60 mL/min/1.7m2 Final    Non  GFR Calc    GFR Estimate If Black 88 >60 mL/min/1.7m2 Final    African American GFR Calc    Calcium 9.6 8.5 - 10.1 mg/dL Final    Bilirubin Total 0.5 0.2 - 1.3 mg/dL Final    Albumin 3.5 3.4 - 5.0 g/dL Final    Protein Total 7.3 6.8 - 8.8  g/dL Final    Alkaline Phosphatase 89 40 - 150 U/L Final    ALT 22 0 - 50 U/L Final    AST 19 0 - 45 U/L Final         7/12/2018  2:11 PM      Component Results     Component Value Ref Range & Units Status    TSH 2.05 0.40 - 4.00 mU/L Final         7/12/2018  2:57 PM      Component Results     Component Value Ref Range & Units Status    Color Urine Yellow  Final    Appearance Urine Slightly Cloudy  Final    Glucose Urine Negative NEG^Negative mg/dL Final    Bilirubin Urine Negative NEG^Negative Final    Ketones Urine Negative NEG^Negative mg/dL Final    Specific Gravity Urine 1.013 1.003 - 1.035 Final    Blood Urine Negative NEG^Negative Final    pH Urine 7.0 5.0 - 7.0 pH Final    Protein Albumin Urine Negative NEG^Negative mg/dL Final    Urobilinogen mg/dL 0.0 0.0 - 2.0 mg/dL Final    Nitrite Urine Negative NEG^Negative Final    Leukocyte Esterase Urine Small (A) NEG^Negative Final    Source Midstream Urine  Final    RBC Urine 2 0 - 2 /HPF Final    WBC Urine 5 0 - 5 /HPF Final    Squamous Epithelial /HPF Urine 3 (H) 0 - 1 /HPF Final    Transitional Epi 1 0 - 1 /HPF Final    Mucous Urine Present (A) NEG^Negative /LPF Final                Thank you for choosing [facility]       Thank you for choosing [facility] for your care. Our goal is always to provide you with excellent care. Hearing back from our patients is one way we can continue to improve our services. Please take a few minutes to complete the written survey that you may receive in the mail after you visit with us. Thank you!        Care EveryWhere ID     This is your Care EveryWhere ID. This could be used by other organizations to access your [facility] medical records  [id number]        Equal Access to Services     DARIEN YARBROUGH : Hadken Rodriges, waraphaelda luqadaha, qaybta kaalmada adeegyada, waxay idiin hayaan adesapna gutierrez. [facility] [phone].    ATENCIÓN: Si habla español, tiene a hylton disposición servicios gratuitos de asistencia  jacobo Daviszohaib al [phone].    We comply with applicable federal civil rights laws and [place] laws. We do not discriminate on the basis of race, color, national origin, age, disability, sex, sexual orientation, or gender identity.            After Visit Summary       This is your record. Keep this with you and show to your community pharmacist(s) and doctor(s) at your next visit.

## 2018-07-12 NOTE — TELEPHONE ENCOUNTER
Reason for Call:  Having a bout of anxiety    Detailed comments: patient is calling and stating that she is having a bout of anxiety today, and she is on Lexapro. She was recently ill with a UTI and I see she was hospitalized for bacturia. She is wondering if this could affect her Lexapro in any way. Please advise on what to do for anxiety.    Phone Number Patient can be reached at: Home number on file [phone] (home)    Best Time: any    Can we leave a detailed message on this number? YES   Elsy Knox  [facility]  [facility]      Call taken on 7/12/2018 at 9:11 AM by Elsy Knox

## 2018-07-12 NOTE — TELEPHONE ENCOUNTER
Left message for patient to return call to clinic.  She has an appt scheduled for 7-13-18 to discuss anxiety.  Theresa GREENE RN

## 2018-07-12 NOTE — ED PROVIDER NOTES
History     Chief Complaint   Patient presents with     Anxiety     HPI  Bonita Villarreal is a 83 year old female with a history of hypertension, diverticulosis, right knee degenerative joint disease, arthritis, generalized anxiety disorder who presents for concern for anxiety. Patient has been taking 10 mg of lexapro for 13 years for her anxiety which has always worked for her. Recently she became sick with bacteriuria with pyuria in her lungs. She was diagnosed on 06/28/18. Her anxiety has gotten worse after her sickness. She realized that she should be writing up a will and is having trouble doing that which she believes may be causing some of her anxiety.  Patient describes her anxiety as feeling light headed and shaky inside. The past three days her anxiety has gotten much worse and she has not been able to eat. Patient states that she has lost 4 pounds in the last two days because nothing sounds good to her so she is not eating. Patient denies any diarrhea.     Patient is currently taking lexapro, atenolol, and losartan. Patient took ibuprofin this morning to help with her head ache. Patient denies any tobacco, alcohol, or drug use.       Social History: The patient lives in [place], [place] in a house attached to her sons house. She presents to the ED via personal car with her daughter in law.        Problem List:    Patient Active Problem List    Diagnosis Date Noted     Bacteriuria with pyuria 06/29/2018     Priority: Medium     Pulmonary infiltrate in right lung on chest x-ray 06/29/2018     Priority: Medium     Headache 06/28/2018     Priority: Medium     Status post total right knee replacement 02/28/2018     Priority: Medium     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     Priority: Medium     CARDIOVASCULAR SCREENING; LDL GOAL LESS THAN 160 10/31/2010     Priority: Medium     LDL in 140s, HDL in 60s  Boardman risk is 6% (average at her age is 14%)       Essential hypertension, benign 06/28/2005     Priority:  Medium     had coronary CT scan  --- no evidence of plaque  started on atenolol and ASA 2005 for /94  physician in [place] switched her to Lotrel 5/10 due to her having dizzy spells  HCTZ added 3/05, pt felt mouth too dried out   pt more anxious and BP still elevated -- switched back to Atenolol       Diverticulosis of large intestine 2005     Priority: Medium     colonoscopy , disease mod - severe  Problem list name updated by automated process. Provider to review       Osteopenia of spine 2005     Priority: Medium     T score -1.3    Risk calculator based on 2003 T-score and personal data showed 1% risk of hip fracture over next 10 years, 14% risk of any fracture over next ten years; consider recheck DEXA at age 77, continue calcium and exercise until then (current recommendations to consider treatment if hip fx risk reaches 3% or any fracture risk reaches 20%)  Problem list name updated by automated process. Provider to review       Family history of other cardiovascular diseases 2005     Priority: Medium     father  at 39 of MI  brother  at 51 of MI  another brother -- MI x 2  eldest sister -- MI x 2 with stents  mother and maternal aunts lived to 80's and mid 90's  Problem list name updated by automated process. Provider to review and confirm  Problem list name updated by automated process. Provider to review       Generalized anxiety disorder 2005     Priority: Medium     onset after patient was diagnosed with hypertension fall           Past Medical History:    Past Medical History:   Diagnosis Date     Advance Care Planning 2012     Arthritis      Diverticulitis of colon 2005     Diverticulosis of colon (without mention of hemorrhage)      Hypertension        Past Surgical History:    Past Surgical History:   Procedure Laterality Date     ARTHROPLASTY KNEE Right 2018    Procedure: ARTHROPLASTY KNEE;  Right total knee arthroplasty;   Surgeon: Anton Grover MD;  Location: [facility]     C APPENDECTOMY       COLONOSCOPY  04/15/02     COLONOSCOPY  3/25/2013    Procedure: COLONOSCOPY;  Colonoscopy  ;  Surgeon: Jamil Porras MD;  Location: [facility]     FLEXIBLE SIGMOIDOSCOPY  96     ORTHOPEDIC SURGERY  22-23 YEARS AGO    BACK     SURGICAL HISTORY OF -       lumbar surgery       Family History:    Family History   Problem Relation Age of Onset     Lipids Brother      HEART DISEASE Brother       at 51 of an MI     Cancer Brother      bone, lung,  82     Prostate Cancer Brother      Cancer Sister      cervical     Lipids Sister      HEART DISEASE Sister      eldest sister, s/p MI x 2 with stents; diagnosed with AAA, postoperative complications and MI,  at 75     HEART DISEASE Brother      MI x 2     Lipids Brother      C.A.D. Father       at 39 of an MI     Family History Negative Mother      lived to age 95     Gynecology Sister      cervical Ca       Social History:  Marital Status:   [5]  Social History   Substance Use Topics     Smoking status: Never Smoker     Smokeless tobacco: Never Used     Alcohol use Yes      Comment: rare        Medications:      ALPRAZolam (XANAX) 0.5 MG tablet   atenolol (TENORMIN) 100 MG tablet   escitalopram (LEXAPRO) 10 MG tablet   losartan-hydrochlorothiazide (HYZAAR) 100-12.5 MG per tablet   calcium carbonate (OS-JEFF 500 MG [place]. [place]) 500 MG tablet   FISH OIL OR   MULTI-VITAMIN OR TABS   order for DME         Review of Systems   Constitutional: Negative.    HENT: Negative.    Eyes: Negative.    Respiratory: Negative.    Cardiovascular: Negative.    Gastrointestinal: Negative.    Endocrine: Negative.    Genitourinary: Negative.    Musculoskeletal: Negative.    Skin: Negative.    Allergic/Immunologic: Negative.    Neurological: Negative.    Hematological: Negative.    Psychiatric/Behavioral: The patient is nervous/anxious (feeling shaky inside over the last 3 days).    All other systems  reviewed and are negative.      Physical Exam   BP: 143/87  Pulse: 82  Temp: 98  F (36.7  C)  Resp: 18  SpO2: 95 %      Physical Exam   Constitutional: She is oriented to person, place, and time. She appears well-developed and well-nourished. No distress.   HENT:   Head: Normocephalic and atraumatic.   Eyes: Conjunctivae and EOM are normal. Pupils are equal, round, and reactive to light. Right eye exhibits no discharge. Left eye exhibits no discharge. No scleral icterus.   Neck: Normal range of motion. Neck supple. No JVD present. No tracheal deviation present. No thyromegaly present.   Cardiovascular: Normal rate and regular rhythm.  Exam reveals no gallop and no friction rub.    No murmur heard.  Pulmonary/Chest: Effort normal and breath sounds normal. No stridor. No respiratory distress. She has no wheezes. She has no rales. She exhibits no tenderness.   Abdominal: Soft. Bowel sounds are normal. She exhibits no distension and no mass. There is no tenderness. There is no rebound and no guarding.   Lymphadenopathy:     She has no cervical adenopathy.   Neurological: She is alert and oriented to person, place, and time. She has normal strength. She displays no atrophy, no tremor and normal reflexes. No cranial nerve deficit or sensory deficit. She exhibits normal muscle tone. She displays a negative Romberg sign. She displays no seizure activity. Coordination and gait normal. GCS eye subscore is 4. GCS verbal subscore is 5. GCS motor subscore is 6.   Skin: No rash noted. She is not diaphoretic. No erythema. No pallor.   Psychiatric: She has a normal mood and affect. Her behavior is normal. Judgment and thought content normal.       ED Course   1:12 PM Patient Assessed.   ED Course     Procedures                  EKG Interpretation:      Interpreted by Raymundo Choudhury  Time reviewed:13:35  Symptoms at time of EKG: Feeling shaky and lightheaded, anxious   Rhythm: Normal sinus   Rate: Normal  Axis:  Normal  Ectopy: None  Conduction: Normal and Left bundle branch block (complete)  ST Segments/ T Waves: ST changes involving aVL, V1 and V2  Q Waves: Q wave changes  Comparison to prior: when compared with EKG dated June 20, 2018 patient appears to have a complete bundle branch block with T-wave changes which are similar nature with some subtle differences when compared with EKG from June 20, 2018    Clinical Impression: left bundle branch block    Critical Care time:  none                   Medications - No data to display  ED Medications: none      ED labs and imaging:  Results for orders placed or performed during the hospital encounter of 07/12/18   CBC with platelets differential   Result Value Ref Range    WBC 6.8 4.0 - 11.0 10e9/L    RBC Count 4.53 3.8 - 5.2 10e12/L    Hemoglobin 13.3 11.7 - 15.7 g/dL    Hematocrit 41.5 35.0 - 47.0 %    MCV 92 78 - 100 fl    MCH 29.4 26.5 - 33.0 pg    MCHC 32.0 31.5 - 36.5 g/dL    RDW 13.8 10.0 - 15.0 %    Platelet Count 350 150 - 450 10e9/L    Diff Method Automated Method     % Neutrophils 79.8 %    % Lymphocytes 12.8 %    % Monocytes 5.9 %    % Eosinophils 0.3 %    % Basophils 0.3 %    % Immature Granulocytes 0.9 %    Nucleated RBCs 0 0 /100    Absolute Neutrophil 5.4 1.6 - 8.3 10e9/L    Absolute Lymphocytes 0.9 0.8 - 5.3 10e9/L    Absolute Monocytes 0.4 0.0 - 1.3 10e9/L    Absolute Eosinophils 0.0 0.0 - 0.7 10e9/L    Absolute Basophils 0.0 0.0 - 0.2 10e9/L    Abs Immature Granulocytes 0.1 0 - 0.4 10e9/L    Absolute Nucleated RBC 0.0    Comprehensive metabolic panel   Result Value Ref Range    Sodium 138 133 - 144 mmol/L    Potassium 4.1 3.4 - 5.3 mmol/L    Chloride 104 94 - 109 mmol/L    Carbon Dioxide 26 20 - 32 mmol/L    Anion Gap 8 3 - 14 mmol/L    Glucose 117 (H) 70 - 99 mg/dL    Urea Nitrogen 14 7 - 30 mg/dL    Creatinine 0.76 0.52 - 1.04 mg/dL    GFR Estimate 73 >60 mL/min/1.7m2    GFR Estimate If Black 88 >60 mL/min/1.7m2    Calcium 9.6 8.5 - 10.1 mg/dL    Bilirubin  Total 0.5 0.2 - 1.3 mg/dL    Albumin 3.5 3.4 - 5.0 g/dL    Protein Total 7.3 6.8 - 8.8 g/dL    Alkaline Phosphatase 89 40 - 150 U/L    ALT 22 0 - 50 U/L    AST 19 0 - 45 U/L   TSH with free T4 reflex   Result Value Ref Range    TSH 2.05 0.40 - 4.00 mU/L   UA reflex to Microscopic   Result Value Ref Range    Color Urine Yellow     Appearance Urine Slightly Cloudy     Glucose Urine Negative NEG^Negative mg/dL    Bilirubin Urine Negative NEG^Negative    Ketones Urine Negative NEG^Negative mg/dL    Specific Gravity Urine 1.013 1.003 - 1.035    Blood Urine Negative NEG^Negative    pH Urine 7.0 5.0 - 7.0 pH    Protein Albumin Urine Negative NEG^Negative mg/dL    Urobilinogen mg/dL 0.0 0.0 - 2.0 mg/dL    Nitrite Urine Negative NEG^Negative    Leukocyte Esterase Urine Small (A) NEG^Negative    Source Midstream Urine     RBC Urine 2 0 - 2 /HPF    WBC Urine 5 0 - 5 /HPF    Squamous Epithelial /HPF Urine 3 (H) 0 - 1 /HPF    Transitional Epi 1 0 - 1 /HPF    Mucous Urine Present (A) NEG^Negative /LPF         ED Vitals;  Vitals:    07/12/18 1157 07/12/18 1333 07/12/18 1335 07/12/18 1430   BP: 143/87 142/78 127/83 148/80   Pulse:  68 76 69   Resp:    16   Temp:       TempSrc:       SpO2:   92% 97%     Assessments & Plan (with Medical Decision Making)   Clinical impression: 83-year-old female with history of hypertension and arthritis who presented for concern for anxiety with report of feeling lightheaded and shaky inside.  The exact cause of her symptoms are not clear.  She does have an abnormal EKG with left bundle branch block but has no cardiac symptoms.  .  Patient had recent hospitalization in June 2018 where she was hospitalized for 2 days from June 28 - June 30 for concern for fever with headache.  She subsequently had a negative workup for meningitis or encephalitis and was discharged home with the presumption that her symptoms are due to bladder infection and possible infiltrate on chest x-ray.  She presented today  stating over the last 3-4 days she has felt shaky inside and somewhat lightheaded.  She reports she is not sure why she is feeling anxious.  She has been on Lexapro for over 13 years and has been taking 10 mg a day which is helped manage her symptoms of anxiety by her report.  She read by private car with her daughter.  She is in the ED alone.  She reports she has had no new medication changes.  She is on losartan and atenolol.  She reports she has no fever.  She has had no diarrhea.  No chest pain or pressure.  She admits she has had about 4 pound weight loss due to poor oral intake and poor appetite.  She denies any chills or fever and reports no night sweats.  She is also not noted any bloody stools.  She reports no rash.  She was treated with a 5 day course of Augmentin for pyuria after her hospital admission.  She has a scheduled appointment with Dr. Jerome in the morning on July 13 but was hoping she could receive something to help her symptoms of feeling anxious.  She tells me she is tried lorazepam in the past.  On my exam she is in no obvious distress.  GCS is 15.  She has no focal neurologic deficits.  She has a normal cardiac exam.  Normal lung exam.     ED Course and plan:   I reviewed her medical records.  I reviewed her recent hospital course from June 2018.  She was hospitalized for concern for meningitis with fever and headache.  She was ultimately discharged home with Augmentin for pyuria.  We discussed options for care given her age and symptoms of reporting feeling shaky inside and light headed.  I elected to obtain blood work including a urinalysis and a TSH.  We discussed options for managing her concerns that she feels shaky inside.  She is requesting lorazepam.  We discussed benzodiazepine therapy for managing subjective report of anxiety.  Because of her age I expressed concern about the importance of  excluding organic causes to explain her symptoms.  I was willing to discharge her home with  Xanax ×3 tablets to help with her symptoms as reported until she follows up in clinic with Dr. Jerome in the morning.  EKG in the ED today shows concern for ST segment changes in the context of left bundle branch block.  Comparison EKG from June 28, 2018 did show an incomplete bundle branch block.Patient has normal labs today including normal TSH.  White count is normal.  Her hemogram is also normal.  Her electrolytes are normal.  Her urine today does show some improvement since treatment for pyuria.  She has 5 white cells per high-power field.  Negative nitrite and small leukocyte esterase.  She has not reporting any urinary symptoms.     With her EKG changes it is unclear if this is contributing to her symptoms of feeling lightheaded and shaky.  Patient is not reporting any cardiac symptoms specifically no palpitations no chest pain or pressure no shortness of breath no dyspnea on exertion . Patient is not orthostatic.    We discussed her abnormal EKG.  Patient tells me she last time she had a cardiac evaluation was at [facility] about [facility] years ago.  Suggested that she keep her appointment in the morning with Dr. Jerome to consider additional cardiac testing and workup such as a stress test or nuclear medicine scan or stress echocardiogram.  Because the patient is otherwise clinically well I did not think admission to the hospital for serial cardiac enzymes stress testing and further care is medically necessary.  Patient and her family were in agreement with plan of care to continue additional workup as an outpatient.  Because of her report of feeling shaky inside and lightheaded at rest is not clear it could certainly be related to cardiac cause and could be an anginal equivalent as discussed with the family.  Patient asked that I provide something to help her to feel more calm until she sees Dr. Jerome in the morning.  She was given Xanax ×3 tablets at her request.  She may use 1 tablet every 8 hours as needed  0.5 mg.  We discussed that if she develops chest pain or pressure, chest tightness, shortness of breath with activity or at rest palpitations or any other symptoms she may need to return to the emergency department for additional care.             Disclaimer: This note consists of symbols derived from keyboarding, dictation and/or voice recognition software. As a result, there may be errors in the script that have gone undetected. Please consider this when interpreting information found in this chart.  I have reviewed the nursing notes.    I have reviewed the findings, diagnosis, plan and need for follow up with the patient.       New Prescriptions    ALPRAZOLAM (XANAX) 0.5 MG TABLET    Take 1 tablet (0.5 mg) by mouth every 8 hours as needed for anxiety       Final diagnoses:   Feeling anxious - Patient reporting feeling shaky inside over the last 3-4 days.  Patient reports a history of anxiety has been on Lexapro 10 mg a day   Lightheadedness - Has felt lightheaded over the last 3-4 days   Abnormal electrocardiogram - Incomplete bundle branch block on EKG from June 20, 2018.  EKG appears to be concerning for complete bundle branch block today.  Distant history of cardiac evaluation at [facility] by report about 13 years ago.   This document serves as a record of the services and decisions personally performed and made by Raymundo Choudhury. The HPI was created on his behalf by Erin Martin, a trained medical scribe. The creation of this document is based on the provider's statements to the medical scribe.  Erin Martin 1:06 PM July 12, 2018    Provider:    The information in this document created by the medical scribe for me, accurately reflects the services I personally performed and the decisions made by me. I have reviewed and approved this document for accuracy prior to leaving the patient care area.  Raymundo Choudhury 1:06 PM July 12, 2018 7/12/2018   [facility]  DEPARTMENT     Raymundo Choudhury MD  07/12/18 6530

## 2018-07-12 NOTE — TELEPHONE ENCOUNTER
"Patient reports an increase in anxiety that started over the last few days.  She describes this as a \"nervous feeling inside.\"  She is not sure what could be making her nervous.  She did get over an illness recently - UTI however denies return of symptoms or fear of return of symptoms causing anxiety.  She has had a change in her appetite in the last week and has lost 4 lbs.  She takes lexapro 10mg daily and today she took an extra 10mg tablet.  RN advised she make an appt and discuss anxiety with MD.  RN also recommended she not increase her dose without MD approval.  She will take her 10mg dose tomorrow and see if MD agrees in dose increase.     She denies SOB, chest pain, hallucinations, paranoia, paralyzing fear, heart palpitations, fevers, light-headedness or any other symptoms at this time.  RN advised of s/s that would warrant ER visit.  Patient agrees with plan and verbalized understanding.    Theresa GREENE RN        "

## 2018-07-12 NOTE — ED AVS SNAPSHOT
[facility] Emergency Department    [address] [address]    Phone:  [phone]    Fax:  [phone]                                       Bonita Villarreal   MRN: [id number]    Department:  [facility] Emergency Department   Date of Visit:  7/12/2018           After Visit Summary Signature Page     I have received my discharge instructions, and my questions have been answered. I have discussed any challenges I see with this plan with the nurse or doctor.    ..........................................................................................................................................  Patient/Patient Representative Signature      ..........................................................................................................................................  Patient Representative Print Name and Relationship to Patient    ..................................................               ................................................  Date                                            Time    ..........................................................................................................................................  Reviewed by Signature/Title    ...................................................              ..............................................  Date                                                            Time

## 2018-07-13 ENCOUNTER — OFFICE VISIT (OUTPATIENT)
Dept: FAMILY MEDICINE | Facility: CLINIC | Age: 83
End: 2018-07-13
Payer: COMMERCIAL

## 2018-07-13 VITALS
HEART RATE: 64 BPM | TEMPERATURE: 97.6 F | SYSTOLIC BLOOD PRESSURE: 120 MMHG | WEIGHT: 178.2 LBS | RESPIRATION RATE: 16 BRPM | HEIGHT: 68 IN | BODY MASS INDEX: 27.01 KG/M2 | DIASTOLIC BLOOD PRESSURE: 70 MMHG

## 2018-07-13 DIAGNOSIS — Z87.440 PERSONAL HISTORY OF URINARY TRACT INFECTION: ICD-10-CM

## 2018-07-13 DIAGNOSIS — F41.1 GENERALIZED ANXIETY DISORDER: Primary | ICD-10-CM

## 2018-07-13 PROBLEM — R82.71 BACTERIURIA WITH PYURIA: Status: RESOLVED | Noted: 2018-06-29 | Resolved: 2018-07-13

## 2018-07-13 PROBLEM — R82.81 BACTERIURIA WITH PYURIA: Status: RESOLVED | Noted: 2018-06-29 | Resolved: 2018-07-13

## 2018-07-13 PROCEDURE — 99214 OFFICE O/P EST MOD 30 MIN: CPT | Performed by: FAMILY MEDICINE

## 2018-07-13 RX ORDER — ALPRAZOLAM 0.5 MG
0.5 TABLET ORAL 3 TIMES DAILY PRN
Qty: 10 TABLET | Refills: 0 | Status: SHIPPED | OUTPATIENT
Start: 2018-07-13 | End: 2018-07-20

## 2018-07-13 RX ORDER — ESCITALOPRAM OXALATE 20 MG/1
20 TABLET ORAL DAILY
Qty: 30 TABLET | Refills: 1 | Status: SHIPPED | OUTPATIENT
Start: 2018-07-13 | End: 2018-07-20

## 2018-07-13 ASSESSMENT — ANXIETY QUESTIONNAIRES
6. BECOMING EASILY ANNOYED OR IRRITABLE: NOT AT ALL
2. NOT BEING ABLE TO STOP OR CONTROL WORRYING: NOT AT ALL
5. BEING SO RESTLESS THAT IT IS HARD TO SIT STILL: NOT AT ALL
1. FEELING NERVOUS, ANXIOUS, OR ON EDGE: SEVERAL DAYS
7. FEELING AFRAID AS IF SOMETHING AWFUL MIGHT HAPPEN: NOT AT ALL
GAD7 TOTAL SCORE: 1
3. WORRYING TOO MUCH ABOUT DIFFERENT THINGS: NOT AT ALL

## 2018-07-13 ASSESSMENT — PATIENT HEALTH QUESTIONNAIRE - PHQ9: 5. POOR APPETITE OR OVEREATING: NOT AT ALL

## 2018-07-13 NOTE — PROGRESS NOTES
Subjective:  Bonita Villarreal is a 83 year old female   Chief Complaint   Patient presents with     Anxiety     recheck/refill talk about medication ? not helping anymore ? increase dose.      Last time I saw her she was acutely ill with a fever and elevated white count, concerned about possible meningitis.  She was sent to the hospital and her spinal tap was negative but she did have pyuria and was treated for a urinary tract infection.  This resolved her fever and her associated headache.  She has been doing well except for anxiety.  She has a long-standing history of anxiety and went into the emergency room yesterday because she was having significant panic symptoms.  They gave her just a few doses of alprazolam and advised her to follow-up with me today as planned.  He was able to sleep last night after taking the alprazolam and feels a little bit better this morning.        Encounter Diagnoses   Name Primary?     GENERALIZED ANXIETY DIS Yes     Personal history of urinary tract infection        ROS:other than noted above, general, HEENT, respiratory, cardiac, gastrointestinal systems are negative    Medical, surgical, social, and family histories, medications and allergies reviewed and updated.    Objective:  Exam:    GENERAL APPEARANCE ADULT: Alert, no acute distress  EYES: PERRL, EOM normal, conjunctiva and lids normal  RESP: lungs clear to auscultation   CV: normal rate, regular rhythm, no murmur or gallop  ABDOMEN: soft, no organomegaly, masses or tenderness  PSYCH: mentation appears normal.,  Moderately anxious      ASSESSMENT:  1. GENERALIZED ANXIETY DIS    2. Personal history of urinary tract infection , resolved       PLAN:  Orders Placed This Encounter     escitalopram (LEXAPRO) 20 MG tablet     ALPRAZolam (XANAX) 0.5 MG tablet       Patient Instructions   We are increasing the escitalopram (lexapro) to 20 mg daily. You may use up the current supply by taking 2 tablets daily. You may use xanax as  needed for severe anxiety until the higher dose kicks in. Recheck in 3 weeks to see how this is going

## 2018-07-13 NOTE — PATIENT INSTRUCTIONS
We are increasing the escitalopram (lexapro) to 20 mg daily. You may use up the current supply by taking 2 tablets daily. You may use xanax as needed for severe anxiety until the higher dose kicks in. Recheck in 3 weeks to see how this is going

## 2018-07-13 NOTE — MR AVS SNAPSHOT
After Visit Summary   7/13/2018    Bonita Villarreal    MRN: [id number]           Patient Information     Date Of Birth          1935        Visit Information        Provider Department      7/13/2018 8:40 AM DIONICIO Jerome MD [facility]        Today's Diagnoses     GENERALIZED ANXIETY DIS          Care Instructions    We are increasing the escitalopram (lexapro) to 20 mg daily. You may use up the current supply by taking 2 tablets daily. You may use xanax as needed for severe anxiety until the higher dose kicks in. Recheck in 3 weeks to see how this is going          Follow-ups after your visit        Your next 10 appointments already scheduled     Jul 20, 2018  8:00 AM CDT   SHORT with Dewayne Santana MD   [facility] ([facility])    [address]  [facility] [address]   [phone]              Who to contact     If you have questions or need follow up information about today's clinic visit or your schedule please contact [facility] directly at [phone].  Normal or non-critical lab and imaging results will be communicated to you by MyChart, letter or phone within 4 business days after the clinic has received the results. If you do not hear from us within 7 days, please contact the clinic through MyChart or phone. If you have a critical or abnormal lab result, we will notify you by phone as soon as possible.  Submit refill requests through [employer] or call your pharmacy and they will forward the refill request to us. Please allow 3 business days for your refill to be completed.          Additional Information About Your Visit        Care EveryWhere ID     This is your Care EveryWhere ID. This could be used by other organizations to access your [facility] medical records  [id number]        Your Vitals Were     Pulse Temperature Respirations Height BMI (Body Mass Index)       64 97.6  F (36.4  C)  "(Tympanic) 16 5' 7.5\" (1.715 m) 27.5 kg/m2        Blood Pressure from Last 3 Encounters:   07/13/18 120/70   07/12/18 148/80   06/30/18 145/81    Weight from Last 3 Encounters:   07/13/18 178 lb 3.2 oz (80.8 kg)   06/28/18 188 lb 11.4 oz (85.6 kg)   05/11/18 189 lb 12.8 oz (86.1 kg)              Today, you had the following     No orders found for display         Today's Medication Changes          These changes are accurate as of 7/13/18  9:12 AM.  If you have any questions, ask your nurse or doctor.               These medicines have changed or have updated prescriptions.        Dose/Directions    * ALPRAZolam 0.5 MG tablet   Commonly known as:  XANAX   This may have changed:  Another medication with the same name was added. Make sure you understand how and when to take each.   Changed by:  DIONICIO Jerome MD        Dose:  0.5 mg   Take 1 tablet (0.5 mg) by mouth every 8 hours as needed for anxiety   Quantity:  3 tablet   Refills:  0       * ALPRAZolam 0.5 MG tablet   Commonly known as:  XANAX   This may have changed:  You were already taking a medication with the same name, and this prescription was added. Make sure you understand how and when to take each.   Used for:  Generalized anxiety disorder   Changed by:  DIONICIO Jerome MD        Dose:  0.5 mg   Take 1 tablet (0.5 mg) by mouth 3 times daily as needed for anxiety   Quantity:  10 tablet   Refills:  0       escitalopram 20 MG tablet   Commonly known as:  LEXAPRO   This may have changed:    - medication strength  - how much to take   Used for:  Generalized anxiety disorder   Changed by:  DIONICIO Jerome MD        Dose:  20 mg   Take 1 tablet (20 mg) by mouth daily   Quantity:  30 tablet   Refills:  1       * Notice:  This list has 2 medication(s) that are the same as other medications prescribed for you. Read the directions carefully, and ask your doctor or other care provider to review them with you.         Where to get your medicines      These medications were " sent to [place] [facility] - - [place] - [id number]  [address], [place] [address]    Hours:  [facility] [place] [facility] Phone:  [phone]     escitalopram 20 MG tablet         Some of these will need a paper prescription and others can be bought over the counter.  Ask your nurse if you have questions.     Bring a paper prescription for each of these medications     ALPRAZolam 0.5 MG tablet                Primary Care Provider Office Phone # Fax #    [place] Lara Santana [phone] [address] [facility] [address]        Equal Access to Services     DARIEN YARBROUGH : Hadii aura guallpa Soenrike, waaxda luqadaha, qaybta kaalmada adesapnayada, cameron gutierrez. So [facility] [phone].    ATENCIÓN: Si habla español, tiene a hylton disposición servicios gratuitos de asistencia lingüística. Llame al [phone].    We comply with applicable federal civil rights laws and [place] laws. We do not discriminate on the basis of race, color, national origin, age, disability, sex, sexual orientation, or gender identity.            Thank you!     Thank you for choosing [facility]  for your care. Our goal is always to provide you with excellent care. Hearing back from our patients is one way we can continue to improve our services. Please take a few minutes to complete the written survey that you may receive in the mail after your visit with us. Thank you!             Your Updated Medication List - Protect others around you: Learn how to safely use, store and throw away your medicines at www.disposemymeds.org.          This list is accurate as of 7/13/18  9:12 AM.  Always use your most recent med list.                   Brand Name Dispense Instructions for use Diagnosis    * ALPRAZolam 0.5 MG tablet    XANAX    3 tablet    Take 1 tablet (0.5 mg) by mouth every 8 hours as needed for anxiety        * ALPRAZolam  0.5 MG tablet    XANAX    10 tablet    Take 1 tablet (0.5 mg) by mouth 3 times daily as needed for anxiety    Generalized anxiety disorder       atenolol 100 MG tablet    TENORMIN    90 tablet    Take 1 tablet (100 mg) by mouth daily    Essential hypertension, benign       calcium carbonate 500 MG tablet    OS-JEFF 500 mg [place]. Ca     Take 1 tablet by mouth daily        escitalopram 20 MG tablet    LEXAPRO    30 tablet    Take 1 tablet (20 mg) by mouth daily    Generalized anxiety disorder       FISH OIL PO      1 tab daily        losartan-hydrochlorothiazide 100-12.5 MG per tablet    HYZAAR    90 tablet    Take 1 tablet by mouth daily    Essential hypertension, benign       Multi-vitamin Tabs tablet   Generic drug:  multivitamin, therapeutic with minerals      1 DAILY        order for DME     1 Units    Equipment being ordered: Walker Wheels () and Walker () Treatment Diagnosis: s/p TKA    Status post total right knee replacement       * Notice:  This list has 2 medication(s) that are the same as other medications prescribed for you. Read the directions carefully, and ask your doctor or other care provider to review them with you.

## 2018-07-14 ASSESSMENT — ANXIETY QUESTIONNAIRES: GAD7 TOTAL SCORE: 1

## 2018-07-14 ASSESSMENT — PATIENT HEALTH QUESTIONNAIRE - PHQ9: SUM OF ALL RESPONSES TO PHQ QUESTIONS 1-9: 3

## 2018-07-16 ENCOUNTER — TELEPHONE (OUTPATIENT)
Dept: FAMILY MEDICINE | Facility: CLINIC | Age: 83
End: 2018-07-16

## 2018-07-16 NOTE — TELEPHONE ENCOUNTER
S-(situation): Patient reports she continues to have dull headache. Patient reports her anxiety has improved but she does not like to take the Xanax.    B-(background): Patient has been seen in ER and in clinic for anxiety.    A-(assessment): Patient reports she continues to have headache with no other symptoms.  Patient states it is a dull headache. Patient reports her anxiety has improved.  Patient did take 4 xanax since Thursday.  Patient has not took any Xanax since Saturday morning. Patient denies trying any OTC meds for headache. Patient has been taking 20 mg of Lexapro.     R-(recommendations): Advised patient ok to try tylenol.  Advised patient to keep appt with PCP on 7/18/18.  Will send to provider to review and advise.    Thank you  Stefani PRESCOTT RN

## 2018-07-16 NOTE — TELEPHONE ENCOUNTER
Reason for Call:  anxiety    Detailed comments: patient is calling and stating she was seen on Friday and was put on Xanax, and all she has done is sleep, so she is not taking that, but doubling her dose of Lexapro. She states she still has headaches and anxiety. Please advise.    Phone Number Patient can be reached at: Home number on file [phone] (home)    Best Time: any    Can we leave a detailed message on this number? YES   Elsy Knox  [facility] [place] [facility]      Call taken on 7/16/2018 at 11:21 AM by Elsy Knox

## 2018-07-20 ENCOUNTER — OFFICE VISIT (OUTPATIENT)
Dept: FAMILY MEDICINE | Facility: CLINIC | Age: 83
End: 2018-07-20
Payer: COMMERCIAL

## 2018-07-20 VITALS
SYSTOLIC BLOOD PRESSURE: 126 MMHG | HEART RATE: 82 BPM | HEIGHT: 68 IN | WEIGHT: 176.8 LBS | DIASTOLIC BLOOD PRESSURE: 83 MMHG | OXYGEN SATURATION: 96 % | BODY MASS INDEX: 26.8 KG/M2 | TEMPERATURE: 98.5 F

## 2018-07-20 DIAGNOSIS — R94.31 ABNORMAL ELECTROCARDIOGRAM: Primary | ICD-10-CM

## 2018-07-20 DIAGNOSIS — F41.1 GENERALIZED ANXIETY DISORDER: ICD-10-CM

## 2018-07-20 PROCEDURE — 99214 OFFICE O/P EST MOD 30 MIN: CPT | Performed by: FAMILY MEDICINE

## 2018-07-20 RX ORDER — ESCITALOPRAM OXALATE 10 MG/1
10 TABLET ORAL DAILY
Qty: 90 TABLET | Refills: 3 | Status: SHIPPED | OUTPATIENT
Start: 2018-07-20 | End: 2018-07-20

## 2018-07-20 RX ORDER — ESCITALOPRAM OXALATE 20 MG/1
20 TABLET ORAL DAILY
Qty: 30 TABLET | Refills: 1
Start: 2018-07-20 | End: 2018-08-29

## 2018-07-20 ASSESSMENT — ANXIETY QUESTIONNAIRES
6. BECOMING EASILY ANNOYED OR IRRITABLE: NOT AT ALL
7. FEELING AFRAID AS IF SOMETHING AWFUL MIGHT HAPPEN: NOT AT ALL
1. FEELING NERVOUS, ANXIOUS, OR ON EDGE: SEVERAL DAYS
2. NOT BEING ABLE TO STOP OR CONTROL WORRYING: NOT AT ALL
5. BEING SO RESTLESS THAT IT IS HARD TO SIT STILL: NOT AT ALL
GAD7 TOTAL SCORE: 2
3. WORRYING TOO MUCH ABOUT DIFFERENT THINGS: NOT AT ALL

## 2018-07-20 ASSESSMENT — PATIENT HEALTH QUESTIONNAIRE - PHQ9: 5. POOR APPETITE OR OVEREATING: SEVERAL DAYS

## 2018-07-20 NOTE — MR AVS SNAPSHOT
After Visit Summary   7/20/2018    Bonita Villarreal    MRN: [id number]           Patient Information     Date Of Birth          1935        Visit Information        Provider Department      7/20/2018 8:00 AM Dewayne Santana MD [facility]        Today's Diagnoses     Abnormal electrocardiogram    -  1    GENERALIZED ANXIETY DIS           Follow-ups after your visit        Your next 10 appointments already scheduled     Aug 16, 2018  8:45 AM CDT   Ech Stress Test with [place] ECHO STRESS 2   [facility] ([facility])    [address] [facility] [address]   [phone]           1. Please bring or wear a comfortable two-piece outfit and walking shoes. 2. Stop eating 3 hours before the test. You may drink water or juice. 3. Stop all caffeine 12 hours before the test. This includes coffee, tea, soda pop, chocolate and certain medicines (such as Anacin and Excederin). Also avoid decaf coffee and tea, as these contain small amounts of caffeine. 4. No alcohol, smoking or use of other tobacco products for 12 hours before the test. 5. Refer to your provider instructions to see if you need to stop any medications (such as beta-blockers or nitrates) for this test. 6. For patients with diabetes: - If you take insulin, call your diabetes care team. Ask if you should take a   dose the morning of your test. - If you take diabetes medicine by mouth, dont take it on the morning of your test. Bring it with you to take after the test. (If you have questions, call your diabetes care team) 7. When you arrive, please tell us if: - You have diabetes. - You have taken Viagra, Cialis or Levitra in the past 48 hours. 8. For any questions that cannot be answered, please contact the ordering physician 9. Please do not wear perfumes or scented lotions on the day of your exam.            Aug 23, 2018  9:00 AM CDT   SHORT with Dewayne Bermudez  "MD Max   [facility] ([facility])    [address]  [facility] [id number] [phone]              Who to contact     If you have questions or need follow up information about today's clinic visit or your schedule please contact [facility] directly at [phone].  Normal or non-critical lab and imaging results will be communicated to you by MyChart, letter or phone within 4 business days after the clinic has received the results. If you do not hear from us within 7 days, please contact the clinic through MyChart or phone. If you have a critical or abnormal lab result, we will notify you by phone as soon as possible.  Submit refill requests through [employer] or call your pharmacy and they will forward the refill request to us. Please allow 3 business days for your refill to be completed.          Additional Information About Your Visit        Care EveryWhere ID     This is your Care EveryWhere ID. This could be used by other organizations to access your [facility] medical records  [id number]        Your Vitals Were     Pulse Temperature Height Pulse Oximetry BMI (Body Mass Index)       82 98.5  F (36.9  C) (Tympanic) 5' 7.5\" (1.715 m) 96% 27.28 kg/m2        Blood Pressure from Last 3 Encounters:   07/20/18 126/83   07/13/18 120/70   07/12/18 148/80    Weight from Last 3 Encounters:   07/20/18 176 lb 12.8 oz (80.2 kg)   07/13/18 178 lb 3.2 oz (80.8 kg)   06/28/18 188 lb 11.4 oz (85.6 kg)                 Today's Medication Changes          These changes are accurate as of 7/20/18 11:59 PM.  If you have any questions, ask your nurse or doctor.               Start taking these medicines.        Dose/Directions    escitalopram 20 MG tablet   Commonly known as:  LEXAPRO   Used for:  Generalized anxiety disorder   Started by:  Dewayne Santana MD        Dose:  20 mg   Take 1 tablet (20 mg) by mouth daily   Quantity:  30 tablet   Refills:  1 "         Stop taking these medicines if you haven't already. Please contact your care team if you have questions.     ALPRAZolam 0.5 MG tablet   Commonly known as:  XANAX   Stopped by:  Dewayne Santana MD                Where to get your medicines      Some of these will need a paper prescription and others can be bought over the counter.  Ask your nurse if you have questions.     You don't need a prescription for these medications     escitalopram 20 MG tablet                Primary Care Provider Office Phone # Fax #    Dewayne Santana [phone] [address] [facility] [address]        Equal Access to Services     [facility]: Hadii aad ku hadasho Soomaali, waaxda luqadaha, qaybta kaalmada adeegyada, waxyessica negrete haychariton adesapna chung . So [facility] [phone].    ATENCIÓN: Si habla español, tiene a hylton disposición servicios gratuitos de asistencia lingüística. Llame al [phone].    We comply with applicable federal civil rights laws and [place] laws. We do not discriminate on the basis of race, color, national origin, age, disability, sex, sexual orientation, or gender identity.            Thank you!     Thank you for choosing [facility]  for your care. Our goal is always to provide you with excellent care. Hearing back from our patients is one way we can continue to improve our services. Please take a few minutes to complete the written survey that you may receive in the mail after your visit with us. Thank you!             Your Updated Medication List - Protect others around you: Learn how to safely use, store and throw away your medicines at www.disposemymeds.org.          This list is accurate as of 7/20/18 11:59 PM.  Always use your most recent med list.                   Brand Name Dispense Instructions for use Diagnosis    atenolol 100 MG tablet    TENORMIN    90 tablet    Take 1 tablet (100 mg) by mouth daily    Essential  hypertension, benign       calcium carbonate 500 MG tablet    OS-JEFF 500 mg [place]. Ca     Take 1 tablet by mouth daily        escitalopram 20 MG tablet    LEXAPRO    30 tablet    Take 1 tablet (20 mg) by mouth daily    Generalized anxiety disorder       FISH OIL PO      1 tab daily        losartan-hydrochlorothiazide 100-12.5 MG per tablet    HYZAAR    90 tablet    Take 1 tablet by mouth daily    Essential hypertension, benign       Multi-vitamin Tabs tablet   Generic drug:  multivitamin, therapeutic with minerals      1 DAILY

## 2018-07-20 NOTE — PROGRESS NOTES
"  SUBJECTIVE:   Bonita Villarreal is a 83 year old female who presents to clinic today for the following health issues:    Anxiety Follow-Up    Status since last visit: Worsened     Other associated symptoms: xanax made her sleep all the time, only took it for 2 days     Complicating factors:   Significant life event: No   Current substance abuse: None  Depression symptoms: No    Amount of exercise or physical activity: 2-3 days/week for an average of 45-60 minutes    Problems taking medications regularly: No    Medication side effects: lightheadedness and headaches with dizziness     Diet: regular (no restrictions)    ADDITIONAL HPI: 83 year old female here for above issue.  Was hospitalized 6/28 for fever and headaches. Work-up showed pyuria and otherwise unremarkable.     Feels woozy when changing positions. Symptoms don't typically happen when she's sitting or laying. Happens rarely now.    ROS: 10 point review of systems negative except as per HPI.    PAST MEDICAL HISTORY:  Past Medical History:   Diagnosis Date     Advance Care Planning 2/28/2012    Advance Care Planning 3/20/2016: Receipt of ACP document:  Received: Health Care Directive which was witnessed or notarized on 2/25/16.  Document not previously scanned.  Validation form completed and sent with document to be scanned.  Code Status needs to be updated to reflect choices in most recent ACP document.  Confirmed/documented designated decision maker(s).  Added by Cata Terrell RN, Advance Care Planning Liaison. Advance Care Planning 2/28/12: Patient does not have an Advance/Health Care Directive (HCD), given \"What is Advance Care Planning?\" flyer. Teena Peace       Arthritis      Diverticulitis of colon 6/28/2005    Patient keeps Rx for Augmentin to use PRN Problem list name updated by automated process. Provider to review     Diverticulosis of colon (without mention of hemorrhage)     history of recurrent diverticulitis     Hypertension     "     ACTIVE MEDICAL PROBLEMS:  Patient Active Problem List   Diagnosis     Essential hypertension, benign     Generalized anxiety disorder     Diverticulosis of large intestine     Osteopenia of spine     Family history of other cardiovascular diseases     CARDIOVASCULAR SCREENING; LDL GOAL LESS THAN 160     Status post total right knee replacement     Right knee DJD     Headache     Pulmonary infiltrate in right lung on chest x-ray        FAMILY HISTORY:  Family History   Problem Relation Age of Onset     Lipids Brother      HEART DISEASE Brother       at 51 of an MI     Cancer Brother      bone, lung,  82     Prostate Cancer Brother      Cancer Sister      cervical     Lipids Sister      HEART DISEASE Sister      eldest sister, s/p MI x 2 with stents; diagnosed with AAA, postoperative complications and MI,  at 75     HEART DISEASE Brother      MI x 2     Lipids Brother      C.A.D. Father       at 39 of an MI     Family History Negative Mother      lived to age 95     Gynecology Sister      cervical Ca       SOCIAL HISTORY:  Social History     Social History     Marital status:      Spouse name: N/A     Number of children: N/A     Years of education: N/A     Occupational History     Not on file.     Social History Main Topics     Smoking status: Never Smoker     Smokeless tobacco: Never Used     Alcohol use Yes      Comment: rare     Drug use: No     Sexual activity: No     Other Topics Concern     Parent/Sibling W/ Cabg, Mi Or Angioplasty Before 65f 55m? Yes     sister bypass,brother bypass,brother MI     Social History Narrative       MEDICATIONS:  Current Outpatient Prescriptions   Medication Sig Dispense Refill     atenolol (TENORMIN) 100 MG tablet Take 1 tablet (100 mg) by mouth daily 90 tablet 3     calcium carbonate (OS-JEFF 500 MG [place]. CA) 500 MG tablet Take 1 tablet by mouth daily       escitalopram (LEXAPRO) 20 MG tablet Take 1 tablet (20 mg) by mouth daily 30 tablet 1     FISH OIL  "OR 1 tab daily       losartan-hydrochlorothiazide (HYZAAR) 100-12.5 MG per tablet Take 1 tablet by mouth daily 90 tablet 3     MULTI-VITAMIN OR TABS 1 DAILY       ALPRAZolam (XANAX) 0.5 MG tablet Take 1 tablet (0.5 mg) by mouth 3 times daily as needed for anxiety (Patient not taking: Reported on 7/20/2018) 10 tablet 0       ALLERGIES:     Allergies   Allergen Reactions     Lisinopril Cough       Problem list, Medication list, Allergies, and Medical/Social/Surgical histories reviewed in [facility] and updated as appropriate.  OBJECTIVE:                                                    VITALS: /83  Pulse 74  Temp 98.5  F (36.9  C) (Tympanic)  Ht 5' 7.5\" (1.715 m)  Wt 176 lb 12.8 oz (80.2 kg)  SpO2 96%  BMI 27.28 kg/m2 Body mass index is 27.28 kg/(m^2).  GENERAL: Pleasant, well appearing female.  HEENT: PERRL, EOMI, oropharynx clear.  NECK: supple, no thyromegaly or thyroid masses, no lymphadenopathy.  CV: RRR, no murmurs, rubs or gallops.  LUNGS: Clear to auscultation bilaterally, normal effort.  ABDOMEN: Soft, non-distended, non-tender.  No hepatosplenomegaly or palpable masses.    SKIN: warm and dry without obvious rashes.   EXTREMITIES: No edema, normal pulses.   PSYCH: Alert and oriented x3, neatly dressed and well groomed, makes good eye contact, fluid speech - non-pressured, no psychomotor agitation or slowing, good memory, judgement and insight, no auditory or visual hallucinations, bright affect which is mood congruent.     EKG: (read and interpreted by me): Sinus  Rhythm   -Left bundle branch block.       ASSESSMENT/PLAN:                                                    1. GENERALIZED ANXIETY DIS  Uncontrolled. Increase lexapro. Follow-up if not improving or if worsening.   - escitalopram (LEXAPRO) 20 MG tablet; Take 1 tablet (20 mg) by mouth daily  Dispense: 30 tablet; Refill: 1    2. Abnormal electrocardiogram  Given symptoms and LBB will check stress ECHO to rule out cardiac ischemia. If chest " pain in the interim 911.  - Exercise Stress Echocardiogram; Future

## 2018-07-21 ASSESSMENT — ANXIETY QUESTIONNAIRES: GAD7 TOTAL SCORE: 2

## 2018-08-14 ENCOUNTER — TELEPHONE (OUTPATIENT)
Dept: FAMILY MEDICINE | Facility: CLINIC | Age: 83
End: 2018-08-14

## 2018-08-14 DIAGNOSIS — R07.9 CHEST PAIN, UNSPECIFIED TYPE: Primary | ICD-10-CM

## 2018-08-14 NOTE — TELEPHONE ENCOUNTER
This patient is scheduled to come in for a stress echo. Thursday by Dewayne. She has a Left Bundle branch Block. Cardiology prefers people with LBBB to have a lexiscan nuclear test.     was hoping to reschedule her to tomorrow so if someone today (MD) could address it that would beautiful      Above message from cardiac testing staff.   Provider covering Dr ISAC Santana, please advise .     Daisy Mullins RNC

## 2018-08-14 NOTE — TELEPHONE ENCOUNTER
Comfort MONROY from cardiac services notified Bonita of the change, and the beta blocker.   Daisy Mullins RNC

## 2018-08-15 ENCOUNTER — HOSPITAL ENCOUNTER (OUTPATIENT)
Dept: NUCLEAR MEDICINE | Facility: CLINIC | Age: 83
Setting detail: NUCLEAR MEDICINE
Discharge: HOME | End: 2018-08-15
Attending: FAMILY MEDICINE | Admitting: FAMILY MEDICINE
Payer: MEDICARE

## 2018-08-15 DIAGNOSIS — R07.9 CHEST PAIN, UNSPECIFIED TYPE: ICD-10-CM

## 2018-08-15 PROCEDURE — 93018 CV STRESS TEST I&R ONLY: CPT | Performed by: INTERNAL MEDICINE

## 2018-08-15 PROCEDURE — 93017 CV STRESS TEST TRACING ONLY: CPT

## 2018-08-15 PROCEDURE — 78452 HT MUSCLE IMAGE SPECT MULT: CPT | Mod: 26 | Performed by: INTERNAL MEDICINE

## 2018-08-15 PROCEDURE — A9502 TC99M TETROFOSMIN: HCPCS | Performed by: INTERNAL MEDICINE

## 2018-08-15 PROCEDURE — 78452 HT MUSCLE IMAGE SPECT MULT: CPT

## 2018-08-15 PROCEDURE — 93016 CV STRESS TEST SUPVJ ONLY: CPT | Performed by: INTERNAL MEDICINE

## 2018-08-15 PROCEDURE — 25000128 H RX IP 250 OP 636: Performed by: FAMILY MEDICINE

## 2018-08-15 PROCEDURE — 34300033 ZZH RX 343: Performed by: INTERNAL MEDICINE

## 2018-08-15 RX ORDER — REGADENOSON 0.08 MG/ML
0.4 INJECTION, SOLUTION INTRAVENOUS ONCE
Status: COMPLETED | OUTPATIENT
Start: 2018-08-15 | End: 2018-08-15

## 2018-08-15 RX ADMIN — TETROFOSMIN 10.3 MCI.: 1.38 INJECTION, POWDER, LYOPHILIZED, FOR SOLUTION INTRAVENOUS at 08:35

## 2018-08-15 RX ADMIN — TETROFOSMIN 30.9 MCI.: 1.38 INJECTION, POWDER, LYOPHILIZED, FOR SOLUTION INTRAVENOUS at 10:30

## 2018-08-15 RX ADMIN — REGADENOSON 0.4 MG: 0.08 INJECTION, SOLUTION INTRAVENOUS at 10:23

## 2018-08-16 NOTE — PROGRESS NOTES
Please CALL PATIENT    Notify patient of NORMAL results. No sign of poor blood flow to the heart on this test

## 2018-08-29 ENCOUNTER — OFFICE VISIT (OUTPATIENT)
Dept: FAMILY MEDICINE | Facility: CLINIC | Age: 83
End: 2018-08-29
Payer: COMMERCIAL

## 2018-08-29 VITALS
SYSTOLIC BLOOD PRESSURE: 130 MMHG | RESPIRATION RATE: 12 BRPM | HEIGHT: 68 IN | OXYGEN SATURATION: 94 % | TEMPERATURE: 97.4 F | BODY MASS INDEX: 27.55 KG/M2 | WEIGHT: 181.8 LBS | HEART RATE: 75 BPM | DIASTOLIC BLOOD PRESSURE: 62 MMHG

## 2018-08-29 DIAGNOSIS — F41.1 GENERALIZED ANXIETY DISORDER: ICD-10-CM

## 2018-08-29 PROCEDURE — 99213 OFFICE O/P EST LOW 20 MIN: CPT | Performed by: FAMILY MEDICINE

## 2018-08-29 RX ORDER — ESCITALOPRAM OXALATE 20 MG/1
20 TABLET ORAL DAILY
Qty: 90 TABLET | Refills: 3 | Status: SHIPPED | OUTPATIENT
Start: 2018-08-29 | End: 2018-10-03

## 2018-08-29 ASSESSMENT — PAIN SCALES - GENERAL: PAINLEVEL: NO PAIN (0)

## 2018-08-29 NOTE — MR AVS SNAPSHOT
After Visit Summary   8/29/2018    Bonita Villarreal    MRN: [id number]           Patient Information     Date Of Birth          1935        Visit Information        Provider Department      8/29/2018 12:40 PM Dewayne Santana MD [facility]        Today's Diagnoses     GENERALIZED ANXIETY DIS          Care Instructions          Thank you for choosing [facility].  You may be receiving a survey in the mail from [facility] regarding your visit today.  Please take a few minutes to complete and return the survey to let us know how we are doing.      Our Clinic hours are:  Mondays    7:20 am - 7 pm  Tues - Fri  7:20 am - 5 pm    Clinic Phone: [phone]    The clinic lab opens at 7:30 am Mon - Fri and appointments are required.    [facility]  Ph. [phone]  Monday  8 am - 7pm  Tues - Fri 8 am - 5:30 pm               Follow-ups after your visit        Who to contact     If you have questions or need follow up information about today's clinic visit or your schedule please contact [facility] directly at [phone].  Normal or non-critical lab and imaging results will be communicated to you by MyChart, letter or phone within 4 business days after the clinic has received the results. If you do not hear from us within 7 days, please contact the clinic through MyChart or phone. If you have a critical or abnormal lab result, we will notify you by phone as soon as possible.  Submit refill requests through [employer] or call your pharmacy and they will forward the refill request to us. Please allow 3 business days for your refill to be completed.          Additional Information About Your Visit        Care EveryWhere ID     This is your Care EveryWhere ID. This could be used by other organizations to access your [facility] medical records  [id number]        Your Vitals Were     Pulse Temperature Respirations Height Pulse Oximetry  "BMI (Body Mass Index)    75 97.4  F (36.3  C) (Tympanic) 12 5' 7.5\" (1.715 m) 94% 28.05 kg/m2       Blood Pressure from Last 3 Encounters:   08/29/18 130/62   07/20/18 126/83   07/13/18 120/70    Weight from Last 3 Encounters:   08/29/18 181 lb 12.8 oz (82.5 kg)   07/20/18 176 lb 12.8 oz (80.2 kg)   07/13/18 178 lb 3.2 oz (80.8 kg)              Today, you had the following     No orders found for display         Where to get your medicines      These medications were sent to [facility] - - IR[place] Se[place]  [id number] [address]    Hours:  TONY Sexton [facility] Phone:  [phone]     escitalopram 20 MG tablet          Primary Care Provider Office Phone # Fax #    Dewayne Lara Santana [phone] [address] [facility] [address]        Equal Access to Services     [facility]: Hadii aura ku hadasho Soomaali, waaxda luqadaha, qaybta kaalmada adeegyada, cameron chung . So [facility] [phone].    ATENCIÓN: Si habla español, tiene a hylton disposición servicios gratuitos de asistencia lingüística. [facility] [phone].    We comply with applicable federal civil rights laws and [place] laws. We do not discriminate on the basis of race, color, national origin, age, disability, sex, sexual orientation, or gender identity.            Thank you!     Thank you for choosing [facility]  for your care. Our goal is always to provide you with excellent care. Hearing back from our patients is one way we can continue to improve our services. Please take a few minutes to complete the written survey that you may receive in the mail after your visit with us. Thank you!             Your Updated Medication List - Protect others around you: Learn how to safely use, store and throw away your medicines at www.disposemymeds.org.          This list is accurate as of 8/29/18  2:45 PM.  Always use " your most recent med list.                   Brand Name Dispense Instructions for use Diagnosis    atenolol 100 MG tablet    TENORMIN    90 tablet    Take 1 tablet (100 mg) by mouth daily    Essential hypertension, benign       calcium carbonate 500 mg {elemental} 500 MG tablet    OS-JEFF     Take 1 tablet by mouth daily        escitalopram 20 MG tablet    LEXAPRO    90 tablet    Take 1 tablet (20 mg) by mouth daily    Generalized anxiety disorder       FISH OIL PO      1 tab daily        losartan-hydrochlorothiazide 100-12.5 MG per tablet    HYZAAR    90 tablet    Take 1 tablet by mouth daily    Essential hypertension, benign       Multi-vitamin Tabs tablet   Generic drug:  multivitamin, therapeutic with minerals      1 DAILY

## 2018-08-29 NOTE — PROGRESS NOTES
"  SUBJECTIVE:   Bonita Villarreal is a 83 year old female who presents to clinic today for the following health issues:  .    Medication Followup of  lexapro    Taking Medication as prescribed: yes    Side Effects:  None    Medication Helping Symptoms:  yes     ADDITIONAL HPI: 83 year old female here for above issue.     ROS: 10 point review of systems negative except as per HPI.    PAST MEDICAL HISTORY:  Past Medical History:   Diagnosis Date     Advance Care Planning 2012    Advance Care Planning 3/20/2016: Receipt of ACP document:  Received: Health Care Directive which was witnessed or notarized on 16.  Document not previously scanned.  Validation form completed and sent with document to be scanned.  Code Status needs to be updated to reflect choices in most recent ACP document.  Confirmed/documented designated decision maker(s).  Added by Cata Terrell RN, Advance Care Planning Liaison. Advance Care Planning 12: Patient does not have an Advance/Health Care Directive (HCD), given \"What is Advance Care Planning?\" flyer. Teena Peace       Arthritis      Diverticulitis of colon 2005    Patient keeps Rx for Augmentin to use PRN Problem list name updated by automated process. Provider to review     Diverticulosis of colon (without mention of hemorrhage)     history of recurrent diverticulitis     Hypertension         ACTIVE MEDICAL PROBLEMS:  Patient Active Problem List   Diagnosis     Essential hypertension, benign     Generalized anxiety disorder     Diverticulosis of large intestine     Osteopenia of spine     Family history of other cardiovascular diseases     CARDIOVASCULAR SCREENING; LDL GOAL LESS THAN 160     Status post total right knee replacement     Right knee DJD     Headache     Pulmonary infiltrate in right lung on chest x-ray        FAMILY HISTORY:  Family History   Problem Relation Age of Onset     Lipids Brother      HEART DISEASE Brother       at 51 of an MI     " Cancer Brother      bone, lung,  82     Prostate Cancer Brother      Cancer Sister      cervical     Lipids Sister      HEART DISEASE Sister      eldest sister, s/p MI x 2 with stents; diagnosed with AAA, postoperative complications and MI,  at 75     HEART DISEASE Brother      MI x 2     Lipids Brother      C.A.D. Father       at 39 of an MI     Family History Negative Mother      lived to age 95     Gynecology Sister      cervical Ca       SOCIAL HISTORY:  Social History     Social History     Marital status:      Spouse name: N/A     Number of children: N/A     Years of education: N/A     Occupational History     Not on file.     Social History Main Topics     Smoking status: Never Smoker     Smokeless tobacco: Never Used     Alcohol use Yes      Comment: rare     Drug use: No     Sexual activity: No     Other Topics Concern     Parent/Sibling W/ Cabg, Mi Or Angioplasty Before 65f 55m? Yes     sister bypass,brother bypass,brother MI     Social History Narrative       MEDICATIONS:  Current Outpatient Prescriptions   Medication Sig Dispense Refill     atenolol (TENORMIN) 100 MG tablet Take 1 tablet (100 mg) by mouth daily 90 tablet 3     calcium carbonate (OS-JEFF 500 MG [place]) 500 MG tablet Take 1 tablet by mouth daily       escitalopram (LEXAPRO) 20 MG tablet Take 1 tablet (20 mg) by mouth daily 90 tablet 3     FISH OIL OR 1 tab daily       losartan-hydrochlorothiazide (HYZAAR) 100-12.5 MG per tablet Take 1 tablet by mouth daily 90 tablet 3     MULTI-VITAMIN OR TABS 1 DAILY         ALLERGIES:     Allergies   Allergen Reactions     Lisinopril Cough       Problem list, Medication list, Allergies, and Medical/Social/Surgical histories reviewed in [facility] and updated as appropriate.    OBJECTIVE:                                                    VITALS: /62 (BP Location: Right arm, Patient Position: Chair, Cuff Size: Adult Regular)  Pulse 75  Temp 97.4  F (36.3  C) (Tympanic)  Resp 12  Ht  "5' 7.5\" (1.715 m)  Wt 181 lb 12.8 oz (82.5 kg)  SpO2 94%  BMI 28.05 kg/m2 Body mass index is 28.05 kg/(m^2).  GENERAL: Pleasant, well appearing female.  PSYCH: Alert and oriented x3, neatly dressed and well groomed, makes good eye contact, fluid speech - non-pressured, no psychomotor agitation or slowing, good memory, judgement and insight, no auditory or visual hallucinations, bright affect which is mood congruent.     ASSESSMENT/PLAN:                                                    1. GENERALIZED ANXIETY DIS  Well controlled. Refilled medication.     - escitalopram (LEXAPRO) 20 MG tablet; Take 1 tablet (20 mg) by mouth daily  Dispense: 90 tablet; Refill: 3       "

## 2018-08-29 NOTE — PATIENT INSTRUCTIONS
Thank you for choosing [facility].  You may be receiving a survey in the mail from [facility] regarding your visit today.  Please take a few minutes to complete and return the survey to let us know how we are doing.      Our Clinic hours are:  Mondays    7:20 am - 7 pm  Tues - Fri  7:20 am - 5 pm    Clinic Phone: [phone]    The clinic lab opens at 7:30 am Mon - Fri and appointments are required.    [facility]. [phone]  Monday  8 am - 7pm  Tues - Fri 8 am - 5:30 pm

## 2018-10-03 ENCOUNTER — TELEPHONE (OUTPATIENT)
Dept: FAMILY MEDICINE | Facility: CLINIC | Age: 83
End: 2018-10-03

## 2018-10-03 DIAGNOSIS — F41.1 GENERALIZED ANXIETY DISORDER: ICD-10-CM

## 2018-10-03 RX ORDER — ESCITALOPRAM OXALATE 20 MG/1
10 TABLET ORAL DAILY
Qty: 90 TABLET | Refills: 3
Start: 2018-10-03 | End: 2019-05-30

## 2018-10-03 NOTE — TELEPHONE ENCOUNTER
Patient is calling stating that she cut her Lexapro dose from 20 mg to 10 mg and it has been for 2 weeks now, and she is doing just fine.  Elsy Knox  [facility] [place] [facility]

## 2018-10-04 ENCOUNTER — ALLIED HEALTH/NURSE VISIT (OUTPATIENT)
Dept: FAMILY MEDICINE | Facility: CLINIC | Age: 83
End: 2018-10-04
Payer: COMMERCIAL

## 2018-10-04 DIAGNOSIS — Z23 NEED FOR PROPHYLACTIC VACCINATION AND INOCULATION AGAINST INFLUENZA: Primary | ICD-10-CM

## 2018-10-04 PROCEDURE — 90662 IIV NO PRSV INCREASED AG IM: CPT

## 2018-10-04 PROCEDURE — G0008 ADMIN INFLUENZA VIRUS VAC: HCPCS

## 2018-10-04 NOTE — PROGRESS NOTES

## 2018-10-04 NOTE — MR AVS SNAPSHOT
After Visit Summary   10/4/2018    Bonita Villarreal    MRN: [id number]           Patient Information     Date Of Birth          1935        Visit Information        Provider Department      10/4/2018 10:15 AM Madelyn/Silviano Quintanilla [facility]        Today's Diagnoses     Need for prophylactic vaccination and inoculation against influenza    -  1       Follow-ups after your visit        Who to contact     If you have questions or need follow up information about today's clinic visit or your schedule please contact [facility] directly at [phone].  Normal or non-critical lab and imaging results will be communicated to you by MyChart, letter or phone within 4 business days after the clinic has received the results. If you do not hear from us within 7 days, please contact the clinic through MyChart or phone. If you have a critical or abnormal lab result, we will notify you by phone as soon as possible.  Submit refill requests through [employer] or call your pharmacy and they will forward the refill request to us. Please allow 3 business days for your refill to be completed.          Additional Information About Your Visit        Care EveryWhere ID     This is your Care EveryWhere ID. This could be used by other organizations to access your [facility] medical records  [id number]         Blood Pressure from Last 3 Encounters:   08/29/18 130/62   07/20/18 126/83   07/13/18 120/70    Weight from Last 3 Encounters:   08/29/18 181 lb 12.8 oz (82.5 kg)   07/20/18 176 lb 12.8 oz (80.2 kg)   07/13/18 178 lb 3.2 oz (80.8 kg)              We Performed the Following     ADMIN INFLUENZA (For MEDICARE Patients ONLY) []     FLU VACCINE, INCREASED ANTIGEN, PRESV FREE, AGE 65+ [04813]        Primary Care Provider Office Phone # Fax #    Dewayne Santnaa [phone] [address]  [facility] [address]        Equal Access to Services     DARIEN  GAAR : Hadii aad dennis saloni Rodriges, waaxda luqadaha, qaybta kadeliciada jaden, waxyessica caden haymorales sonelianajessica chung . So [facility] [phone].    ATENCIÓN: Si habla español, tiene a hylton disposición servicios gratuitos de asistencia lingüística. Llame al [phone].    We comply with applicable federal civil rights laws and [place] laws. We do not discriminate on the basis of race, color, national origin, age, disability, sex, sexual orientation, or gender identity.            Thank you!     Thank you for choosing [facility]  for your care. Our goal is always to provide you with excellent care. Hearing back from our patients is one way we can continue to improve our services. Please take a few minutes to complete the written survey that you may receive in the mail after your visit with us. Thank you!             Your Updated Medication List - Protect others around you: Learn how to safely use, store and throw away your medicines at www.disposemymeds.org.          This list is accurate as of 10/4/18 10:24 AM.  Always use your most recent med list.                   Brand Name Dispense Instructions for use Diagnosis    atenolol 100 MG tablet    TENORMIN    90 tablet    Take 1 tablet (100 mg) by mouth daily    Essential hypertension, benign       calcium carbonate 500 mg (elemental) 500 MG tablet    OS-JEFF     Take 1 tablet by mouth daily        escitalopram 20 MG tablet    LEXAPRO    90 tablet    Take 0.5 tablets (10 mg) by mouth daily    Generalized anxiety disorder       FISH OIL PO      1 tab daily        losartan-hydrochlorothiazide 100-12.5 MG per tablet    HYZAAR    90 tablet    Take 1 tablet by mouth daily    Essential hypertension, benign       Multi-vitamin Tabs tablet   Generic drug:  multivitamin, therapeutic with minerals      1 DAILY

## 2018-10-04 NOTE — NURSING NOTE
Prior to injection verified patient identity using patient's name and date of birth.  Due to injection administration, patient instructed to remain in clinic for 15 minutes  afterwards, and to report any adverse reaction to me immediately.    Ana Mccoy CMA

## 2018-10-22 ENCOUNTER — TELEPHONE (OUTPATIENT)
Dept: FAMILY MEDICINE | Facility: CLINIC | Age: 83
End: 2018-10-22

## 2018-10-22 DIAGNOSIS — K57.92 DIVERTICULITIS: Primary | ICD-10-CM

## 2018-10-22 NOTE — TELEPHONE ENCOUNTER
Reason for call:  Patient reporting a symptom    Symptom or request: Pt has had diverticulitis pain x 2 days and is asking that Dr. Santana send an Rx to [facility] for an antibiotic.  Please call patient and advise.      Duration (how long have symptoms been present): 2 days    Have you been treated for this before? Yes    Additional comments:     Phone Number patient can be reached at:  Home number on file [phone] (home)    Best Time:  any    Can we leave a detailed message on this number:  YES    Call taken on 10/22/2018 at 9:13 AM by Pao Corona

## 2018-10-22 NOTE — TELEPHONE ENCOUNTER
Diverticulitis pain.  Reviewed med list.  Pt has been treated several times with Augmentin 875-125 #20 + 2 refills in the past, last being 6/30/18.  Advise.  Jenn

## 2019-03-01 ENCOUNTER — APPOINTMENT (OUTPATIENT)
Dept: GENERAL RADIOLOGY | Facility: CLINIC | Age: 84
End: 2019-03-01
Attending: PHYSICIAN ASSISTANT
Payer: COMMERCIAL

## 2019-03-01 ENCOUNTER — HOSPITAL ENCOUNTER (EMERGENCY)
Facility: CLINIC | Age: 84
Discharge: HOME | End: 2019-03-01
Attending: PHYSICIAN ASSISTANT | Admitting: PHYSICIAN ASSISTANT
Payer: COMMERCIAL

## 2019-03-01 VITALS
BODY MASS INDEX: 29.01 KG/M2 | HEART RATE: 80 BPM | RESPIRATION RATE: 16 BRPM | TEMPERATURE: 98 F | DIASTOLIC BLOOD PRESSURE: 69 MMHG | SYSTOLIC BLOOD PRESSURE: 190 MMHG | OXYGEN SATURATION: 95 % | WEIGHT: 188 LBS

## 2019-03-01 DIAGNOSIS — S82.841A BIMALLEOLAR FRACTURE OF RIGHT ANKLE, CLOSED, INITIAL ENCOUNTER: ICD-10-CM

## 2019-03-01 PROCEDURE — 73610 X-RAY EXAM OF ANKLE: CPT | Mod: RT

## 2019-03-01 PROCEDURE — 73610 X-RAY EXAM OF ANKLE: CPT | Mod: RT,76

## 2019-03-01 PROCEDURE — 27808 TREATMENT OF ANKLE FRACTURE: CPT

## 2019-03-01 PROCEDURE — G0463 HOSPITAL OUTPT CLINIC VISIT: HCPCS | Mod: 25

## 2019-03-01 PROCEDURE — 99213 OFFICE O/P EST LOW 20 MIN: CPT | Mod: 25 | Performed by: PHYSICIAN ASSISTANT

## 2019-03-01 PROCEDURE — 27808 TREATMENT OF ANKLE FRACTURE: CPT | Mod: Z6 | Performed by: PHYSICIAN ASSISTANT

## 2019-03-01 PROCEDURE — 73560 X-RAY EXAM OF KNEE 1 OR 2: CPT | Mod: RT

## 2019-03-01 ASSESSMENT — ENCOUNTER SYMPTOMS
NUMBNESS: 0
WOUND: 0
WEAKNESS: 0

## 2019-03-01 NOTE — ED AVS SNAPSHOT
[facility] Emergency Department  [address] [facility] [address]  Phone:  [phone]  Fax:  [phone]                                    Bonita Villarreal   MRN: [id number]    Department:  [facility] Emergency Department   Date of Visit:  3/1/2019           After Visit Summary Signature Page    I have received my discharge instructions, and my questions have been answered. I have discussed any challenges I see with this plan with the nurse or doctor.    ..........................................................................................................................................  Patient/Patient Representative Signature      ..........................................................................................................................................  Patient Representative Print Name and Relationship to Patient    ..................................................               ................................................  Date                                   Time    ..........................................................................................................................................  Reviewed by Signature/Title    ...................................................              ..............................................  Date                                               Time          22EPIC Rev 08/18

## 2019-03-02 NOTE — DISCHARGE INSTRUCTIONS
Patient placed in cam walking boot and informed that she can weight bear with walker and walking boot.     Tylenol over the counter as needed for pain.   Ice, elevate, rest.   Patient to follow up with Orthopedics early next week.  Information given to patient. Patient to call [phone] for appointment.     Patient to return to the emergency department if numbness, pain out of proportion, calf pain or swelling, redness, or fevers occur.

## 2019-05-21 DIAGNOSIS — I10 ESSENTIAL HYPERTENSION, BENIGN: ICD-10-CM

## 2019-05-22 RX ORDER — LOSARTAN POTASSIUM AND HYDROCHLOROTHIAZIDE 12.5; 1 MG/1; MG/1
1 TABLET ORAL DAILY
Qty: 90 TABLET | Refills: 0 | Status: SHIPPED | OUTPATIENT
Start: 2019-05-22 | End: 2019-05-30

## 2019-05-22 NOTE — TELEPHONE ENCOUNTER
Prescription approved per [facility] Refill Protocol.  Refill given to cover until appt in August 2019.  KPavelRN

## 2019-05-30 ENCOUNTER — OFFICE VISIT (OUTPATIENT)
Dept: FAMILY MEDICINE | Facility: CLINIC | Age: 84
End: 2019-05-30
Payer: COMMERCIAL

## 2019-05-30 VITALS
OXYGEN SATURATION: 95 % | TEMPERATURE: 97.4 F | HEIGHT: 68 IN | HEART RATE: 60 BPM | WEIGHT: 194.4 LBS | RESPIRATION RATE: 16 BRPM | BODY MASS INDEX: 29.46 KG/M2 | SYSTOLIC BLOOD PRESSURE: 132 MMHG | DIASTOLIC BLOOD PRESSURE: 70 MMHG

## 2019-05-30 DIAGNOSIS — F41.1 GENERALIZED ANXIETY DISORDER: ICD-10-CM

## 2019-05-30 DIAGNOSIS — Z00.00 WELL ADULT EXAM: Primary | ICD-10-CM

## 2019-05-30 DIAGNOSIS — Z13.220 LIPID SCREENING: ICD-10-CM

## 2019-05-30 DIAGNOSIS — I10 ESSENTIAL HYPERTENSION, BENIGN: ICD-10-CM

## 2019-05-30 DIAGNOSIS — Z78.0 POSTMENOPAUSAL STATUS: ICD-10-CM

## 2019-05-30 LAB
ANION GAP SERPL CALCULATED.3IONS-SCNC: 4 MMOL/L (ref 3–14)
BUN SERPL-MCNC: 19 MG/DL (ref 7–30)
CALCIUM SERPL-MCNC: 9.3 MG/DL (ref 8.5–10.1)
CHLORIDE SERPL-SCNC: 105 MMOL/L (ref 94–109)
CHOLEST SERPL-MCNC: 221 MG/DL
CO2 SERPL-SCNC: 27 MMOL/L (ref 20–32)
CREAT SERPL-MCNC: 0.81 MG/DL (ref 0.52–1.04)
GFR SERPL CREATININE-BSD FRML MDRD: 68 ML/MIN/{1.73_M2}
GLUCOSE SERPL-MCNC: 95 MG/DL (ref 70–99)
HDLC SERPL-MCNC: 63 MG/DL
LDLC SERPL CALC-MCNC: 131 MG/DL
NONHDLC SERPL-MCNC: 158 MG/DL
POTASSIUM SERPL-SCNC: 4.5 MMOL/L (ref 3.4–5.3)
SODIUM SERPL-SCNC: 136 MMOL/L (ref 133–144)
TRIGL SERPL-MCNC: 137 MG/DL

## 2019-05-30 PROCEDURE — 80048 BASIC METABOLIC PNL TOTAL CA: CPT | Performed by: FAMILY MEDICINE

## 2019-05-30 PROCEDURE — 99397 PER PM REEVAL EST PAT 65+ YR: CPT | Performed by: FAMILY MEDICINE

## 2019-05-30 PROCEDURE — 80061 LIPID PANEL: CPT | Performed by: FAMILY MEDICINE

## 2019-05-30 PROCEDURE — 36415 COLL VENOUS BLD VENIPUNCTURE: CPT | Performed by: FAMILY MEDICINE

## 2019-05-30 RX ORDER — ESCITALOPRAM OXALATE 20 MG/1
10 TABLET ORAL DAILY
Qty: 90 TABLET | Refills: 3 | Status: SHIPPED | OUTPATIENT
Start: 2019-05-30 | End: 2019-10-10

## 2019-05-30 RX ORDER — LOSARTAN POTASSIUM AND HYDROCHLOROTHIAZIDE 12.5; 1 MG/1; MG/1
1 TABLET ORAL DAILY
Qty: 90 TABLET | Refills: 3 | Status: SHIPPED | OUTPATIENT
Start: 2019-05-30 | End: 2020-05-06

## 2019-05-30 RX ORDER — ATENOLOL 100 MG/1
100 TABLET ORAL DAILY
Qty: 90 TABLET | Refills: 3 | Status: SHIPPED | OUTPATIENT
Start: 2019-05-30 | End: 2020-05-06

## 2019-05-30 ASSESSMENT — MIFFLIN-ST. JEOR: SCORE: 1387.35

## 2019-05-30 NOTE — LETTER
[facility]  [address]  [facility] [address]  Phone: [phone]      6/7/2019     Bonita Sandrakyle Villarreal  [address] [address] [place] [address]      Dear Bonita:    Thank you for allowing me to participate in your care. Your recent test results were reviewed and listed below. Cholesterol mildly elevated.  Not abnormal enough at this point to warrant medication changes but I would recommend: increasing daily exercise, increasing dietary fiber, eliminating trans fats and reducing saturated fats. Otherwise labs look good.     Your results are provided below for your review  Results for orders placed or performed in visit on 05/30/19   Basic metabolic panel   Result Value Ref Range    Sodium 136 133 - 144 mmol/L    Potassium 4.5 3.4 - 5.3 mmol/L    Chloride 105 94 - 109 mmol/L    Carbon Dioxide 27 20 - 32 mmol/L    Anion Gap 4 3 - 14 mmol/L    Glucose 95 70 - 99 mg/dL    Urea Nitrogen 19 7 - 30 mg/dL    Creatinine 0.81 0.52 - 1.04 mg/dL    GFR Estimate 68 >60 mL/min/[1.73_m2]    GFR Estimate If Black 78 >60 mL/min/[1.73_m2]    Calcium 9.3 8.5 - 10.1 mg/dL   Lipid panel reflex to direct LDL Fasting   Result Value Ref Range    Cholesterol 221 (H) <200 mg/dL    Triglycerides 137 <150 mg/dL    HDL Cholesterol 63 >49 mg/dL    LDL Cholesterol Calculated 131 (H) <100 mg/dL    Non HDL Cholesterol 158 (H) <130 mg/dL   Thank you for choosing [facility]. As a result, please continue with the treatment plan discussed in the office. Return as discussed or sooner if symptoms worsen or fail to improve.     If you have any further questions or concerns, please do not hesitate to contact us.  Sincerely,    Dr. Dewayne Santana

## 2019-05-30 NOTE — PROGRESS NOTES
"  SUBJECTIVE:   Bonita Villarreal is a 83 year old female who presents for Preventive Visit.      Are you in the first 12 months of your Medicare Part B coverage?  No    Physical Health:  In general, how would you rate your overall physical health? excellent  Outside of work, how many days during the week do you exercise? 6-7 days/week  Outside of work, approximately how many minutes a day do you exercise?30-45 minutes  If you drink alcohol do you typically have >3 drinks per day or >7 drinks per week? No  Do you usually eat at least 4 servings of fruit and vegetables a day, include whole grains & fiber and avoid regularly eating high fat or \"junk\" foods? Yes  Do you have any problems taking medications regularly?  No  Do you have any side effects from medications? none  Needs assistance for the following daily activities: no assistance needed  Which of the following safety concerns are present in your home?  none identified   Hearing impairment: No  In the past 6 months, have you been bothered by leaking of urine? yes    Mental Health:  In general, how would you rate your overall mental or emotional health? excellent  PHQ-2 Score: 0    Do you feel safe in your environment? Yes    Do you have a Health Care Directive? Yes: Advance Directive has been received and scanned.    Additional concerns to address?  YES, talk about recent fall    Fall risk:  Fallen 2 or more times in the past year?: Yes  Any fall with injury in the past year?: Yes  Timed Up and Go Test (>13.5 is fall risk; contact physician) : 7    Cognitive Screenin) Repeat 3 items (Leader, Season, Table)    2) Clock draw: NORMAL  3) 3 item recall: Recalls 3 objects  Results: 3 items recalled: COGNITIVE IMPAIRMENT LESS LIKELY    Mini-CogTM Copyright ISAC Fuller. Licensed by the author for use in [facility]; reprinted with permission (juany@.[facility] [place]). All rights reserved.      Do you have sleep apnea, excessive snoring or daytime drowsiness?: " no        Chief Complaint   Patient presents with     Wellness Visit        Reviewed and updated as needed this visit by clinical staff  Tobacco  Allergies  Meds  Med Hx  Surg Hx  Fam Hx  Soc Hx        Reviewed and updated as needed this visit by Provider        Social History     Tobacco Use     Smoking status: Never Smoker     Smokeless tobacco: Never Used   Substance Use Topics     Alcohol use: Yes     Comment: rare                           Current providers sharing in care for this patient include:   Patient Care Team:  Dewayne Santana MD as PCP - General (Family Practice)  Dewayne Santana MD as Assigned PCP    The following health maintenance items are reviewed in Epic and correct as of today:  Health Maintenance   Topic Date Due     DEXA  03/19/2005     ZOSTER IMMUNIZATION (2 of 3) 01/12/2010     PHQ-2  01/01/2019     FALL RISK ASSESSMENT  05/11/2019     MEDICARE ANNUAL WELLNESS VISIT  05/11/2019     ADVANCED DIRECTIVE PLANNING  10/20/2022     DTAP/TDAP/TD IMMUNIZATION (4 - Td) 03/14/2026     INFLUENZA VACCINE  Completed     PNEUMOCOCCAL IMMUNIZATION 65+ LOW/MEDIUM RISK  Completed     IPV IMMUNIZATION  Aged Out     MENINGITIS IMMUNIZATION  Aged Out     Labs reviewed in EPIC  Patient Active Problem List   Diagnosis     Essential hypertension, benign     Generalized anxiety disorder     Diverticulosis of large intestine     Osteopenia of spine     Family history of other cardiovascular diseases     CARDIOVASCULAR SCREENING; LDL GOAL LESS THAN 160     Status post total right knee replacement     Right knee DJD     Headache     Pulmonary infiltrate in right lung on chest x-ray     Past Surgical History:   Procedure Laterality Date     ARTHROPLASTY KNEE Right 2/28/2018    Procedure: ARTHROPLASTY KNEE;  Right total knee arthroplasty;  Surgeon: Anton Grover MD;  Location: [facility]     C APPENDECTOMY       COLONOSCOPY  04/15/02     COLONOSCOPY  3/25/2013    Procedure: COLONOSCOPY;   "Colonoscopy  ;  Surgeon: Jamil Porras MD;  Location: [facility]     FLEXIBLE SIGMOIDOSCOPY  96     ORTHOPEDIC SURGERY  22-23 YEARS AGO    BACK     SURGICAL HISTORY OF -       lumbar surgery       Social History     Tobacco Use     Smoking status: Never Smoker     Smokeless tobacco: Never Used   Substance Use Topics     Alcohol use: Yes     Comment: rare     Family History   Problem Relation Age of Onset     Lipids Brother      Heart Disease Brother          at 51 of an MI     Cancer Brother         bone, lung,  82     Prostate Cancer Brother      Cancer Sister         cervical     Lipids Sister      Heart Disease Sister         eldest sister, s/p MI x 2 with stents; diagnosed with AAA, postoperative complications and MI,  at 75     Heart Disease Brother         MI x 2     Lipids Brother      C.A.D. Father          at 39 of an MI     Family History Negative Mother         lived to age 95     Gynecology Sister         cervical Ca         Current Outpatient Medications   Medication Sig Dispense Refill     atenolol (TENORMIN) 100 MG tablet Take 1 tablet (100 mg) by mouth daily 90 tablet 3     calcium carbonate (OS-JEFF 500 MG [place]. [place]) 500 MG tablet Take 1 tablet by mouth daily       escitalopram (LEXAPRO) 20 MG tablet Take 0.5 tablets (10 mg) by mouth daily 90 tablet 3     FISH OIL OR 1 tab daily       losartan-hydrochlorothiazide (HYZAAR) 100-12.5 MG tablet Take 1 tablet by mouth daily .  Appt DUE 2019 90 tablet 0     MULTI-VITAMIN OR TABS 1 DAILY       Allergies   Allergen Reactions     Lisinopril Cough     Mammogram Screening: Patient over age 75, has elected to stop mammography screening.    ROS:  Constitutional, neuro, ENT, endocrine, pulmonary, cardiac, gastrointestinal, genitourinary, musculoskeletal, integument and psychiatric systems are negative, except as otherwise noted.     OBJECTIVE:   /70   Pulse 60   Resp 16   Ht 1.715 m (5' 7.5\")   Wt 88.2 kg (194 lb 6.4 oz)  " " BMI 30.00 kg/m   Estimated body mass index is 30 kg/m  as calculated from the following:    Height as of this encounter: 1.715 m (5' 7.5\").    Weight as of this encounter: 88.2 kg (194 lb 6.4 oz).  EXAM:   GENERAL: healthy, alert and no distress  EYES: Eyes grossly normal to inspection, PERRL and conjunctivae and sclerae normal  HENT: ear canals and TM's normal, nose and mouth without ulcers or lesions  NECK: no adenopathy, no asymmetry, masses, or scars and thyroid normal to palpation  RESP: lungs clear to auscultation - no rales, rhonchi or wheezes  BREAST: normal without masses, tenderness or nipple discharge and no palpable axillary masses or adenopathy  CV: regular rate and rhythm, normal S1 S2, no S3 or S4, no murmur, click or rub, no peripheral edema and peripheral pulses strong  ABDOMEN: soft, nontender, no hepatosplenomegaly, no masses and bowel sounds normal  MS: no gross musculoskeletal defects noted, no edema  SKIN: no suspicious lesions or rashes  NEURO: Normal strength and tone, mentation intact and speech normal  PSYCH: mentation appears normal, affect normal/bright      ASSESSMENT / PLAN:   1. Well adult exam      2. Essential hypertension, benign  Well controlled. Refilled medication. Check labs.    - losartan-hydrochlorothiazide (HYZAAR) 100-12.5 MG tablet; Take 1 tablet by mouth daily  Dispense: 90 tablet; Refill: 3  - atenolol (TENORMIN) 100 MG tablet; Take 1 tablet (100 mg) by mouth daily  Dispense: 90 tablet; Refill: 3  - Basic metabolic panel    3. GENERALIZED ANXIETY DIS  Well controlled. Refilled medication.     - escitalopram (LEXAPRO) 20 MG tablet; Take 0.5 tablets (10 mg) by mouth daily  Dispense: 90 tablet; Refill: 3    4. Postmenopausal status    - DX Hip/Pelvis/Spine; Future    5. Lipid screening    - Lipid panel reflex to direct LDL Fasting    End of Life Planning:  Patient currently has an advanced directive: Yes.  Practitioner is supportive of decision.    COUNSELING:  Reviewed " "preventive health counseling, as reflected in patient instructions    Estimated body mass index is 30 kg/m  as calculated from the following:    Height as of this encounter: 1.715 m (5' 7.5\").    Weight as of this encounter: 88.2 kg (194 lb 6.4 oz).         reports that she has never smoked. She has never used smokeless tobacco.      Appropriate preventive services were discussed with this patient, including applicable screening as appropriate for cardiovascular disease, diabetes, osteopenia/osteoporosis, and glaucoma.  As appropriate for age/gender, discussed screening for colorectal cancer, prostate cancer, breast cancer, and cervical cancer. Checklist reviewing preventive services available has been given to the patient.    Reviewed patients plan of care and provided an AVS. The Basic Care Plan (routine screening as documented in Health Maintenance) for Bonita meets the Care Plan requirement. This Care Plan has been established and reviewed with the Patient.    Counseling Resources:  ATP IV Guidelines  Pooled Cohorts Equation Calculator  Breast Cancer Risk Calculator  FRAX Risk Assessment  ICSI Preventive Guidelines  Dietary Guidelines for Americans, 2010  USDA's MyPlate  ASA Prophylaxis  Lung CA Screening    Dewayne Santana MD  [facility] [place]  "

## 2019-05-30 NOTE — PATIENT INSTRUCTIONS
Patient Education   Personalized Prevention Plan  You are due for the preventive services outlined below.  Your care team is available to assist you in scheduling these services.  If you have already completed any of these items, please share that information with your care team to update in your medical record.  Health Maintenance Due   Topic Date Due     Osteoporosis Screening  03/19/2005     Zoster (Shingles) Vaccine (2 of 3) 01/12/2010     PHQ-2  01/01/2019     FALL RISK ASSESSMENT  05/11/2019     Annual Wellness Visit  05/11/2019       Preventing Falls in the Home  An adult or child can fall for many reasons. If you are an older adult, you may fall because your reaction time slows down. Your muscles and joints may get stiff, weak, or less flexible because of illness, medicines, or a physical condition. These things can also make a child more likely to fall or be injured in a fall.  Other health problems that make falls more likely include:    Arthritis    Dizziness or lightheadedness when you get out of bed (orthostatic hypotension)    History of a stroke    Dizziness    Anemia    Certain medicines taken for mental illness    Problems with balance or gait    History of falls with or without an injury    Changes in vision (vision impairment)    Changes in thinking skills and memory (cognitive impairment)  Injuries from a fall can include broken bones, dislocated joints, and cuts. When these injuries are serious enough, they can make it impossible for you or a child who is injured in a fall to live on his or her own.  Prevention tips  To help prevent falls and fall-related injuries, follow the tips below.   Floors  Make floors safer by doing the following:     Put nonskid pads under area rugs.    Remove throw rugs.    Replace worn floor coverings.    Tack carpets firmly to each step on carpeted stairs. Put nonskid strips on the edges of uncarpeted stairs.    Keep floors and stairs free of clutter and  cords.    Arrange furniture so there are clear pathways.    Clean up any spills right away.    Wear shoes that fit.  Bathrooms    Make bathrooms safer by doing the following:     Install grab bars in the tub or shower.    Apply nonskid strips or put a nonskid rubber mat in the tub or shower.    Sit on a bath chair to bathe.    Use bathmats with nonskid backing.  Lighting and the environment  Improve lighting in your home by doing the following:     Keep a flashlight in each room. Or put a lamp next to the bed within easy reach.    Put nightlights in the bedrooms, hallways, kitchen, and bathrooms.    Make sure all stairways have good lighting.    Take your time when going up and down stairs.    Put handrails on both sides of stairs and in walkways for more support. To prevent injury to your wrist or arm, don t use handrails to pull yourself up.    Install grab bars to pull yourself up.    Move or rearrange items that you use often. This will make them easier to find or reach.    Look at your home to find any safety hazards. Especially look at doorways, walkways, and the driveway. Remove or repair any safety problems that you find.  Date Last Reviewed: 8/1/2016 2000-2018 [employer]. [address] [facility], [place] [address]. All rights reserved. This information is not intended as a substitute for professional medical care. Always follow your healthcare professional's instructions.           Patient Education

## 2019-06-06 NOTE — RESULT ENCOUNTER NOTE
Please mail letter: Cholesterol mildly elevated.  Not abnormal enough at this point to warrant medication changes but I would recommend: increasing daily exercise, increasing dietary fiber, eliminating trans fats and reducing saturated fats. Otherwise labs look good.

## 2019-06-25 ENCOUNTER — TELEPHONE (OUTPATIENT)
Dept: FAMILY MEDICINE | Facility: CLINIC | Age: 84
End: 2019-06-25

## 2019-06-25 NOTE — TELEPHONE ENCOUNTER
Pt has appt tomorrow, 6/26/19 to evaluate insect bite behind her knee for local infection.  Tick was on her body <36 hours.  KpavleRN

## 2019-06-25 NOTE — TELEPHONE ENCOUNTER
Reason for call:  Patient reporting a symptom    Symptom or request: Patient got a tick on Saturday 6/22/19 patient said it is red swollen and itches.    Duration (how long have symptoms been present): since 6/22    Have you been treated for this before? No      Phone Number patient can be reached at:  Home number on file [phone] (home)    Best Time:  any    Can we leave a detailed message on this number:  YES    Call taken on 6/25/2019 at 8:10 AM by Rocio Nunez

## 2019-06-26 ENCOUNTER — OFFICE VISIT (OUTPATIENT)
Dept: FAMILY MEDICINE | Facility: CLINIC | Age: 84
End: 2019-06-26
Payer: COMMERCIAL

## 2019-06-26 VITALS
HEART RATE: 59 BPM | OXYGEN SATURATION: 96 % | WEIGHT: 191.4 LBS | HEIGHT: 68 IN | BODY MASS INDEX: 29.01 KG/M2 | DIASTOLIC BLOOD PRESSURE: 84 MMHG | SYSTOLIC BLOOD PRESSURE: 136 MMHG | TEMPERATURE: 98 F
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
## 2019-06-26 DIAGNOSIS — S80.862A TICK BITE OF LOWER LEG, LEFT, INITIAL ENCOUNTER: Primary | ICD-10-CM

## 2019-06-26 PROCEDURE — 99213 OFFICE O/P EST LOW 20 MIN: CPT | Performed by: FAMILY MEDICINE

## 2019-06-26 ASSESSMENT — MIFFLIN-ST. JEOR: SCORE: 1368.74

## 2019-06-26 ASSESSMENT — PAIN SCALES - GENERAL: PAINLEVEL: NO PAIN (0)

## 2019-06-26 NOTE — PROGRESS NOTES
Subjective     Bonita Villarreal is a 84 year old female who presents to clinic today for the following health issues:    HPI   Concern - tick bite  Onset: Saturday afternoon    Description:   She says when she found the emeded tick she pulled it out and her leg was painful and swelled up. Her leg also turned red and was very warm and itchy.     Intensity: moderate    Progression of Symptoms:  improving    Accompanying Signs & Symptoms:  None aside from itching. The redness and swelling has gone down.     Previous history of similar problem:   none    Therapies Tried and outcome: She kept applying rubbing alcohol.  She is not sure if it helped but her main goal was to keep it clean.     Verified above history with patient.      Patient Active Problem List   Diagnosis     Essential hypertension, benign     Generalized anxiety disorder     Diverticulosis of large intestine     Osteopenia of spine     Family history of other cardiovascular diseases     CARDIOVASCULAR SCREENING; LDL GOAL LESS THAN 160     Status post total right knee replacement     Right knee DJD     Headache     Pulmonary infiltrate in right lung on chest x-ray     Past Surgical History:   Procedure Laterality Date     ARTHROPLASTY KNEE Right 2018    Procedure: ARTHROPLASTY KNEE;  Right total knee arthroplasty;  Surgeon: Anton Grover MD;  Location: [facility]     C APPENDECTOMY       COLONOSCOPY  04/15/02     COLONOSCOPY  3/25/2013    Procedure: COLONOSCOPY;  Colonoscopy  ;  Surgeon: Jamil Porras MD;  Location: [facility]     FLEXIBLE SIGMOIDOSCOPY  96     ORTHOPEDIC SURGERY  22-23 YEARS AGO    BACK     SURGICAL HISTORY OF -       lumbar surgery       Social History     Tobacco Use     Smoking status: Never Smoker     Smokeless tobacco: Never Used   Substance Use Topics     Alcohol use: Yes     Comment: rare     Family History   Problem Relation Age of Onset     Lipids Brother      Heart Disease Brother          at 51 of an MI  "    Cancer Brother         bone, lung,  82     Prostate Cancer Brother      Cancer Sister         cervical     Lipids Sister      Heart Disease Sister         eldest sister, s/p MI x 2 with stents; diagnosed with AAA, postoperative complications and MI,  at 75     Heart Disease Brother         MI x 2     Lipids Brother      C.A.D. Father          at 39 of an MI     Family History Negative Mother         lived to age 95     Gynecology Sister         cervical Ca         Current Outpatient Medications   Medication Sig Dispense Refill     atenolol (TENORMIN) 100 MG tablet Take 1 tablet (100 mg) by mouth daily 90 tablet 3     calcium carbonate (OS-JEFF 500 MG [place]. [place]) 500 MG tablet Take 1 tablet by mouth daily       escitalopram (LEXAPRO) 20 MG tablet Take 0.5 tablets (10 mg) by mouth daily 90 tablet 3     FISH OIL OR 1 tab daily       Lidocaine-Benzalkonium (BACTINE) 2.5-0.13 % LIQD 1-2 sprays to the tick bite site 2-3 x a day (after cleansing with warm soapy water)       losartan-hydrochlorothiazide (HYZAAR) 100-12.5 MG tablet Take 1 tablet by mouth daily 90 tablet 3     MULTI-VITAMIN OR TABS 1 DAILY       Allergies   Allergen Reactions     Lisinopril Cough         Reviewed and updated as needed this visit by Provider  Tobacco  Allergies  Meds  Problems  Med Hx  Surg Hx  Fam Hx         Review of Systems   C: NEGATIVE for fever, chills, change in weight  INTEGUMENTARY/SKIN: see above  MUSCULOSKELETAL: see above  H: NEGATIVE for bleeding problems  ABD: NEG for abd pain  CV: NEG for chest pain or palpitation  R: NEG for dyspnea        Objective    /84 (BP Location: Right arm, Patient Position: Sitting, Cuff Size: Adult Regular)   Pulse 59   Temp 98  F (36.7  C) (Tympanic)   Ht 1.715 m (5' 7.5\")   Wt 86.8 kg (191 lb 6.4 oz)   SpO2 96%   BMI 29.54 kg/m    Body mass index is 29.54 kg/m .  Physical Exam   GEN: alert, oriented x 3, NAD  SKIN: good turgor; on left popliteal area, there is a 2 mm " scaly slightly hemorrhagic papule consisetnt with a tick bite, surrounded by about 5 mm erythema with no additional ring like rash (no target rash); no other rash  EXT: no joint swelling x 4; no joint TTP.    Diagnostic Test Results:  none         Assessment & Plan     Bonita was seen today for insect bites.    Diagnoses and all orders for this visit:    Tick bite of lower leg, left, initial encounter  -     Lidocaine-Benzalkonium (BACTINE) 2.5-0.13 % LIQD; 1-2 sprays to the tick bite site 2-3 x a day (after cleansing with warm soapy water)       No erythema migrans. Patient almost sure it was not a deer tick per her report.  Discussed with patient expected possible reactions to tick bites.  Daily wound care discussed with patient.  May apply bactine antiseptic after cleansing and as needed.  Tick bite precautions discussed; printed in AVS.    Patient Instructions     Patient Education     Tick Bite (No Antibiotics)    You have been bitten by a tick. Ticks are small arachnids that feed on the blood of rodents, rabbits, birds, deer, dogs, and people. A tick bite may cause a reaction like a spider bite. You may have redness, itching, and slight swelling at the site. Sometimes you may have no reaction where the tick bit you.  Most tick bites are harmless. But some ticks carry diseases, such as Lyme disease or [place] spotted fever. These can be passed to people at the time of the bite. Lyme disease is of greatest concern. Right now you have no symptoms of Lyme disease or other serious reaction to the bite. It is important to watch for the warning signs, which could appear weeks to months after the tick bite.  Home care  The following will help you care for your bite at home:    If itching is a problem, avoid tight clothing and anything that heats up your skin. This includes hot showers or baths and direct sunlight. This will tend to make the itching worse.    An ice pack will reduce local areas of redness and  itching. Make your own ice pack by putting ice cubes in a zip-top plastic bag and wrapping it in a thin towel. Creams containing benzocaine will reduce itching. These creams are available over the counter.    You can use an antihistamine with diphenhydramine if your doctor did not give you another antihistamine. This medicine may be used to reduce itching if large areas of the skin are involved. It is available at drugstores and groceries. If symptoms continue, talk with your doctor or pharmacist about over-the-counter medicines.  Follow-up care  Follow up with your healthcare provider, or as advised.  When to seek medical advice  Call your healthcare provider right away if any of these occur:  Signs of local infection. Watch for these during the next few days.    Increasing redness around the bite site    Increased pain or swelling    Fever over 100.4 F (38.0 C), or as directed by your healthcare provider    Fluid draining from the bite area  Signs of tick-related disease. Watch for these during the next few weeks to months.    Circular, red, ring-like rash appears at the bite area within 1 to 3 weeks    A non-itchy red rash develops on your wrists or ankles and spreads. It may become purple (petechiae).    Red eyes    Tiredness, fever or chills, nausea or vomiting    Neck pain or stiffness, headache, or confusion    Muscle or bone aches    Irregular or rapid heartbeat    Joint pain or swelling, especially in the knee    Numbness, tingling, or weakness in the arms or legs    Weakness on one side of the face  Date Last Reviewed: 10/1/2016    0313-0527 [employer]. [address] [place] [address], [place] [id number]. All rights reserved. This information is not intended as a substitute for professional medical care. Always follow your healthcare professional's instructions.               Return in about 2 weeks (around 7/10/2019) for if with development of symptoms suspect for lyme disease.    Khanh Allen,  MD  [facility] [place]

## 2019-06-26 NOTE — PATIENT INSTRUCTIONS
Patient Education     Tick Bite (No Antibiotics)    You have been bitten by a tick. Ticks are small arachnids that feed on the blood of rodents, rabbits, birds, deer, dogs, and people. A tick bite may cause a reaction like a spider bite. You may have redness, itching, and slight swelling at the site. Sometimes you may have no reaction where the tick bit you.  Most tick bites are harmless. But some ticks carry diseases, such as Lyme disease or [place] spotted fever. These can be passed to people at the time of the bite. Lyme disease is of greatest concern. Right now you have no symptoms of Lyme disease or other serious reaction to the bite. It is important to watch for the warning signs, which could appear weeks to months after the tick bite.  Home care  The following will help you care for your bite at home:    If itching is a problem, avoid tight clothing and anything that heats up your skin. This includes hot showers or baths and direct sunlight. This will tend to make the itching worse.    An ice pack will reduce local areas of redness and itching. Make your own ice pack by putting ice cubes in a zip-top plastic bag and wrapping it in a thin towel. Creams containing benzocaine will reduce itching. These creams are available over the counter.    You can use an antihistamine with diphenhydramine if your doctor did not give you another antihistamine. This medicine may be used to reduce itching if large areas of the skin are involved. It is available at drugstores and groceries. If symptoms continue, talk with your doctor or pharmacist about over-the-counter medicines.  Follow-up care  Follow up with your healthcare provider, or as advised.  When to seek medical advice  Call your healthcare provider right away if any of these occur:  Signs of local infection. Watch for these during the next few days.    Increasing redness around the bite site    Increased pain or swelling    Fever over 100.4 F (38.0 C), or  as directed by your healthcare provider    Fluid draining from the bite area  Signs of tick-related disease. Watch for these during the next few weeks to months.    Circular, red, ring-like rash appears at the bite area within 1 to 3 weeks    A non-itchy red rash develops on your wrists or ankles and spreads. It may become purple (petechiae).    Red eyes    Tiredness, fever or chills, nausea or vomiting    Neck pain or stiffness, headache, or confusion    Muscle or bone aches    Irregular or rapid heartbeat    Joint pain or swelling, especially in the knee    Numbness, tingling, or weakness in the arms or legs    Weakness on one side of the face  Date Last Reviewed: 10/1/2016    6089-7790 [employer]. [address] [place] [address], [place] [address]. All rights reserved. This information is not intended as a substitute for professional medical care. Always follow your healthcare professional's instructions.

## 2019-08-01 ENCOUNTER — TELEPHONE (OUTPATIENT)
Dept: FAMILY MEDICINE | Facility: CLINIC | Age: 84
End: 2019-08-01

## 2019-08-01 DIAGNOSIS — K57.92 DIVERTICULITIS: ICD-10-CM

## 2019-08-01 NOTE — TELEPHONE ENCOUNTER
Was given 1 course with 2 refills 10/22/18 to take with diverticulitis flares. This seems like very frequent flares and is not typical for diverticulitis. I would recommend CT abdomen/pelvis with next flare to confirm that indeed symptoms are actually from this an not something else. CT ordered. If having symptoms now lets see if we can get CT within the next 24 hours to confirm diagnosis.  If we can get CT scheduled by tomorrow then hold off starting Augmentin until CT.

## 2019-08-01 NOTE — TELEPHONE ENCOUNTER
Routing refill request to provider for review/approval because:  Drug not on the [facility] refill protocol   Takes when diverticulitis flare.    Theresa GREENE RN

## 2019-08-01 NOTE — TELEPHONE ENCOUNTER
Requested Prescriptions   Pending Prescriptions Disp Refills     amoxicillin-clavulanate (AUGMENTIN) 875-125 MG tablet [Pharmacy Med Name: AMOXICILLIN-CLAVULANATE POTASS 875-125 MG TABLET] 20 tablet 2     Sig: Take 1 tablet by mouth 2 times daily       There is no refill protocol information for this order        Last Written Prescription Date:  11/9/2007  Last Fill Quantity: 20,  # refills: 1   Last office visit: 6/26/2019 with prescribing provider:  Max   Future Office Visit:

## 2019-08-02 NOTE — TELEPHONE ENCOUNTER
"Called pt again, \"I just picked up a prescription, I am having a flare up right now. I just picked up my last one (the Augmentin),\" she has no more no refills.  Said \"I had some left from my last one so I've been taking it for five days now, so it's gone. I had a little flare up and I didn't take quite enough pills and it came back.\"  She said she had the \"cramping and stuff\" in her stomach and she had a headache, which she only really gets when she has a flare up. Also, her lower back \"hurt a little bit.\" Her stool was \"real flat.\" Sx are gone now.  \"I have done this for how many years now. I don't get a flare up that often.\" Says it may happen once yearly, maybe twice a year. \"As soon as I take these, right away I have relief,\" she says by the time she has had four doses she has full relief from sx.    Please advise next step. She said she will schedule an OV with you if needed.    Hyacinth BENNETT RN      "

## 2019-08-02 NOTE — TELEPHONE ENCOUNTER
If she's only having 1-2 flares per year then she should not be out. This would be her 4th prescription since 10/2018. It seems to be happening more often than she realizes. I know she's done this for a long time and I'm not saying it's not diverticulitis but since it's happening more often then we really should investigate why so that we don't miss something more serious such as colon cancer.

## 2019-08-07 DIAGNOSIS — F41.1 GENERALIZED ANXIETY DISORDER: ICD-10-CM

## 2019-08-07 RX ORDER — ESCITALOPRAM OXALATE 10 MG/1
10 TABLET ORAL DAILY
Qty: 90 TABLET | Refills: 3 | OUTPATIENT
Start: 2019-08-07

## 2019-08-07 NOTE — TELEPHONE ENCOUNTER
"Requested Prescriptions   Pending Prescriptions Disp Refills     escitalopram (LEXAPRO) 10 MG tablet [Pharmacy Med Name: ESCITALOPRAM OXALATE 10 MG TABLET] 90 tablet 3     Sig: Take 1 tablet (10 mg) by mouth daily   Last Written Prescription Date:  5/30/19  Last Fill Quantity: 90 tab,  # refills: 3   Last office visit: 6/26/2019 with prescribing provider:  JERMAINE Allen   Future Office Visit:        SSRIs Protocol Passed - 8/7/2019 12:17 PM        Passed - Recent (12 mo) or future (30 days) visit within the authorizing provider's specialty     Patient had office visit in the last 12 months or has a visit in the next 30 days with authorizing provider or within the authorizing provider's specialty.  See \"Patient Info\" tab in inbasket, or \"Choose Columns\" in Meds & Orders section of the refill encounter.              Passed - Medication is active on med list        Passed - Patient is age 18 or older        Passed - No active pregnancy on record        Passed - No positive pregnancy test in last 12 months          "

## 2019-08-15 ENCOUNTER — HOSPITAL ENCOUNTER (OUTPATIENT)
Dept: CT IMAGING | Facility: CLINIC | Age: 84
Discharge: HOME | End: 2019-08-15
Attending: FAMILY MEDICINE | Admitting: FAMILY MEDICINE
Payer: COMMERCIAL

## 2019-08-15 DIAGNOSIS — K57.92 DIVERTICULITIS: ICD-10-CM

## 2019-08-15 LAB
CREAT BLD-MCNC: 0.8 MG/DL (ref 0.52–1.04)
GFR SERPL CREATININE-BSD FRML MDRD: 69 ML/MIN/{1.73_M2}

## 2019-08-15 PROCEDURE — 25000125 ZZHC RX 250: Performed by: FAMILY MEDICINE

## 2019-08-15 PROCEDURE — 82565 ASSAY OF CREATININE: CPT

## 2019-08-15 PROCEDURE — 25000128 H RX IP 250 OP 636: Performed by: FAMILY MEDICINE

## 2019-08-15 PROCEDURE — 74177 CT ABD & PELVIS W/CONTRAST: CPT

## 2019-08-15 RX ORDER — IOPAMIDOL 755 MG/ML
93 INJECTION, SOLUTION INTRAVASCULAR ONCE
Status: COMPLETED | OUTPATIENT
Start: 2019-08-15 | End: 2019-08-15

## 2019-08-15 RX ADMIN — SODIUM CHLORIDE 63 ML: 9 INJECTION, SOLUTION INTRAVENOUS at 11:40

## 2019-08-15 RX ADMIN — IOPAMIDOL 93 ML: 755 INJECTION, SOLUTION INTRAVENOUS at 11:40

## 2019-08-16 NOTE — RESULT ENCOUNTER NOTE
Please call the patient with the results. Notify that CT shows no evidence of diverticulitis. Symptoms are not from diverticulitis and she should not be doing antibiotics for this.  Possibly related to bowel spasm and we could try an antispasmodic medication to use when she gets symptoms.

## 2019-08-22 DIAGNOSIS — K58.1 IRRITABLE BOWEL SYNDROME WITH CONSTIPATION: Primary | ICD-10-CM

## 2019-08-22 RX ORDER — HYOSCYAMINE SULFATE 0.125 MG
0.12 TABLET ORAL EVERY 6 HOURS PRN
Qty: 90 TABLET | Refills: 1 | Status: SHIPPED | OUTPATIENT
Start: 2019-08-22 | End: 2020-02-21

## 2019-08-23 ENCOUNTER — TELEPHONE (OUTPATIENT)
Dept: FAMILY MEDICINE | Facility: CLINIC | Age: 84
End: 2019-08-23

## 2019-08-24 NOTE — TELEPHONE ENCOUNTER
Prior Authorization Retail Medication Request    Medication/Dose: hyoscyamine  ICD code (if different than what is on RX):    Previously Tried and Failed:  Pepcid; zantac  Rationale:      Insurance Name:  Not provided  Insurance ID:  Not provided  [facility] Key: [id number]      Pharmacy Information (if different than what is on RX)  Name:    Phone:

## 2019-09-03 NOTE — TELEPHONE ENCOUNTER
PRIOR AUTHORIZATION DENIED    Medication: hyoscyamine-DENIED    Denial Date: 9/3/2019    Denial Rational: Patient's Medicare Part D drug plan cannot cover drugs classified as being part of the FDA's PIERCE program.        Appeal Information:

## 2019-09-03 NOTE — TELEPHONE ENCOUNTER
Central Prior Authorization Team   Phone: [phone]      [place] Initiation    Medication: hyoscyamine-Initiated  Insurance Company: Medicare Blue - Phone [phone] Fax [phone]  Pharmacy Filling the Rx: [facility] - - [place] - [id number] [facility]  Filling Pharmacy Phone: [phone]  Filling Pharmacy Fax:    Start Date: 9/3/2019

## 2019-09-04 NOTE — TELEPHONE ENCOUNTER
Writer contacted the patient and it was $36 out of pocket.  The patient reviewed the side effects and decided she does not want to take this. The patient would like to wait and see how she is doing and they she will schedule an appointment to discuss.    Stefani PRESCOTT RN

## 2019-09-04 NOTE — TELEPHONE ENCOUNTER
PATIENCE [facility] for hyoscyamine has been denied.    Denial Rational: Patient's Medicare Part D drug plan cannot cover drugs classified as being part of the FDA's PIERCE program.    Pharmacy notified of denial.   Nica Wyman

## 2019-10-08 DIAGNOSIS — F41.1 GENERALIZED ANXIETY DISORDER: ICD-10-CM

## 2019-10-08 NOTE — TELEPHONE ENCOUNTER
"Requested Prescriptions   Pending Prescriptions Disp Refills     escitalopram (LEXAPRO) 20 MG tablet [Pharmacy Med Name: ESCITALOPRAM OXALATE 20 MG TABLET] 90 tablet 3     Sig: TAKE 1/2 TABLET BY MOUTH EVERY DAY   Last Written Prescription Date:  5/30/19  Last Fill Quantity: 90 tab,  # refills: 3   Last office visit: 6/26/2019 with prescribing provider:  JERMAINE Allen   Future Office Visit:        SSRIs Protocol Passed - 10/8/2019  8:00 AM        Passed - Recent (12 mo) or future (30 days) visit within the authorizing provider's specialty     Patient has had an office visit with the authorizing provider or a provider within the authorizing providers department within the previous 12 mos or has a future within next 30 days. See \"Patient Info\" tab in inbasket, or \"Choose Columns\" in Meds & Orders section of the refill encounter.              Passed - Medication is active on med list        Passed - Patient is age 18 or older        Passed - No active pregnancy on record        Passed - No positive pregnancy test in last 12 months          "

## 2019-10-10 RX ORDER — ESCITALOPRAM OXALATE 10 MG/1
10 TABLET ORAL DAILY
Qty: 90 TABLET | Refills: 3 | Status: SHIPPED | OUTPATIENT
Start: 2019-10-10 | End: 2020-09-24

## 2019-10-10 RX ORDER — ESCITALOPRAM OXALATE 20 MG/1
TABLET ORAL
Qty: 90 TABLET | Refills: 3 | OUTPATIENT
Start: 2019-10-10

## 2019-10-10 NOTE — TELEPHONE ENCOUNTER
Lexapro refill.  Pt does not want to cut the pills in 1/2.  Need new order for Lexapro 10 mg 1 tab/day.  Advise.  ElsyRn

## 2019-10-24 ENCOUNTER — IMMUNIZATION (OUTPATIENT)
Dept: FAMILY MEDICINE | Facility: CLINIC | Age: 84
End: 2019-10-24
Payer: COMMERCIAL

## 2019-10-24 PROCEDURE — G0008 ADMIN INFLUENZA VIRUS VAC: HCPCS

## 2019-10-24 PROCEDURE — 90662 IIV NO PRSV INCREASED AG IM: CPT

## 2019-10-25 ENCOUNTER — HOSPITAL ENCOUNTER (EMERGENCY)
Facility: CLINIC | Age: 84
Discharge: HOME | End: 2019-10-25
Attending: EMERGENCY MEDICINE | Admitting: EMERGENCY MEDICINE
Payer: COMMERCIAL

## 2019-10-25 VITALS
SYSTOLIC BLOOD PRESSURE: 149 MMHG | OXYGEN SATURATION: 95 % | BODY MASS INDEX: 28.55 KG/M2 | TEMPERATURE: 99.1 F | WEIGHT: 185 LBS | DIASTOLIC BLOOD PRESSURE: 83 MMHG | RESPIRATION RATE: 16 BRPM

## 2019-10-25 DIAGNOSIS — M94.0 COSTOCHONDRITIS: ICD-10-CM

## 2019-10-25 LAB
ALBUMIN UR-MCNC: NEGATIVE MG/DL
APPEARANCE UR: CLEAR
BACTERIA #/AREA URNS HPF: ABNORMAL /HPF
BILIRUB UR QL STRIP: NEGATIVE
COLOR UR AUTO: YELLOW
GLUCOSE UR STRIP-MCNC: NEGATIVE MG/DL
HGB UR QL STRIP: NEGATIVE
KETONES UR STRIP-MCNC: NEGATIVE MG/DL
LEUKOCYTE ESTERASE UR QL STRIP: ABNORMAL
MUCOUS THREADS #/AREA URNS LPF: PRESENT /LPF
NITRATE UR QL: POSITIVE
PH UR STRIP: 5 PH (ref 5–7)
RBC #/AREA URNS AUTO: 1 /HPF (ref 0–2)
SOURCE: ABNORMAL
SP GR UR STRIP: 1.02 (ref 1–1.03)
SQUAMOUS #/AREA URNS AUTO: 1 /HPF (ref 0–1)
UROBILINOGEN UR STRIP-MCNC: 0 MG/DL (ref 0–2)
WBC #/AREA URNS AUTO: 16 /HPF (ref 0–5)

## 2019-10-25 PROCEDURE — 99283 EMERGENCY DEPT VISIT LOW MDM: CPT | Mod: Z6 | Performed by: EMERGENCY MEDICINE

## 2019-10-25 PROCEDURE — 99283 EMERGENCY DEPT VISIT LOW MDM: CPT | Performed by: EMERGENCY MEDICINE

## 2019-10-25 PROCEDURE — 81001 URINALYSIS AUTO W/SCOPE: CPT | Performed by: EMERGENCY MEDICINE

## 2019-10-25 NOTE — DISCHARGE INSTRUCTIONS
Tylenol and ibuprofen for discomfort    Follow-up primary care for further evaluation    Return if worsening pain, shortness of air or any other concern

## 2019-10-25 NOTE — ED PROVIDER NOTES
History     Chief Complaint   Patient presents with     Flank Pain     right flank. present for 1 week     HPI  Bonita Villarreal is a 84 year old female who presents to the emergency department for evaluation of lower back pain. The patient states that she has experienced lower back pain that radiates over her right hip for the past one and a half weeks. She describes the pain as a constant ache that worsens with movement. The pain does not keep her awake at night. The patient can't recall what she was doing when the back pain began and has not experienced similar back pain in the past. She took ibuprofen this morning with no relief. The patient also acknowledges occasional lower right abdominal pain. She was diagnosed with IBS in clinic two months ago after a negative CT scan of her abdomen but states that she has bowel movements every day. Her stools are occasionally hard but are not black or bloody. The patient denies any fatigue, weakness, nausea, poor appetite, fever, urinary symptoms, pain or swelling in her legs. She regularly takes escitalopram, atenolol, and losartan-hydrochlorothiazide.       Allergies:  Allergies   Allergen Reactions     Lisinopril Cough       Problem List:    Patient Active Problem List    Diagnosis Date Noted     Pulmonary infiltrate in right lung on chest x-ray 06/29/2018     Priority: Medium     Headache 06/28/2018     Priority: Medium     Status post total right knee replacement 02/28/2018     Priority: Medium     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     Priority: Medium     CARDIOVASCULAR SCREENING; LDL GOAL LESS THAN 160 10/31/2010     Priority: Medium     LDL in 140s, HDL in 60s  [place] risk is 6% (average at her age is 14%)       Essential hypertension, benign 06/28/2005     Priority: Medium     had coronary CT scan 2003 --- no evidence of plaque  started on atenolol and ASA Jan 2005 for /94  physician in [place] switched her to Lotrel 5/10 due to her having dizzy  rosalina  HCTZ added 3/05, pt felt mouth too dried out   pt more anxious and BP still elevated -- switched back to Atenolol       Diverticulosis of large intestine 2005     Priority: Medium     colonoscopy , disease mod - severe  Problem list name updated by automated process. Provider to review       Osteopenia of spine 2005     Priority: Medium     T score -1.3    Risk calculator based on 2003 T-score and personal data showed 1% risk of hip fracture over next 10 years, 14% risk of any fracture over next ten years; consider recheck DEXA at age 77, continue calcium and exercise until then (current recommendations to consider treatment if hip fx risk reaches 3% or any fracture risk reaches 20%)  Problem list name updated by automated process. Provider to review       Family history of other cardiovascular diseases 2005     Priority: Medium     father  at 39 of MI  brother  at 51 of MI  another brother -- MI x 2  eldest sister -- MI x 2 with stents  mother and maternal aunts lived to 80's and mid 90's  Problem list name updated by automated process. Provider to review and confirm  Problem list name updated by automated process. Provider to review       Generalized anxiety disorder 2005     Priority: Medium     onset after patient was diagnosed with hypertension fall           Past Medical History:    Past Medical History:   Diagnosis Date     Advance Care Planning 2012     Arthritis      Diverticulitis of colon 2005     Diverticulosis of colon (without mention of hemorrhage)      Hypertension        Past Surgical History:    Past Surgical History:   Procedure Laterality Date     ARTHROPLASTY KNEE Right 2018    Procedure: ARTHROPLASTY KNEE;  Right total knee arthroplasty;  Surgeon: Anton Grover MD;  Location: [facility] OR     C APPENDECTOMY       COLONOSCOPY  04/15/02     COLONOSCOPY  3/25/2013    Procedure: COLONOSCOPY;  Colonoscopy  ;  Surgeon: Vern  Jamil Gonzalez MD;  Location: [facility] GI     FLEXIBLE SIGMOIDOSCOPY  96     ORTHOPEDIC SURGERY  22-23 YEARS AGO    BACK     SURGICAL HISTORY OF -       lumbar surgery       Family History:    Family History   Problem Relation Age of Onset     Lipids Brother      Heart Disease Brother          at 51 of an MI     Cancer Brother         bone, lung,  82     Prostate Cancer Brother      Cancer Sister         cervical     Lipids Sister      Heart Disease Sister         eldest sister, s/p MI x 2 with stents; diagnosed with AAA, postoperative complications and MI,  at 75     Heart Disease Brother         MI x 2     Lipids Brother      C.A.D. Father          at 39 of an MI     Family History Negative Mother         lived to age 95     Gynecology Sister         cervical Ca       Social History:  Marital Status:   [5]  Social History     Tobacco Use     Smoking status: Never Smoker     Smokeless tobacco: Never Used   Substance Use Topics     Alcohol use: Yes     Comment: rare     Drug use: No        Medications:    amoxicillin-clavulanate (AUGMENTIN) 875-125 MG tablet  atenolol (TENORMIN) 100 MG tablet  calcium carbonate (OS-JEFF 500 MG [place]. [place]) 500 MG tablet  escitalopram (LEXAPRO) 10 MG tablet  FISH OIL OR  hyoscyamine (ANASPAZ/LEVSIN) 0.125 MG tablet  Lidocaine-Benzalkonium (BACTINE) 2.5-0.13 % LIQD  losartan-hydrochlorothiazide (HYZAAR) 100-12.5 MG tablet  MULTI-VITAMIN OR TABS          Review of Systems  All other systems reviewed and are negative.    Physical Exam   BP: (!) 149/83  Heart Rate: 65  Temp: 99.1  F (37.3  C)  Resp: 16  Weight: 83.9 kg (185 lb)  SpO2: 95 %      Physical Exam  Nontoxic appearing no respiratory distress alert and oriented ×3  Head atraumatic normocephalic  Lungs clear to auscultation  Tender to palpation over right posterior lateral inferior rib cage reproduces pain complaint  Heart regular no murmur  Abdomen soft nontender bowel sounds positive no masses or  HSM  Strength and sensation grossly intact throughout the extremities, gait and station normal  Speech is fluent, good eye contact, thought processes are rational  Lower extremities without swelling, redness or tenderness  Pedal pulses symmetrical and strong    ED Course        Procedures               Critical Care time:  none     Results for orders placed or performed during the hospital encounter of 10/25/19   UA reflex to Microscopic   Result Value Ref Range    Color Urine Yellow     Appearance Urine Clear     Glucose Urine Negative NEG^Negative mg/dL    Bilirubin Urine Negative NEG^Negative    Ketones Urine Negative NEG^Negative mg/dL    Specific Gravity Urine 1.018 1.003 - 1.035    Blood Urine Negative NEG^Negative    pH Urine 5.0 5.0 - 7.0 pH    Protein Albumin Urine Negative NEG^Negative mg/dL    Urobilinogen mg/dL 0.0 0.0 - 2.0 mg/dL    Nitrite Urine Positive (A) NEG^Negative    Leukocyte Esterase Urine Moderate (A) NEG^Negative    Source Midstream Urine     RBC Urine 1 0 - 2 /HPF    WBC Urine 16 (H) 0 - 5 /HPF    Bacteria Urine Few (A) NEG^Negative /HPF    Squamous Epithelial /HPF Urine 1 0 - 1 /HPF    Mucous Urine Present (A) NEG^Negative /LPF                 No results found for this or any previous visit (from the past 24 hour(s)).    Medications - No data to display      15:22 Patient assessed.      Assessments & Plan (with Medical Decision Making)  84-year-old female right flank pain, exam consistent with chest wall/costochondral tenderness.  Further history during visit reveals URI and severe coughing jag couple weeks ago prior to developing symptoms.  Broad differential considered include but not limited to pneumothorax, pneumonia, pulmonary embolism, rib fracture/trauma, renal colic, gallbladder disease, appendicitis, vascular catastrophe, bowel perforation, bowel obstruction versus other.  Her exam is otherwise unrevealing, no indication for further evaluation.  Urinalysis returns post visit with  positive nitrite positive LE, 16 white cells and few bacteria .  I will call patient today, 10/28, discuss antibiotics and order if symptoms are persisting.     I have reviewed the nursing notes.    I have reviewed the findings, diagnosis, plan and need for follow up with the patient.          Discharge Medication List as of 10/25/2019  3:49 PM          Final diagnoses:   Costochondritis       This document serves as a record of the services and decisions personally performed and made by Billy Murdock MD. It was created on HIS/HER behalf by Renetta Partida, a trained medical scribe. The creation of this document is based the provider's statements to the medical scribe.  Renetta Partida 3:26 PM 10/25/2019    Provider:   The information in this document, created by the medical scribe for me, accurately reflects the services I personally performed and the decisions made by me. I have reviewed and approved this document for accuracy prior to leaving the patient care area.  Billy Murdock MD 3:26 PM 10/25/2019    10/25/2019   [facility] EMERGENCY DEPARTMENT     Billy Murdock MD  10/28/19 0728     no

## 2019-10-25 NOTE — ED NOTES
Pt reports right lower back pain that wraps to front abdominal pain x 1 week.  Denies n/v, blood in urine, discharge.

## 2019-10-25 NOTE — ED AVS SNAPSHOT
[facility] Emergency Department  [address] [facility] [address] [address]  Phone:  [phone]  Fax:  [phone]                                    Bonita Villarreal   MRN: [id number]    Department:  [facility] Emergency Department   Date of Visit:  10/25/2019           After Visit Summary Signature Page    I have received my discharge instructions, and my questions have been answered. I have discussed any challenges I see with this plan with the nurse or doctor.    ..........................................................................................................................................  Patient/Patient Representative Signature      ..........................................................................................................................................  Patient Representative Print Name and Relationship to Patient    ..................................................               ................................................  Date                                   Time    ..........................................................................................................................................  Reviewed by Signature/Title    ...................................................              ..............................................  Date                                               Time          22EPIC Rev 08/18

## 2020-01-10 ENCOUNTER — OFFICE VISIT (OUTPATIENT)
Dept: FAMILY MEDICINE | Facility: CLINIC | Age: 85
End: 2020-01-10
Payer: COMMERCIAL

## 2020-01-10 VITALS
HEART RATE: 69 BPM | OXYGEN SATURATION: 96 % | BODY MASS INDEX: 29.35 KG/M2 | RESPIRATION RATE: 20 BRPM | SYSTOLIC BLOOD PRESSURE: 130 MMHG | WEIGHT: 187 LBS | HEIGHT: 67 IN | DIASTOLIC BLOOD PRESSURE: 70 MMHG | TEMPERATURE: 98 F

## 2020-01-10 DIAGNOSIS — H61.23 BILATERAL IMPACTED CERUMEN: ICD-10-CM

## 2020-01-10 DIAGNOSIS — R09.81 NASAL CONGESTION: ICD-10-CM

## 2020-01-10 DIAGNOSIS — J06.9 VIRAL URI WITH COUGH: Primary | ICD-10-CM

## 2020-01-10 PROCEDURE — 69210 REMOVE IMPACTED EAR WAX UNI: CPT | Performed by: NURSE PRACTITIONER

## 2020-01-10 PROCEDURE — 99213 OFFICE O/P EST LOW 20 MIN: CPT | Mod: 25 | Performed by: NURSE PRACTITIONER

## 2020-01-10 RX ORDER — IBUPROFEN 200 MG
400-600 TABLET ORAL PRN
Status: ON HOLD | COMMUNITY
Start: 2018-05-17 | End: 2020-10-07

## 2020-01-10 RX ORDER — FLUTICASONE PROPIONATE 50 MCG
1 SPRAY, SUSPENSION (ML) NASAL DAILY
Qty: 16 G | Refills: 0 | Status: SHIPPED | OUTPATIENT
Start: 2020-01-10 | End: 2020-02-21

## 2020-01-10 ASSESSMENT — MIFFLIN-ST. JEOR: SCORE: 1333.47

## 2020-01-10 NOTE — PROGRESS NOTES
Subjective     Bonita Villarreal is a 84 year old female who presents to clinic today for the following health issues:    HPI   ENT Symptoms             Symptoms: cc Present Absent Comment   Fever/Chills   x    Fatigue x x     Muscle Aches   x    Eye Irritation   x    Sneezing   x    Nasal Pipo/Drg   x    Sinus Pressure/Pain  x     Loss of smell   x    Dental pain   x    Sore Throat   x    Swollen Glands   x    Ear Pain/Fullness   x    Cough  x     Wheeze   x    Chest Pain   x    Shortness of breath  x     Rash   x    Other x x  Head congestion     Symptom duration:  since Dec 28 2019   Symptom severity:  mod   Treatments tried:  cough medications    Contacts:  yes     Symptoms have remained consistent, no fever or chills. No expectorant from cough.  She's had her flu shot.  She just worries it's pneumonia.        Patient Active Problem List   Diagnosis     Essential hypertension, benign     Generalized anxiety disorder     Diverticulosis of large intestine     Osteopenia of spine     Family history of other cardiovascular diseases     CARDIOVASCULAR SCREENING; LDL GOAL LESS THAN 160     Status post total right knee replacement     Right knee DJD     Headache     Pulmonary infiltrate in right lung on chest x-ray     Past Surgical History:   Procedure Laterality Date     ARTHROPLASTY KNEE Right 2/28/2018    Procedure: ARTHROPLASTY KNEE;  Right total knee arthroplasty;  Surgeon: Anton Grover MD;  Location: [facility]     C APPENDECTOMY       COLONOSCOPY  04/15/02     COLONOSCOPY  3/25/2013    Procedure: COLONOSCOPY;  Colonoscopy  ;  Surgeon: Jamil Porras MD;  Location: [facility]     FLEXIBLE SIGMOIDOSCOPY  07/29/96     ORTHOPEDIC SURGERY  22-23 YEARS AGO    BACK     SURGICAL HISTORY OF -       lumbar surgery       Social History     Tobacco Use     Smoking status: Never Smoker     Smokeless tobacco: Never Used   Substance Use Topics     Alcohol use: Yes     Comment: rare     Family History   Problem  Relation Age of Onset     Lipids Brother      Heart Disease Brother          at 51 of an MI     Cancer Brother         bone, lung,  82     Prostate Cancer Brother      Cancer Sister         cervical     Lipids Sister      Heart Disease Sister         eldest sister, s/p MI x 2 with stents; diagnosed with AAA, postoperative complications and MI,  at 75     Heart Disease Brother         MI x 2     Lipids Brother      C.A.D. Father          at 39 of an MI     Family History Negative Mother         lived to age 95     Gynecology Sister         cervical Ca         Current Outpatient Medications   Medication Sig Dispense Refill     atenolol (TENORMIN) 100 MG tablet Take 1 tablet (100 mg) by mouth daily 90 tablet 3     calcium carbonate (OS-JEFF 500 MG [place]. [place]) 500 MG tablet Take 1 tablet by mouth daily       cholecalciferol (VITAMIN D-1000 MAX ST) 25 MCG (1000 UT) TABS Take 1,000 Units by mouth       escitalopram (LEXAPRO) 10 MG tablet Take 1 tablet (10 mg) by mouth daily 90 tablet 3     FISH OIL OR 1 tab daily       fluticasone (FLONASE) 50 MCG/ACT nasal spray Spray 1 spray into both nostrils daily for 14 days 16 g 0     ibuprofen (ADVIL/MOTRIN) 200 MG tablet 400mg as needed for pain does not take daily       losartan-hydrochlorothiazide (HYZAAR) 100-12.5 MG tablet Take 1 tablet by mouth daily 90 tablet 3     MULTI-VITAMIN OR TABS 1 DAILY       hyoscyamine (ANASPAZ/LEVSIN) 0.125 MG tablet Take 1 tablet (125 mcg) by mouth every 6 hours as needed for cramping (Patient not taking: Reported on 1/10/2020) 90 tablet 1     Allergies   Allergen Reactions     Lisinopril Cough     BP Readings from Last 3 Encounters:   01/10/20 130/70   10/25/19 (!) 149/83   19 136/84    Wt Readings from Last 3 Encounters:   01/10/20 84.8 kg (187 lb)   10/25/19 83.9 kg (185 lb)   19 86.8 kg (191 lb 6.4 oz)                      Reviewed and updated as needed this visit by Provider         Review of Systems   ROS COMP:  "Constitutional, HEENT, cardiovascular, pulmonary, gi and gu systems are negative, except as otherwise noted.      Objective    /70 (BP Location: Right arm, Patient Position: Chair, Cuff Size: Adult Large)   Pulse 69   Temp 98  F (36.7  C) (Tympanic)   Resp 20   Ht 1.69 m (5' 6.54\")   Wt 84.8 kg (187 lb)   LMP  (LMP Unknown)   SpO2 96%   BMI 29.70 kg/m    Body mass index is 29.7 kg/m .  Physical Exam   GENERAL: healthy, alert and no distress  HENT: ear canals occluded with wax bilaterally, cleared with lavage only and TM's normal, pharynx with mild erythema, no sinus tenderness  NECK: no adenopathy, no asymmetry  RESP: lungs clear to auscultation - no rales, rhonchi or wheezes  CV: regular rate and rhythm, normal S1 S2, no S3 or S4, no murmur  ABDOMEN: soft, nontendermal  MS: no gross musculoskeletal defects noted      Diagnostic Test Results:  Labs reviewed in Epic  No results found for this or any previous visit (from the past 24 hour(s)).        Assessment & Plan     1. Viral URI with cough    Reassurance provided, exam was normal. Continue to monitor.    2. Bilateral impacted cerumen    - REMOVE IMPACTED CERUMEN    3. Nasal congestion    - fluticasone (FLONASE) 50 MCG/ACT nasal spray; Spray 1 spray into both nostrils daily for 14 days  Dispense: 16 g; Refill: 0   Discussed how to take the medication(s), expected outcomes, potential side effects.    BMI:   Estimated body mass index is 29.7 kg/m  as calculated from the following:    Height as of this encounter: 1.69 m (5' 6.54\").    Weight as of this encounter: 84.8 kg (187 lb).           See Patient Instructions  Patient Instructions   Exam was normal, continue with rest, fluids.  If any symptoms worsen, return for re evaluation.  Can try nasal spray, Flonase nightly for a couple of weeks.  Ears were cleared of wax.      Our Clinic hours are:  Mondays    7:20 am - 7 pm  Tues -  Fri  7:20 am - 5 pm    Clinic Phone: [phone]    The clinic lab opens " at 7:30 am Mon - Fri and appointments are required.    [facility]  Ph. [phone]  Monday  8 am - 7pm  Tues - Fri 8 am - 5:30 pm             Return in about 1 week (around 1/17/2020) for or sooner if symptoms persist or worsen.    JESI Márquez [facility]

## 2020-01-10 NOTE — PATIENT INSTRUCTIONS
Exam was normal, continue with rest, fluids.  If any symptoms worsen, return for re evaluation.  Can try nasal spray, Flonase nightly for a couple of weeks.  Ears were cleared of wax.      Our Clinic hours are:  Mondays    7:20 am - 7 pm  Tues -  Fri  7:20 am - 5 pm    Clinic Phone: [phone]    The clinic lab opens at 7:30 am Mon - Fri and appointments are required.    [facility]  Ph. [phone]  Monday  8 am - 7pm  Tues - Fri 8 am - 5:30 pm

## 2020-01-26 ENCOUNTER — HOSPITAL ENCOUNTER (EMERGENCY)
Facility: CLINIC | Age: 85
Discharge: HOME | End: 2020-01-26
Attending: FAMILY MEDICINE | Admitting: FAMILY MEDICINE
Payer: COMMERCIAL

## 2020-01-26 ENCOUNTER — APPOINTMENT (OUTPATIENT)
Dept: GENERAL RADIOLOGY | Facility: CLINIC | Age: 85
End: 2020-01-26
Attending: FAMILY MEDICINE
Payer: COMMERCIAL

## 2020-01-26 ENCOUNTER — APPOINTMENT (OUTPATIENT)
Dept: CT IMAGING | Facility: CLINIC | Age: 85
End: 2020-01-26
Attending: FAMILY MEDICINE
Payer: COMMERCIAL

## 2020-01-26 VITALS
OXYGEN SATURATION: 96 % | SYSTOLIC BLOOD PRESSURE: 117 MMHG | DIASTOLIC BLOOD PRESSURE: 115 MMHG | HEIGHT: 68 IN | TEMPERATURE: 98.3 F | WEIGHT: 180 LBS | BODY MASS INDEX: 27.28 KG/M2 | RESPIRATION RATE: 16 BRPM

## 2020-01-26 DIAGNOSIS — S32.10XA CLOSED FRACTURE OF SACRUM AND COCCYX, INITIAL ENCOUNTER (H): ICD-10-CM
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
## 2020-01-26 DIAGNOSIS — S32.2XXA CLOSED FRACTURE OF SACRUM AND COCCYX, INITIAL ENCOUNTER (H): ICD-10-CM

## 2020-01-26 DIAGNOSIS — S09.90XA CLOSED HEAD INJURY, INITIAL ENCOUNTER: ICD-10-CM

## 2020-01-26 PROCEDURE — 70450 CT HEAD/BRAIN W/O DYE: CPT

## 2020-01-26 PROCEDURE — 99284 EMERGENCY DEPT VISIT MOD MDM: CPT | Mod: Z6 | Performed by: FAMILY MEDICINE

## 2020-01-26 PROCEDURE — 99284 EMERGENCY DEPT VISIT MOD MDM: CPT | Mod: 25

## 2020-01-26 PROCEDURE — 25000132 ZZH RX MED GY IP 250 OP 250 PS 637: Performed by: FAMILY MEDICINE

## 2020-01-26 PROCEDURE — 72220 X-RAY EXAM SACRUM TAILBONE: CPT

## 2020-01-26 PROCEDURE — 72125 CT NECK SPINE W/O DYE: CPT

## 2020-01-26 RX ORDER — OXYCODONE HYDROCHLORIDE 5 MG/1
2.5 TABLET ORAL EVERY 6 HOURS PRN
Qty: 12 TABLET | Refills: 0 | Status: SHIPPED | OUTPATIENT
Start: 2020-01-26 | End: 2020-03-24

## 2020-01-26 RX ORDER — ACETAMINOPHEN 500 MG
1000 TABLET ORAL ONCE
Status: COMPLETED | OUTPATIENT
Start: 2020-01-26 | End: 2020-01-26

## 2020-01-26 RX ADMIN — ACETAMINOPHEN 1000 MG: 500 TABLET, FILM COATED ORAL at 12:08

## 2020-01-26 ASSESSMENT — MIFFLIN-ST. JEOR: SCORE: 1324.97

## 2020-01-26 NOTE — ED AVS SNAPSHOT
[facility] Emergency Department  [address] [address]  Phone:  [phone]  Fax:  [phone]                                    Bonita Villarreal   MRN: [id number]    Department:  [facility] Emergency Department   Date of Visit:  1/26/2020           After Visit Summary Signature Page    I have received my discharge instructions, and my questions have been answered. I have discussed any challenges I see with this plan with the nurse or doctor.    ..........................................................................................................................................  Patient/Patient Representative Signature      ..........................................................................................................................................  Patient Representative Print Name and Relationship to Patient    ..................................................               ................................................  Date                                   Time    ..........................................................................................................................................  Reviewed by Signature/Title    ...................................................              ..............................................  Date                                               Time          22EPIC Rev 08/18

## 2020-01-26 NOTE — ED PROVIDER NOTES
History     Chief Complaint   Patient presents with     Fall     pt slipped on ice walking into [employer], lump on the back of head. No LOC, denies nausea, denies blood thinners.      HPI  Bonita Villarreal is a 84 year old female, past medical history is significant for hypertension, diverticulosis, osteopenia, generalized anxiety disorder, presents the emergency department after a slip on ice while walking into [employer] falling backwards striking her occiput area scalp.  No loss of consciousness.  No nausea or vomiting.  No blood thinners.  Patient states that she was walking in the parking lot outside of [employer] on her way in when she had a patch of glare ice slipped landing on her occiput and tailbone.  She reports pain in both areas but no loss of consciousness.  No nausea or vomiting.  She went to [employer] anyway, noted that her vision at the start of the service was a little bit blurry at distance but that resolved quickly.  She took no pain medication and was encouraged by other churchgoer's to eat a sandwich and have some fluids so she had half a sandwich and a coffee.  She reports these went down without difficulty, no stomach upset no nausea or vomiting.  She notes no difficulties with ambulation currently except for pain in her tailbone area.  She notes swelling on the occiput portion of her scalp.      Allergies:  Allergies   Allergen Reactions     Lisinopril Cough       Problem List:    Patient Active Problem List    Diagnosis Date Noted     Pulmonary infiltrate in right lung on chest x-ray 06/29/2018     Priority: Medium     Headache 06/28/2018     Priority: Medium     Status post total right knee replacement 02/28/2018     Priority: Medium     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     Priority: Medium     CARDIOVASCULAR SCREENING; LDL GOAL LESS THAN 160 10/31/2010     Priority: Medium     LDL in 140s, HDL in 60s  [place] risk is 6% (average at her age is 14%)       Essential hypertension, benign 06/28/2005      Priority: Medium     had coronary CT scan  --- no evidence of plaque  started on atenolol and ASA 2005 for /94  physician in [place] switched her to Lotrel 5/10 due to her having dizzy spells  HCTZ added 3/05, pt felt mouth too dried out   pt more anxious and BP still elevated -- switched back to Atenolol       Diverticulosis of large intestine 2005     Priority: Medium     colonoscopy , disease mod - severe  Problem list name updated by automated process. Provider to review       Osteopenia of spine 2005     Priority: Medium     T score -1.3    Risk calculator based on 2003 T-score and personal data showed 1% risk of hip fracture over next 10 years, 14% risk of any fracture over next ten years; consider recheck DEXA at age 77, continue calcium and exercise until then (current recommendations to consider treatment if hip fx risk reaches 3% or any fracture risk reaches 20%)  Problem list name updated by automated process. Provider to review       Family history of other cardiovascular diseases 2005     Priority: Medium     father  at 39 of MI  brother  at 51 of MI  another brother -- MI x 2  eldest sister -- MI x 2 with stents  mother and maternal aunts lived to 80's and mid 90's  Problem list name updated by automated process. Provider to review and confirm  Problem list name updated by automated process. Provider to review       Generalized anxiety disorder 2005     Priority: Medium     onset after patient was diagnosed with hypertension fall           Past Medical History:    Past Medical History:   Diagnosis Date     Advance Care Planning 2012     Arthritis      Diverticulitis of colon 2005     Diverticulosis of colon (without mention of hemorrhage)      Hypertension        Past Surgical History:    Past Surgical History:   Procedure Laterality Date     ARTHROPLASTY KNEE Right 2018    Procedure: ARTHROPLASTY KNEE;  Right total knee  "arthroplasty;  Surgeon: Anton Grover MD;  Location: [facility]     C APPENDECTOMY       COLONOSCOPY  04/15/02     COLONOSCOPY  3/25/2013    Procedure: COLONOSCOPY;  Colonoscopy  ;  Surgeon: Jamil Porras MD;  Location: [facility]     FLEXIBLE SIGMOIDOSCOPY  96     ORTHOPEDIC SURGERY  22-23 YEARS AGO    BACK     SURGICAL HISTORY OF -       lumbar surgery       Family History:    Family History   Problem Relation Age of Onset     Lipids Brother      Heart Disease Brother          at 51 of an MI     Cancer Brother         bone, lung,  82     Prostate Cancer Brother      Cancer Sister         cervical     Lipids Sister      Heart Disease Sister         eldest sister, s/p MI x 2 with stents; diagnosed with AAA, postoperative complications and MI,  at 75     Heart Disease Brother         MI x 2     Lipids Brother      C.A.D. Father          at 39 of an MI     Family History Negative Mother         lived to age 95     Gynecology Sister         cervical Ca       Social History:  Marital Status:   [5]  Social History     Tobacco Use     Smoking status: Never Smoker     Smokeless tobacco: Never Used   Substance Use Topics     Alcohol use: Yes     Comment: rare     Drug use: No        Medications:    oxyCODONE (ROXICODONE) 5 MG tablet  atenolol (TENORMIN) 100 MG tablet  calcium carbonate (OS-JEFF 500 MG [place]. [place]) 500 MG tablet  cholecalciferol (VITAMIN D-1000 MAX ST) 25 MCG (1000 UT) TABS  escitalopram (LEXAPRO) 10 MG tablet  FISH OIL OR  hyoscyamine (ANASPAZ/LEVSIN) 0.125 MG tablet  ibuprofen (ADVIL/MOTRIN) 200 MG tablet  losartan-hydrochlorothiazide (HYZAAR) 100-12.5 MG tablet  MULTI-VITAMIN OR TABS          Review of Systems   All other systems reviewed and are negative.      Physical Exam   BP: (!) 117/115  Heart Rate: 88  Temp: 98.3  F (36.8  C)  Resp: 16  Height: 172.7 cm (5' 8\")  Weight: 81.6 kg (180 lb)  SpO2: 96 %      Physical Exam  Vitals signs and nursing note reviewed. "   Constitutional:       Appearance: Normal appearance. She is normal weight.   HENT:      Head: Normocephalic.        Nose: Nose normal.      Mouth/Throat:      Mouth: Mucous membranes are moist.      Pharynx: Oropharynx is clear.   Eyes:      Pupils: Pupils are equal, round, and reactive to light.   Neck:      Musculoskeletal: Normal range of motion.   Cardiovascular:      Rate and Rhythm: Normal rate and regular rhythm.      Pulses: Normal pulses.      Heart sounds: Normal heart sounds.   Pulmonary:      Effort: Pulmonary effort is normal.      Breath sounds: Normal breath sounds.   Abdominal:      General: Abdomen is flat.      Palpations: Abdomen is soft.   Musculoskeletal:        Back:    Neurological:      Mental Status: She is alert.         ED Course        Procedures               Critical Care time:  none               Results for orders placed or performed during the hospital encounter of 01/26/20 (from the past 24 hour(s))   CT Cervical Spine w/o Contrast    Narrative    CT CERVICAL SPINE WITHOUT CONTRAST   1/26/2020 1:39 PM     HISTORY: Pain. Slip and fall on ice striking occiput.     TECHNIQUE: Axial images of the cervical spine were obtained without  intravenous contrast. Multiplanar reformations were performed.  Radiation dose for this scan was reduced using automated exposure  control, adjustment of the mA and/or kV according to patient size, or  iterative reconstruction technique.     COMPARISON: None.    FINDINGS: Sagittal reconstructions demonstrate focal kyphosis at the  C5-6 level, otherwise normal posterior alignment mild-to-moderate  multilevel degenerative disc and facet disease is present most  advanced at C5-6 where there is mild-to-moderate central canal  stenosis, severe left-sided foraminal stenosis and moderate  right-sided foraminal stenosis. There is no evidence for fracture.      Impression    IMPRESSION: Degenerative changes. No evidence for fracture.    EARL PEOPLES MD   CT Head  w/o Contrast    Narrative    CT SCAN OF THE HEAD WITHOUT CONTRAST   1/26/2020 1:39 PM     HISTORY: Slip and fall on ice striking occiput.    TECHNIQUE:  Axial images of the head and coronal reformations without  IV contrast material.  Radiation dose for this scan was reduced using  automated exposure control, adjustment of the mA and/or kV according  to patient size, or iterative reconstruction technique.    COMPARISON: 6/28/2018.    FINDINGS: There is a large posterior scalp hematoma. There is no  evidence for any intracranial hemorrhage or skull fracture. Moderate  cerebral atrophy is present. Minimal nonspecific white matter changes  without mass effect are also present. There is no evidence for acute  infarct or any mass effect. Visualized paranasal sinuses and mastoid  air cells are clear.      Impression    IMPRESSION:  1. Cerebral atrophy with chronic white matter changes most likely due  to chronic small vessel ischemic disease.  2. Large posterior scalp hematoma. No evidence for intracranial  hemorrhage or skull fracture.    EARL PEOPLES MD   XR Sacrum and Coccyx 2 Views    Narrative    EXAM: XR SACRUM AND COCCYX 2 VW  LOCATION: [facility]  DATE/TIME: 1/26/2020 1:34 PM    INDICATION: Slip and fall on ice. Tail bone pain.  COMPARISON: CT of the abdomen/pelvis dated 08/15/2019.      Impression    IMPRESSION: There is cortical irregularity along the anterior cortex of the S2/S3 interspace, concerning for a minimally displaced sacral fracture. The bones are demineralized.   2:53 PM  All imaging is reviewed in the room with the patient and her daughter and son.  Answered their questions.  Patient is reluctant to use narcotic pain medication however I emphasized that she would likely feel much more uncomfortable tomorrow when she wakes up and have significant discomfort for a couple of weeks.  She will make the most of Tylenol/ibuprofen and I have given her prescription for oxycodone which she will  use sparingly if necessary only.  Otherwise she will discard the prescription.  Return if worse or changes.    Medications   acetaminophen (TYLENOL) tablet 1,000 mg (1,000 mg Oral Given 1/26/20 1208)       Assessments & Plan (with Medical Decision Making)   Assessments and plan with medical decision making at the time stamp above.      Disclaimer: This note consists of symbols derived from keyboarding, dictation and/or voice recognition software. As a result, there may be errors in the script that have gone undetected. Please consider this when interpreting information found in this chart.      I have reviewed the nursing notes.    I have reviewed the findings, diagnosis, plan and need for follow up with the patient.       New Prescriptions    OXYCODONE (ROXICODONE) 5 MG TABLET    Take 0.5 tablets (2.5 mg) by mouth every 6 hours as needed for breakthrough pain       Final diagnoses:   Fall, initial encounter   Closed head injury, initial encounter   Closed fracture of sacrum and coccyx, initial encounter (H) - S2/S3 interface       1/26/2020   [facility] EMERGENCY DEPARTMENT     Marc Dolan MD  01/26/20 2394

## 2020-01-26 NOTE — DISCHARGE INSTRUCTIONS
Rest, ice affected areas 20 minutes out of each hour while awake.  Tylenol/ibuprofen as needed for baseline pain requirement.  You may use oxycodone sparingly for breakthrough pain if needed.  Please be cautious with this medication as we discussed.  You can expect more soreness and stiffness when you awaken tomorrow and for the next couple of days and gradual improvement.  With a sacral fracture as we discussed it will be a good 2-3 weeks with some significant discomfort and then gradual improvement over the next 2 to 3 weeks.  Please return to the emergency department if worse or changes.

## 2020-01-26 NOTE — ED TRIAGE NOTES
pt slipped on ice walking into [employer], lump on the back of head. No LOC, denies nausea, denies blood thinners. Pain on the back of head (hematoma present) and on her tailbone, pt is able to ambulate with discomfort. Pt was able to attend [employer], reports some dizziness after incident and some blurred vision during [employer] but both have subsided.

## 2020-01-26 NOTE — ED NOTES
Pt reports she was going to [employer] and slipped on some ice, fell hitting tailbone and back of head on pavement. Pt denies LOC, pt is not on blood thinners. Pt reports pain to tailbone, left wrist and back of head

## 2020-02-11 ENCOUNTER — TELEPHONE (OUTPATIENT)
Dept: FAMILY MEDICINE | Facility: CLINIC | Age: 85
End: 2020-02-11

## 2020-02-11 NOTE — TELEPHONE ENCOUNTER
Reason for Call:  Medication or medication refill:    Do you use a [facility]?  Name of the pharmacy and phone number for the current request:  [facility] [place] [phone]    Name of the medication requested: Patient is taking Ibuprofen and Tylenol.   Other request: Patient fell 1/26/2020, went to ED, ED physician at [facility] prescribed oxycodone, she says it doesn't work for her. She is still in a lot of pain. Patient wants recommendation on what else she can do.      Can we leave a detailed message on this number? YES    Phone number patient can be reached at: Home number on file [phone] (home)    Best Time: Any    Call taken on 2/11/2020 at 9:00 AM by Elsy Kay

## 2020-02-12 NOTE — TELEPHONE ENCOUNTER
Patient returns our call.  Two weeks ago she fell and broke her tailbone.  She went to the ER and was prescribed Oxycodone. This medication did not help and she has been taking  mg daily.  She can increase this to 600 mg TID with food.  Alternate it with tylenol.  She has been sitting on something soft.  If she is not better by the end of the week she will be seen.  Patient denies any blood in her stool. Анна VUONG RN

## 2020-02-18 ENCOUNTER — TELEPHONE (OUTPATIENT)
Dept: FAMILY MEDICINE | Facility: CLINIC | Age: 85
End: 2020-02-18

## 2020-02-18 NOTE — TELEPHONE ENCOUNTER
Reason for call:  Patient reporting a symptom    Symptom or request: Pt fractured her tailbone and was seen in ER 3 weeks ago.  Pt still has a lot of pain and wants to know how much Tylenol she can take?  Please call patient and advise.      Duration (how long have symptoms been present): 3 weeks    Have you been treated for this before? Yes    Additional comments:     Phone Number patient can be reached at:  Home number on file [phone] (home)    Best Time:  any    Can we leave a detailed message on this number:  YES    Call taken on 2/18/2020 at 10:14 AM by Pao Corona

## 2020-02-21 ENCOUNTER — ANCILLARY PROCEDURE (OUTPATIENT)
Dept: GENERAL RADIOLOGY | Facility: CLINIC | Age: 85
End: 2020-02-21
Attending: FAMILY MEDICINE
Payer: COMMERCIAL

## 2020-02-21 ENCOUNTER — OFFICE VISIT (OUTPATIENT)
Dept: FAMILY MEDICINE | Facility: CLINIC | Age: 85
End: 2020-02-21
Payer: COMMERCIAL

## 2020-02-21 VITALS
OXYGEN SATURATION: 96 % | WEIGHT: 186 LBS | DIASTOLIC BLOOD PRESSURE: 78 MMHG | HEIGHT: 68 IN | SYSTOLIC BLOOD PRESSURE: 110 MMHG | TEMPERATURE: 97.8 F | HEART RATE: 78 BPM | BODY MASS INDEX: 28.19 KG/M2 | RESPIRATION RATE: 16 BRPM

## 2020-02-21 DIAGNOSIS — S32.2XXD CLOSED FRACTURE OF COCCYX WITH ROUTINE HEALING, SUBSEQUENT ENCOUNTER: Primary | ICD-10-CM

## 2020-02-21 DIAGNOSIS — S32.2XXD CLOSED FRACTURE OF COCCYX WITH ROUTINE HEALING, SUBSEQUENT ENCOUNTER: ICD-10-CM

## 2020-02-21 PROCEDURE — 99213 OFFICE O/P EST LOW 20 MIN: CPT | Performed by: FAMILY MEDICINE

## 2020-02-21 PROCEDURE — 72220 X-RAY EXAM SACRUM TAILBONE: CPT | Mod: FY

## 2020-02-21 RX ORDER — METHOCARBAMOL 500 MG/1
500 TABLET, FILM COATED ORAL 4 TIMES DAILY PRN
Qty: 60 TABLET | Refills: 1 | Status: SHIPPED | OUTPATIENT
Start: 2020-02-21 | End: 2020-05-28

## 2020-02-21 ASSESSMENT — PAIN SCALES - GENERAL: PAINLEVEL: WORST PAIN (10)

## 2020-02-21 ASSESSMENT — MIFFLIN-ST. JEOR: SCORE: 1352.19

## 2020-02-21 NOTE — PROGRESS NOTES
"Subjective     Bonita Villarreal is a 84 year old female who presents to clinic today for the following health issues:    HPI   ED/UC Followup:    Facility:  [facility]  Date of visit: 1/26/2020  Reason for visit: follow up fractured tailbone   Current Status: Still having a lot of pain     ADDITIONAL HPI: 84 year old female here for above issue.      ROS: 10 point review of systems negative except as per HPI.    PAST MEDICAL HISTORY:  Past Medical History:   Diagnosis Date     Advance Care Planning 2/28/2012    Advance Care Planning 3/20/2016: Receipt of ACP document:  Received: Health Care Directive which was witnessed or notarized on 2/25/16.  Document not previously scanned.  Validation form completed and sent with document to be scanned.  Code Status needs to be updated to reflect choices in most recent ACP document.  Confirmed/documented designated decision maker(s).  Added by Cata Terrell RN, Advance Care Planning Liaison. Advance Care Planning 2/28/12: Patient does not have an Advance/Health Care Directive (HCD), given \"What is Advance Care Planning?\" flyer. Teena Peace       Arthritis      Diverticulitis of colon 6/28/2005    Patient keeps Rx for Augmentin to use PRN Problem list name updated by automated process. Provider to review     Diverticulosis of colon (without mention of hemorrhage)     history of recurrent diverticulitis     Hypertension         ACTIVE MEDICAL PROBLEMS:  Patient Active Problem List   Diagnosis     Essential hypertension, benign     Generalized anxiety disorder     Diverticulosis of large intestine     Osteopenia of spine     Family history of other cardiovascular diseases     CARDIOVASCULAR SCREENING; LDL GOAL LESS THAN 160     Status post total right knee replacement     Right knee DJD     Headache     Pulmonary infiltrate in right lung on chest x-ray     Acute cystitis     UTI (urinary tract infection)     Nausea with vomiting     Volume depletion, " gastrointestinal loss     Hyponatremia     Fracture of sacrum (H)     Sacral pain        FAMILY HISTORY:  Family History   Problem Relation Age of Onset     Lipids Brother      Heart Disease Brother          at 51 of an MI     Cancer Brother         bone, lung,  82     Prostate Cancer Brother      Cancer Sister         cervical     Lipids Sister      Heart Disease Sister         eldest sister, s/p MI x 2 with stents; diagnosed with AAA, postoperative complications and MI,  at 75     Heart Disease Brother         MI x 2     Lipids Brother      C.A.D. Father          at 39 of an MI     Family History Negative Mother         lived to age 95     Gynecology Sister         cervical Ca       SOCIAL HISTORY:  Social History     Socioeconomic History     Marital status:      Spouse name: Not on file     Number of children: Not on file     Years of education: Not on file     Highest education level: Not on file   Occupational History     Not on file   Social Needs     Financial resource strain: Not on file     Food insecurity:     Worry: Not on file     Inability: Not on file     Transportation needs:     Medical: Not on file     Non-medical: Not on file   Tobacco Use     Smoking status: Never Smoker     Smokeless tobacco: Never Used   Substance and Sexual Activity     Alcohol use: Yes     Comment: rare     Drug use: No     Sexual activity: Yes     Partners: Male   Lifestyle     Physical activity:     Days per week: Not on file     Minutes per session: Not on file     Stress: Not on file   Relationships     Social connections:     Talks on phone: Not on file     Gets together: Not on file     Attends Cheondoism service: Not on file     Active member of club or organization: Not on file     Attends meetings of clubs or organizations: Not on file     Relationship status: Not on file     Intimate partner violence:     Fear of current or ex partner: Not on file     Emotionally abused: Not on file      "Physically abused: Not on file     Forced sexual activity: Not on file   Other Topics Concern     Parent/sibling w/ CABG, MI or angioplasty before 65F 55M? Yes     Comment: sister bypass,brother bypass,brother MI   Social History Narrative     Not on file       MEDICATIONS:  Current Outpatient Medications   Medication Sig Dispense Refill     atenolol (TENORMIN) 100 MG tablet Take 1 tablet (100 mg) by mouth daily 90 tablet 3     escitalopram (LEXAPRO) 10 MG tablet Take 1 tablet (10 mg) by mouth daily 90 tablet 3     ibuprofen (ADVIL/MOTRIN) 200 MG tablet Take 400-600 mg by mouth as needed (ruptured tail bone pain)        losartan-hydrochlorothiazide (HYZAAR) 100-12.5 MG tablet Take 1 tablet by mouth daily 90 tablet 3     methocarbamol 500 MG PO tablet Take 1 tablet (500 mg) by mouth 4 times daily as needed for muscle spasms 60 tablet 1     cephALEXin (KEFLEX) 500 MG capsule Take 1 capsule (500 mg) by mouth every 8 hours for 3 days 9 capsule 0     Lidocaine (LIDOCARE) 4 % Patch Place 1 patch onto the skin every 24 hours To prevent lidocaine toxicity, patient should be patch free for 12 hrs daily. 12 patch 1     MEGARED OMEGA-3 KRILL OIL PO Take 1 capsule by mouth daily       multivitamin (CENTRUM SILVER) tablet Take 1 tablet by mouth daily       oxyCODONE (ROXICODONE) 5 MG tablet Take 0.5 tablets (2.5 mg) by mouth every 6 hours as needed for breakthrough pain 12 tablet 0       ALLERGIES:     Allergies   Allergen Reactions     Lisinopril Cough       Problem list, Medication list, Allergies, and Medical/Social/Surgical histories reviewed in [facility] and updated as appropriate.    OBJECTIVE:                                                    VITALS: /78 (BP Location: Right arm, Cuff Size: Adult Regular)   Pulse 78   Temp 97.8  F (36.6  C) (Tympanic)   Resp 16   Ht 1.727 m (5' 8\")   Wt 84.4 kg (186 lb)   LMP  (LMP Unknown)   SpO2 96%   Breastfeeding No   BMI 28.28 kg/m   Body mass index is 28.28 " kg/m .  GENERAL: Pleasant, well appearing female.  MUSCULOSKELETAL:  Tenderness to palpation over distal coccyx.    ASSESSMENT/PLAN:                                                    1. Closed fracture of coccyx with routine healing, subsequent encounter  Routine healing. Trial robaxin for added pain control. Also recommended over the counter lidocaine patch. Off load as much as possible. Follow-up if not improving or if worsening.   - XR Sacrum and Coccyx 2 Views; Future  - methocarbamol 500 MG PO tablet; Take 1 tablet (500 mg) by mouth 4 times daily as needed for muscle spasms  Dispense: 60 tablet; Refill: 1

## 2020-02-21 NOTE — PATIENT INSTRUCTIONS
Our Clinic hours are:  Mondays    7:20 am - 7 pm  Tues -  Fri  7:20 am - 5 pm    Clinic Phone: [phone]    The clinic lab opens at 7:30 am Mon - Fri and appointments are required.    [facility]. [phone]  Monday  8 am - 7pm  Tues - Fri 8 am - 5:30 pm

## 2020-02-25 ENCOUNTER — TELEPHONE (OUTPATIENT)
Dept: FAMILY MEDICINE | Facility: CLINIC | Age: 85
End: 2020-02-25

## 2020-02-25 NOTE — TELEPHONE ENCOUNTER
Reason for call:  Patient reporting a symptom    Symptom or request: Patient feels she has a UTI heaviness feels like she has to go all the time, doesn't feel like it empty's. Few days, has gotten worse. Urine look clear. No burning or itching. Would like to know if she should come in or if could be treated over phone that patient was just in to see her.    Duration (how long have symptoms been present): 2-3 days    Have you been treated for this before? No    Phone Number patient can be reached at:  Home number on file [phone] (home)    Best Time:  any    Can we leave a detailed message on this number:  YES    Call taken on 2/25/2020 at 7:49 AM by Rocio Nunez

## 2020-02-25 NOTE — TELEPHONE ENCOUNTER
Pt feels she may have a UTI b/c she is urinating every 1-2 hours.  No fever, no burning with urination,no odor,urine clear.  Pt is having constant back pain and spasms.  Pt was given Robaxin on 2/21/20 for muscle spasms in her back.  Will start this med to see if it may help with her urinary symptoms?  Pt to f/u if has UTI symptoms ie: burning,fever, frequency not resolved  Jenn

## 2020-02-27 ENCOUNTER — APPOINTMENT (OUTPATIENT)
Dept: CT IMAGING | Facility: CLINIC | Age: 85
End: 2020-02-27
Attending: STUDENT IN AN ORGANIZED HEALTH CARE EDUCATION/TRAINING PROGRAM
Payer: COMMERCIAL

## 2020-02-27 ENCOUNTER — TELEPHONE (OUTPATIENT)
Dept: FAMILY MEDICINE | Facility: CLINIC | Age: 85
End: 2020-02-27

## 2020-02-27 ENCOUNTER — HOSPITAL ENCOUNTER (EMERGENCY)
Facility: CLINIC | Age: 85
Discharge: HOME | End: 2020-02-27
Attending: EMERGENCY MEDICINE | Admitting: EMERGENCY MEDICINE
Payer: COMMERCIAL

## 2020-02-27 VITALS
OXYGEN SATURATION: 95 % | TEMPERATURE: 97.7 F | HEART RATE: 78 BPM | BODY MASS INDEX: 26.61 KG/M2 | WEIGHT: 175 LBS | DIASTOLIC BLOOD PRESSURE: 78 MMHG | RESPIRATION RATE: 16 BRPM | SYSTOLIC BLOOD PRESSURE: 108 MMHG

## 2020-02-27 DIAGNOSIS — S32.10XS CLOSED FRACTURE OF SACRUM, UNSPECIFIED PORTION OF SACRUM, SEQUELA: ICD-10-CM

## 2020-02-27 DIAGNOSIS — N39.0 ACUTE UTI: ICD-10-CM

## 2020-02-27 LAB
ALBUMIN UR-MCNC: NEGATIVE MG/DL
ANION GAP SERPL CALCULATED.3IONS-SCNC: 8 MMOL/L (ref 3–14)
APPEARANCE UR: ABNORMAL
BACTERIA #/AREA URNS HPF: ABNORMAL /HPF
BASOPHILS # BLD AUTO: 0.1 10E9/L (ref 0–0.2)
BASOPHILS NFR BLD AUTO: 0.5 %
BILIRUB UR QL STRIP: NEGATIVE
BUN SERPL-MCNC: 19 MG/DL (ref 7–30)
CALCIUM SERPL-MCNC: 9.2 MG/DL (ref 8.5–10.1)
CHLORIDE SERPL-SCNC: 103 MMOL/L (ref 94–109)
CO2 SERPL-SCNC: 24 MMOL/L (ref 20–32)
COLOR UR AUTO: YELLOW
CREAT SERPL-MCNC: 0.74 MG/DL (ref 0.52–1.04)
DIFFERENTIAL METHOD BLD: ABNORMAL
EOSINOPHIL # BLD AUTO: 0.2 10E9/L (ref 0–0.7)
EOSINOPHIL NFR BLD AUTO: 1.8 %
ERYTHROCYTE [DISTWIDTH] IN BLOOD BY AUTOMATED COUNT: 11.9 % (ref 10–15)
GFR SERPL CREATININE-BSD FRML MDRD: 75 ML/MIN/{1.73_M2}
GLUCOSE SERPL-MCNC: 145 MG/DL (ref 70–99)
GLUCOSE UR STRIP-MCNC: NEGATIVE MG/DL
HCT VFR BLD AUTO: 40.8 % (ref 35–47)
HGB BLD-MCNC: 13.7 G/DL (ref 11.7–15.7)
HGB UR QL STRIP: NEGATIVE
IMM GRANULOCYTES # BLD: 0.1 10E9/L (ref 0–0.4)
IMM GRANULOCYTES NFR BLD: 0.5 %
KETONES UR STRIP-MCNC: NEGATIVE MG/DL
LEUKOCYTE ESTERASE UR QL STRIP: ABNORMAL
LYMPHOCYTES # BLD AUTO: 1.1 10E9/L (ref 0.8–5.3)
LYMPHOCYTES NFR BLD AUTO: 8.7 %
MCH RBC QN AUTO: 30.4 PG (ref 26.5–33)
MCHC RBC AUTO-ENTMCNC: 33.6 G/DL (ref 31.5–36.5)
MCV RBC AUTO: 91 FL (ref 78–100)
MONOCYTES # BLD AUTO: 1 10E9/L (ref 0–1.3)
MONOCYTES NFR BLD AUTO: 7.3 %
MUCOUS THREADS #/AREA URNS LPF: PRESENT /LPF
NEUTROPHILS # BLD AUTO: 10.7 10E9/L (ref 1.6–8.3)
NEUTROPHILS NFR BLD AUTO: 81.2 %
NITRATE UR QL: POSITIVE
NRBC # BLD AUTO: 0 10*3/UL
NRBC BLD AUTO-RTO: 0 /100
PH UR STRIP: 5 PH (ref 5–7)
PLATELET # BLD AUTO: 362 10E9/L (ref 150–450)
POTASSIUM SERPL-SCNC: 3.9 MMOL/L (ref 3.4–5.3)
RBC # BLD AUTO: 4.5 10E12/L (ref 3.8–5.2)
RBC #/AREA URNS AUTO: 1 /HPF (ref 0–2)
SODIUM SERPL-SCNC: 135 MMOL/L (ref 133–144)
SOURCE: ABNORMAL
SP GR UR STRIP: 1.02 (ref 1–1.03)
SQUAMOUS #/AREA URNS AUTO: 1 /HPF (ref 0–1)
UROBILINOGEN UR STRIP-MCNC: 0 MG/DL (ref 0–2)
WBC # BLD AUTO: 13.1 10E9/L (ref 4–11)
WBC #/AREA URNS AUTO: 5 /HPF (ref 0–5)

## 2020-02-27 PROCEDURE — 87186 SC STD MICRODIL/AGAR DIL: CPT | Performed by: EMERGENCY MEDICINE

## 2020-02-27 PROCEDURE — 81001 URINALYSIS AUTO W/SCOPE: CPT | Performed by: STUDENT IN AN ORGANIZED HEALTH CARE EDUCATION/TRAINING PROGRAM

## 2020-02-27 PROCEDURE — 80048 BASIC METABOLIC PNL TOTAL CA: CPT | Performed by: STUDENT IN AN ORGANIZED HEALTH CARE EDUCATION/TRAINING PROGRAM

## 2020-02-27 PROCEDURE — 87088 URINE BACTERIA CULTURE: CPT | Performed by: EMERGENCY MEDICINE

## 2020-02-27 PROCEDURE — 25000132 ZZH RX MED GY IP 250 OP 250 PS 637: Performed by: STUDENT IN AN ORGANIZED HEALTH CARE EDUCATION/TRAINING PROGRAM

## 2020-02-27 PROCEDURE — 99284 EMERGENCY DEPT VISIT MOD MDM: CPT | Mod: Z6 | Performed by: EMERGENCY MEDICINE

## 2020-02-27 PROCEDURE — 87086 URINE CULTURE/COLONY COUNT: CPT | Performed by: EMERGENCY MEDICINE

## 2020-02-27 PROCEDURE — 85025 COMPLETE CBC W/AUTO DIFF WBC: CPT | Performed by: STUDENT IN AN ORGANIZED HEALTH CARE EDUCATION/TRAINING PROGRAM

## 2020-02-27 PROCEDURE — 72131 CT LUMBAR SPINE W/O DYE: CPT

## 2020-02-27 PROCEDURE — 74176 CT ABD & PELVIS W/O CONTRAST: CPT

## 2020-02-27 PROCEDURE — 99284 EMERGENCY DEPT VISIT MOD MDM: CPT | Mod: 25 | Performed by: EMERGENCY MEDICINE

## 2020-02-27 RX ORDER — TEA TREE OIL 100 %
1 OIL (ML) TOPICAL DAILY
COMMUNITY
End: 2020-02-29

## 2020-02-27 RX ORDER — PHENAZOPYRIDINE HYDROCHLORIDE 200 MG/1
200 TABLET, FILM COATED ORAL 3 TIMES DAILY
Qty: 9 TABLET | Refills: 0 | Status: ON HOLD | OUTPATIENT
Start: 2020-02-27 | End: 2020-03-02

## 2020-02-27 RX ORDER — SULFAMETHOXAZOLE/TRIMETHOPRIM 800-160 MG
1 TABLET ORAL 2 TIMES DAILY
Qty: 6 TABLET | Refills: 0 | Status: ON HOLD | OUTPATIENT
Start: 2020-02-27 | End: 2020-03-02

## 2020-02-27 RX ORDER — OXYCODONE HYDROCHLORIDE 5 MG/1
5 TABLET ORAL ONCE
Status: COMPLETED | OUTPATIENT
Start: 2020-02-27 | End: 2020-02-27

## 2020-02-27 RX ADMIN — OXYCODONE HYDROCHLORIDE 5 MG: 5 TABLET ORAL at 19:08

## 2020-02-27 NOTE — TELEPHONE ENCOUNTER
"Pt had stopped the Oxycodone since it \"didn't help\".  Pt will restart Oxycodone, take Ibuprofen and try ice.  If no improvement, pt will f/u in clinic. KpavelRN  "

## 2020-02-27 NOTE — ED AVS SNAPSHOT
[facility] Emergency Department  [address] [address]  Phone:  [phone]  Fax:  [phone]                                    Bonita Villarreal   MRN: [id number]    Department:  [facility] Emergency Department   Date of Visit:  2/27/2020           After Visit Summary Signature Page    I have received my discharge instructions, and my questions have been answered. I have discussed any challenges I see with this plan with the nurse or doctor.    ..........................................................................................................................................  Patient/Patient Representative Signature      ..........................................................................................................................................  Patient Representative Print Name and Relationship to Patient    ..................................................               ................................................  Date                                   Time    ..........................................................................................................................................  Reviewed by Signature/Title    ...................................................              ..............................................  Date                                               Time          22EPIC Rev 08/18

## 2020-02-27 NOTE — TELEPHONE ENCOUNTER
Reason for Call:  Other     Detailed comments: pt Is calling and stating the current medication she is one is not helping.  She would like to discuss this re: her Fractured tailbone.  Her phar is Baljeet VITALE.      Phone Number Patient can be reached at: Home number on file [phone] (home)    Best Time: any    Can we leave a detailed message on this number? YES    Call taken on 2/27/2020 at 8:29 AM by Анна Gant

## 2020-02-28 NOTE — ED PROVIDER NOTES
History   No chief complaint on file.    HPI  Bonita Villarreal is a 84 year old female with past medical history significant for hypertension, diverticulosis, osteopenia, generalized anxiety disorder, and with recent fall on ice one month prior with left sacral fracture presents today with 3 days of worsening left back pain that radiates around to the groin and a roughly L1 distribution.  She is also endorsing increased urinary frequency.  The pain is described as sharp in nature and is not reproducible.  She reports that the pain worsens with movement and she has essentially been bedridden due to this pain.  Her sacral fracture was deemed nonoperable and she has been slowly recovering at home.  She has been able to ambulate around the house up until the last couple days.  She was recently started on oxycodone by her primary doctor and this is been minimally helpful.  She is also been taking ibuprofen.  She denies any numbness or tingling in her legs.  She denies any loss of bowel or bladder function.  She denies any saddle anesthesia.  She is not having any dysuria, diarrhea, or constipation.  She does not report any fevers, chills, nausea, vomiting, shortness of breath, chest pain, or abdominal pain.    Allergies:  Allergies   Allergen Reactions     Lisinopril Cough       Problem List:    Patient Active Problem List    Diagnosis Date Noted     Pulmonary infiltrate in right lung on chest x-ray 06/29/2018     Priority: Medium     Headache 06/28/2018     Priority: Medium     Status post total right knee replacement 02/28/2018     Priority: Medium     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     Priority: Medium     CARDIOVASCULAR SCREENING; LDL GOAL LESS THAN 160 10/31/2010     Priority: Medium     LDL in 140s, HDL in 60s  [place] risk is 6% (average at her age is 14%)       Essential hypertension, benign 06/28/2005     Priority: Medium     had coronary CT scan 2003 --- no evidence of plaque  started on atenolol and ASA  2005 for /94  physician in [place] switched her to Lotrel 5/10 due to her having dizzy spells  HCTZ added 3/05, pt felt mouth too dried out   pt more anxious and BP still elevated -- switched back to Atenolol       Diverticulosis of large intestine 2005     Priority: Medium     colonoscopy , disease mod - severe  Problem list name updated by automated process. Provider to review       Osteopenia of spine 2005     Priority: Medium     T score -1.3    Risk calculator based on 2003 T-score and personal data showed 1% risk of hip fracture over next 10 years, 14% risk of any fracture over next ten years; consider recheck DEXA at age 77, continue calcium and exercise until then (current recommendations to consider treatment if hip fx risk reaches 3% or any fracture risk reaches 20%)  Problem list name updated by automated process. Provider to review       Family history of other cardiovascular diseases 2005     Priority: Medium     father  at 39 of MI  brother  at 51 of MI  another brother -- MI x 2  eldest sister -- MI x 2 with stents  mother and maternal aunts lived to 80's and mid 90's  Problem list name updated by automated process. Provider to review and confirm  Problem list name updated by automated process. Provider to review       Generalized anxiety disorder 2005     Priority: Medium     onset after patient was diagnosed with hypertension fall           Past Medical History:    Past Medical History:   Diagnosis Date     Advance Care Planning 2012     Arthritis      Diverticulitis of colon 2005     Diverticulosis of colon (without mention of hemorrhage)      Hypertension        Past Surgical History:    Past Surgical History:   Procedure Laterality Date     ARTHROPLASTY KNEE Right 2018    Procedure: ARTHROPLASTY KNEE;  Right total knee arthroplasty;  Surgeon: Anton Grover MD;  Location: [facility]     C APPENDECTOMY       COLONOSCOPY   04/15/02     COLONOSCOPY  3/25/2013    Procedure: COLONOSCOPY;  Colonoscopy  ;  Surgeon: Jamil Porras MD;  Location: [facility]     FLEXIBLE SIGMOIDOSCOPY  96     ORTHOPEDIC SURGERY  22-23 YEARS AGO    BACK     SURGICAL HISTORY OF -       lumbar surgery       Family History:    Family History   Problem Relation Age of Onset     Lipids Brother      Heart Disease Brother          at 51 of an MI     Cancer Brother         bone, lung,  82     Prostate Cancer Brother      Cancer Sister         cervical     Lipids Sister      Heart Disease Sister         eldest sister, s/p MI x 2 with stents; diagnosed with AAA, postoperative complications and MI,  at 75     Heart Disease Brother         MI x 2     Lipids Brother      C.A.D. Father          at 39 of an MI     Family History Negative Mother         lived to age 95     Gynecology Sister         cervical Ca       Social History:  Marital Status:   [5]  Social History     Tobacco Use     Smoking status: Never Smoker     Smokeless tobacco: Never Used   Substance Use Topics     Alcohol use: Yes     Comment: rare     Drug use: No        Medications:    atenolol (TENORMIN) 100 MG tablet  escitalopram (LEXAPRO) 10 MG tablet  Fish Oil-Cholecalciferol (OMEGA-3 FISH OIL/VITAMIN D3) 6668-2293 MG-UNIT CAPS  ibuprofen (ADVIL/MOTRIN) 200 MG tablet  losartan-hydrochlorothiazide (HYZAAR) 100-12.5 MG tablet  methocarbamol 500 MG PO tablet  oxyCODONE (ROXICODONE) 5 MG tablet  phenazopyridine (PYRIDIUM) 200 MG tablet  sulfamethoxazole-trimethoprim (BACTRIM DS) 800-160 MG tablet          Review of Systems  Remainder of ROS negative unless noted in HPI.     Physical Exam   BP: (!) 158/96  Pulse: 84  Heart Rate: 88  Temp: 97.7  F (36.5  C)  Resp: 16  Weight: 79.4 kg (175 lb)  SpO2: 96 %  Physical Exam  Nursing note and vitals were reviewed.  Constitutional: Awake and alert, adequately nourished and developed appearing 84-year-old in no apparent discomfort, who  does not appear acutely ill, and who answers questions appropriately and cooperates with examination.  HEENT: EOMI. Atraumatic.  Neck: Freely mobile.  Cardiovascular: Cardiac examination reveals normal heart rate and regular rhythm without murmur.  Pulmonary/Chest: Breathing is unlabored.  Breath sounds are clear and equal bilaterally.  There no retractions, tachypnea, rales, wheezes, or rhonchi.  Abdomen: Soft, nontender, no HSM or masses rebound or guarding.  No inguinal hernia noted.  Musculoskeletal: Extremities are warm and well-perfused and without edema.  There is no tenderness to palpation over her sacroiliac joints, sacrum, hips, or back.  Neurological: Alert, oriented, thought content logical, coherent.  Sensation intact to light touch in bilateral lower extremities.  Hip flexion is 5 out of 5 although exam was limited by pain.  She has good strength with ankle flexion and extension bilaterally.  Skin: Warm, dry, no rashes.  Well-perfused.  Psychiatric: Affect broad and appropriate.       ED Course     ED Course as of Feb 27 2121   Thu Feb 27, 2020 1937 WBC(!): 13.1   1937 Hemoglobin: 13.7   1937 Platelet Count: 362   1942 Creatinine: 0.74   1943 Sodium: 135   1943 Potassium: 3.9   2025 1.  Bilateral sacral insufficiency fractures which appears subacute.  2.  No convincing evidence for any acute lumbar spine fractures.  3.  Multilevel degenerative disc and facet disease most advanced at L4-L5 where there is moderate central canal stenosis, moderate left-sided foraminal stenosis and mild-to-moderate right-sided foraminal stenosis.   Lumbar spine CT w/o contrast   2025 Nitrite Urine(!): Positive   2025 Leukocyte Esterase Urine(!): Trace   2025 Bacteria Urine(!): Few   2030 1.  Colonic diverticulosis without diverticulitis.   2.  No renal calculi or hydronephrosis.    Abd/pelvis CT - no contrast - Stone Protocol     Procedures    Critical Care time:  none         Results for orders placed or performed  during the hospital encounter of 02/27/20 (from the past 24 hour(s))   UA with Microscopic reflex to Culture   Result Value Ref Range    Color Urine Yellow     Appearance Urine Slightly Cloudy     Glucose Urine Negative NEG^Negative mg/dL    Bilirubin Urine Negative NEG^Negative    Ketones Urine Negative NEG^Negative mg/dL    Specific Gravity Urine 1.018 1.003 - 1.035    Blood Urine Negative NEG^Negative    pH Urine 5.0 5.0 - 7.0 pH    Protein Albumin Urine Negative NEG^Negative mg/dL    Urobilinogen mg/dL 0.0 0.0 - 2.0 mg/dL    Nitrite Urine Positive (A) NEG^Negative    Leukocyte Esterase Urine Trace (A) NEG^Negative    Source Midstream Urine     WBC Urine 5 0 - 5 /HPF    RBC Urine 1 0 - 2 /HPF    Bacteria Urine Few (A) NEG^Negative /HPF    Squamous Epithelial /HPF Urine 1 0 - 1 /HPF    Mucous Urine Present (A) NEG^Negative /LPF   CBC with platelets, differential   Result Value Ref Range    WBC 13.1 (H) 4.0 - 11.0 10e9/L    RBC Count 4.50 3.8 - 5.2 10e12/L    Hemoglobin 13.7 11.7 - 15.7 g/dL    Hematocrit 40.8 35.0 - 47.0 %    MCV 91 78 - 100 fl    MCH 30.4 26.5 - 33.0 pg    MCHC 33.6 31.5 - 36.5 g/dL    RDW 11.9 10.0 - 15.0 %    Platelet Count 362 150 - 450 10e9/L    Diff Method Automated Method     % Neutrophils 81.2 %    % Lymphocytes 8.7 %    % Monocytes 7.3 %    % Eosinophils 1.8 %    % Basophils 0.5 %    % Immature Granulocytes 0.5 %    Nucleated RBCs 0 0 /100    Absolute Neutrophil 10.7 (H) 1.6 - 8.3 10e9/L    Absolute Lymphocytes 1.1 0.8 - 5.3 10e9/L    Absolute Monocytes 1.0 0.0 - 1.3 10e9/L    Absolute Eosinophils 0.2 0.0 - 0.7 10e9/L    Absolute Basophils 0.1 0.0 - 0.2 10e9/L    Abs Immature Granulocytes 0.1 0 - 0.4 10e9/L    Absolute Nucleated RBC 0.0    Basic metabolic panel   Result Value Ref Range    Sodium 135 133 - 144 mmol/L    Potassium 3.9 3.4 - 5.3 mmol/L    Chloride 103 94 - 109 mmol/L    Carbon Dioxide 24 20 - 32 mmol/L    Anion Gap 8 3 - 14 mmol/L    Glucose 145 (H) 70 - 99 mg/dL    Urea  Nitrogen 19 7 - 30 mg/dL    Creatinine 0.74 0.52 - 1.04 mg/dL    GFR Estimate 75 >60 mL/min/[1.73_m2]    GFR Estimate If Black 87 >60 mL/min/[1.73_m2]    Calcium 9.2 8.5 - 10.1 mg/dL   Lumbar spine CT w/o contrast    Narrative    EXAM: CT LUMBAR SPINE W/O CONTRAST  LOCATION: [facility]  DATE/TIME: 2/27/2020 7:33 PM    INDICATION: Subacute fall. Lower back pain with radicular symptoms.  COMPARISON: None.  TECHNIQUE: Routine without IV contrast. Multiplanar reformats. Dose reduction techniques were used.    FINDINGS:  Nomenclature is based on 5 lumbar type vertebral bodies. There are Schmorl's node deformities in the superior endplates of T12 and L1. Vertebral body heights are otherwise maintained. No definite lumbar fracture. Bilateral sacral insufficiency fractures   are present. The fracture on the right appears subacute as there is some sclerosis. The fracture on the left also shows a subtle degree of sclerosis but may be slightly less subacute. No extraspinal soft tissue abnormality.    T12-L1: Minimal disc bulging with a more focal right posterolateral disc protrusion and some facet hypertrophy is present causing mild right-sided foraminal stenosis but no central canal stenosis.    L1-L2: Broad-based disc bulge and mild facet hypertrophy is present causing some mild central canal stenosis but no neural foraminal stenosis.    L2-L3: Broad-based disc bulging and mild facet hypertrophy is present causing borderline to mild central canal stenosis and mild right-sided foraminal stenosis.    L3-L4: Broad-based disc bulging and mild-to-moderate facet and ligament flavum hypertrophy is present causing mild-to-moderate central canal stenosis and mild right-sided foraminal stenosis.    L4-L5: Broad-based disc bulging with a more focal left posterolateral disc protrusion and mild-to-moderate facet hypertrophy is present causing moderate central canal stenosis, moderate left-sided foraminal stenosis and  mild-to-moderate right-sided   foraminal stenosis.    L5-S1: Mild disc space narrowing and moderate facet hypertrophy is present causing some mild-to-moderate bilateral foraminal stenosis but no significant central canal stenosis.      Impression    IMPRESSION:  1.  Bilateral sacral insufficiency fractures which appears subacute.  2.  No convincing evidence for any acute lumbar spine fractures.  3.  Multilevel degenerative disc and facet disease most advanced at L4-L5 where there is moderate central canal stenosis, moderate left-sided foraminal stenosis and mild-to-moderate right-sided foraminal stenosis.   Abd/pelvis CT - no contrast - Stone Protocol    Narrative    EXAM: CT ABDOMEN PELVIS W/O CONTRAST  LOCATION: [facility]  DATE/TIME: 2/27/2020 7:33 PM    INDICATION: Left lower quadrant abdominal pain  COMPARISON: None.  TECHNIQUE: CT scan of the abdomen and pelvis was performed without IV contrast. Multiplanar reformats were obtained. Dose reduction techniques were used.  CONTRAST: None.    FINDINGS:   LOWER CHEST: Normal.    HEPATOBILIARY: Normal.    PANCREAS: Normal.    SPLEEN: Normal.    ADRENAL GLANDS: Normal.    KIDNEYS/BLADDER: Right kidney is normal with no mass, stones, or hydronephrosis.   Left kidney is normal with no mass, stones, or hydronephrosis.  Bladder is normal.    BOWEL: Diverticulosis of the colon. No acute inflammatory change. No obstruction.     LYMPH NODES: Normal.    VASCULATURE: No abdominal aortic aneurysm.    PELVIC ORGANS: Normal.    MUSCULOSKELETAL: Normal.      Impression    IMPRESSION:   1.  Colonic diverticulosis without diverticulitis.   2.  No renal calculi or hydronephrosis.          Medications   oxyCODONE (ROXICODONE) tablet 5 mg (5 mg Oral Given 2/27/20 1908)       Assessments & Plan (with Medical Decision Making)   84-year-old female with medical history as above presenting with left lower back pain that radiates around to the groin.  Differential diagnosis  includes fracture, nerve root impingement, neuropathy, urinary tract infection, kidney stone, hernia, others.  There is no acute report of trauma although she does have the history of fall approximately 6 weeks ago with a sacral fracture.  Her symptoms are distributed in an L1 distribution. There is no reproducible tenderness on exam.  There is no physical evidence of hernia.  She does report some increased urinary frequency so we will obtain a urine analysis.  She was given 5 mg of oxycodone for pain.  A CBC, BMP, and UA were notable for a mild leukocytosis to 13.1, mild hyperglycemia with a glucose of 145, and nitrate positive urine concerning for UTI.  We did also obtain CT scan to evaluate for stone or lumbar fracture to explain her symptoms. Aside from some diverticulosis without diverticulitis and the known stress fractures of her sacrum, these were unremarkable.  There is no evidence of nerve impingement to explain her symptoms.  Given the urinalysis with evidence of infection I suspect that her symptoms are caused by cystitis.  We will obtain a culture and treat with Bactrim with Pyridium for bladder spasm.  She is otherwise well-appearing without any evidence of systemic infection.  She has no CVA tenderness to suspect pyelonephritis.  The patient remained hemodynamically stable throughout the remainder of her stay in the emergency department.  The patient was discharged home in stable condition with plan for primary care follow-up in the next 3 days.    I have reviewed the nursing notes.    I have reviewed the findings, diagnosis, plan and need for follow up with the patient.    Physician Attestation   I, Dante Hinkle MD, saw this patient with the resident and agree with the resident/fellow's findings and plan of care as documented in the note.      I personally reviewed vital signs, medications labs and imaging studies.    Key findings: Patient is a 84-year-old female with a history of recent  sacral fracture who presents the emergency department complaining of left lower back pain radiating to her abdomen.  Pain has been present for 3 days and has been fairly constant.  She denies any fevers or chills she has not had any vomiting.  She denies any significant diarrhea.  She has not had any bowel or bladder dysfunction but does endorse some urgency.  There is no pain with urination.  She rated the pain a 3 out of 10.    On my examination patient is alert and oriented appears in no acute distress.  Lungs were clear to auscultation bilaterally and heart was regular rate and rhythm.  Patient had tenderness to palpation of the left lower lumbar region an mild tenderness to palpation in the left lower abdomen.  No guarding or rebound present.  No reproducible hip tenderness.  Patient has no swelling in her legs and no tenderness in her calfs.   Basic labs were obtained patient was given a tablet of oxycodone for pain.  Labs were obtained.  CBC was elevated at 13.1.  With a left shift.  Comprehensive metabolic panel unremarkable.  Urine analysis with positive nitrate trace leukocyte Estrace 5 WBCs.  Due to her abdominal pain it was decided to proceed with a CT scan renal stone protocol to rule out other abnormalities or evidence of pyelonephritis.  No obvious acute abnormality was noted on CT scan.  Urine culture was sent.  Patient was feeling better with pain medication.  We discussed findings with patient.  She definitely has a UTI which we will treat with antibiotics.  She is also having some spasming and therefore we will treat her with Pyridium.  Patient feels comfortable at this time going home.  She does not feel she needs to be admitted.  She has stable vital signs and is in no acute distress.  She understands if symptoms worsen or new symptoms develop she immediately needs to return for further evaluation and care.    Dante Hinkle MD  Date of Service (when I saw the patient):2/27/20    New  Prescriptions    PHENAZOPYRIDINE (PYRIDIUM) 200 MG TABLET    Take 1 tablet (200 mg) by mouth 3 times daily for 3 days    SULFAMETHOXAZOLE-TRIMETHOPRIM (BACTRIM DS) 800-160 MG TABLET    Take 1 tablet by mouth 2 times daily for 3 days       Final diagnoses:   Acute UTI   Closed fracture of sacrum, unspecified portion of sacrum, sequela     Blu Florence MD   2/27/2020   [facility] EMERGENCY DEPARTMENT     Blu Florence MD  Resident  02/27/20 2129       Dante Hinkle MD  02/29/20 [id number]

## 2020-02-28 NOTE — DISCHARGE INSTRUCTIONS
Please take all the medications as prescribed.  You can take the Pyridium which may help with bladder spasm.  The Pyridium can turn your urine orange so do not be alarmed.  Schedule appointment to follow-up with your primary doctor in the next few days to make sure your symptoms have resolved and to see if you need any physical therapy for your sacral fractures.  Please come back to the emergency department if your symptoms get worse or if you have high fevers.

## 2020-02-28 NOTE — ED NOTES
Pt had fall approx 6 weeks ago, fractured tailbone.  Minimal improvement with that.  Pt presents today mainly for a new anterior left hip pain, hurts with ambulation, started approx 2 days ago.  No new falls.

## 2020-02-28 NOTE — RESULT ENCOUNTER NOTE
Emergency Dept/Urgent Care discharge antibiotic (if prescribed): Sulfamethoxazole-Trimethoprim (Bactrim DS, Septra DS) 800-160 mg PO tablet,  1 tablet by mouth 2 times daily for 3 days.  Date of Rx (if applicable):  2/27/20  No changes in treatment per Urine culture protocol.

## 2020-02-29 ENCOUNTER — HOSPITAL ENCOUNTER (OUTPATIENT)
Facility: CLINIC | Age: 85
Setting detail: OBSERVATION
Discharge: HOME | End: 2020-03-02
Attending: FAMILY MEDICINE | Admitting: INTERNAL MEDICINE
Payer: COMMERCIAL

## 2020-02-29 DIAGNOSIS — S32.10XD CLOSED FRACTURE OF SACRUM WITH ROUTINE HEALING, UNSPECIFIED PORTION OF SACRUM, SUBSEQUENT ENCOUNTER: Primary | ICD-10-CM

## 2020-02-29 DIAGNOSIS — M53.3 SACRAL PAIN: ICD-10-CM

## 2020-02-29 DIAGNOSIS — Z16.24 RESISTANCE TO MULTIPLE ANTIBIOTICS: ICD-10-CM

## 2020-02-29 DIAGNOSIS — B96.20 UNSPECIFIED ESCHERICHIA COLI (E. COLI) AS THE CAUSE OF DISEASES CLASSIFIED ELSEWHERE: ICD-10-CM

## 2020-02-29 DIAGNOSIS — R11.2 INTRACTABLE VOMITING WITH NAUSEA, UNSPECIFIED VOMITING TYPE: ICD-10-CM

## 2020-02-29 DIAGNOSIS — E87.1 HYPONATREMIA: ICD-10-CM

## 2020-02-29 DIAGNOSIS — N30.00 ACUTE CYSTITIS WITHOUT HEMATURIA: ICD-10-CM

## 2020-02-29 DIAGNOSIS — R11.2 NAUSEA AND VOMITING, INTRACTABILITY OF VOMITING NOT SPECIFIED, UNSPECIFIED VOMITING TYPE: ICD-10-CM

## 2020-02-29 PROBLEM — N39.0 UTI (URINARY TRACT INFECTION): Status: ACTIVE | Noted: 2020-02-29

## 2020-02-29 LAB
ALBUMIN SERPL-MCNC: 3.4 G/DL (ref 3.4–5)
ALP SERPL-CCNC: 242 U/L (ref 40–150)
ALT SERPL W P-5'-P-CCNC: 18 U/L (ref 0–50)
ANION GAP SERPL CALCULATED.3IONS-SCNC: 11 MMOL/L (ref 3–14)
AST SERPL W P-5'-P-CCNC: 22 U/L (ref 0–45)
BACTERIA SPEC CULT: ABNORMAL
BASOPHILS # BLD AUTO: 0.1 10E9/L (ref 0–0.2)
BASOPHILS NFR BLD AUTO: 0.4 %
BILIRUB SERPL-MCNC: 0.7 MG/DL (ref 0.2–1.3)
BUN SERPL-MCNC: 15 MG/DL (ref 7–30)
CALCIUM SERPL-MCNC: 9.3 MG/DL (ref 8.5–10.1)
CHLORIDE SERPL-SCNC: 96 MMOL/L (ref 94–109)
CO2 SERPL-SCNC: 23 MMOL/L (ref 20–32)
CREAT SERPL-MCNC: 0.72 MG/DL (ref 0.52–1.04)
DIFFERENTIAL METHOD BLD: ABNORMAL
EOSINOPHIL # BLD AUTO: 0.1 10E9/L (ref 0–0.7)
EOSINOPHIL NFR BLD AUTO: 0.7 %
ERYTHROCYTE [DISTWIDTH] IN BLOOD BY AUTOMATED COUNT: 11.9 % (ref 10–15)
GFR SERPL CREATININE-BSD FRML MDRD: 78 ML/MIN/{1.73_M2}
GLUCOSE SERPL-MCNC: 150 MG/DL (ref 70–99)
HCT VFR BLD AUTO: 39.5 % (ref 35–47)
HGB BLD-MCNC: 13.7 G/DL (ref 11.7–15.7)
IMM GRANULOCYTES # BLD: 0.1 10E9/L (ref 0–0.4)
IMM GRANULOCYTES NFR BLD: 0.8 %
LACTATE BLD-SCNC: 1 MMOL/L (ref 0.7–2)
LYMPHOCYTES # BLD AUTO: 1.1 10E9/L (ref 0.8–5.3)
LYMPHOCYTES NFR BLD AUTO: 9.2 %
Lab: ABNORMAL
MCH RBC QN AUTO: 30.7 PG (ref 26.5–33)
MCHC RBC AUTO-ENTMCNC: 34.7 G/DL (ref 31.5–36.5)
MCV RBC AUTO: 89 FL (ref 78–100)
MONOCYTES # BLD AUTO: 0.9 10E9/L (ref 0–1.3)
MONOCYTES NFR BLD AUTO: 7.3 %
NEUTROPHILS # BLD AUTO: 9.9 10E9/L (ref 1.6–8.3)
NEUTROPHILS NFR BLD AUTO: 81.6 %
NRBC # BLD AUTO: 0 10*3/UL
NRBC BLD AUTO-RTO: 0 /100
PLATELET # BLD AUTO: 374 10E9/L (ref 150–450)
POTASSIUM SERPL-SCNC: 3.4 MMOL/L (ref 3.4–5.3)
PROT SERPL-MCNC: 6.9 G/DL (ref 6.8–8.8)
RBC # BLD AUTO: 4.46 10E12/L (ref 3.8–5.2)
SODIUM SERPL-SCNC: 130 MMOL/L (ref 133–144)
SPECIMEN SOURCE: ABNORMAL
WBC # BLD AUTO: 12.1 10E9/L (ref 4–11)

## 2020-02-29 PROCEDURE — 99285 EMERGENCY DEPT VISIT HI MDM: CPT | Mod: Z6 | Performed by: FAMILY MEDICINE

## 2020-02-29 PROCEDURE — 25000132 ZZH RX MED GY IP 250 OP 250 PS 637: Performed by: PHYSICIAN ASSISTANT

## 2020-02-29 PROCEDURE — 25000128 H RX IP 250 OP 636

## 2020-02-29 PROCEDURE — 96376 TX/PRO/DX INJ SAME DRUG ADON: CPT | Performed by: FAMILY MEDICINE

## 2020-02-29 PROCEDURE — 25000132 ZZH RX MED GY IP 250 OP 250 PS 637: Performed by: FAMILY MEDICINE

## 2020-02-29 PROCEDURE — 83605 ASSAY OF LACTIC ACID: CPT | Performed by: INTERNAL MEDICINE

## 2020-02-29 PROCEDURE — 99219 ZZC INITIAL OBSERVATION CARE,LEVL II: CPT | Performed by: INTERNAL MEDICINE

## 2020-02-29 PROCEDURE — 96361 HYDRATE IV INFUSION ADD-ON: CPT | Performed by: FAMILY MEDICINE

## 2020-02-29 PROCEDURE — 96365 THER/PROPH/DIAG IV INF INIT: CPT | Performed by: FAMILY MEDICINE

## 2020-02-29 PROCEDURE — 25800030 ZZH RX IP 258 OP 636: Performed by: FAMILY MEDICINE

## 2020-02-29 PROCEDURE — 85025 COMPLETE CBC W/AUTO DIFF WBC: CPT | Performed by: FAMILY MEDICINE

## 2020-02-29 PROCEDURE — 80053 COMPREHEN METABOLIC PANEL: CPT | Performed by: FAMILY MEDICINE

## 2020-02-29 PROCEDURE — G0378 HOSPITAL OBSERVATION PER HR: HCPCS

## 2020-02-29 PROCEDURE — 25000128 H RX IP 250 OP 636: Performed by: INTERNAL MEDICINE

## 2020-02-29 PROCEDURE — 36415 COLL VENOUS BLD VENIPUNCTURE: CPT | Performed by: INTERNAL MEDICINE

## 2020-02-29 PROCEDURE — 96375 TX/PRO/DX INJ NEW DRUG ADDON: CPT

## 2020-02-29 PROCEDURE — 25800025 ZZH RX 258: Performed by: FAMILY MEDICINE

## 2020-02-29 PROCEDURE — 25000125 ZZHC RX 250: Performed by: FAMILY MEDICINE

## 2020-02-29 PROCEDURE — 96375 TX/PRO/DX INJ NEW DRUG ADDON: CPT | Performed by: FAMILY MEDICINE

## 2020-02-29 PROCEDURE — 99285 EMERGENCY DEPT VISIT HI MDM: CPT | Mod: 25 | Performed by: FAMILY MEDICINE

## 2020-02-29 PROCEDURE — 25000128 H RX IP 250 OP 636: Performed by: FAMILY MEDICINE

## 2020-02-29 PROCEDURE — C9113 INJ PANTOPRAZOLE SODIUM, VIA: HCPCS | Performed by: FAMILY MEDICINE

## 2020-02-29 PROCEDURE — 25000132 ZZH RX MED GY IP 250 OP 250 PS 637: Performed by: INTERNAL MEDICINE

## 2020-02-29 RX ORDER — CEFAZOLIN SODIUM 1 G/50ML
1 INJECTION, SOLUTION INTRAVENOUS EVERY 8 HOURS
Status: DISCONTINUED | OUTPATIENT
Start: 2020-02-29 | End: 2020-03-02

## 2020-02-29 RX ORDER — PROCHLORPERAZINE 25 MG
12.5 SUPPOSITORY, RECTAL RECTAL EVERY 12 HOURS PRN
Status: DISCONTINUED | OUTPATIENT
Start: 2020-02-29 | End: 2020-03-02 | Stop reason: HOSPADM

## 2020-02-29 RX ORDER — POLYETHYLENE GLYCOL 3350 17 G/17G
17 POWDER, FOR SOLUTION ORAL DAILY PRN
Status: DISCONTINUED | OUTPATIENT
Start: 2020-02-29 | End: 2020-03-02 | Stop reason: HOSPADM

## 2020-02-29 RX ORDER — HYDROMORPHONE HYDROCHLORIDE 1 MG/ML
INJECTION, SOLUTION INTRAMUSCULAR; INTRAVENOUS; SUBCUTANEOUS
Status: COMPLETED
Start: 2020-02-29 | End: 2020-02-29

## 2020-02-29 RX ORDER — ONDANSETRON 2 MG/ML
4 INJECTION INTRAMUSCULAR; INTRAVENOUS EVERY 6 HOURS PRN
Status: DISCONTINUED | OUTPATIENT
Start: 2020-02-29 | End: 2020-03-02

## 2020-02-29 RX ORDER — CEFAZOLIN SODIUM 1 G/50ML
1 INJECTION, SOLUTION INTRAVENOUS ONCE
Status: COMPLETED | OUTPATIENT
Start: 2020-02-29 | End: 2020-02-29

## 2020-02-29 RX ORDER — ACETAMINOPHEN 650 MG/1
650 SUPPOSITORY RECTAL EVERY 4 HOURS PRN
Status: DISCONTINUED | OUTPATIENT
Start: 2020-02-29 | End: 2020-03-02 | Stop reason: HOSPADM

## 2020-02-29 RX ORDER — AMOXICILLIN 250 MG
2 CAPSULE ORAL 2 TIMES DAILY PRN
Status: DISCONTINUED | OUTPATIENT
Start: 2020-02-29 | End: 2020-03-02 | Stop reason: HOSPADM

## 2020-02-29 RX ORDER — NALOXONE HYDROCHLORIDE 0.4 MG/ML
.1-.4 INJECTION, SOLUTION INTRAMUSCULAR; INTRAVENOUS; SUBCUTANEOUS
Status: DISCONTINUED | OUTPATIENT
Start: 2020-02-29 | End: 2020-03-02 | Stop reason: HOSPADM

## 2020-02-29 RX ORDER — PROCHLORPERAZINE MALEATE 5 MG
5 TABLET ORAL EVERY 6 HOURS PRN
Status: DISCONTINUED | OUTPATIENT
Start: 2020-02-29 | End: 2020-03-02 | Stop reason: HOSPADM

## 2020-02-29 RX ORDER — ESCITALOPRAM OXALATE 10 MG/1
10 TABLET ORAL DAILY
Status: DISCONTINUED | OUTPATIENT
Start: 2020-02-29 | End: 2020-03-02 | Stop reason: HOSPADM

## 2020-02-29 RX ORDER — ONDANSETRON 2 MG/ML
4 INJECTION INTRAMUSCULAR; INTRAVENOUS ONCE
Status: COMPLETED | OUTPATIENT
Start: 2020-02-29 | End: 2020-02-29

## 2020-02-29 RX ORDER — ONDANSETRON 4 MG/1
4 TABLET, ORALLY DISINTEGRATING ORAL EVERY 6 HOURS PRN
Status: DISCONTINUED | OUTPATIENT
Start: 2020-02-29 | End: 2020-03-02 | Stop reason: HOSPADM

## 2020-02-29 RX ORDER — AMOXICILLIN 250 MG
1 CAPSULE ORAL 2 TIMES DAILY PRN
Status: DISCONTINUED | OUTPATIENT
Start: 2020-02-29 | End: 2020-03-02 | Stop reason: HOSPADM

## 2020-02-29 RX ORDER — OXYCODONE HYDROCHLORIDE 5 MG/1
5 TABLET ORAL EVERY 4 HOURS PRN
Status: DISCONTINUED | OUTPATIENT
Start: 2020-02-29 | End: 2020-03-02 | Stop reason: HOSPADM

## 2020-02-29 RX ORDER — BISACODYL 10 MG
10 SUPPOSITORY, RECTAL RECTAL DAILY PRN
Status: DISCONTINUED | OUTPATIENT
Start: 2020-02-29 | End: 2020-03-02 | Stop reason: HOSPADM

## 2020-02-29 RX ORDER — HYDROMORPHONE HYDROCHLORIDE 1 MG/ML
0.2 INJECTION, SOLUTION INTRAMUSCULAR; INTRAVENOUS; SUBCUTANEOUS
Status: DISCONTINUED | OUTPATIENT
Start: 2020-02-29 | End: 2020-02-29

## 2020-02-29 RX ORDER — ATENOLOL 100 MG/1
100 TABLET ORAL DAILY
Status: DISCONTINUED | OUTPATIENT
Start: 2020-02-29 | End: 2020-02-29

## 2020-02-29 RX ORDER — SODIUM CHLORIDE AND POTASSIUM CHLORIDE 150; 900 MG/100ML; MG/100ML
INJECTION, SOLUTION INTRAVENOUS CONTINUOUS
Status: DISCONTINUED | OUTPATIENT
Start: 2020-02-29 | End: 2020-03-01

## 2020-02-29 RX ORDER — KETOROLAC TROMETHAMINE 15 MG/ML
15 INJECTION, SOLUTION INTRAMUSCULAR; INTRAVENOUS ONCE
Status: COMPLETED | OUTPATIENT
Start: 2020-02-29 | End: 2020-02-29

## 2020-02-29 RX ORDER — MULTIVIT WITH MINERALS/LUTEIN
1 TABLET ORAL DAILY
COMMUNITY

## 2020-02-29 RX ORDER — ONDANSETRON 2 MG/ML
4 INJECTION INTRAMUSCULAR; INTRAVENOUS EVERY 6 HOURS PRN
Status: DISCONTINUED | OUTPATIENT
Start: 2020-02-29 | End: 2020-03-02 | Stop reason: HOSPADM

## 2020-02-29 RX ORDER — IBUPROFEN 400 MG/1
400 TABLET, FILM COATED ORAL EVERY 6 HOURS PRN
Status: DISCONTINUED | OUTPATIENT
Start: 2020-02-29 | End: 2020-03-02 | Stop reason: HOSPADM

## 2020-02-29 RX ORDER — ACETAMINOPHEN 500 MG
1000 TABLET ORAL ONCE
Status: COMPLETED | OUTPATIENT
Start: 2020-02-29 | End: 2020-02-29

## 2020-02-29 RX ORDER — ACETAMINOPHEN 325 MG/1
650 TABLET ORAL EVERY 4 HOURS PRN
Status: DISCONTINUED | OUTPATIENT
Start: 2020-02-29 | End: 2020-03-02 | Stop reason: HOSPADM

## 2020-02-29 RX ORDER — ONDANSETRON 4 MG/1
4 TABLET, ORALLY DISINTEGRATING ORAL EVERY 6 HOURS PRN
Status: DISCONTINUED | OUTPATIENT
Start: 2020-02-29 | End: 2020-03-02

## 2020-02-29 RX ADMIN — ONDANSETRON 4 MG: 2 INJECTION INTRAMUSCULAR; INTRAVENOUS at 06:55

## 2020-02-29 RX ADMIN — ESCITALOPRAM OXALATE 10 MG: 10 TABLET ORAL at 11:10

## 2020-02-29 RX ADMIN — ACETAMINOPHEN 1000 MG: 500 TABLET, FILM COATED ORAL at 07:47

## 2020-02-29 RX ADMIN — CEFAZOLIN SODIUM 1 G: 1 INJECTION, SOLUTION INTRAVENOUS at 15:05

## 2020-02-29 RX ADMIN — CEFAZOLIN SODIUM 1 G: 1 INJECTION, SOLUTION INTRAVENOUS at 07:01

## 2020-02-29 RX ADMIN — POTASSIUM CHLORIDE AND SODIUM CHLORIDE: 900; 150 INJECTION, SOLUTION INTRAVENOUS at 21:12

## 2020-02-29 RX ADMIN — PROCHLORPERAZINE EDISYLATE 5 MG: 5 INJECTION INTRAMUSCULAR; INTRAVENOUS at 16:10

## 2020-02-29 RX ADMIN — ACETAMINOPHEN 650 MG: 325 TABLET, FILM COATED ORAL at 11:13

## 2020-02-29 RX ADMIN — DEXTROSE AND SODIUM CHLORIDE: 5; 450 INJECTION, SOLUTION INTRAVENOUS at 10:37

## 2020-02-29 RX ADMIN — IBUPROFEN 400 MG: 400 TABLET ORAL at 17:41

## 2020-02-29 RX ADMIN — LIDOCAINE HYDROCHLORIDE 30 ML: 20 SOLUTION ORAL; TOPICAL at 07:47

## 2020-02-29 RX ADMIN — ONDANSETRON 4 MG: 2 INJECTION INTRAMUSCULAR; INTRAVENOUS at 07:35

## 2020-02-29 RX ADMIN — PANTOPRAZOLE SODIUM 40 MG: 40 INJECTION, POWDER, FOR SOLUTION INTRAVENOUS at 07:47

## 2020-02-29 RX ADMIN — ACETAMINOPHEN 650 MG: 325 TABLET, FILM COATED ORAL at 20:57

## 2020-02-29 RX ADMIN — SODIUM CHLORIDE, POTASSIUM CHLORIDE, SODIUM LACTATE AND CALCIUM CHLORIDE 1000 ML: 600; 310; 30; 20 INJECTION, SOLUTION INTRAVENOUS at 06:54

## 2020-02-29 RX ADMIN — CEFAZOLIN SODIUM 1 G: 1 INJECTION, SOLUTION INTRAVENOUS at 22:54

## 2020-02-29 RX ADMIN — HYDROMORPHONE HYDROCHLORIDE 0.2 MG: 1 INJECTION, SOLUTION INTRAMUSCULAR; INTRAVENOUS; SUBCUTANEOUS at 09:15

## 2020-02-29 RX ADMIN — KETOROLAC TROMETHAMINE 15 MG: 15 INJECTION, SOLUTION INTRAMUSCULAR; INTRAVENOUS at 07:01

## 2020-02-29 RX ADMIN — ACETAMINOPHEN 650 MG: 325 TABLET, FILM COATED ORAL at 17:05

## 2020-02-29 RX ADMIN — ONDANSETRON 4 MG: 4 TABLET, ORALLY DISINTEGRATING ORAL at 15:10

## 2020-02-29 RX ADMIN — POTASSIUM CHLORIDE AND SODIUM CHLORIDE: 900; 150 INJECTION, SOLUTION INTRAVENOUS at 11:09

## 2020-02-29 RX ADMIN — OXYCODONE HYDROCHLORIDE 5 MG: 5 TABLET ORAL at 18:18

## 2020-02-29 ASSESSMENT — MIFFLIN-ST. JEOR: SCORE: 1302.29

## 2020-02-29 NOTE — PLAN OF CARE
Patient tolerated 25% of clear liquid lunch. She denies nausea now. She reports some tail bone pain r/t fx she received when slipping on the ice last month. She was given tylenol and reported that it helped. She is up with stand by assist and walker, sitting in the recliner now.

## 2020-02-29 NOTE — ED TRIAGE NOTES
Pt here for evaluation of vomiting. States ongoing since Thursday. Seen here for fractured tailbone (fall 5 weeks prior none since) and UTI. Started on pyridium and one other pill can't recall if its an antibiotic or not. Yesterday reports frequent vomiting, lightheadedness, subjective fever, and ongoing urinary sx particularly dysuria, also anxious due to everything going on. Denies abdominal or flank pain.

## 2020-02-29 NOTE — PLAN OF CARE
[place] [place] [place]    Patient admitted to room 2302 at approximately 1030 via cart from emergency room. Patient was accompanied by transport tech.     Verbal SBAR report received from Micki OREILLY prior to patient arrival.     Patient ambulated to bed with stand-by assist. Patient alert and oriented X 3. Pain is controlled with current analgesics.  Medication(s) being used: dilaudid, toradol. 0-10 Pain Scale: 0. Admission vital signs: Blood pressure (!) 83/67, pulse 77, temperature 97.8  F (36.6  C), temperature source Oral, resp. rate 18, height 1.727 m (5' 8\"), weight 79.4 kg (175 lb), SpO2 95 %, not currently breastfeeding. Patient was oriented to plan of care, call light, bed controls, tv, telephone, bathroom, and visiting hours.     Risk Assessment    The following safety risks were identified during admission: none. Yellow risk band applied: NO.     Skin Initial Assessment    This writer admitted this patient and completed a full skin assessment and Neal score in the Adult PCS flowsheet. Appropriate interventions initiated as needed.     Secondary skin check completed by Marina OREILLY .         Education    Patient has a [place] to Observation order: Yes  Observation education completed and documented: Yes      Sylvie Carmona RN      "

## 2020-02-29 NOTE — ED NOTES
Patient has  [place] to Observation  order. Patient has been given Patient Bill of Rights, Observation brochure and  What does Observation mean to me  forms.  Patient has been given the opportunity to ask questions about observation status and their plan of care.   Anita Jett RN

## 2020-02-29 NOTE — ED PROVIDER NOTES
"  HPI   The patient is an 84-year-old female presenting with nausea and vomiting.  Symptoms began last night.  She has been nauseous with vomiting repeatedly since that time.  She has not been able to sleep because of her vomiting.  She feels lightheaded at times and \"woozy.\"  She has not been able to keep anything down by mouth.  She feels dehydrated and tired.  She was seen here in the ED on 2/27.  She was diagnosed with a likely bladder infection.  She was given Bactrim and she has been using this as directed.  The urine culture is positive for E. coli greater than 100,000 colonies and unfortunately is resistant to Bactrim.  The patient denies flank pain.  She does report some left groin pain that is been present for days.  She has urgency of urination but no dysuria.  She denies diarrhea or constipation.  She denies recent fall or injury other than that described in the recent ED note and related to her sacrum.  She denies having a fever.        Allergies:  Allergies   Allergen Reactions     Lisinopril Cough     Problem List:    Patient Active Problem List    Diagnosis Date Noted     Pulmonary infiltrate in right lung on chest x-ray 06/29/2018     Priority: Medium     Headache 06/28/2018     Priority: Medium     Status post total right knee replacement 02/28/2018     Priority: Medium     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     Priority: Medium     CARDIOVASCULAR SCREENING; LDL GOAL LESS THAN 160 10/31/2010     Priority: Medium     LDL in 140s, HDL in 60s  [place] [address] risk is 6% (average at her age is 14%)       Essential hypertension, benign 06/28/2005     Priority: Medium     had coronary CT scan 2003 --- no evidence of plaque  started on atenolol and ASA Jan 2005 for /94  physician in [place] switched her to Lotrel 5/10 due to her having dizzy spells  HCTZ added 3/05, pt felt mouth too dried out  4/05 pt more anxious and BP still elevated -- switched back to Atenolol       Diverticulosis of large intestine 06/28/2005 "     Priority: Medium     colonoscopy , disease mod - severe  Problem list name updated by automated process. Provider to review       Osteopenia of spine 2005     Priority: Medium     T score -1.3    Risk calculator based on 2003 T-score and personal data showed 1% risk of hip fracture over next 10 years, 14% risk of any fracture over next ten years; consider recheck DEXA at age 77, continue calcium and exercise until then (current recommendations to consider treatment if hip fx risk reaches 3% or any fracture risk reaches 20%)  Problem list name updated by automated process. Provider to review       Family history of other cardiovascular diseases 2005     Priority: Medium     father  at 39 of MI  brother  at 51 of MI  another brother -- MI x 2  eldest sister -- MI x 2 with stents  mother and maternal aunts lived to 80's and mid 90's  Problem list name updated by automated process. Provider to review and confirm  Problem list name updated by automated process. Provider to review       Generalized anxiety disorder 2005     Priority: Medium     onset after patient was diagnosed with hypertension fall         Past Medical History:    Past Medical History:   Diagnosis Date     Advance Care Planning 2012     Arthritis      Diverticulitis of colon 2005     Diverticulosis of colon (without mention of hemorrhage)      Hypertension      Past Surgical History:    Past Surgical History:   Procedure Laterality Date     ARTHROPLASTY KNEE Right 2018    Procedure: ARTHROPLASTY KNEE;  Right total knee arthroplasty;  Surgeon: Anton Grover MD;  Location: [facility]      APPENDECTOMY       COLONOSCOPY  04/15/02     COLONOSCOPY  3/25/2013    Procedure: COLONOSCOPY;  Colonoscopy  ;  Surgeon: Jamil Porras MD;  Location: [facility]     FLEXIBLE SIGMOIDOSCOPY  96     ORTHOPEDIC SURGERY  22-23 YEARS AGO    BACK     SURGICAL HISTORY OF -       lumbar surgery     Family  "History:    Family History   Problem Relation Age of Onset     Lipids Brother      Heart Disease Brother          at 51 of an MI     Cancer Brother         bone, lung,  82     Prostate Cancer Brother      Cancer Sister         cervical     Lipids Sister      Heart Disease Sister         eldest sister, s/p MI x 2 with stents; diagnosed with AAA, postoperative complications and MI,  at 75     Heart Disease Brother         MI x 2     Lipids Brother      C.A.D. Father          at 39 of an MI     Family History Negative Mother         lived to age 95     Gynecology Sister         cervical Ca     Social History:  Marital Status:   [5]  Social History     Tobacco Use     Smoking status: Never Smoker     Smokeless tobacco: Never Used   Substance Use Topics     Alcohol use: Yes     Comment: rare     Drug use: No      Medications:    atenolol (TENORMIN) 100 MG tablet  escitalopram (LEXAPRO) 10 MG tablet  ibuprofen (ADVIL/MOTRIN) 200 MG tablet  losartan-hydrochlorothiazide (HYZAAR) 100-12.5 MG tablet  MEGARED OMEGA-3 KRILL OIL PO  methocarbamol 500 MG PO tablet  multivitamin (CENTRUM SILVER) tablet  oxyCODONE (ROXICODONE) 5 MG tablet  sulfamethoxazole-trimethoprim (BACTRIM DS) 800-160 MG tablet  phenazopyridine (PYRIDIUM) 200 MG tablet      Review of Systems   All other systems reviewed and are negative.      PE   BP: (!) 165/145  Pulse: 91  Temp: 97.5  F (36.4  C)  Resp: 16  Height: 172.7 cm (5' 8\")  Weight: 79.4 kg (175 lb)  SpO2: 96 %  Physical Exam  Vitals signs reviewed.   Constitutional:       General: She is in acute distress.      Appearance: She is well-developed.      Comments: Holding an emesis bag.  Sitting up in bed and obviously uncomfortable.  Cooperative and conversational when questioned.   HENT:      Head: Normocephalic and atraumatic.      Right Ear: External ear normal.      Left Ear: External ear normal.      Nose: No congestion or rhinorrhea.      Mouth/Throat:      Mouth: Mucous " membranes are dry.   Eyes:      Conjunctiva/sclera: Conjunctivae normal.   Neck:      Musculoskeletal: Normal range of motion.   Cardiovascular:      Rate and Rhythm: Normal rate and regular rhythm.   Pulmonary:      Effort: Pulmonary effort is normal.   Abdominal:      Palpations: Abdomen is soft.      Tenderness: There is abdominal tenderness.      Comments: Very mild tenderness in the left lower quadrant/groin region.   Musculoskeletal: Normal range of motion.   Skin:     General: Skin is warm and dry.   Neurological:      Mental Status: She is alert and oriented to person, place, and time.   Psychiatric:         Behavior: Behavior normal.         ED COURSE and [facility]   0657.  The patient has left lower quadrant abdominal/pelvic discomfort and mild tenderness.  She is known to have acute cystitis related to E. coli.  She has been treated with Bactrim which the E. coli is resistant to.  She is nauseous with repeated vomiting now since last evening.  The patient will be given ceftezole and here in the ED.  Fluid bolus.  Zofran.  Toradol.    0741.  The patient has not vomited while here in the ED.  She continues to have mild nausea.  Repeat Zofran.  She continues to have epigastric discomfort.  Protonix and GI cocktail ordered.  She continues to have a headache.  Tylenol ordered.  Her blood work is encouraging with only a mildly elevated white blood cell count and no evidence for organ dysfunction.    0919.  The patient will be admitted for IV antibiotics and observation with symptom control.  Dr. Kirby has accepted the patient to his service.  No additional orders requested.  Holding orders placed by me.    LABS  Labs Ordered and Resulted from Time of ED Arrival Up to the Time of Departure from the ED   CBC WITH PLATELETS DIFFERENTIAL - Abnormal; Notable for the following components:       Result Value    WBC 12.1 (*)     Absolute Neutrophil 9.9 (*)     All other components within normal limits   COMPREHENSIVE  METABOLIC PANEL - Abnormal; Notable for the following components:    Sodium 130 (*)     Glucose 150 (*)     Alkaline Phosphatase 242 (*)     All other components within normal limits       IMAGING  Images reviewed by me.  Radiology report also reviewed.  No orders to display       Procedures    Medications   lactated ringers BOLUS 1,000 mL (0 mLs Intravenous Stopped 2/29/20 0855)   ondansetron (ZOFRAN) injection 4 mg (4 mg Intravenous Given 2/29/20 0655)   ceFAZolin (ANCEF) intermittent infusion 1 g (0 g Intravenous Stopped 2/29/20 0731)   ketorolac (TORADOL) injection 15 mg (15 mg Intravenous Given 2/29/20 0701)   ondansetron (ZOFRAN) injection 4 mg (4 mg Intravenous Given 2/29/20 0735)   pantoprazole (PROTONIX) 40 mg IV push injection (40 mg Intravenous Given 2/29/20 0747)   lidocaine (XYLOCAINE) 2 % 15 mL, alum & mag hydroxide-simethicone (MAALOX  ES) 15 mL GI Cocktail (30 mLs Oral Given 2/29/20 0747)   acetaminophen (TYLENOL) tablet 1,000 mg (1,000 mg Oral Given 2/29/20 0747)   HYDROmorphone (PF) (DILAUDID) 0.5 MG/0.5 ML injection (0.5 mg  Given 2/29/20 0915)         IMPRESSION       ICD-10-CM    1. Acute cystitis without hematuria N30.00    2. Intractable vomiting with nausea, unspecified vomiting type R11.2    3. Hyponatremia E87.1               Medication List      There are no discharge medications for this visit.                       Miki Bar MD  02/29/20 7546

## 2020-02-29 NOTE — ED NOTES
Still reports ongoing nausea, HA, no real improvement in her pain. Requesting additional medication. Will update Dr. Bar.

## 2020-02-29 NOTE — H&P
[facility]    History and Physical - Hospitalist Service       Date of Admission:  2/29/2020    Assessment & Plan   Bonita Villarreal is a 84 year old female admitted on 2/29/2020. She presents with nausea and vomiting.    Nausea and vomiting  Volume depletion  Hypovolemic hyponatremia  Patient was brought into the hospital under observation status.  Will discontinue Bactrim and treat the patient with IV Ancef.  This can be changed to an oral cephalosporin at when nausea and vomiting resolved.  We will volume resuscitate with IV normal saline.  I expect hyponatremia should resolve with volume replacement.    Urinary tract infection due to E. Coli  Discontinue Bactrim and treat the patient with IV Ancef.    Hypertension  Hold atenolol, lisinopril, and hydrochlorothiazide.    Generalized anxiety disorder  Continue Lexapro.     Diet: Advance Diet as Tolerated: Clear Liquid Diet    DVT Prophylaxis: Pneumatic Compression Devices  Fong Catheter: not present  Code Status: Full Code      Disposition Plan   Expected discharge: Tomorrow, recommended to prior living arrangement once adequate pain management/ tolerating PO medications.  Entered: Cristhian Kirby MD 02/29/2020, 11:09 AM       Cristhian Kirby MD  [facility]    ______________________________________________________________________    Chief Complaint   Chief Complaint   Patient presents with     Vomiting        History is obtained from the patient    History of Present Illness   Bonita Villarreal is a 84 year old female who presents with nausea and vomiting.  Patient presented to the emergency room on January 26 after a fall.  She was diagnosed with a sacral insufficiency fracture and discharged with oxycodone.  Patient did well until 2 days ago, when she presented to the emergency room with complaints of dysuria.  Patient was found to have a urinary tract infection and discharged home with a  "course of Bactrim.  Patient states that since starting Bactrim, she has had nausea vomiting.  Patient has been unable to keep down either food or drink.  She denies fevers or chills.  She has had no diarrhea.  She denies abdominal pain, but does have left groin pain related to her sacral insufficiency fracture.  She patient denies headaches, nasal congestion, runny nose, sore throat, cough, or wheezing.  She denies edema or rash.  Patient denies other significant arthritis or myopathy.  Patient denies diplopia, dysarthria, dysphasia, incoordination, or focal numbness or weakness.    Review of Systems    The 10 point Review of Systems is negative other than noted in the HPI or here.     Past Medical History    I have reviewed this patient's medical history and updated it with pertinent information if needed.   Past Medical History:   Diagnosis Date     Advance Care Planning 2/28/2012    Advance Care Planning 3/20/2016: Receipt of ACP document:  Received: Health Care Directive which was witnessed or notarized on 2/25/16.  Document not previously scanned.  Validation form completed and sent with document to be scanned.  Code Status needs to be updated to reflect choices in most recent ACP document.  Confirmed/documented designated decision maker(s).  Added by Cata Terrell RN, Advance Care Planning Liaison. Advance Care Planning 2/28/12: Patient does not have an Advance/Health Care Directive (HCD), given \"What is Advance Care Planning?\" flyer. Teena Peace       Arthritis      Diverticulitis of colon 6/28/2005    Patient keeps Rx for Augmentin to use PRN Problem list name updated by automated process. Provider to review     Diverticulosis of colon (without mention of hemorrhage)     history of recurrent diverticulitis     Hypertension        Patient Active Problem List    Diagnosis Date Noted     Acute cystitis 02/29/2020     Priority: Medium     UTI (urinary tract infection) 02/29/2020     Priority: Medium     " Pulmonary infiltrate in right lung on chest x-ray 2018     Priority: Medium     Headache 2018     Priority: Medium     Status post total right knee replacement 2018     Priority: Medium     Right knee DJD 2018     Priority: Medium     CARDIOVASCULAR SCREENING; LDL GOAL LESS THAN 160 10/31/2010     Priority: Medium     LDL in 140s, HDL in 60s  [place] risk is 6% (average at her age is 14%)       Essential hypertension, benign 2005     Priority: Medium     had coronary CT scan  --- no evidence of plaque  started on atenolol and ASA 2005 for /94  physician in [place] switched her to Lotrel 5/10 due to her having dizzy spells  HCTZ added 3/05, pt felt mouth too dried out   pt more anxious and BP still elevated -- switched back to Atenolol       Diverticulosis of large intestine 2005     Priority: Medium     colonoscopy , disease mod - severe  Problem list name updated by automated process. Provider to review       Osteopenia of spine 2005     Priority: Medium     T score -1.3    Risk calculator based on 2003 T-score and personal data showed 1% risk of hip fracture over next 10 years, 14% risk of any fracture over next ten years; consider recheck DEXA at age 77, continue calcium and exercise until then (current recommendations to consider treatment if hip fx risk reaches 3% or any fracture risk reaches 20%)  Problem list name updated by automated process. Provider to review       Family history of other cardiovascular diseases 2005     Priority: Medium     father  at 39 of MI  brother  at 51 of MI  another brother -- MI x 2  eldest sister -- MI x 2 with stents  mother and maternal aunts lived to 80's and mid 90's  Problem list name updated by automated process. Provider to review and confirm  Problem list name updated by automated process. Provider to review       Generalized anxiety disorder 2005     Priority: Medium     onset after  patient was diagnosed with hypertension fall           Past Surgical History   I have reviewed this patient's surgical history and updated it with pertinent information if needed.  Past Surgical History:   Procedure Laterality Date     ARTHROPLASTY KNEE Right 2018    Procedure: ARTHROPLASTY KNEE;  Right total knee arthroplasty;  Surgeon: Anton Grover MD;  Location: [facility]     C APPENDECTOMY       COLONOSCOPY  04/15/02     COLONOSCOPY  3/25/2013    Procedure: COLONOSCOPY;  Colonoscopy  ;  Surgeon: Jamil Porras MD;  Location: [facility]     FLEXIBLE SIGMOIDOSCOPY  96     ORTHOPEDIC SURGERY  22-23 YEARS AGO    BACK     SURGICAL HISTORY OF -       lumbar surgery       Social History   I have reviewed this patient's social history and updated it with pertinent information if needed.  Social History     Tobacco Use     Smoking status: Never Smoker     Smokeless tobacco: Never Used   Substance Use Topics     Alcohol use: Yes     Comment: rare     Drug use: No       Family History   I have reviewed this patient's family history and updated it with pertinent information if needed.   Family History   Problem Relation Age of Onset     Lipids Brother      Heart Disease Brother          at 51 of an MI     Cancer Brother         bone, lung,  82     Prostate Cancer Brother      Cancer Sister         cervical     Lipids Sister      Heart Disease Sister         eldest sister, s/p MI x 2 with stents; diagnosed with AAA, postoperative complications and MI,  at 75     Heart Disease Brother         MI x 2     Lipids Brother      C.A.D. Father          at 39 of an MI     Family History Negative Mother         lived to age 95     Gynecology Sister         cervical Ca       Prior to Admission Medications   Prior to Admission Medications   Prescriptions Last Dose Informant Patient Reported? Taking?   MEGARED OMEGA-3 KRILL OIL PO 2020 at am Self Yes Yes   Sig: Take 1 capsule by mouth daily    atenolol (TENORMIN) 100 MG tablet 2/28/2020 at am Self No Yes   Sig: Take 1 tablet (100 mg) by mouth daily   escitalopram (LEXAPRO) 10 MG tablet 2/28/2020 at am Self No Yes   Sig: Take 1 tablet (10 mg) by mouth daily   ibuprofen (ADVIL/MOTRIN) 200 MG tablet 2/28/2020 at am Self Yes Yes   Sig: Take 400-600 mg by mouth as needed (ruptured tail bone pain)    losartan-hydrochlorothiazide (HYZAAR) 100-12.5 MG tablet 2/28/2020 at am Self No Yes   Sig: Take 1 tablet by mouth daily   methocarbamol 500 MG PO tablet Past Week at Unknown time Self No Yes   Sig: Take 1 tablet (500 mg) by mouth 4 times daily as needed for muscle spasms   multivitamin (CENTRUM SILVER) tablet 2/28/2020 at am Self Yes Yes   Sig: Take 1 tablet by mouth daily   oxyCODONE (ROXICODONE) 5 MG tablet Past Week at Unknown time Self No Yes   Sig: Take 0.5 tablets (2.5 mg) by mouth every 6 hours as needed for breakthrough pain   phenazopyridine (PYRIDIUM) 200 MG tablet 2/27/2020 Self No No   Sig: Take 1 tablet (200 mg) by mouth 3 times daily for 3 days   sulfamethoxazole-trimethoprim (BACTRIM DS) 800-160 MG tablet 2/28/2020 at pm Self No Yes   Sig: Take 1 tablet by mouth 2 times daily for 3 days      Facility-Administered Medications: None     Allergies   Allergies   Allergen Reactions     Lisinopril Cough       Physical Exam   Vital Signs: Temp: 97.8  F (36.6  C) Temp src: Oral BP: (!) 83/67 Pulse: 77   Resp: 18 SpO2: 95 % O2 Device: None (Room air)    Weight: 175 lbs 0 oz    Gen: Well nourished, well developed, resting comfortably in bed, no acute distress  Head: atraumatic normocephalic; sclera non-injected, anicterric; oral mucosa moist, no lesions, no exudates, normal dentition  Neck: supple without spinal abnormality  Chest: clear to auscultation bilaterally, no wheezes, no rhonchi, no rales.  Cardiovascular: regular rate and rhythm, no gallops or rubs, no murmurs, no edema  Abdomen: bowel sounds normal, no hepatosplenomegaly, no masses, non-tender,  non-distended, no guarding, no rebound tenderness  Musculoskeletal: normal muscle mass, normal muscle tone  Skin: no rashes, no chronic venous stasis  Lymph: no lymphadenopathy.  Neuro: cranial nerves II-XII intact, strength in all four extremities normal, reflexes normal, coordination normal.    Data   Data reviewed today: I reviewed all medications, new labs and imaging results over the last 24 hours. I personally reviewed no images or EKG's today.    Recent Labs   Lab 02/29/20  0649 02/27/20  1919   WBC 12.1* 13.1*   HGB 13.7 13.7   MCV 89 91    362   * 135   POTASSIUM 3.4 3.9   CHLORIDE 96 103   CO2 23 24   BUN 15 19   CR 0.72 0.74   ANIONGAP 11 8   JEFF 9.3 9.2   * 145*   ALBUMIN 3.4  --    PROTTOTAL 6.9  --    BILITOTAL 0.7  --    ALKPHOS 242*  --    ALT 18  --    AST 22  --          No results found for this or any previous visit (from the past 24 hour(s)).

## 2020-02-29 NOTE — PROGRESS NOTES
Skin affirmation note    Admitting nurse completed full skin assessment, Neal score and Neal interventions. This writer agrees with the initial skin assessment findings.

## 2020-03-01 LAB
ANION GAP SERPL CALCULATED.3IONS-SCNC: 5 MMOL/L (ref 3–14)
BUN SERPL-MCNC: 10 MG/DL (ref 7–30)
CALCIUM SERPL-MCNC: 8.7 MG/DL (ref 8.5–10.1)
CHLORIDE SERPL-SCNC: 104 MMOL/L (ref 94–109)
CO2 SERPL-SCNC: 27 MMOL/L (ref 20–32)
CREAT SERPL-MCNC: 0.66 MG/DL (ref 0.52–1.04)
ERYTHROCYTE [DISTWIDTH] IN BLOOD BY AUTOMATED COUNT: 12.1 % (ref 10–15)
GFR SERPL CREATININE-BSD FRML MDRD: 82 ML/MIN/{1.73_M2}
GLUCOSE BLDC GLUCOMTR-MCNC: 95 MG/DL (ref 70–99)
GLUCOSE SERPL-MCNC: 114 MG/DL (ref 70–99)
HCT VFR BLD AUTO: 35.9 % (ref 35–47)
HGB BLD-MCNC: 11.8 G/DL (ref 11.7–15.7)
MCH RBC QN AUTO: 30.1 PG (ref 26.5–33)
MCHC RBC AUTO-ENTMCNC: 32.9 G/DL (ref 31.5–36.5)
MCV RBC AUTO: 92 FL (ref 78–100)
PLATELET # BLD AUTO: 266 10E9/L (ref 150–450)
POTASSIUM SERPL-SCNC: 4.1 MMOL/L (ref 3.4–5.3)
RBC # BLD AUTO: 3.92 10E12/L (ref 3.8–5.2)
SODIUM SERPL-SCNC: 136 MMOL/L (ref 133–144)
WBC # BLD AUTO: 6.5 10E9/L (ref 4–11)

## 2020-03-01 PROCEDURE — 85027 COMPLETE CBC AUTOMATED: CPT | Performed by: INTERNAL MEDICINE

## 2020-03-01 PROCEDURE — 99207 ZZC CDG-CODE CATEGORY CHANGED: CPT | Performed by: INTERNAL MEDICINE

## 2020-03-01 PROCEDURE — 96375 TX/PRO/DX INJ NEW DRUG ADDON: CPT

## 2020-03-01 PROCEDURE — G0378 HOSPITAL OBSERVATION PER HR: HCPCS

## 2020-03-01 PROCEDURE — 25000132 ZZH RX MED GY IP 250 OP 250 PS 637: Performed by: INTERNAL MEDICINE

## 2020-03-01 PROCEDURE — 99225 ZZC SUBSEQUENT OBSERVATION CARE,LEVEL II: CPT | Performed by: INTERNAL MEDICINE

## 2020-03-01 PROCEDURE — 80048 BASIC METABOLIC PNL TOTAL CA: CPT | Performed by: INTERNAL MEDICINE

## 2020-03-01 PROCEDURE — 25000128 H RX IP 250 OP 636: Performed by: PHYSICIAN ASSISTANT

## 2020-03-01 PROCEDURE — 96376 TX/PRO/DX INJ SAME DRUG ADON: CPT

## 2020-03-01 PROCEDURE — 25000132 ZZH RX MED GY IP 250 OP 250 PS 637: Performed by: PHYSICIAN ASSISTANT

## 2020-03-01 PROCEDURE — 00000146 ZZHCL STATISTIC GLUCOSE BY METER IP

## 2020-03-01 PROCEDURE — 25000128 H RX IP 250 OP 636: Performed by: INTERNAL MEDICINE

## 2020-03-01 PROCEDURE — 36415 COLL VENOUS BLD VENIPUNCTURE: CPT | Performed by: INTERNAL MEDICINE

## 2020-03-01 RX ORDER — ATENOLOL 100 MG/1
100 TABLET ORAL DAILY
Status: DISCONTINUED | OUTPATIENT
Start: 2020-03-01 | End: 2020-03-02 | Stop reason: HOSPADM

## 2020-03-01 RX ORDER — LOSARTAN POTASSIUM AND HYDROCHLOROTHIAZIDE 12.5; 1 MG/1; MG/1
1 TABLET ORAL DAILY
Status: DISCONTINUED | OUTPATIENT
Start: 2020-03-01 | End: 2020-03-01 | Stop reason: CLARIF

## 2020-03-01 RX ORDER — HYDROCHLOROTHIAZIDE 12.5 MG/1
12.5 CAPSULE ORAL DAILY
Status: DISCONTINUED | OUTPATIENT
Start: 2020-03-01 | End: 2020-03-02 | Stop reason: HOSPADM

## 2020-03-01 RX ORDER — HYDRALAZINE HYDROCHLORIDE 20 MG/ML
10 INJECTION INTRAMUSCULAR; INTRAVENOUS EVERY 6 HOURS PRN
Status: DISCONTINUED | OUTPATIENT
Start: 2020-03-01 | End: 2020-03-02 | Stop reason: HOSPADM

## 2020-03-01 RX ORDER — LOSARTAN POTASSIUM 50 MG/1
100 TABLET ORAL DAILY
Status: DISCONTINUED | OUTPATIENT
Start: 2020-03-01 | End: 2020-03-02 | Stop reason: HOSPADM

## 2020-03-01 RX ADMIN — HYDROCHLOROTHIAZIDE 12.5 MG: 12.5 CAPSULE ORAL at 14:11

## 2020-03-01 RX ADMIN — CEFAZOLIN SODIUM 1 G: 1 INJECTION, SOLUTION INTRAVENOUS at 16:35

## 2020-03-01 RX ADMIN — ACETAMINOPHEN 650 MG: 325 TABLET, FILM COATED ORAL at 08:33

## 2020-03-01 RX ADMIN — CEFAZOLIN SODIUM 1 G: 1 INJECTION, SOLUTION INTRAVENOUS at 23:41

## 2020-03-01 RX ADMIN — LOSARTAN POTASSIUM 100 MG: 50 TABLET, FILM COATED ORAL at 14:11

## 2020-03-01 RX ADMIN — HYDRALAZINE HYDROCHLORIDE 10 MG: 20 INJECTION, SOLUTION INTRAMUSCULAR; INTRAVENOUS at 16:25

## 2020-03-01 RX ADMIN — Medication 1 MG: at 23:41

## 2020-03-01 RX ADMIN — OXYCODONE HYDROCHLORIDE 5 MG: 5 TABLET ORAL at 23:41

## 2020-03-01 RX ADMIN — ATENOLOL 100 MG: 100 TABLET ORAL at 14:11

## 2020-03-01 RX ADMIN — POTASSIUM CHLORIDE AND SODIUM CHLORIDE: 900; 150 INJECTION, SOLUTION INTRAVENOUS at 06:38

## 2020-03-01 RX ADMIN — ACETAMINOPHEN 650 MG: 325 TABLET, FILM COATED ORAL at 19:18

## 2020-03-01 RX ADMIN — CEFAZOLIN SODIUM 1 G: 1 INJECTION, SOLUTION INTRAVENOUS at 08:30

## 2020-03-01 RX ADMIN — ACETAMINOPHEN 650 MG: 325 TABLET, FILM COATED ORAL at 12:44

## 2020-03-01 RX ADMIN — HYDRALAZINE HYDROCHLORIDE 10 MG: 20 INJECTION, SOLUTION INTRAMUSCULAR; INTRAVENOUS at 04:28

## 2020-03-01 RX ADMIN — OXYCODONE HYDROCHLORIDE 5 MG: 5 TABLET ORAL at 04:30

## 2020-03-01 RX ADMIN — ESCITALOPRAM OXALATE 10 MG: 10 TABLET ORAL at 08:30

## 2020-03-01 ASSESSMENT — MIFFLIN-ST. JEOR
SCORE: 1361.5
SCORE: 1359.5

## 2020-03-01 NOTE — PROGRESS NOTES
[facility]    Hospitalist Progress Note    Date of Service (when I saw the patient): 03/01/2020    Assessment & Plan   Bonita Villarreal is a 84 year old female who was admitted on 2/29/2020. She presents with nausea and vomiting.    Nausea and vomiting  Volume depletion  Hypovolemic hyponatremia  Patient was brought into the hospital under observation status.  Will discontinue Bactrim and treat the patient with IV Ancef.  This can be changed to an oral cephalosporin at when nausea and vomiting resolved.  We will volume resuscitate with IV normal saline.  I expect hyponatremia should resolve with volume replacement.  -N/V resolved.  Patient tolerated small breakfast and lunch.    Urinary tract infection due to E. Coli  Discontinue Bactrim and treat the patient with IV Ancef.    Hypertension  Dizziness  Resume atenolol, lisinopril, and hydrochlorothiazide as patient is now hypertension  - Observe overnight for resolution of elevated BP    Generalized anxiety disorder  Continue Lexapro.     Diet: Advance Diet as Tolerated: Regular Diet Adult    DVT Prophylaxis: Pneumatic Compression Devices  Fong Catheter: not present  Code Status: Full Code      Disposition Plan   Expected discharge: Tomorrow, recommended to prior living arrangement once dizziness resolved and patient eating and drinking adequately.    Cristhian Kirby    Interval History   The patient complains of unsteadiness with standing.  Appetite is modest but improved.  No further vomiting since yesterday.    -Data reviewed today: I reviewed all new labs and imaging results over the last 24 hours. I personally reviewed no images or EKG's today.    Physical Exam   Temp: 97.1  F (36.2  C) Temp src: Oral BP: (!) 186/81 Pulse: 98   Resp: 18 SpO2: 96 % O2 Device: None (Room air)    Vitals:    02/29/20 0637 03/01/20 0312 03/01/20 0633   Weight: 79.4 kg (175 lb) 85.1 kg (187 lb 9.8 oz) 85.3 kg (188 lb 0.8 oz)     Vital Signs with  Ranges  Temp:  [97.1  F (36.2  C)-98.3  F (36.8  C)] 97.1  F (36.2  C)  Pulse:  [] 98  Resp:  [14-18] 18  BP: (144-210)/(61-87) 186/81  SpO2:  [92 %-97 %] 96 %  I/O last 3 completed shifts:  In: 2367 [P.O.:440; I.V.:1927]  Out: -     Gen: Well nourished, well developed, alert and oriented x 3, no acute distressed  HEENT: Atraumatic, normocephalic; sclera non-injected, anicterric; oral mucosa moist, no lesion, no exudate, normal dentition  Lungs: Clear to ausculation, no wheezes, no rhonchi, no rales  Heart: Regular rate, regular rhythm, no gallops, no rubs, no murmurs  GI: Bowel sound normal, no hepatosplenomegaly, no masses, non-tender, non-distended, no guarding, no rebound tenderness  Lymph: No lymphadenopathy, no edema  Skin: No rashes, no chronic venous stasis     Medications       atenolol  100 mg Oral Daily     ceFAZolin  1 g Intravenous Q8H     escitalopram  10 mg Oral Daily     losartan  100 mg Oral Daily    And     hydrochlorothiazide  12.5 mg Oral Daily       Data   Recent Labs   Lab 03/01/20  0542 02/29/20  0649 02/27/20  1919   WBC 6.5 12.1* 13.1*   HGB 11.8 13.7 13.7   MCV 92 89 91    374 362    130* 135   POTASSIUM 4.1 3.4 3.9   CHLORIDE 104 96 103   CO2 27 23 24   BUN 10 15 19   CR 0.66 0.72 0.74   ANIONGAP 5 11 8   JEFF 8.7 9.3 9.2   * 150* 145*   ALBUMIN  --  3.4  --    PROTTOTAL  --  6.9  --    BILITOTAL  --  0.7  --    ALKPHOS  --  242*  --    ALT  --  18  --    AST  --  22  --        No results found for this or any previous visit (from the past 24 hour(s)).

## 2020-03-01 NOTE — PROVIDER NOTIFICATION
"   03/01/20 1347 03/01/20 1349   Vital Signs   Pulse 115 98   BP (!) 210/87 (!) 186/81   BP Location Right arm Left arm   Patient returned back to bed from the bathroom and felt dizzy. Initial BP elevated, rechecked after five minutes in bed, at rest and /81. Web page to Dr Kirby to see if home BP medications may be restarted.  Appetite fair today. Denies nausea, no diarrhea.  Has had \"tailbone\" pain for which she has requested tylenol for pain management. Up with SBA and walker.   Unsure if she is ready to discharge to home today.  "

## 2020-03-01 NOTE — PLAN OF CARE
Pt A & O x 4. Up with SBA and walker, steady. IV fluids running at 100 ml/ hr. Pt c/o mild to moderate tailbone pain rating 2-7/10 overnight; PRN tylenol and oxycodone given and was effective. Pt's BP running high, MD notified and PRN hydralazine ordered. BP was 182/72 at 0419, 1 dose hydralazine given, recheck was 159/61. During this same time, pt c/o arm and leg shaking along with increased tailbone pain. BS checked and was 92, juice and snacks given as well. VSS. Shaking appeared to be anxiety related, pt was fast asleep several minutes after snacks and pain medication given. Pt has been afebrile. Pt has been resting and sleeping in between cares overnight. Discharge pending for possibly later today, 3/1. Will continue to monitor and reassess.

## 2020-03-01 NOTE — CONSULTS
Care Transition Initial Assessment - RN      Met with: Patient.    DATA  Active Problems:    Acute cystitis    UTI (urinary tract infection)       Cognitive Status: awake, alert and oriented.  [facility]  Primary Care MD Name: ivelisse  Contact information and PCP information verified: Yes  Lives With: alone      Quality of Family Relationships: supportive, involved, helpful  Description of Support System: Supportive, Involved   Who is your support system?: Children   Support Assessment: Adequate family and caregiver support   Insurance concerns: No Insurance issues identified        This writer met with pt, introduced self and role. Patient currently lives in an apartment connected to her son and DIL's home in [place].  She is independent with ADLs at baseline.  She does NOT use assistive devices for ambulation.  Her DIL often checks in on patient and helps with anything she may need.  Patient continues to drive.  She recently suffered a fall and fractured her coccyx.   She reports she has been managing well since her fall.  Discussed discharge planning and medicare guidelines in regards to home care and SNF benefits.  She feels safe to return home with her family support.  Discussed home care.  Patient declines at this time, stating she does NOT to remain homebound.  She is interested in outpatient physcial therapy and would like a walker for home use.  Notified Dr. Kirby of patient's desires.  Patient's family will provide transportation home upon discharge.        PLAN    Home w/ family    AMI TownsendN RN  Inpatient Care Coordinator  [facility] [phone]  [facility] [phone]

## 2020-03-01 NOTE — PLAN OF CARE
Pt here with UTI. Hx of fall approximately a month ago. Pt has fx of tailbone. Ibuprofen, tylenol and oxycodone for pain medications. Mattress pad from [employer] on mattress. Also, using ice pads from [employer] for tailbone pain. Pt up in room with walker and SBA of one. Can be impulsive at times. Granddaughter here. POC reviewed with family and pt. Wrote pain medication times on board. Explained that they are not scheduled. Questions answered. Continue to assess and monitor

## 2020-03-02 VITALS
RESPIRATION RATE: 16 BRPM | BODY MASS INDEX: 28.5 KG/M2 | HEART RATE: 84 BPM | HEIGHT: 68 IN | SYSTOLIC BLOOD PRESSURE: 162 MMHG | WEIGHT: 188.05 LBS | OXYGEN SATURATION: 98 % | TEMPERATURE: 98.3 F | DIASTOLIC BLOOD PRESSURE: 91 MMHG

## 2020-03-02 PROBLEM — E86.9 VOLUME DEPLETION, GASTROINTESTINAL LOSS: Status: ACTIVE | Noted: 2020-03-02

## 2020-03-02 PROBLEM — M53.3 SACRAL PAIN: Status: ACTIVE | Noted: 2020-03-02

## 2020-03-02 PROBLEM — S32.10XA FRACTURE OF SACRUM (H): Status: ACTIVE | Noted: 2020-03-02

## 2020-03-02 PROBLEM — E87.1 HYPONATREMIA: Status: ACTIVE | Noted: 2020-03-02

## 2020-03-02 PROBLEM — R11.2 NAUSEA WITH VOMITING: Status: ACTIVE | Noted: 2020-03-02

## 2020-03-02 PROCEDURE — 25000128 H RX IP 250 OP 636: Performed by: INTERNAL MEDICINE

## 2020-03-02 PROCEDURE — 99217 ZZC OBSERVATION CARE DISCHARGE: CPT

## 2020-03-02 PROCEDURE — 25000132 ZZH RX MED GY IP 250 OP 250 PS 637: Performed by: PHYSICIAN ASSISTANT

## 2020-03-02 PROCEDURE — 25000132 ZZH RX MED GY IP 250 OP 250 PS 637: Performed by: INTERNAL MEDICINE

## 2020-03-02 PROCEDURE — 99207 ZZC CDG-CODE CATEGORY CHANGED: CPT

## 2020-03-02 PROCEDURE — 25000132 ZZH RX MED GY IP 250 OP 250 PS 637

## 2020-03-02 PROCEDURE — G0378 HOSPITAL OBSERVATION PER HR: HCPCS

## 2020-03-02 RX ORDER — CEPHALEXIN 500 MG/1
500 CAPSULE ORAL EVERY 8 HOURS
Qty: 9 CAPSULE | Refills: 0 | Status: SHIPPED | OUTPATIENT
Start: 2020-03-02 | End: 2020-03-24

## 2020-03-02 RX ORDER — LIDOCAINE 4 G/G
1 PATCH TOPICAL EVERY 24 HOURS
Qty: 12 PATCH | Refills: 1 | Status: SHIPPED | OUTPATIENT
Start: 2020-03-02 | End: 2020-05-06

## 2020-03-02 RX ORDER — CEPHALEXIN 500 MG/1
500 CAPSULE ORAL EVERY 8 HOURS SCHEDULED
Status: DISCONTINUED | OUTPATIENT
Start: 2020-03-02 | End: 2020-03-02 | Stop reason: HOSPADM

## 2020-03-02 RX ADMIN — ATENOLOL 100 MG: 100 TABLET ORAL at 07:58

## 2020-03-02 RX ADMIN — ESCITALOPRAM OXALATE 10 MG: 10 TABLET ORAL at 07:58

## 2020-03-02 RX ADMIN — HYDROCHLOROTHIAZIDE 12.5 MG: 12.5 CAPSULE ORAL at 07:58

## 2020-03-02 RX ADMIN — CEPHALEXIN 500 MG: 500 CAPSULE ORAL at 13:21

## 2020-03-02 RX ADMIN — CEFAZOLIN SODIUM 1 G: 1 INJECTION, SOLUTION INTRAVENOUS at 06:49

## 2020-03-02 RX ADMIN — IBUPROFEN 400 MG: 400 TABLET ORAL at 04:26

## 2020-03-02 RX ADMIN — LOSARTAN POTASSIUM 100 MG: 50 TABLET, FILM COATED ORAL at 07:58

## 2020-03-02 RX ADMIN — IBUPROFEN 400 MG: 400 TABLET ORAL at 11:00

## 2020-03-02 ASSESSMENT — PAIN DESCRIPTION - DESCRIPTORS
DESCRIPTORS: HEADACHE
DESCRIPTORS: HEADACHE

## 2020-03-02 NOTE — DISCHARGE SUMMARY
"[facility]    Discharge Summary  Hospital Medicine    Date of Admission:  2/29/2020  Date of Discharge:  3/2/2020   Discharging Provider: Markie Hogan  Date of Service: 3/2/2020      Primary Care     Natasha Santanakimberly Bermudez  [id number]  [facility] [id number]      Identification and Chief Compaint:  Bonita Villarreal is a 84 year old female who was admitted on 2/29/2020. She presented with nausea and vomiting.     Discharge Diagnoses       Essential hypertension, benign    Generalized anxiety disorder    Acute cystitis    UTI (urinary tract infection) due to E coli    Nausea with vomiting    Volume depletion, gastrointestinal loss    Hyponatremia    Fracture of sacrum (H)    Sacral pain      Discharge Disposition   Discharged to home    Discharge Orders      Reason for your hospital stay    You were admitted because you felt \"sick\", including nausea and vomiting.  You had been on an antibiotic called Bactrim since your urinary tract infection was diagnosed 1/27/2020.  As it turns out, the bacterium that was causing your urinary tract infection was resistant to Bactrim.  Therefore, your symptoms were probably the result of having your UTI untreated.  Here, we stopped Bactrim, and started an IV antibiotic called cefazolin.  Following the switch, your nausea and vomiting resolved, and he started to feel well again.  Today, you feel well enough to go home; I agree that you are ready.  Cefazolin is only available IV.  Therefore, we will choose a similar antibiotic called cephalexin, which you will take for the next 3 days to complete a 5-day course of effective therapy.  Following that, the infection should be completely treated.    As we discussed, your blood pressure was a little high here, even after resumption of your outpatient medications.  However, I think hospitalization and the anxiety it provokes probably contributes to that.  Both you and I think your blood pressure " will improve once you get home.    Finally, I prescribed the lidocaine patches that you and I discussed.  You can place a patch over the most painful area over your tailbone once daily.  That patch will stay in place for 12 hours, after which you will remove it for 12 hours.  The medication is still being delivered to the painful area, but we want to prevent accumulation of the lidocaine in the tissues, which is the rationale for taking the patch off for a 12-hour period of time.     Follow-up and recommended labs and tests     Follow up with primary care provider, Dewayne Santana, within 7 days for hospital follow-up.  The following labs/tests are recommended: Please follow-up on blood pressure, which was mildly but persistently high during this hospital stay..     Activity    Your activity upon discharge: activity as tolerated.     Full Code     Commode    DME Documentation:   Describe the reason for need to support medical necessity: The patient sustained a sacral fracture in a fall of 1/26/2020.  Her ability to ambulate in her home to the bathroom is impaired and painful.     I, the undersigned, certify that the above prescribed supplies are medically necessary for this patient and is both reasonable and necessary in reference to accepted standards of medical and necessary in reference to accepted standards of medical practice in the treatment of this patient's condition and is not prescribed as a convenience.     Diet    Follow this diet upon discharge: Orders Placed This Encounter      Advance Diet as Tolerated: Regular Diet Adult        Discharge Medications   Current Discharge Medication List      START taking these medications    Details   cephALEXin (KEFLEX) 500 MG capsule Take 1 capsule (500 mg) by mouth every 8 hours for 3 days  Qty: 9 capsule, Refills: 0    Associated Diagnoses: Acute cystitis without hematuria      Lidocaine (LIDOCARE) 4 % Patch Place 1 patch onto the skin every 24 hours To  prevent lidocaine toxicity, patient should be patch free for 12 hrs daily.  Qty: 12 patch, Refills: 1    Associated Diagnoses: Closed fracture of sacrum with routine healing, unspecified portion of sacrum, subsequent encounter; Sacral pain         CONTINUE these medications which have NOT CHANGED    Details   atenolol (TENORMIN) 100 MG tablet Take 1 tablet (100 mg) by mouth daily  Qty: 90 tablet, Refills: 3    Associated Diagnoses: Essential hypertension, benign      escitalopram (LEXAPRO) 10 MG tablet Take 1 tablet (10 mg) by mouth daily  Qty: 90 tablet, Refills: 3    Associated Diagnoses: Generalized anxiety disorder      ibuprofen (ADVIL/MOTRIN) 200 MG tablet Take 400-600 mg by mouth as needed (ruptured tail bone pain)       losartan-hydrochlorothiazide (HYZAAR) 100-12.5 MG tablet Take 1 tablet by mouth daily  Qty: 90 tablet, Refills: 3    Associated Diagnoses: Essential hypertension, benign      MEGARED OMEGA-3 KRILL OIL PO Take 1 capsule by mouth daily      methocarbamol 500 MG PO tablet Take 1 tablet (500 mg) by mouth 4 times daily as needed for muscle spasms  Qty: 60 tablet, Refills: 1    Associated Diagnoses: Closed fracture of coccyx with routine healing, subsequent encounter      multivitamin (CENTRUM SILVER) tablet Take 1 tablet by mouth daily      oxyCODONE (ROXICODONE) 5 MG tablet Take 0.5 tablets (2.5 mg) by mouth every 6 hours as needed for breakthrough pain  Qty: 12 tablet, Refills: 0         STOP taking these medications       phenazopyridine (PYRIDIUM) 200 MG tablet Comments:   Reason for Stopping:         sulfamethoxazole-trimethoprim (BACTRIM DS) 800-160 MG tablet Comments:   Reason for Stopping:             Allergies   Allergies   Allergen Reactions     Lisinopril Cough       Consultations This Hospital Stay   Consultation during this admission received from:    CARE TRANSITION RN/SW IP CONSULT    Significant Results and Procedures   Procedures    None.    Data   Results for orders placed or  performed during the hospital encounter of 02/27/20   Abd/pelvis CT - no contrast - Stone Protocol    Narrative    EXAM: CT ABDOMEN PELVIS W/O CONTRAST  LOCATION: [facility]  DATE/TIME: 2/27/2020 7:33 PM    INDICATION: Left lower quadrant abdominal pain  COMPARISON: None.  TECHNIQUE: CT scan of the abdomen and pelvis was performed without IV contrast. Multiplanar reformats were obtained. Dose reduction techniques were used.  CONTRAST: None.    FINDINGS:   LOWER CHEST: Normal.    HEPATOBILIARY: Normal.    PANCREAS: Normal.    SPLEEN: Normal.    ADRENAL GLANDS: Normal.    KIDNEYS/BLADDER: Right kidney is normal with no mass, stones, or hydronephrosis.   Left kidney is normal with no mass, stones, or hydronephrosis.  Bladder is normal.    BOWEL: Diverticulosis of the colon. No acute inflammatory change. No obstruction.     LYMPH NODES: Normal.    VASCULATURE: No abdominal aortic aneurysm.    PELVIC ORGANS: Normal.    MUSCULOSKELETAL: Normal.      Impression    IMPRESSION:   1.  Colonic diverticulosis without diverticulitis.   2.  No renal calculi or hydronephrosis.      Lumbar spine CT w/o contrast    Narrative    EXAM: CT LUMBAR SPINE W/O CONTRAST  LOCATION: [facility]  DATE/TIME: 2/27/2020 7:33 PM    INDICATION: Subacute fall. Lower back pain with radicular symptoms.  COMPARISON: None.  TECHNIQUE: Routine without IV contrast. Multiplanar reformats. Dose reduction techniques were used.    FINDINGS:  Nomenclature is based on 5 lumbar type vertebral bodies. There are Schmorl's node deformities in the superior endplates of T12 and L1. Vertebral body heights are otherwise maintained. No definite lumbar fracture. Bilateral sacral insufficiency fractures   are present. The fracture on the right appears subacute as there is some sclerosis. The fracture on the left also shows a subtle degree of sclerosis but may be slightly less subacute. No extraspinal soft tissue abnormality.    T12-L1: Minimal  disc bulging with a more focal right posterolateral disc protrusion and some facet hypertrophy is present causing mild right-sided foraminal stenosis but no central canal stenosis.    L1-L2: Broad-based disc bulge and mild facet hypertrophy is present causing some mild central canal stenosis but no neural foraminal stenosis.    L2-L3: Broad-based disc bulging and mild facet hypertrophy is present causing borderline to mild central canal stenosis and mild right-sided foraminal stenosis.    L3-L4: Broad-based disc bulging and mild-to-moderate facet and ligament flavum hypertrophy is present causing mild-to-moderate central canal stenosis and mild right-sided foraminal stenosis.    L4-L5: Broad-based disc bulging with a more focal left posterolateral disc protrusion and mild-to-moderate facet hypertrophy is present causing moderate central canal stenosis, moderate left-sided foraminal stenosis and mild-to-moderate right-sided   foraminal stenosis.    L5-S1: Mild disc space narrowing and moderate facet hypertrophy is present causing some mild-to-moderate bilateral foraminal stenosis but no significant central canal stenosis.      Impression    IMPRESSION:  1.  Bilateral sacral insufficiency fractures which appears subacute.  2.  No convincing evidence for any acute lumbar spine fractures.  3.  Multilevel degenerative disc and facet disease most advanced at L4-L5 where there is moderate central canal stenosis, moderate left-sided foraminal stenosis and mild-to-moderate right-sided foraminal stenosis.       History of Present Illness   (From H&P) Bonita Villarreal is a 84 year old female who presents with nausea and vomiting.  Patient presented to the emergency room on January 26 after a fall.  She was diagnosed with a sacral insufficiency fracture and discharged with oxycodone.  Patient did well until 2 days ago, when she presented to the emergency room with complaints of dysuria.  Patient was found to have a urinary  "tract infection and discharged home with a course of Bactrim.  Patient states that since starting Bactrim, she has had nausea vomiting.  Patient has been unable to keep down either food or drink.  She denies fevers or chills.  She has had no diarrhea.  She denies abdominal pain, but does have left groin pain related to her sacral insufficiency fracture.  She patient denies headaches, nasal congestion, runny nose, sore throat, cough, or wheezing.  She denies edema or rash.  Patient denies other significant arthritis or myopathy.  Patient denies diplopia, dysarthria, dysphasia, incoordination, or focal numbness or weakness.     Hospital Course   Bonita Villarreal was admitted on 2/29/2020.  The following problems were addressed during her hospitalization:    Nausea and vomiting  Volume depletion  Etiology of nausea and vomiting is unclear, though the patient says that she became \"sick\" at roughly the same time she was found to have an E. coli urinary tract infection and started on trimethoprim/sulfamethoxazole.  Here, trimethoprim/sulfamethoxazole was discontinued, in part out of concern that it was contributing to GI symptoms, but largely because the E. coli isolate was found to be resistant to the antibiotic, and cefazolin IV was started.  With this change in antibiotic and ongoing treatment of her UTI, her nausea and vomiting have resolved.  She has resumed a general diet, without nausea or emesis.  She is having bowel movements without difficulty, but denies diarrhea.  Today, she feels well enough to go home, and is not concerned that she will have difficulties living independently (though her son lives in an adjoining residence and can help her if needed).    -Discharge home today.    Hyponatremia  Associated with hypovolemia from GI loss.  Her sodium got no lower than 130.  Sodium yesterday was 136, even after the resumption of losartan and hydrochlorothiazide.  -Resolved.     Urinary tract infection due to " "E. Coli  Diagnosed on urine culture 2/27/2020.  The patient had few, if any, associated urologic symptoms, and did not have fever.  However, she felt \"sick\", had nausea and vomiting which started about the same time as her UTI was diagnosed.  She was started on trimethoprim/sulfamethoxazole, which was discontinued upon admission here out of concern that it was causing nausea and vomiting, and due to the finding on urine culture that the E. coli was resistant to TMP/SMX.  She was changed to cefazolin IV here.  On cefazolin, she has clinically improved, with resolution of her GI symptoms, and has remained afebrile.  She is stable for discharge today, and therefore I reviewed her sensitivity panel to try to find a sensitive antibiotic.  The E. coli is ampicillin resistant, in addition to TMP/SMX.  It is only intermediate in sensitivity to quinolones.  It is sensitive to all cephalosporins tested, though none of these cephalosporins are PO drugs.  I think the best course of action is to discharge her on cephalexin 500 mg 3 times daily for an additional 3 days (to complete a 5-day course of sensitive therapy) and monitor her clinical response.  -Cephalexin 500 mg 3 times daily for 3 additional days.     Hypertension  She was mildly hypotensive shortly after admission, so her outpatient atenolol 100 mg daily and losartan/hydrochlorothiazide 100/12.5 mg daily were held.  Therapy was resumed the following day when she became hypertensive.  She has remained mildly but persistently hypertensive since.  She thinks, and I agree with her, that her anxiety probably contributes to hypertension, and that it should improve after discharge.    -Discharge on preadmission antihypertensives as above.  -The patient has a BP cuff at home so that she can monitor her pressures there.     Generalized anxiety disorder  Exacerbated by current hospitalization.  The patient anticipates that will improve after discharge.  -Continue preadmission " escitalopram.    Recent sacral fracture  Sacral pain  The patient suffered a fall 1/26/2020, in which she sustained a sacral fracture.  She has had persistent pain.  She is currently managing pain with ibuprofen 600 mg every 6 hours routinely.  She tried acetaminophen, though found that it did not help.  Pain control is suboptimal on ibuprofen alone.  It makes it difficult for her to ambulate, including ambulation to the bathroom.  -We discussed benefit and risk attendant to the use of lidocaine patches.  She wishes to give it a try.  We will discharge her with a prescription for lidocaine patch applied to her painful sacrum, on 12 hours/off 12 hours.  -Continue ibuprofen as she is currently using.  -We will prescribe a bedside commode, to prevent her the long and currently painful walk to the bathroom until such time as her sacral fracture heals.    Pending Results   Unresulted Labs Ordered in the Past 30 Days of this Admission     No orders found from 1/30/2020 to 3/1/2020.          Physical Exam   Temp:  [96.5  F (35.8  C)-98.3  F (36.8  C)] 98.3  F (36.8  C)  Pulse:  [] 84  Resp:  [16-18] 16  BP: (155-210)/() 162/91  SpO2:  [94 %-98 %] 98 %  Vitals:    02/29/20 0637 03/01/20 0312 03/01/20 0633   Weight: 79.4 kg (175 lb) 85.1 kg (187 lb 9.8 oz) 85.3 kg (188 lb 0.8 oz)       GENERAL: Pleasant woman, sitting up in bed eating lunch, looks comfortable.  RESP: No accessory muscle use.  Lungs clear throughout on inspiration and expiration.  Expiration not prolonged, no wheeze.  CV: Regular rate and rhythm, non-tachycardic.  Normal S1 S2, no murmur or extra sound.  No lower extremity edema.  ABDOMEN: Soft, non-tender, no guarding.  Bowel sounds positive..  NEURO: Alert, oriented, conversant.  Cranial nerves III - XII grossly intact.  No gross motor or sensory deficits.    PSYCH: Alert, conversant.  Able to articulate logical thoughts, no tangential thoughts, no hallucinations or delusions.  Affect is mildly  anxious.        The discharge plan was discussed with the patient, who expresses agreement.    Total time on this discharge was 40 minutes.    Markie Hogan MD

## 2020-03-02 NOTE — PLAN OF CARE
"Obs. A&O. Up with assist x 1 and walker to bedside commode. Pt continues to c/o intermittent dizziness, headache, and nausea. Refused antiemetic, but took PRN Tylenol x 1. Hydralazine PRN x 1 this evening for HTN. Pt on scheduled ancef. Regular diet tolerated well, however Pt preferred more clear and full liquids for dinner this evening. IV saline locked. BP (!) 163/79   Pulse 78   Temp 97.8  F (36.6  C) (Oral)   Resp 18   Ht 1.727 m (5' 8\")   Wt 85.3 kg (188 lb 0.8 oz)   LMP  (LMP Unknown)   SpO2 95%   BMI 28.59 kg/m  .       Sheila Jones RN on 3/1/2020 at 11:45 PM    "

## 2020-03-02 NOTE — PLAN OF CARE
[facility] DISCHARGE NOTE    Patient discharged to home at 1:14 PM via wheel chair. Accompanied by son and staff. Discharge instructions reviewed with patient, opportunity offered to ask questions. Prescriptions sent to patients preferred pharmacy. All belongings sent with patient.    Domi Crockett RN

## 2020-03-02 NOTE — PLAN OF CARE
Pt is A & O x 4. Up with SBA, walker, and transfer belt. BP remained elevated overnight, see flowsheets; additional VSS. IV Sl'd. Pt c/o throbbing headache overnight rating 8/10; prn oxycodone, motrin, heat, and ice applied. Pt stated that she had no relief. Pt having difficulty sleeping overnight. Still having some generalized weakness. Pt anxious in regards to going home. Discharge pending for later today, 3/2. Will continue to monitor and reassess.

## 2020-03-10 ENCOUNTER — OFFICE VISIT (OUTPATIENT)
Dept: FAMILY MEDICINE | Facility: CLINIC | Age: 85
End: 2020-03-10
Payer: COMMERCIAL

## 2020-03-10 ENCOUNTER — TELEPHONE (OUTPATIENT)
Dept: FAMILY MEDICINE | Facility: CLINIC | Age: 85
End: 2020-03-10

## 2020-03-10 VITALS
RESPIRATION RATE: 16 BRPM | HEIGHT: 69 IN | DIASTOLIC BLOOD PRESSURE: 70 MMHG | HEART RATE: 77 BPM | WEIGHT: 181 LBS | SYSTOLIC BLOOD PRESSURE: 118 MMHG | OXYGEN SATURATION: 96 % | TEMPERATURE: 98.2 F | BODY MASS INDEX: 26.81 KG/M2

## 2020-03-10 DIAGNOSIS — R31.9 URINARY TRACT INFECTION WITH HEMATURIA, SITE UNSPECIFIED: Primary | ICD-10-CM

## 2020-03-10 DIAGNOSIS — S32.10XD CLOSED FRACTURE OF SACRUM WITH ROUTINE HEALING, UNSPECIFIED PORTION OF SACRUM, SUBSEQUENT ENCOUNTER: ICD-10-CM

## 2020-03-10 DIAGNOSIS — R82.90 NONSPECIFIC FINDING ON EXAMINATION OF URINE: ICD-10-CM

## 2020-03-10 DIAGNOSIS — N39.0 URINARY TRACT INFECTION WITH HEMATURIA, SITE UNSPECIFIED: Primary | ICD-10-CM

## 2020-03-10 DIAGNOSIS — R39.89 URINARY PROBLEM: Primary | ICD-10-CM

## 2020-03-10 LAB
ALBUMIN UR-MCNC: ABNORMAL MG/DL
APPEARANCE UR: CLEAR
BACTERIA #/AREA URNS HPF: ABNORMAL /HPF
BILIRUB UR QL STRIP: ABNORMAL
COLOR UR AUTO: YELLOW
GLUCOSE UR STRIP-MCNC: NEGATIVE MG/DL
HGB UR QL STRIP: NEGATIVE
KETONES UR STRIP-MCNC: ABNORMAL MG/DL
LEUKOCYTE ESTERASE UR QL STRIP: ABNORMAL
NITRATE UR QL: NEGATIVE
NON-SQ EPI CELLS #/AREA URNS LPF: ABNORMAL /LPF
PH UR STRIP: 5.5 PH (ref 5–7)
RBC #/AREA URNS AUTO: ABNORMAL /HPF
SOURCE: ABNORMAL
SP GR UR STRIP: 1.02 (ref 1–1.03)
UROBILINOGEN UR STRIP-ACNC: 1 EU/DL (ref 0.2–1)
WBC #/AREA URNS AUTO: ABNORMAL /HPF

## 2020-03-10 PROCEDURE — 87086 URINE CULTURE/COLONY COUNT: CPT | Performed by: NURSE PRACTITIONER

## 2020-03-10 PROCEDURE — 87088 URINE BACTERIA CULTURE: CPT | Performed by: NURSE PRACTITIONER

## 2020-03-10 PROCEDURE — 87186 SC STD MICRODIL/AGAR DIL: CPT | Performed by: NURSE PRACTITIONER

## 2020-03-10 PROCEDURE — 99213 OFFICE O/P EST LOW 20 MIN: CPT | Performed by: NURSE PRACTITIONER

## 2020-03-10 PROCEDURE — 81001 URINALYSIS AUTO W/SCOPE: CPT | Performed by: NURSE PRACTITIONER

## 2020-03-10 RX ORDER — NITROFURANTOIN 25; 75 MG/1; MG/1
100 CAPSULE ORAL 2 TIMES DAILY
Qty: 10 CAPSULE | Refills: 0 | Status: SHIPPED | OUTPATIENT
Start: 2020-03-10 | End: 2020-05-06

## 2020-03-10 ASSESSMENT — ENCOUNTER SYMPTOMS
FLANK PAIN: 0
CONSTITUTIONAL NEGATIVE: 1
FREQUENCY: 1
DYSURIA: 0
HEMATURIA: 0
NERVOUS/ANXIOUS: 1
FEVER: 0

## 2020-03-10 ASSESSMENT — MIFFLIN-ST. JEOR: SCORE: 1345.39

## 2020-03-10 NOTE — PATIENT INSTRUCTIONS
We will call you once the results come back from your urine sample.     Continue to increase your water intake and notify us if symptoms persist.     Continue doing what you are doing at home to help sacral fracture.

## 2020-03-10 NOTE — PROGRESS NOTES
Bonita Villarreal is a 84 year old female who presents to clinic today for the following health issues:    [facility] of hospitals [facility] the [place]       Hospital Follow-up Visit:    Hospital/Nursing Home/IP Rehab Facility: [facility]  Date of Admission: 2/27/2020  Date of Discharge: 2/29/2020  Reason(s) for Admission: Acute UTI,  Closed fracture of sacrum, Hyponatremia, Vomitting            Problems taking medications regularly:  None       Medication changes since discharge: None       Problems adhering to non-medication therapy:  None    Summary of hospitalization:  [facility] discharge summary reviewed  Diagnostic Tests/Treatments reviewed.  Follow up needed: none  Other Healthcare Providers Involved in Patient s Care:         None  Update since discharge: fluctuating course.     Post Discharge Medication Reconciliation: discharge medications reconciled, continue medications without change.  Plan of care communicated with patient     Coding guidelines for this visit:  Type of Medical   Decision Making Face-to-Face Visit       within 7 Days of discharge Face-to-Face Visit        within 14 days of discharge   Moderate Complexity [id number]   High Complexity [id number]          In addition: since discharge from hospital she states she has had left pelvic pressure. States she has a hard time going to the bathroom at times, then other times she is going 4 times a night. States she feels like she has to urinate now, but hasn't been able to give a sample. She is wondering if UTI has returned of if symptoms may be due to nerves from sacral fracture.    Sacral fracture: recent x-ray patient reports it is starting to heal. Sleeps in recliner on occasion. Ibuprofen q6h for pain. Oxycodone at night (1/2 tab).     Patient Active Problem List   Diagnosis     Essential hypertension, benign     Generalized anxiety disorder     Diverticulosis of large intestine     Osteopenia of spine     Family history of other cardiovascular  diseases     CARDIOVASCULAR SCREENING; LDL GOAL LESS THAN 160     Status post total right knee replacement     Right knee DJD     Headache     Pulmonary infiltrate in right lung on chest x-ray     Acute cystitis     UTI (urinary tract infection)     Nausea with vomiting     Volume depletion, gastrointestinal loss     Hyponatremia     Fracture of sacrum (H)     Sacral pain     Past Surgical History:   Procedure Laterality Date     ARTHROPLASTY KNEE Right 2018    Procedure: ARTHROPLASTY KNEE;  Right total knee arthroplasty;  Surgeon: Anton Grover MD;  Location: [facility]      APPENDECTOMY       COLONOSCOPY  04/15/02     COLONOSCOPY  3/25/2013    Procedure: COLONOSCOPY;  Colonoscopy  ;  Surgeon: Jamil Porras MD;  Location: [facility]     FLEXIBLE SIGMOIDOSCOPY  96     ORTHOPEDIC SURGERY  22-23 YEARS AGO    BACK     SURGICAL HISTORY OF -       lumbar surgery       Social History     Tobacco Use     Smoking status: Never Smoker     Smokeless tobacco: Never Used   Substance Use Topics     Alcohol use: Yes     Comment: rare     Family History   Problem Relation Age of Onset     Lipids Brother      Heart Disease Brother          at 51 of an MI     Cancer Brother         bone, lung,  82     Prostate Cancer Brother      Cancer Sister         cervical     Lipids Sister      Heart Disease Sister         eldest sister, s/p MI x 2 with stents; diagnosed with AAA, postoperative complications and MI,  at 75     Heart Disease Brother         MI x 2     Lipids Brother      C.A.D. Father          at 39 of an MI     Family History Negative Mother         lived to age 95     Gynecology Sister         cervical Ca         Current Outpatient Medications   Medication Sig Dispense Refill     atenolol (TENORMIN) 100 MG tablet Take 1 tablet (100 mg) by mouth daily 90 tablet 3     escitalopram (LEXAPRO) 10 MG tablet Take 1 tablet (10 mg) by mouth daily 90 tablet 3     ibuprofen (ADVIL/MOTRIN) 200 MG tablet  "Take 400-600 mg by mouth as needed (ruptured tail bone pain)        Lidocaine (LIDOCARE) 4 % Patch Place 1 patch onto the skin every 24 hours To prevent lidocaine toxicity, patient should be patch free for 12 hrs daily. 12 patch 1     losartan-hydrochlorothiazide (HYZAAR) 100-12.5 MG tablet Take 1 tablet by mouth daily 90 tablet 3     MEGARED OMEGA-3 KRILL OIL PO Take 1 capsule by mouth daily       methocarbamol 500 MG PO tablet Take 1 tablet (500 mg) by mouth 4 times daily as needed for muscle spasms 60 tablet 1     multivitamin (CENTRUM SILVER) tablet Take 1 tablet by mouth daily       oxyCODONE (ROXICODONE) 5 MG tablet Take 0.5 tablets (2.5 mg) by mouth every 6 hours as needed for breakthrough pain 12 tablet 0     Allergies   Allergen Reactions     Bactrim [Sulfamethoxazole W/Trimethoprim] Nausea and Vomiting     Lisinopril Cough     BP Readings from Last 3 Encounters:   03/10/20 118/70   03/02/20 (!) 162/91   02/27/20 108/78    Wt Readings from Last 3 Encounters:   03/10/20 82.1 kg (181 lb)   03/01/20 85.3 kg (188 lb 0.8 oz)   02/27/20 79.4 kg (175 lb)                      Reviewed and updated as needed this visit by Provider  Tobacco  Allergies  Meds  Problems  Med Hx  Surg Hx  Fam Hx         Review of Systems   Constitutional: Negative.  Negative for fever.   Genitourinary: Positive for decreased urine volume, frequency (sometimes at night), pelvic pain (left pressure) and urgency. Negative for dysuria, flank pain and hematuria.   Musculoskeletal:        Tailbone pain from recent fx   Psychiatric/Behavioral: The patient is nervous/anxious.             Objective    /70   Pulse 77   Temp 98.2  F (36.8  C) (Tympanic)   Resp 16   Ht 1.753 m (5' 9\")   Wt 82.1 kg (181 lb)   LMP  (LMP Unknown)   SpO2 96%   BMI 26.73 kg/m    Body mass index is 26.73 kg/m .  Physical Exam  Vitals signs and nursing note reviewed.   Constitutional:       General: She is not in acute distress.     Appearance: Normal " "appearance. She is not ill-appearing or toxic-appearing.   Cardiovascular:      Rate and Rhythm: Normal rate and regular rhythm.      Pulses: Normal pulses.      Heart sounds: Normal heart sounds.   Pulmonary:      Effort: Pulmonary effort is normal.      Breath sounds: Normal breath sounds.   Abdominal:      Tenderness: There is no abdominal tenderness. There is no right CVA tenderness or left CVA tenderness.   Skin:     General: Skin is warm and dry.   Neurological:      Mental Status: She is alert.            Diagnostic Test Results:  Labs reviewed in Epic        Assessment & Plan     1. Urinary problem  Persistent frequency, urgency. Unable to give a sample today (tried several times). She states she will come back tomorrow morning to give sample. Recommended increase in fluid intake.    - *UA reflex to Microscopic and Culture ([place] [facility] (except [place] and [place])    2. Closed fracture of sacrum with routine healing, unspecified portion of sacrum, subsequent encounter  Continue OTC and prescription medication for pain. Using walker. Repositions frequently.        BMI:   Estimated body mass index is 26.73 kg/m  as calculated from the following:    Height as of this encounter: 1.753 m (5' 9\").    Weight as of this encounter: 82.1 kg (181 lb).           See Patient Instructions    Return if symptoms worsen or fail to improve.    JESI Randall [facility]      "

## 2020-03-10 NOTE — TELEPHONE ENCOUNTER
Spoke with patient regarding her urine results. She does have another UTI. Medication picked based on her previous urine culture results. Culture is still being sent off to verify coverage. Macrobid sent to pharmacy.     Recommended taking a probiotic and/or eating yogurt every day while on antibiotics and for ~1 week after stopping antibiotics to prevent GI upset. Discussed OTC recommendations for symptom control. Rest, hydration.    Will call with culture results.

## 2020-03-11 ENCOUNTER — TELEPHONE (OUTPATIENT)
Dept: FAMILY MEDICINE | Facility: CLINIC | Age: 85
End: 2020-03-11

## 2020-03-11 ENCOUNTER — HOSPITAL ENCOUNTER (EMERGENCY)
Facility: CLINIC | Age: 85
Discharge: HOME | End: 2020-03-11
Attending: EMERGENCY MEDICINE | Admitting: EMERGENCY MEDICINE
Payer: COMMERCIAL

## 2020-03-11 VITALS
HEIGHT: 68 IN | TEMPERATURE: 97.4 F | OXYGEN SATURATION: 99 % | WEIGHT: 180 LBS | RESPIRATION RATE: 18 BRPM | SYSTOLIC BLOOD PRESSURE: 142 MMHG | HEART RATE: 76 BPM | BODY MASS INDEX: 27.28 KG/M2 | DIASTOLIC BLOOD PRESSURE: 78 MMHG

## 2020-03-11 DIAGNOSIS — R11.2 NON-INTRACTABLE VOMITING WITH NAUSEA, UNSPECIFIED VOMITING TYPE: ICD-10-CM

## 2020-03-11 DIAGNOSIS — N30.00 ACUTE CYSTITIS WITHOUT HEMATURIA: ICD-10-CM

## 2020-03-11 LAB
ALBUMIN UR-MCNC: NEGATIVE MG/DL
ANION GAP SERPL CALCULATED.3IONS-SCNC: 6 MMOL/L (ref 3–14)
APPEARANCE UR: CLEAR
BACTERIA #/AREA URNS HPF: ABNORMAL /HPF
BASOPHILS # BLD AUTO: 0.1 10E9/L (ref 0–0.2)
BASOPHILS NFR BLD AUTO: 0.5 %
BILIRUB UR QL STRIP: NEGATIVE
BUN SERPL-MCNC: 12 MG/DL (ref 7–30)
CALCIUM SERPL-MCNC: 9.7 MG/DL (ref 8.5–10.1)
CHLORIDE SERPL-SCNC: 100 MMOL/L (ref 94–109)
CO2 SERPL-SCNC: 27 MMOL/L (ref 20–32)
COLOR UR AUTO: YELLOW
CREAT SERPL-MCNC: 0.61 MG/DL (ref 0.52–1.04)
DIFFERENTIAL METHOD BLD: NORMAL
EOSINOPHIL # BLD AUTO: 0.1 10E9/L (ref 0–0.7)
EOSINOPHIL NFR BLD AUTO: 1.1 %
ERYTHROCYTE [DISTWIDTH] IN BLOOD BY AUTOMATED COUNT: 12 % (ref 10–15)
GFR SERPL CREATININE-BSD FRML MDRD: 84 ML/MIN/{1.73_M2}
GLUCOSE SERPL-MCNC: 125 MG/DL (ref 70–99)
GLUCOSE UR STRIP-MCNC: NEGATIVE MG/DL
HCT VFR BLD AUTO: 38.6 % (ref 35–47)
HGB BLD-MCNC: 12.8 G/DL (ref 11.7–15.7)
HGB UR QL STRIP: NEGATIVE
IMM GRANULOCYTES # BLD: 0.1 10E9/L (ref 0–0.4)
IMM GRANULOCYTES NFR BLD: 0.6 %
KETONES UR STRIP-MCNC: NEGATIVE MG/DL
LEUKOCYTE ESTERASE UR QL STRIP: ABNORMAL
LYMPHOCYTES # BLD AUTO: 1.4 10E9/L (ref 0.8–5.3)
LYMPHOCYTES NFR BLD AUTO: 13.8 %
MCH RBC QN AUTO: 30.2 PG (ref 26.5–33)
MCHC RBC AUTO-ENTMCNC: 33.2 G/DL (ref 31.5–36.5)
MCV RBC AUTO: 91 FL (ref 78–100)
MONOCYTES # BLD AUTO: 0.7 10E9/L (ref 0–1.3)
MONOCYTES NFR BLD AUTO: 7.1 %
MUCOUS THREADS #/AREA URNS LPF: PRESENT /LPF
NEUTROPHILS # BLD AUTO: 7.7 10E9/L (ref 1.6–8.3)
NEUTROPHILS NFR BLD AUTO: 76.9 %
NITRATE UR QL: NEGATIVE
NRBC # BLD AUTO: 0 10*3/UL
NRBC BLD AUTO-RTO: 0 /100
PH UR STRIP: 6 PH (ref 5–7)
PLATELET # BLD AUTO: 388 10E9/L (ref 150–450)
POTASSIUM SERPL-SCNC: 4.1 MMOL/L (ref 3.4–5.3)
RBC # BLD AUTO: 4.24 10E12/L (ref 3.8–5.2)
RBC #/AREA URNS AUTO: 1 /HPF (ref 0–2)
SODIUM SERPL-SCNC: 133 MMOL/L (ref 133–144)
SOURCE: ABNORMAL
SP GR UR STRIP: 1.01 (ref 1–1.03)
SQUAMOUS #/AREA URNS AUTO: 2 /HPF (ref 0–1)
UROBILINOGEN UR STRIP-MCNC: 0 MG/DL (ref 0–2)
WBC # BLD AUTO: 10 10E9/L (ref 4–11)
WBC #/AREA URNS AUTO: 5 /HPF (ref 0–5)

## 2020-03-11 PROCEDURE — 85025 COMPLETE CBC W/AUTO DIFF WBC: CPT | Performed by: EMERGENCY MEDICINE

## 2020-03-11 PROCEDURE — 80048 BASIC METABOLIC PNL TOTAL CA: CPT | Performed by: EMERGENCY MEDICINE

## 2020-03-11 PROCEDURE — 99283 EMERGENCY DEPT VISIT LOW MDM: CPT | Performed by: EMERGENCY MEDICINE

## 2020-03-11 PROCEDURE — 99284 EMERGENCY DEPT VISIT MOD MDM: CPT | Mod: Z6 | Performed by: EMERGENCY MEDICINE

## 2020-03-11 PROCEDURE — 81001 URINALYSIS AUTO W/SCOPE: CPT | Performed by: EMERGENCY MEDICINE

## 2020-03-11 PROCEDURE — 25000132 ZZH RX MED GY IP 250 OP 250 PS 637: Performed by: EMERGENCY MEDICINE

## 2020-03-11 PROCEDURE — 25000128 H RX IP 250 OP 636: Performed by: EMERGENCY MEDICINE

## 2020-03-11 RX ORDER — ONDANSETRON 4 MG/1
4 TABLET, ORALLY DISINTEGRATING ORAL ONCE
Status: COMPLETED | OUTPATIENT
Start: 2020-03-11 | End: 2020-03-11

## 2020-03-11 RX ORDER — ACETAMINOPHEN 325 MG/1
650 TABLET ORAL ONCE
Status: COMPLETED | OUTPATIENT
Start: 2020-03-11 | End: 2020-03-11

## 2020-03-11 RX ORDER — LORAZEPAM 0.5 MG/1
0.5 TABLET ORAL ONCE
Status: COMPLETED | OUTPATIENT
Start: 2020-03-11 | End: 2020-03-11

## 2020-03-11 RX ORDER — ONDANSETRON 4 MG/1
4 TABLET, ORALLY DISINTEGRATING ORAL EVERY 8 HOURS PRN
Qty: 12 TABLET | Refills: 0 | Status: SHIPPED | OUTPATIENT
Start: 2020-03-11 | End: 2020-03-24

## 2020-03-11 RX ORDER — ONDANSETRON 4 MG/1
4 TABLET, ORALLY DISINTEGRATING ORAL EVERY 8 HOURS PRN
Qty: 12 TABLET | Refills: 0 | Status: SHIPPED | OUTPATIENT
Start: 2020-03-11 | End: 2020-03-11

## 2020-03-11 RX ADMIN — LORAZEPAM 0.5 MG: 0.5 TABLET ORAL at 20:42

## 2020-03-11 RX ADMIN — ONDANSETRON 4 MG: 4 TABLET, ORALLY DISINTEGRATING ORAL at 22:22

## 2020-03-11 RX ADMIN — ACETAMINOPHEN 650 MG: 325 TABLET, FILM COATED ORAL at 22:22

## 2020-03-11 ASSESSMENT — MIFFLIN-ST. JEOR: SCORE: 1324.97

## 2020-03-11 NOTE — TELEPHONE ENCOUNTER
"Jamila Velasquez,  , Spoke to patient and explained in detail what to expect and why she needed antibiotic changed and unfortunately, GI side effects caused her to become dehydrated and ended up needing IV antibiotics/fluid/hospitalization.    I did advise she increase fluid intake and drink small sips frequently. Watch for blood in urine, fever, Back pain and call back/be seen if these occur, but expect to fell better over the course of 48-72 hours. I explained a Urine culture is in progress and once it returns, will tell us if she is on an antibiotic that will kill the bacteria.     Patient verbalized understanding and seemed to understand. I explained Dr. Santana is out of the office until 3-25-20. Mabel Guillen RN

## 2020-03-11 NOTE — TELEPHONE ENCOUNTER
Reason for Call:  Medication question:    Do you use a [facility]?  Name of the pharmacy and phone number for the current request:  [facility] - [place] [phone]    Name of the medication requested: Macrobid  Other request: Patient had UTI, prescribed med, did not work. Ended up in hospital, given Macrobid yesterday at OV with Anupama Abdi. Patient wants Dr. Santana to look over her meds and give patient her opinion because she's not happy about her treatment.    Can we leave a detailed message on this number? YES    Phone number patient can be reached at: Home number on file [phone] (home)    Best Time: Any    Call taken on 3/11/2020 at 8:59 AM by Elsy Kay

## 2020-03-11 NOTE — ED AVS SNAPSHOT
[facility] Emergency Department  [address] [address]  Phone:  [phone]  Fax:  [phone]                                    Bonita Villarreal   MRN: [id number]    Department:  [facility] Emergency Department   Date of Visit:  3/11/2020           After Visit Summary Signature Page    I have received my discharge instructions, and my questions have been answered. I have discussed any challenges I see with this plan with the nurse or doctor.    ..........................................................................................................................................  Patient/Patient Representative Signature      ..........................................................................................................................................  Patient Representative Print Name and Relationship to Patient    ..................................................               ................................................  Date                                   Time    ..........................................................................................................................................  Reviewed by Signature/Title    ...................................................              ..............................................  Date                                               Time          22EPIC Rev 08/18

## 2020-03-12 ASSESSMENT — ENCOUNTER SYMPTOMS
DYSURIA: 0
VOMITING: 1
COUGH: 0
HEMATURIA: 0
SORE THROAT: 0
DIARRHEA: 0
BACK PAIN: 0
NAUSEA: 1
HEADACHES: 0
SHORTNESS OF BREATH: 0
FREQUENCY: 1
ABDOMINAL PAIN: 0
CHILLS: 0
FEVER: 0

## 2020-03-12 NOTE — ED PROVIDER NOTES
History     Chief Complaint   Patient presents with     Vomiting     pt is on 3rd antibiotic for UTI, started Macrobid yesterday, started vomiting today.     HPI  Bonita Villarreal is a 84 year old female with history of urinary tract infections, anxiety, hypertension, diverticulosis, who presents for evaluation of nausea and vomiting in setting of treatment for urinary tract infection. She was recently treated for UTI with bactrim and developed GI symptoms at that time which were thought to be 2/2 medication. When culture results were available, E coli not sensitive to bactrim. She was treated as inpatient with IV cefazolin with improvement. Also was volume down with slight leukocytosis. Symptoms improved and was discharged to home on course of oral cephalexin. Was doing well and was at PCP follow up yesterday. She has noticed some increase in frequency but no dysuria. Repeat UA suggestive of UTI and she was started on nitrofurantoin Culture result shows 10-50K enterococcus. She had one episode of nausea and vomiting and became concerned that she may be getting sick again and wanted to be evaluated. She has trouble with anxiety which is currently exacerbated. She denies fever, chills, back pain, abdominal pain. No current urinary symptoms at this time.     The patient's PMHx, Surgical Hx, Allergies, and Medications were all reviewed with the patient.    Allergies:  Allergies   Allergen Reactions     Bactrim [Sulfamethoxazole W/Trimethoprim] Nausea and Vomiting     Lisinopril Cough       Problem List:    Patient Active Problem List    Diagnosis Date Noted     Nausea with vomiting 03/02/2020     Priority: Medium     Volume depletion, gastrointestinal loss 03/02/2020     Priority: Medium     Hyponatremia 03/02/2020     Priority: Medium     Fracture of sacrum (H) 03/02/2020     Priority: Medium     Sustained 1/26/2020       Sacral pain 03/02/2020     Priority: Medium     Acute cystitis 02/29/2020     Priority:  Medium     UTI (urinary tract infection) 2020     Priority: Medium     Pulmonary infiltrate in right lung on chest x-ray 2018     Priority: Medium     Headache 2018     Priority: Medium     Status post total right knee replacement 2018     Priority: Medium     Right knee DJD 2018     Priority: Medium     CARDIOVASCULAR SCREENING; LDL GOAL LESS THAN 160 10/31/2010     Priority: Medium     LDL in 140s, HDL in 60s  [place] risk is 6% (average at her age is 14%)       Essential hypertension, benign 2005     Priority: Medium     had coronary CT scan  --- no evidence of plaque  started on atenolol and ASA 2005 for /94  physician in [place] switched her to Lotrel 5/10 due to her having dizzy spells  HCTZ added 3/05, pt felt mouth too dried out   pt more anxious and BP still elevated -- switched back to Atenolol       Diverticulosis of large intestine 2005     Priority: Medium     colonoscopy , disease mod - severe  Problem list name updated by automated process. Provider to review       Osteopenia of spine 2005     Priority: Medium     T score -1.3    Risk calculator based on 2003 T-score and personal data showed 1% risk of hip fracture over next 10 years, 14% risk of any fracture over next ten years; consider recheck DEXA at age 77, continue calcium and exercise until then (current recommendations to consider treatment if hip fx risk reaches 3% or any fracture risk reaches 20%)  Problem list name updated by automated process. Provider to review       Family history of other cardiovascular diseases 2005     Priority: Medium     father  at 39 of MI  brother  at 51 of MI  another brother -- MI x 2  eldest sister -- MI x 2 with stents  mother and maternal aunts lived to 80's and mid 90's  Problem list name updated by automated process. Provider to review and confirm  Problem list name updated by automated process. Provider to review        Generalized anxiety disorder 2005     Priority: Medium     onset after patient was diagnosed with hypertension fall           Past Medical History:    Past Medical History:   Diagnosis Date     Advance Care Planning 2012     Arthritis      Diverticulitis of colon 2005     Diverticulosis of colon (without mention of hemorrhage)      Hypertension        Past Surgical History:    Past Surgical History:   Procedure Laterality Date     ARTHROPLASTY KNEE Right 2018    Procedure: ARTHROPLASTY KNEE;  Right total knee arthroplasty;  Surgeon: Anton Grover MD;  Location: [facility]      APPENDECTOMY       COLONOSCOPY  04/15/02     COLONOSCOPY  3/25/2013    Procedure: COLONOSCOPY;  Colonoscopy  ;  Surgeon: Jamil Porras MD;  Location: [facility]     FLEXIBLE SIGMOIDOSCOPY  96     ORTHOPEDIC SURGERY  22-23 YEARS AGO    BACK     SURGICAL HISTORY OF -       lumbar surgery       Family History:    Family History   Problem Relation Age of Onset     Lipids Brother      Heart Disease Brother          at 51 of an MI     Cancer Brother         bone, lung,  82     Prostate Cancer Brother      Cancer Sister         cervical     Lipids Sister      Heart Disease Sister         eldest sister, s/p MI x 2 with stents; diagnosed with AAA, postoperative complications and MI,  at 75     Heart Disease Brother         MI x 2     Lipids Brother      C.A.D. Father          at 39 of an MI     Family History Negative Mother         lived to age 95     Gynecology Sister         cervical Ca       Social History:  Marital Status:   [5]  Social History     Tobacco Use     Smoking status: Never Smoker     Smokeless tobacco: Never Used   Substance Use Topics     Alcohol use: Yes     Comment: rare     Drug use: No        Medications:    ondansetron (ZOFRAN-ODT) 4 MG ODT tab  atenolol (TENORMIN) 100 MG tablet  escitalopram (LEXAPRO) 10 MG tablet  ibuprofen (ADVIL/MOTRIN) 200 MG tablet  Lidocaine  "(LIDOCARE) 4 % Patch  losartan-hydrochlorothiazide (HYZAAR) 100-12.5 MG tablet  MEGARED OMEGA-3 KRILL OIL PO  methocarbamol 500 MG PO tablet  multivitamin (CENTRUM SILVER) tablet  nitroFURantoin macrocrystal-monohydrate (MACROBID) 100 MG capsule  oxyCODONE (ROXICODONE) 5 MG tablet          Review of Systems   Constitutional: Negative for chills and fever.   HENT: Negative for sore throat.    Eyes: Negative for visual disturbance.   Respiratory: Negative for cough and shortness of breath.    Cardiovascular: Negative for chest pain.   Gastrointestinal: Positive for nausea and vomiting. Negative for abdominal pain and diarrhea.   Genitourinary: Positive for frequency. Negative for dysuria, hematuria and urgency.   Musculoskeletal: Negative for back pain.   Skin: Negative for rash.   Neurological: Negative for headaches.       Physical Exam   BP: (!) 155/83  Pulse: 77  Temp: 97.4  F (36.3  C)  Resp: 18  Height: 172.7 cm (5' 8\")  Weight: 81.6 kg (180 lb)(stated)  SpO2: 95 %    Physical Exam  GEN: Awake, alert, and cooperative. No acute distress. Anxious affect.   HENT: MMM. External ears and nose normal bilaterally.  EYES: EOM intact. Conjunctiva clear.  NECK: Supple, symmetric.  CV : Regular rate and rhythm.  PULM: Normal effort. No wheezes, rales, or rhonchi bilaterally.  ABD: Soft, non-tender, non-distended. No rebound or guarding.   BACK: No CVA tenderness.   NEURO: Normal speech. Following commands. CN II-XII grossly intact. Answering questions and interacting appropriately.   EXT: No gross deformity. Warm and well perfused  INT: Warm. No diaphoresis. Normal color.        ED Course        Procedures           Critical Care time:  none               Results for orders placed or performed during the hospital encounter of 03/11/20 (from the past 24 hour(s))   UA reflex to Microscopic   Result Value Ref Range    Color Urine Yellow     Appearance Urine Clear     Glucose Urine Negative NEG^Negative mg/dL    Bilirubin " Urine Negative NEG^Negative    Ketones Urine Negative NEG^Negative mg/dL    Specific Gravity Urine 1.012 1.003 - 1.035    Blood Urine Negative NEG^Negative    pH Urine 6.0 5.0 - 7.0 pH    Protein Albumin Urine Negative NEG^Negative mg/dL    Urobilinogen mg/dL 0.0 0.0 - 2.0 mg/dL    Nitrite Urine Negative NEG^Negative    Leukocyte Esterase Urine Small (A) NEG^Negative    Source Midstream Urine     RBC Urine 1 0 - 2 /HPF    WBC Urine 5 0 - 5 /HPF    Bacteria Urine Few (A) NEG^Negative /HPF    Squamous Epithelial /HPF Urine 2 (H) 0 - 1 /HPF    Mucous Urine Present (A) NEG^Negative /LPF   CBC with platelets differential   Result Value Ref Range    WBC 10.0 4.0 - 11.0 10e9/L    RBC Count 4.24 3.8 - 5.2 10e12/L    Hemoglobin 12.8 11.7 - 15.7 g/dL    Hematocrit 38.6 35.0 - 47.0 %    MCV 91 78 - 100 fl    MCH 30.2 26.5 - 33.0 pg    MCHC 33.2 31.5 - 36.5 g/dL    RDW 12.0 10.0 - 15.0 %    Platelet Count 388 150 - 450 10e9/L    Diff Method Automated Method     % Neutrophils 76.9 %    % Lymphocytes 13.8 %    % Monocytes 7.1 %    % Eosinophils 1.1 %    % Basophils 0.5 %    % Immature Granulocytes 0.6 %    Nucleated RBCs 0 0 /100    Absolute Neutrophil 7.7 1.6 - 8.3 10e9/L    Absolute Lymphocytes 1.4 0.8 - 5.3 10e9/L    Absolute Monocytes 0.7 0.0 - 1.3 10e9/L    Absolute Eosinophils 0.1 0.0 - 0.7 10e9/L    Absolute Basophils 0.1 0.0 - 0.2 10e9/L    Abs Immature Granulocytes 0.1 0 - 0.4 10e9/L    Absolute Nucleated RBC 0.0    Basic metabolic panel   Result Value Ref Range    Sodium 133 133 - 144 mmol/L    Potassium 4.1 3.4 - 5.3 mmol/L    Chloride 100 94 - 109 mmol/L    Carbon Dioxide 27 20 - 32 mmol/L    Anion Gap 6 3 - 14 mmol/L    Glucose 125 (H) 70 - 99 mg/dL    Urea Nitrogen 12 7 - 30 mg/dL    Creatinine 0.61 0.52 - 1.04 mg/dL    GFR Estimate 84 >60 mL/min/[1.73_m2]    GFR Estimate If Black >90 >60 mL/min/[1.73_m2]    Calcium 9.7 8.5 - 10.1 mg/dL       Medications   LORazepam (ATIVAN) tablet 0.5 mg (0.5 mg Oral Given 3/11/20  2042)   acetaminophen (TYLENOL) tablet 650 mg (650 mg Oral Given 3/11/20 2222)   ondansetron (ZOFRAN-ODT) ODT tab 4 mg (4 mg Oral Given 3/11/20 2222)       Assessments & Plan (with Medical Decision Making)   84 year old female with past medical history of urinary tract infections, anxiety, hypertension, diverticulosis, who presents for evaluation of nausea and vomiting in setting of treatment for urinary tract infection. Urinary frequency only (intermittent, no symptoms now). On arrival to Emergency Department, vital signs were notable for BP of 155/83, temp of 97.4. No abdominal or flank pain or CVA tenderness on exam. No fever, chills, dysuria or urgency. No clinical signs of upper urinary tract infection. Chart review shows recent CT of abdomen and pelvis without evidence of pyelonephritis or renal/ureter lithiasis. UA today without convincing infection. Most recent cultures show E coli which was treated with cephalexin and now enterococcus of <50K CFU. She is currently on nitrofurantoin and local antibiogram show good sensitivity.  Because of no signs of upper tract infection I believe this is an appropriate medication and she should continue taking.  This is her second day and her symptoms are better today than yesterday.  CBC without leukocytosis, she did have mild leukocytosis 1 week ago with left shift.  BMP grossly normal with exception of glucose of 125.  Patient is very anxious and is asking if there is something to help treat her anxiety for today.  She has been taking escitalopram for anxiety and depression for many years and says that recently her anxiety has been more of an issue.  Recommended that she follows up with her primary care physician to discuss possible dosage change of her Escitalopram.  Says she was taking 20 mg previously and is now on 10.  She was given a one-time dose of 0.5 mg of p.o. Lorazepam in the emergency department.  Her daughter is present with her and will be staying with her  and is driving for her.  She was observed in the emergency department, noted to have improvement of her anxiety did know a little bit of lightheadedness which is not unexpected.  She is ambulating to the bathroom without difficulty.  Given her reassuring work-up I feel she is appropriate for discharge and outpatient follow-up to monitor treatment response.  Strict ED return precautions discussed.  She expresses agreement understanding plan and discharged in stable condition.  She reports she has ondansetron at home.    I have reviewed the nursing notes.         Discharge Medication List as of 3/11/2020 10:20 PM          Final diagnoses:   Acute cystitis without hematuria   Non-intractable vomiting with nausea, unspecified vomiting type     Waldo Ruffin MD    3/11/2020   [facility] EMERGENCY DEPARTMENT    Disclaimer: This note consists of words and symbols derived from keyboarding and dictation using voice recognition software.  As a result, there may be errors that have gone undetected.  Please consider this when interpreting information found in this note.             Waldo Ruffin MD  03/12/20 [id number]

## 2020-03-12 NOTE — ED NOTES
Pt to MD x3 in last 2 weeks for UTI.  Pt had f/u yesterday and was told she still has UTI and was put on another abx.  Last night pt c/o freq urination but better earlier today.  Tonight pt states she has frequency and n/v.

## 2020-03-12 NOTE — DISCHARGE INSTRUCTIONS
Continue take antibiotics as prescribed.  You may take Zofran as directed as needed for vomiting.  If your symptoms do not improve please follow-up with your primary care provider in  2 days.  If your symptoms worsen or you develop new or concerning symptoms, please return to the emergency department for further evaluation and treatment.

## 2020-03-13 LAB
BACTERIA SPEC CULT: ABNORMAL
BACTERIA SPEC CULT: ABNORMAL
Lab: ABNORMAL
SPECIMEN SOURCE: ABNORMAL

## 2020-03-15 ENCOUNTER — HOSPITAL ENCOUNTER (EMERGENCY)
Facility: CLINIC | Age: 85
Discharge: HOME | End: 2020-03-15
Attending: EMERGENCY MEDICINE | Admitting: EMERGENCY MEDICINE
Payer: COMMERCIAL

## 2020-03-15 VITALS
SYSTOLIC BLOOD PRESSURE: 152 MMHG | WEIGHT: 180 LBS | OXYGEN SATURATION: 97 % | RESPIRATION RATE: 18 BRPM | BODY MASS INDEX: 27.28 KG/M2 | DIASTOLIC BLOOD PRESSURE: 105 MMHG | HEIGHT: 68 IN | HEART RATE: 87 BPM | TEMPERATURE: 97.8 F

## 2020-03-15 DIAGNOSIS — R30.0 DYSURIA: ICD-10-CM

## 2020-03-15 PROCEDURE — 99283 EMERGENCY DEPT VISIT LOW MDM: CPT | Mod: 25

## 2020-03-15 PROCEDURE — 51798 US URINE CAPACITY MEASURE: CPT

## 2020-03-15 PROCEDURE — 99284 EMERGENCY DEPT VISIT MOD MDM: CPT | Mod: Z6 | Performed by: EMERGENCY MEDICINE

## 2020-03-15 ASSESSMENT — MIFFLIN-ST. JEOR: SCORE: 1324.97

## 2020-03-15 NOTE — ED AVS SNAPSHOT
[facility] Emergency Department  [address] [address]  Phone:  [phone]  Fax:  [phone]                                    Bonita Villarreal   MRN: [id number]    Department:  [facility] Emergency Department   Date of Visit:  3/15/2020           After Visit Summary Signature Page    I have received my discharge instructions, and my questions have been answered. I have discussed any challenges I see with this plan with the nurse or doctor.    ..........................................................................................................................................  Patient/Patient Representative Signature      ..........................................................................................................................................  Patient Representative Print Name and Relationship to Patient    ..................................................               ................................................  Date                                   Time    ..........................................................................................................................................  Reviewed by Signature/Title    ...................................................              ..............................................  Date                                               Time          22EPIC Rev 08/18

## 2020-03-15 NOTE — DISCHARGE INSTRUCTIONS
Finish up antibiotic, follow-up urology    Return here for vomiting, abdominal pain, poor urinary output, fever or any other concern

## 2020-03-15 NOTE — ED PROVIDER NOTES
History     Chief Complaint   Patient presents with     Urinary Retention     No urinary output since 2300 last night     HPI  Bonita Villarreal is a 84 year old female with a history significant for hypertension, diverticulitis of colon, generalized anxiety disorder and nausea with vomiting. She presents to the ED with her daughter for evaluation of urinary retention. She states that she has had some retention in the past. The patient reports that she has no significant urine output since 2300 last night, and overall she's just uncomfortable. She denies fever, nausea, vomiting, constipation, changes in medications.      Allergies:  Allergies   Allergen Reactions     Bactrim [Sulfamethoxazole W/Trimethoprim] Nausea and Vomiting     Lisinopril Cough       Problem List:    Patient Active Problem List    Diagnosis Date Noted     Nausea with vomiting 03/02/2020     Priority: Medium     Volume depletion, gastrointestinal loss 03/02/2020     Priority: Medium     Hyponatremia 03/02/2020     Priority: Medium     Fracture of sacrum (H) 03/02/2020     Priority: Medium     Sustained 1/26/2020       Sacral pain 03/02/2020     Priority: Medium     Acute cystitis 02/29/2020     Priority: Medium     UTI (urinary tract infection) 02/29/2020     Priority: Medium     Pulmonary infiltrate in right lung on chest x-ray 06/29/2018     Priority: Medium     Headache 06/28/2018     Priority: Medium     Status post total right knee replacement 02/28/2018     Priority: Medium     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     Priority: Medium     CARDIOVASCULAR SCREENING; LDL GOAL LESS THAN 160 10/31/2010     Priority: Medium     LDL in 140s, HDL in 60s  [place] risk is 6% (average at her age is 14%)       Essential hypertension, benign 06/28/2005     Priority: Medium     had coronary CT scan 2003 --- no evidence of plaque  started on atenolol and ASA Jan 2005 for /94  physician in [place] switched her to Lotrel 5/10 due to her having dizzy  rosalina  HCTZ added 3/05, pt felt mouth too dried out   pt more anxious and BP still elevated -- switched back to Atenolol       Diverticulosis of large intestine 2005     Priority: Medium     colonoscopy , disease mod - severe  Problem list name updated by automated process. Provider to review       Osteopenia of spine 2005     Priority: Medium     T score -1.3    Risk calculator based on 2003 T-score and personal data showed 1% risk of hip fracture over next 10 years, 14% risk of any fracture over next ten years; consider recheck DEXA at age 77, continue calcium and exercise until then (current recommendations to consider treatment if hip fx risk reaches 3% or any fracture risk reaches 20%)  Problem list name updated by automated process. Provider to review       Family history of other cardiovascular diseases 2005     Priority: Medium     father  at 39 of MI  brother  at 51 of MI  another brother -- MI x 2  eldest sister -- MI x 2 with stents  mother and maternal aunts lived to 80's and mid 90's  Problem list name updated by automated process. Provider to review and confirm  Problem list name updated by automated process. Provider to review       Generalized anxiety disorder 2005     Priority: Medium     onset after patient was diagnosed with hypertension fall           Past Medical History:    Past Medical History:   Diagnosis Date     Advance Care Planning 2012     Arthritis      Diverticulitis of colon 2005     Diverticulosis of colon (without mention of hemorrhage)      Hypertension        Past Surgical History:    Past Surgical History:   Procedure Laterality Date     ARTHROPLASTY KNEE Right 2018    Procedure: ARTHROPLASTY KNEE;  Right total knee arthroplasty;  Surgeon: Anton Grover MD;  Location: [facility]     C APPENDECTOMY       COLONOSCOPY  04/15/02     COLONOSCOPY  3/25/2013    Procedure: COLONOSCOPY;  Colonoscopy  ;  Surgeon: Vern  "Jamil Gonzalez MD;  Location: [facility]     FLEXIBLE SIGMOIDOSCOPY  96     ORTHOPEDIC SURGERY  22-23 YEARS AGO    BACK     SURGICAL HISTORY OF -       lumbar surgery       Family History:    Family History   Problem Relation Age of Onset     Lipids Brother      Heart Disease Brother          at 51 of an MI     Cancer Brother         bone, lung,  82     Prostate Cancer Brother      Cancer Sister         cervical     Lipids Sister      Heart Disease Sister         eldest sister, s/p MI x 2 with stents; diagnosed with AAA, postoperative complications and MI,  at 75     Heart Disease Brother         MI x 2     Lipids Brother      C.A.D. Father          at 39 of an MI     Family History Negative Mother         lived to age 95     Gynecology Sister         cervical Ca       Social History:  Marital Status:   [5]  Social History     Tobacco Use     Smoking status: Never Smoker     Smokeless tobacco: Never Used   Substance Use Topics     Alcohol use: Yes     Comment: rare     Drug use: No        Medications:    atenolol (TENORMIN) 100 MG tablet  escitalopram (LEXAPRO) 10 MG tablet  ibuprofen (ADVIL/MOTRIN) 200 MG tablet  Lidocaine (LIDOCARE) 4 % Patch  losartan-hydrochlorothiazide (HYZAAR) 100-12.5 MG tablet  MEGARED OMEGA-3 KRILL OIL PO  methocarbamol 500 MG PO tablet  multivitamin (CENTRUM SILVER) tablet  nitroFURantoin macrocrystal-monohydrate (MACROBID) 100 MG capsule  ondansetron (ZOFRAN-ODT) 4 MG ODT tab  oxyCODONE (ROXICODONE) 5 MG tablet          Review of Systems  Problem focused review of systems otherwise negative    Physical Exam   BP: (!) 152/101  Pulse: 86  Temp: 97.8  F (36.6  C)  Resp: 18  Height: 172.7 cm (5' 8\")  Weight: 81.6 kg (180 lb)  SpO2: 97 %      Physical Exam  Nontoxic-appearing no respiratory distress alert and oriented  Lungs clear  No CVA tenderness  Abdomen soft nondistended bowel sounds positive mild suprapubic tenderness no guarding or rebound  Pelvic exam with " female RN staff present external genitalia are normally developed, no lesions appreciated, vaginal mucosa is unremarkable, again no lesions no bleeding, cervix is unremarkable  Bladder scan 80 cc urine  ED Course        Procedures               Critical Care time:  none               No results found for this or any previous visit (from the past 24 hour(s)).    Medications - No data to display     8:24 Patient assessed. Course of care outlined.    Assessments & Plan (with Medical Decision Making)  84-year-old female recent urinary tract infection, third antibiotic.  Suprapubic fullness and decreased urinary output since 11:00 last evening prompts evaluation today.  Exam including abdomen and pelvic is unremarkable.  She has 80 cc of urine in her bladder.  Her renal function was normal on 3/11.  No indication for further evaluation.  Recommend plenty of fluids, finish up antibiotic.  Follow-up urology.  Return criteria reviewed     I have reviewed the nursing notes.    I have reviewed the findings, diagnosis, plan and need for follow up with the patient.          New Prescriptions    No medications on file       Final diagnoses:   Dysuria   This document serves as a record of services personally performed by Billy Murdock MD. It was created on their behalf by Hien Davalos, a trained medical scribe. The creation of this record is based on the provider's personal observations and the statements of the patient. This document has been checked and approved by the attending provider.      3/15/2020   [facility] EMERGENCY DEPARTMENT     Billy Murdock MD  03/15/20 0907

## 2020-03-15 NOTE — ED TRIAGE NOTES
Patient reports she has been seen 4 times for UTI. Has on tablet of Macrobid left. Reports she has not had urine output since ~ 2300 last night. Abdominal discomfort present. Decreased oral intake due to nausea. Denies fever or chills.

## 2020-03-17 ENCOUNTER — TELEPHONE (OUTPATIENT)
Dept: FAMILY MEDICINE | Facility: CLINIC | Age: 85
End: 2020-03-17

## 2020-03-17 DIAGNOSIS — N39.0 URINARY TRACT INFECTION WITHOUT HEMATURIA, SITE UNSPECIFIED: ICD-10-CM

## 2020-03-17 DIAGNOSIS — N39.0 RECURRENT UTI: ICD-10-CM

## 2020-03-17 DIAGNOSIS — N39.0 URINARY TRACT INFECTION WITHOUT HEMATURIA, SITE UNSPECIFIED: Primary | ICD-10-CM

## 2020-03-17 DIAGNOSIS — R82.90 NONSPECIFIC FINDING ON EXAMINATION OF URINE: Primary | ICD-10-CM

## 2020-03-17 LAB
ALBUMIN UR-MCNC: 30 MG/DL
APPEARANCE UR: ABNORMAL
BACTERIA #/AREA URNS HPF: ABNORMAL /HPF
BILIRUB UR QL STRIP: ABNORMAL
COLOR UR AUTO: YELLOW
GLUCOSE UR STRIP-MCNC: 100 MG/DL
HGB UR QL STRIP: ABNORMAL
KETONES UR STRIP-MCNC: ABNORMAL MG/DL
LEUKOCYTE ESTERASE UR QL STRIP: ABNORMAL
NITRATE UR QL: NEGATIVE
NON-SQ EPI CELLS #/AREA URNS LPF: ABNORMAL /LPF
PH UR STRIP: 6 PH (ref 5–7)
RBC #/AREA URNS AUTO: ABNORMAL /HPF
SOURCE: ABNORMAL
SP GR UR STRIP: 1.02 (ref 1–1.03)
URNS CMNT MICRO: ABNORMAL
UROBILINOGEN UR STRIP-ACNC: 0.2 EU/DL (ref 0.2–1)
WBC #/AREA URNS AUTO: ABNORMAL /HPF

## 2020-03-17 PROCEDURE — 87086 URINE CULTURE/COLONY COUNT: CPT | Performed by: NURSE PRACTITIONER

## 2020-03-17 PROCEDURE — 81001 URINALYSIS AUTO W/SCOPE: CPT | Performed by: NURSE PRACTITIONER

## 2020-03-17 RX ORDER — CIPROFLOXACIN 250 MG/1
250 TABLET, FILM COATED ORAL 2 TIMES DAILY
Qty: 14 TABLET | Refills: 0 | Status: SHIPPED | OUTPATIENT
Start: 2020-03-17 | End: 2020-03-24

## 2020-03-17 NOTE — TELEPHONE ENCOUNTER
Will get a UA cup and wipes ready for her to collect at home and will call her with results.    She will also check her blood pressures.      Ana Johnson MA

## 2020-03-18 LAB
BACTERIA SPEC CULT: NORMAL
SPECIMEN SOURCE: NORMAL

## 2020-03-19 ENCOUNTER — TELEPHONE (OUTPATIENT)
Dept: FAMILY MEDICINE | Facility: CLINIC | Age: 85
End: 2020-03-19

## 2020-03-19 NOTE — TELEPHONE ENCOUNTER
Reason for call:  Patient reporting a symptom    Symptom or request: Pt started on Cipro yesterday for an ongoing UTI and she is still urinating every 20 minutes and nothing much comes out.  She also had leg cramps last night.  [facility]  Please call patient and advise.      Duration (how long have symptoms been present): ongoing    Have you been treated for this before? Yes    Additional comments:     Phone Number patient can be reached at:  Home number on file [phone] (home)    Best Time:  any    Can we leave a detailed message on this number:  YES    Call taken on 3/19/2020 at 7:37 AM by Pao Corona

## 2020-03-19 NOTE — TELEPHONE ENCOUNTER
Urgency,frequency.  No fever, no pain.  Pt started Cipro for UTI yesterday.  Pt will give it more time for symptoms to resolve.  Pt will continue to push fluids and cranberry juice.  Leg twitching, not junior horse type.  Pt has Flexeril @ home and will take as needed.  Pt will f/u if has fever and/or pain.  KpavelRn

## 2020-03-24 ENCOUNTER — VIRTUAL VISIT (OUTPATIENT)
Dept: FAMILY MEDICINE | Facility: CLINIC | Age: 85
End: 2020-03-24
Payer: COMMERCIAL

## 2020-03-24 DIAGNOSIS — M53.3 SACRAL PAIN: ICD-10-CM

## 2020-03-24 DIAGNOSIS — F41.1 GENERALIZED ANXIETY DISORDER: Primary | Chronic | ICD-10-CM

## 2020-03-24 PROCEDURE — 99442 ZZC PHYSICIAN TELEPHONE EVALUATION 11-20 MIN: CPT | Performed by: NURSE PRACTITIONER

## 2020-03-24 NOTE — PROGRESS NOTES
"Bonita Villarreal is a 84 year old female who is being evaluated via a billable telephone visit.      The patient has been notified of following:     \"This telephone visit will be conducted via a call between you and your physician/provider. We have found that certain health care needs can be provided without the need for a physical exam.  This service lets us provide the care you need with a short phone conversation.  If a prescription is necessary we can send it directly to your pharmacy.  If lab work is needed we can place an order for that and you can then stop by our lab to have the test done at a later time.    If during the course of the call the physician/provider feels a telephone visit is not appropriate, you will not be charged for this service.\"     Bonita Villarreal complains of    Chief Complaint   Patient presents with     Tailbone Pain     She feels a bit more anxious with the COVID virus. Feels it's manageable with Lexapro.  Since her accident of 1/26/20 she still gets pain in her tailbone. She has been taking occasional ibuprofen for that.  She has lidocaine patches but states they are hard to apply. She tries to stay active every day.  No other acute concerns voiced today.    I have reviewed and updated the patient's Past Medical History, Social History, Family History and Medication List.    ALLERGIES  Bactrim [sulfamethoxazole w/trimethoprim] and Lisinopril      Additional provider notes: pt is still having tailbone pain, she broke it 1/26/2020.     Assessment/Plan:  1. Generalized anxiety disorder    After discussion, she reports being stable and will continue with present dose of Lexapro.      2. Sacral pain    Suggested she try schedule Tylenol for  3 days and if that doesn't help, apply the lidocaine patch.  Stay active and will follow up accordingly.      Phone call duration:  18 minutes    JESI Márquez CNP    "

## 2020-04-01 ENCOUNTER — VIRTUAL VISIT (OUTPATIENT)
Dept: FAMILY MEDICINE | Facility: CLINIC | Age: 85
End: 2020-04-01
Payer: COMMERCIAL

## 2020-04-01 DIAGNOSIS — R39.15 URINARY URGENCY: Primary | ICD-10-CM

## 2020-04-01 PROCEDURE — 99213 OFFICE O/P EST LOW 20 MIN: CPT | Mod: TEL | Performed by: FAMILY MEDICINE

## 2020-04-01 NOTE — PROGRESS NOTES
"Subjective     Bonita Villarreal is a 84 year old female who is being evaluated via a billable telephone visit.      The patient has been notified of following:     \"This telephone visit will be conducted via a call between you and your physician/provider. We have found that certain health care needs can be provided without the need for a physical exam.  This service lets us provide the care you need with a short phone conversation.  If a prescription is necessary we can send it directly to your pharmacy.  If lab work is needed we can place an order for that and you can then stop by our lab to have the test done at a later time.    If during the course of the call the physician/provider feels a telephone visit is not appropriate, you will not be charged for this service.\"     Patient has given verbal consent for Telephone visit?  Yes    Bonita Villarreal complains of   Chief Complaint   Patient presents with     UTI       ALLERGIES  Bactrim [sulfamethoxazole w/trimethoprim] and Lisinopril    URINARY TRACT SYMPTOMS  Onset:x 3 days    Description:   Painful urination (Dysuria): no            Frequency: YES- at night, going every 3 hours  Blood in urine (Hematuria): no   Delay in urine (Hesitency): YES    Intensity: moderate    Progression of Symptoms:  stable    Accompanying Signs & Symptoms:  Fever/chills: no   Flank pain no   Nausea and vomiting: no   Any vaginal symptoms: none  Abdominal/Pelvic Pain: YES- lower abdominal pressure    History:   History of frequent UTI's: no   History of kidney stones: no   Sexually Active: no   Possibility of pregnancy: No    Precipitating factors:       Therapies Tried and outcome: increase water intake             Patient Active Problem List   Diagnosis     Essential hypertension, benign     Generalized anxiety disorder     Diverticulosis of large intestine     Osteopenia of spine     Family history of other cardiovascular diseases     CARDIOVASCULAR SCREENING; LDL GOAL " LESS THAN 160     Status post total right knee replacement     Right knee DJD     Headache     Pulmonary infiltrate in right lung on chest x-ray     Acute cystitis     UTI (urinary tract infection)     Nausea with vomiting     Volume depletion, gastrointestinal loss     Hyponatremia     Fracture of sacrum (H)     Sacral pain     Past Surgical History:   Procedure Laterality Date     ARTHROPLASTY KNEE Right 2018    Procedure: ARTHROPLASTY KNEE;  Right total knee arthroplasty;  Surgeon: Anton Grover MD;  Location: [facility]      APPENDECTOMY       COLONOSCOPY  04/15/02     COLONOSCOPY  3/25/2013    Procedure: COLONOSCOPY;  Colonoscopy  ;  Surgeon: Jamil Porras MD;  Location: [facility]     FLEXIBLE SIGMOIDOSCOPY  96     ORTHOPEDIC SURGERY  22-23 YEARS AGO    BACK     SURGICAL HISTORY OF -       lumbar surgery       Social History     Tobacco Use     Smoking status: Never Smoker     Smokeless tobacco: Never Used   Substance Use Topics     Alcohol use: Yes     Comment: rare     Family History   Problem Relation Age of Onset     Lipids Brother      Heart Disease Brother          at 51 of an MI     Cancer Brother         bone, lung,  82     Prostate Cancer Brother      Cancer Sister         cervical     Lipids Sister      Heart Disease Sister         eldest sister, s/p MI x 2 with stents; diagnosed with AAA, postoperative complications and MI,  at 75     Heart Disease Brother         MI x 2     Lipids Brother      C.A.D. Father          at 39 of an MI     Family History Negative Mother         lived to age 95     Gynecology Sister         cervical Ca         Current Outpatient Medications   Medication Sig Dispense Refill     atenolol (TENORMIN) 100 MG tablet Take 1 tablet (100 mg) by mouth daily 90 tablet 3     escitalopram (LEXAPRO) 10 MG tablet Take 1 tablet (10 mg) by mouth daily 90 tablet 3     ibuprofen (ADVIL/MOTRIN) 200 MG tablet Take 400-600 mg by mouth as needed (ruptured tail  bone pain)        losartan-hydrochlorothiazide (HYZAAR) 100-12.5 MG tablet Take 1 tablet by mouth daily 90 tablet 3     MEGARED OMEGA-3 KRILL OIL PO Take 1 capsule by mouth daily       methocarbamol 500 MG PO tablet Take 1 tablet (500 mg) by mouth 4 times daily as needed for muscle spasms 60 tablet 1     multivitamin (CENTRUM SILVER) tablet Take 1 tablet by mouth daily       Lidocaine (LIDOCARE) 4 % Patch Place 1 patch onto the skin every 24 hours To prevent lidocaine toxicity, patient should be patch free for 12 hrs daily. (Patient not taking: Reported on 4/1/2020) 12 patch 1     Allergies   Allergen Reactions     Bactrim [Sulfamethoxazole W/Trimethoprim] Nausea and Vomiting     Lisinopril Cough       Reviewed and updated as needed this visit by Provider  Tobacco  Allergies  Meds  Problems  Med Hx  Surg Hx  Fam Hx         Review of Systems   ROS COMP: Constitutional, neuro, ENT, endocrine, pulmonary, cardiac, gastrointestinal, genitourinary, musculoskeletal, integument and psychiatric systems are negative, except as otherwise noted.        Objective   Reported vitals:  LMP  (LMP Unknown)      Psych: Alert and oriented times 3; coherent speech, normal   rate and volume, able to articulate logical thoughts, able   to abstract reason, no tangential thoughts, no hallucinations   or delusions  Her affect is bright     Diagnostic Test Results:  Labs reviewed in Epic  . UA RESULTS:  Recent Labs   Lab Test 03/17/20  1523   COLOR Yellow   APPEARANCE Slightly Cloudy   URINEGLC 100*   URINEBILI Small*   URINEKETONE Trace*   SG 1.020   UBLD Trace*   URINEPH 6.0   PROTEIN 30*   UROBILINOGEN 0.2   NITRITE Negative   LEUKEST Large*   RBCU O - 2   WBCU 5-10*              Assessment/Plan:  This patient was seen virtually during the COVID-19 outbreak in attempts to keep healthy patients out of the clinic per CDC guidelines (practicing social distancing).  I evaluated them by phone/evisit and the note reflects our  conversation/visit.     1. Urinary urgency  Recent UTI. Resolved with cipro but now having urgency. No significant dysuria. No fevers/chills or flank pain.  Will have her daughter come to  a urine specimen cup, have ryan collect this at home and daughter will bring back. Treat if positive. If negative consider having her come in for exam (she would prefer to avoid) or empiric trial of topical estrogen.  - UA with Microscopic reflex to Culture    Return in about 1 week (around 4/8/2020) for if not improved or sooner if worsening.      Phone call duration:  17 minutes    Dewayne Santana MD

## 2020-04-02 DIAGNOSIS — R39.15 URINARY URGENCY: ICD-10-CM

## 2020-04-02 LAB
ALBUMIN UR-MCNC: NEGATIVE MG/DL
APPEARANCE UR: CLEAR
BACTERIA #/AREA URNS HPF: ABNORMAL /HPF
BILIRUB UR QL STRIP: NEGATIVE
COLOR UR AUTO: YELLOW
GLUCOSE UR STRIP-MCNC: NEGATIVE MG/DL
HGB UR QL STRIP: NEGATIVE
KETONES UR STRIP-MCNC: NEGATIVE MG/DL
LEUKOCYTE ESTERASE UR QL STRIP: ABNORMAL
NITRATE UR QL: NEGATIVE
NON-SQ EPI CELLS #/AREA URNS LPF: ABNORMAL /LPF
PH UR STRIP: 7 PH (ref 5–7)
RBC #/AREA URNS AUTO: ABNORMAL /HPF
SOURCE: ABNORMAL
SP GR UR STRIP: 1.02 (ref 1–1.03)
UROBILINOGEN UR STRIP-ACNC: 0.2 EU/DL (ref 0.2–1)
WBC #/AREA URNS AUTO: ABNORMAL /HPF

## 2020-04-02 PROCEDURE — 81001 URINALYSIS AUTO W/SCOPE: CPT | Performed by: FAMILY MEDICINE

## 2020-04-02 NOTE — RESULT ENCOUNTER NOTE
Please call the patient with the results. Notify that UA shows no signs of bladder infection. We could try some topical estrogen to see if that helps with symptoms.  Let me know if she'd like to try this.

## 2020-05-01 ENCOUNTER — TELEPHONE (OUTPATIENT)
Dept: FAMILY MEDICINE | Facility: CLINIC | Age: 85
End: 2020-05-01

## 2020-05-01 NOTE — TELEPHONE ENCOUNTER
Patient returned our call. Please call back. I tried to make an appt, but it says Dr. Davalos is not in. Please call back, as she would like to do it late next week.  Elsy Skaggs  [facility] [place]

## 2020-05-01 NOTE — TELEPHONE ENCOUNTER
Left message for pt to call clinic.   Dr. Davalos is F2F next week and does cortisone injection if patient returns call.     Dr. Santana's schedule is full today.     Luba Iniguez MA

## 2020-05-01 NOTE — TELEPHONE ENCOUNTER
Ok to schedule for face to face for this.  I am only in today and then not again for for 3 weeks due to our rotating schedules but she can schedule with any face to face provider.  Care team please be sure whoever she gets scheduled with does knee injections.

## 2020-05-01 NOTE — TELEPHONE ENCOUNTER
Reason for Call:  Other appointment    Detailed comments: Patient is asking if she can have a Cortizone shot due to not able to have knee surgery.    Phone Number Patient can be reached at: Home number on file [phone] (home)    Best Time: any    Can we leave a detailed message on this number? YES    Call taken on 5/1/2020 at 11:02 AM by Rocio Nunez

## 2020-05-06 ENCOUNTER — OFFICE VISIT (OUTPATIENT)
Dept: FAMILY MEDICINE | Facility: CLINIC | Age: 85
End: 2020-05-06
Payer: COMMERCIAL

## 2020-05-06 VITALS
HEART RATE: 86 BPM | BODY MASS INDEX: 25.92 KG/M2 | HEIGHT: 69 IN | DIASTOLIC BLOOD PRESSURE: 68 MMHG | OXYGEN SATURATION: 97 % | RESPIRATION RATE: 16 BRPM | TEMPERATURE: 98.1 F | WEIGHT: 175 LBS | SYSTOLIC BLOOD PRESSURE: 118 MMHG

## 2020-05-06 DIAGNOSIS — R73.01 ELEVATED FASTING GLUCOSE: ICD-10-CM

## 2020-05-06 DIAGNOSIS — I10 ESSENTIAL HYPERTENSION, BENIGN: ICD-10-CM

## 2020-05-06 DIAGNOSIS — R81 GLUCOSURIA: ICD-10-CM

## 2020-05-06 DIAGNOSIS — M17.12 PRIMARY OSTEOARTHRITIS OF LEFT KNEE: Primary | ICD-10-CM

## 2020-05-06 PROBLEM — R73.03 PREDIABETES: Status: ACTIVE | Noted: 2020-05-06

## 2020-05-06 LAB
ANION GAP SERPL CALCULATED.3IONS-SCNC: 6 MMOL/L (ref 3–14)
BUN SERPL-MCNC: 14 MG/DL (ref 7–30)
CALCIUM SERPL-MCNC: 9.3 MG/DL (ref 8.5–10.1)
CHLORIDE SERPL-SCNC: 105 MMOL/L (ref 94–109)
CO2 SERPL-SCNC: 29 MMOL/L (ref 20–32)
CREAT SERPL-MCNC: 0.73 MG/DL (ref 0.52–1.04)
GFR SERPL CREATININE-BSD FRML MDRD: 76 ML/MIN/{1.73_M2}
GLUCOSE SERPL-MCNC: 113 MG/DL (ref 70–99)
HBA1C MFR BLD: 5.7 % (ref 0–5.6)
POTASSIUM SERPL-SCNC: 4.6 MMOL/L (ref 3.4–5.3)
SODIUM SERPL-SCNC: 140 MMOL/L (ref 133–144)

## 2020-05-06 PROCEDURE — 36415 COLL VENOUS BLD VENIPUNCTURE: CPT | Performed by: FAMILY MEDICINE

## 2020-05-06 PROCEDURE — 20610 DRAIN/INJ JOINT/BURSA W/O US: CPT | Mod: LT | Performed by: FAMILY MEDICINE

## 2020-05-06 PROCEDURE — 83036 HEMOGLOBIN GLYCOSYLATED A1C: CPT | Performed by: FAMILY MEDICINE

## 2020-05-06 PROCEDURE — 80048 BASIC METABOLIC PNL TOTAL CA: CPT | Performed by: FAMILY MEDICINE

## 2020-05-06 PROCEDURE — 99213 OFFICE O/P EST LOW 20 MIN: CPT | Mod: 25 | Performed by: FAMILY MEDICINE

## 2020-05-06 RX ORDER — ATENOLOL 100 MG/1
100 TABLET ORAL DAILY
Qty: 90 TABLET | Refills: 3 | Status: SHIPPED | OUTPATIENT
Start: 2020-05-06 | End: 2021-05-11

## 2020-05-06 RX ORDER — TRIAMCINOLONE ACETONIDE 40 MG/ML
40 INJECTION, SUSPENSION INTRA-ARTICULAR; INTRAMUSCULAR ONCE
Status: COMPLETED | OUTPATIENT
Start: 2020-05-06 | End: 2020-05-06

## 2020-05-06 RX ORDER — LOSARTAN POTASSIUM AND HYDROCHLOROTHIAZIDE 12.5; 1 MG/1; MG/1
1 TABLET ORAL DAILY
Qty: 90 TABLET | Refills: 3 | Status: SHIPPED | OUTPATIENT
Start: 2020-05-06 | End: 2021-07-07

## 2020-05-06 RX ADMIN — TRIAMCINOLONE ACETONIDE 40 MG: 40 INJECTION, SUSPENSION INTRA-ARTICULAR; INTRAMUSCULAR at 10:34

## 2020-05-06 ASSESSMENT — MIFFLIN-ST. JEOR: SCORE: 1318.17

## 2020-05-06 ASSESSMENT — PAIN SCALES - GENERAL: PAINLEVEL: MODERATE PAIN (5)

## 2020-05-06 NOTE — RESULT ENCOUNTER NOTE
Blood sugar is mildly elevated, but HgbA1c which is a measure of diabetes/prediabetes shows in the early pre-diabetic range.      Cut back on sweets/simple sugars and carbohydrates, but no evidence of diabetes at this time.    Lois Davalos M.D.

## 2020-05-06 NOTE — LETTER
May 6, 2020      Bonita Villarreal  [address] [place] [id number]        Dear ,    We are writing to inform you of your test results.    Blood sugar is mildly elevated, but HgbA1c which is a measure of diabetes/prediabetes shows in the early pre-diabetic range.       Cut back on sweets/simple sugars and carbohydrates, but no evidence of diabetes at this time.     Resulted Orders   Basic metabolic panel   Result Value Ref Range    Sodium 140 133 - 144 mmol/L    Potassium 4.6 3.4 - 5.3 mmol/L    Chloride 105 94 - 109 mmol/L    Carbon Dioxide 29 20 - 32 mmol/L    Anion Gap 6 3 - 14 mmol/L    Glucose 113 (H) 70 - 99 mg/dL      Comment:      Non Fasting    Urea Nitrogen 14 7 - 30 mg/dL    Creatinine 0.73 0.52 - 1.04 mg/dL    GFR Estimate 76 >60 mL/min/[1.73_m2]      Comment:      Non  GFR Calc  Starting 12/18/2018, serum creatinine based estimated GFR (eGFR) will be   calculated using the Chronic Kidney Disease Epidemiology Collaboration   (CKD-EPI) equation.      GFR Estimate If Black 88 >60 mL/min/[1.73_m2]      Comment:       GFR Calc  Starting 12/18/2018, serum creatinine based estimated GFR (eGFR) will be   calculated using the Chronic Kidney Disease Epidemiology Collaboration   (CKD-EPI) equation.      Calcium 9.3 8.5 - 10.1 mg/dL   Hemoglobin A1c   Result Value Ref Range    Hemoglobin A1C 5.7 (H) 0 - 5.6 %      Comment:      Normal <5.7% Prediabetes 5.7-6.4%  Diabetes 6.5% or higher - adopted from ADA   consensus guidelines.         If you have any questions or concerns, please call the clinic at the number listed above.       Sincerely,        Lois Davalos MD/[facility]

## 2020-05-06 NOTE — PATIENT INSTRUCTIONS
Our Clinic hours are:  Mondays    7:20 am - 7 pm  Tues -  Fri  7:20 am - 5 pm    Clinic Phone: [phone]    The clinic lab opens at 7:30 am Mon - Fri and appointments are required.    [facility]. [phone]  Monday  8 am - 7pm  Tues - Fri 8 am - 5:30 pm

## 2020-05-06 NOTE — PROGRESS NOTES
"Subjective     Bonita Villarreal is a 84 year old female who presents to clinic today for the following health issues:    HPI   Chief Complaint   Patient presents with     Knee Pain     Patient took a step down and noticed pain in left knee down to her ankle. Patient was going to have a knee replacement in Feb but fell and surgery was put off. Patient rates pain at 5/10. Patient has no swelling or redness. Patient has been using hemp cream on knee which has helped.       Hypertension       Hypertension Follow-up      Do you check your blood pressure regularly outside of the clinic? Yes     Are you following a low salt diet? No    Are your blood pressures ever more than 140 on the top number (systolic) OR more   than 90 on the bottom number (diastolic), for example 140/90? No    Longstanding knee pain. Was scheduled for knee replacement in Feb but she fell and broke her tailbone so surgery was put off.    Orthopedic surgeon is Dr. Santana through [facility].   Hasn't had an injection in that knee since December - 12/19 - [facility].  Nothing more recently than that.      Patient also wants refills on her blood pressure medications.  Chart reviewed for renal function, etc, noted that over the past year her blood sugar has been elevated, she even had some glucosuria in March when she was in.  Will refill medication as blood pressure looks normal, renal function has been good, but will also add an HgbA1c.              Reviewed and updated as needed this visit by Provider         Review of Systems   ROS COMP: Constitutional, HEENT, cardiovascular, pulmonary, gi and gu systems are negative, except as otherwise noted.      Objective    /68   Pulse 86   Temp 98.1  F (36.7  C) (Tympanic)   Resp 16   Ht 1.753 m (5' 9\")   Wt 79.4 kg (175 lb)   LMP  (LMP Unknown)   SpO2 97%   Breastfeeding No   BMI 25.84 kg/m    Body mass index is 25.84 kg/m .  Physical Exam   GENERAL APPEARANCE: healthy, alert and " "no distress  MS: left knee without effusion.  + crepitus on extension/flexion    Risks, benefits and alternatives discussed   A steroid injection was performed at left knee lateral aspect using 1% plain Lidocaine and 40 mg of Kenalog. This was well tolerated.      Diagnostic Test Results:  Labs reviewed in Epic        Assessment & Plan     1. Primary osteoarthritis of left knee  Injection today  If not improving symptoms, should follow up with Dr. Calixto Santana in orthopedics as she previously was scheduled for TKA in Feb.  Although many surgeries have been on hold due to COVID, I know they are restarting and likely this will continue as things open up again.   - triamcinolone (KENALOG-40) injection 40 mg  - DRAIN/INJECT LARGE JOINT/BURSA    2. Essential hypertension, benign  Well controlled  Check renal function  - losartan-hydrochlorothiazide (HYZAAR) 100-12.5 MG tablet; Take 1 tablet by mouth daily  Dispense: 90 tablet; Refill: 3  - atenolol (TENORMIN) 100 MG tablet; Take 1 tablet (100 mg) by mouth daily  Dispense: 90 tablet; Refill: 3  - Basic metabolic panel    3. Glucosuria  R/o diabetes  - Hemoglobin A1c    4. Elevated fasting glucose   as above.   - Hemoglobin A1c     BMI:   Estimated body mass index is 25.84 kg/m  as calculated from the following:    Height as of this encounter: 1.753 m (5' 9\").    Weight as of this encounter: 79.4 kg (175 lb).               No follow-ups on file.    Lois Davalos MD  [facility]      "

## 2020-05-11 ENCOUNTER — TELEPHONE (OUTPATIENT)
Dept: FAMILY MEDICINE | Facility: CLINIC | Age: 85
End: 2020-05-11

## 2020-05-11 DIAGNOSIS — R39.15 URINARY URGENCY: ICD-10-CM

## 2020-05-11 NOTE — TELEPHONE ENCOUNTER
S-(situation): patient called to request a prescription for the compound cream.     B-(background): The patient reports she did have virtual visit with ivelisse for urine symptoms.     A-(assessment): The patient thought she was doing better so she did not get the prescription.  Now the patient reports having the same symptoms again.    R-(recommendations): Will send to covering provider to review and advise.      Notes from lab:  Notes recorded by Mabel Guillen RN on 4/3/2020 at 4:17 PM CDT   Patient notified. Patient verbalized understanding. Mabel Guillen RN     ------     Notes recorded by Dewayne Santana MD on 4/3/2020 at 3:17 PM CDT   The compounded estrogen is sent visual the [facility]. [facility] can't compound it. She will get it by mail from [place].   ------     Notes recorded by Ana Mccoy CMA on 4/3/2020 at 1:08 PM CDT   Notified would like try it and would like prescription sent to Carlie Mccoy CMA     ------     Notes recorded by Dewayne Santana MD on 4/2/2020 at 3:47 PM CDT   Please call the patient with the results. Notify that UA shows no signs of bladder infection. We could try some topical estrogen to see if that helps with symptoms.  Let me know if she'd like to try this.

## 2020-05-11 NOTE — TELEPHONE ENCOUNTER
Reason for Call:  Other prescription    Detailed comments: Pt had a virtual visit on 4/1/20 and stating she never received this but would like to try this again?   COMPOUNDED NON-CONTROLLED SUBSTANCE Estradiol cream 0.02%, apply 0.5 gm to vagina q MWF, 30 gram tube       Phone Number Patient can be reached at: Home number on file [phone] (home)    Best Time: any    Can we leave a detailed message on this number? YES    Call taken on 5/11/2020 at 10:08 AM by Анна Gant

## 2020-05-11 NOTE — TELEPHONE ENCOUNTER
Patient was notified. The patient was advised if any symptoms worsen or develop to be seen in clinic or UC.  Patient agrees with the plan.    Thank you    Stefani PRESCOTT RN

## 2020-05-28 ENCOUNTER — OFFICE VISIT (OUTPATIENT)
Dept: FAMILY MEDICINE | Facility: CLINIC | Age: 85
End: 2020-05-28
Payer: COMMERCIAL

## 2020-05-28 VITALS
WEIGHT: 177.4 LBS | DIASTOLIC BLOOD PRESSURE: 70 MMHG | SYSTOLIC BLOOD PRESSURE: 110 MMHG | OXYGEN SATURATION: 96 % | BODY MASS INDEX: 26.89 KG/M2 | HEIGHT: 68 IN | HEART RATE: 74 BPM | RESPIRATION RATE: 16 BRPM | TEMPERATURE: 98.2 F

## 2020-05-28 DIAGNOSIS — I10 ESSENTIAL HYPERTENSION, BENIGN: Chronic | ICD-10-CM

## 2020-05-28 DIAGNOSIS — H26.9 CATARACT OF BOTH EYES, UNSPECIFIED CATARACT TYPE: ICD-10-CM

## 2020-05-28 DIAGNOSIS — Z01.818 PREOP GENERAL PHYSICAL EXAM: Primary | ICD-10-CM

## 2020-05-28 PROCEDURE — 99214 OFFICE O/P EST MOD 30 MIN: CPT | Performed by: FAMILY MEDICINE

## 2020-05-28 ASSESSMENT — PAIN SCALES - GENERAL: PAINLEVEL: NO PAIN (0)

## 2020-05-28 ASSESSMENT — MIFFLIN-ST. JEOR: SCORE: 1313.18

## 2020-05-28 ASSESSMENT — PATIENT HEALTH QUESTIONNAIRE - PHQ9: SUM OF ALL RESPONSES TO PHQ QUESTIONS 1-9: 0

## 2020-05-28 NOTE — PROGRESS NOTES
[facility]  [address] [place] [address]  [phone]  Dept: [phone]    PRE-OP EVALUATION:  Today's date: 2020    Bonita Villarreal (: 1935) presents for pre-operative evaluation assessment as requested by Dr. Truong.  She requires evaluation and anesthesia risk assessment prior to undergoing surgery/procedure for treatment of Cataract Surgery right eye and then Left Eye .    Proposed Surgery/ Procedure: Cataract Surgery right eye and then Left Eye   Date of Surgery/ Procedure 2020 & : 2020   Time of Surgery/ Procedure: ?  Hospital/Surgical Facility: [facility]  Fax number for surgical facility: [phone]  Primary Physician: Dewayne Santana  Type of Anesthesia Anticipated: to be determined    Patient has a Health Care Directive or Living Will:  YES     1. NO - Do you have a history of heart attack, stroke, stent, bypass or surgery on an artery in the head, neck, heart or legs?  2. NO - Do you ever have any pain or discomfort in your chest?  3. NO - Do you have a history of  Heart Failure?  4. NO - Are you troubled by shortness of breath when: walking on the level, up a slight hill or at night?  5. NO - Do you currently have a cold, bronchitis or other respiratory infection?  6. NO - Do you have a cough, shortness of breath or wheezing?  7. NO - Do you sometimes get pains in the calves of your legs when you walk?  8. NO - Do you or anyone in your family have previous history of blood clots?  9. NO - Do you or does anyone in your family have a serious bleeding problem such as prolonged bleeding following surgeries or cuts?  10. NO - Have you ever had problems with anemia or been told to take iron pills?  11. NO - Have you had any abnormal blood loss such as black, tarry or bloody stools, or abnormal vaginal bleeding?  12. NO - Have you ever had a blood transfusion?  13. NO - Have you or any of your relatives ever had problems  with anesthesia?  14. NO - Do you have sleep apnea, excessive snoring or daytime drowsiness?  15. NO - Do you have any prosthetic heart valves?  16. Yes - Do you have prosthetic joints? Right knee  17. NO - Is there any chance that you may be pregnant?      HPI:     HPI related to upcoming procedure: cataracts      See problem list for active medical problems.  Problems all longstanding and stable, except as noted/documented.  See ROS for pertinent symptoms related to these conditions.    HYPERTENSION - Patient has longstanding history of HTN , currently denies any symptoms referable to elevated blood pressure. Specifically denies chest pain, palpitations, dyspnea, orthopnea, PND or peripheral edema. Blood pressure readings have been in normal range. Current medication regimen is as listed below. Patient denies any side effects of medication.       MEDICAL HISTORY:     Patient Active Problem List    Diagnosis Date Noted     Prediabetes 05/06/2020     Priority: Medium     Nausea with vomiting 03/02/2020     Priority: Medium     Volume depletion, gastrointestinal loss 03/02/2020     Priority: Medium     Hyponatremia 03/02/2020     Priority: Medium     Fracture of sacrum (H) 03/02/2020     Priority: Medium     Sustained 1/26/2020       Sacral pain 03/02/2020     Priority: Medium     Acute cystitis 02/29/2020     Priority: Medium     UTI (urinary tract infection) 02/29/2020     Priority: Medium     Pulmonary infiltrate in right lung on chest x-ray 06/29/2018     Priority: Medium     Headache 06/28/2018     Priority: Medium     Status post total right knee replacement 02/28/2018     Priority: Medium     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     Priority: Medium     CARDIOVASCULAR SCREENING; LDL GOAL LESS THAN 160 10/31/2010     Priority: Medium     LDL in 140s, HDL in 60s  [place] risk is 6% (average at her age is 14%)       Essential hypertension, benign 06/28/2005     Priority: Medium     had coronary CT scan 2003 --- no  evidence of plaque  started on atenolol and ASA 2005 for /94  physician in [place] switched her to Lotrel 5/10 due to her having dizzy spells  HCTZ added 3/05, pt felt mouth too dried out   pt more anxious and BP still elevated -- switched back to Atenolol       Diverticulosis of large intestine 2005     Priority: Medium     colonoscopy , disease mod - severe  Problem list name updated by automated process. Provider to review       Osteopenia of spine 2005     Priority: Medium     T score -1.3    Risk calculator based on 2003 T-score and personal data showed 1% risk of hip fracture over next 10 years, 14% risk of any fracture over next ten years; consider recheck DEXA at age 77, continue calcium and exercise until then (current recommendations to consider treatment if hip fx risk reaches 3% or any fracture risk reaches 20%)  Problem list name updated by automated process. Provider to review       Family history of other cardiovascular diseases 2005     Priority: Medium     father  at 39 of MI  brother  at 51 of MI  another brother -- MI x 2  eldest sister -- MI x 2 with stents  mother and maternal aunts lived to 80's and mid 90's  Problem list name updated by automated process. Provider to review and confirm  Problem list name updated by automated process. Provider to review       Generalized anxiety disorder 2005     Priority: Medium     onset after patient was diagnosed with hypertension fall         Past Medical History:   Diagnosis Date     Advance Care Planning 2012    Advance Care Planning 3/20/2016: Receipt of ACP document:  Received: Health Care Directive which was witnessed or notarized on 16.  Document not previously scanned.  Validation form completed and sent with document to be scanned.  Code Status needs to be updated to reflect choices in most recent ACP document.  Confirmed/documented designated decision maker(s).  Added by Cata  "Nidhi RN, Advance Care Planning Liaison. Advance Care Planning 2/28/12: Patient does not have an Advance/Health Care Directive (HCD), given \"What is Advance Care Planning?\" flyer. Teena Peace       Arthritis      Diverticulitis of colon 6/28/2005    Patient keeps Rx for Augmentin to use PRN Problem list name updated by automated process. Provider to review     Diverticulosis of colon (without mention of hemorrhage)     history of recurrent diverticulitis     Hypertension      Past Surgical History:   Procedure Laterality Date     ARTHROPLASTY KNEE Right 2/28/2018    Procedure: ARTHROPLASTY KNEE;  Right total knee arthroplasty;  Surgeon: Anton Grover MD;  Location: [facility]      APPENDECTOMY       COLONOSCOPY  04/15/02     COLONOSCOPY  3/25/2013    Procedure: COLONOSCOPY;  Colonoscopy  ;  Surgeon: Jamil Porras MD;  Location: [facility]     FLEXIBLE SIGMOIDOSCOPY  07/29/96     ORTHOPEDIC SURGERY  22-23 YEARS AGO    BACK     SURGICAL HISTORY OF -       lumbar surgery     Current Outpatient Medications   Medication Sig Dispense Refill     atenolol (TENORMIN) 100 MG tablet Take 1 tablet (100 mg) by mouth daily 90 tablet 3     escitalopram (LEXAPRO) 10 MG tablet Take 1 tablet (10 mg) by mouth daily 90 tablet 3     ibuprofen (ADVIL/MOTRIN) 200 MG tablet Take 400-600 mg by mouth as needed (ruptured tail bone pain)        losartan-hydrochlorothiazide (HYZAAR) 100-12.5 MG tablet Take 1 tablet by mouth daily 90 tablet 3     MEGARED OMEGA-3 KRILL OIL PO Take 1 capsule by mouth daily       multivitamin (CENTRUM SILVER) tablet Take 1 tablet by mouth daily       OTC products: None, except as noted above    Allergies   Allergen Reactions     Bactrim [Sulfamethoxazole W/Trimethoprim] Nausea and Vomiting     Lisinopril Cough      Latex Allergy: NO    Social History     Tobacco Use     Smoking status: Never Smoker     Smokeless tobacco: Never Used   Substance Use Topics     Alcohol use: Yes     Comment: rare     History " "  Drug Use No       REVIEW OF SYSTEMS:   Constitutional, neuro, ENT, endocrine, pulmonary, cardiac, gastrointestinal, genitourinary, musculoskeletal, integument and psychiatric systems are negative, except as otherwise noted.    EXAM:   /70   Pulse 74   Temp 98.2  F (36.8  C) (Tympanic)   Resp 16   Ht 1.727 m (5' 8\")   Wt 80.5 kg (177 lb 6.4 oz)   LMP  (LMP Unknown)   SpO2 96%   Breastfeeding No   BMI 26.97 kg/m      GENERAL APPEARANCE: healthy, alert and no distress     EYES: EOMI, PERRL     HENT: ear canals and TM's normal and nose and mouth without ulcers or lesions     NECK: no adenopathy, no asymmetry, masses, or scars and thyroid normal to palpation     RESP: lungs clear to auscultation - no rales, rhonchi or wheezes     CV: regular rates and rhythm, normal S1 S2, no S3 or S4 and no murmur, click or rub     ABDOMEN:  soft, nontender, no HSM or masses and bowel sounds normal     MS: extremities normal- no gross deformities noted, no evidence of inflammation in joints, FROM in all extremities.     SKIN: no suspicious lesions or rashes     NEURO: Normal strength and tone, sensory exam grossly normal, mentation intact and speech normal     PSYCH: mentation appears normal. and affect normal/bright     LYMPHATICS: No cervical adenopathy    DIAGNOSTICS:   No labs or EKG required for low risk surgery (cataract, skin procedure, breast biopsy, etc)    Recent Labs   Lab Test 05/06/20  1028 03/11/20  2113 03/01/20  0542  02/28/18  1349   HGB  --  12.8 11.8   < > 13.7   PLT  --  388 266   < > 280   INR  --   --   --   --  0.94    133 136   < >  --    POTASSIUM 4.6 4.1 4.1   < >  --    CR 0.73 0.61 0.66   < >  --    A1C 5.7*  --   --   --   --     < > = values in this interval not displayed.        IMPRESSION:   Reason for surgery/procedure: cataracts  Diagnosis/reason for consult: Preoperative H&P     The proposed surgical procedure is considered LOW risk.    REVISED CARDIAC RISK INDEX  The patient has " the following serious cardiovascular risks for perioperative complications such as (MI, PE, VFib and 3  AV Block):  No serious cardiac risks  INTERPRETATION: 0 risks: Class I (very low risk - 0.4% complication rate)    The patient has the following additional risks for perioperative complications:  Hypertension       ICD-10-CM    1. Preop general physical exam  Z01.818    2. Cataract of both eyes, unspecified cataract type  H26.9    3. Essential hypertension, benign  I10 Well controlled. Continue current medications,       RECOMMENDATIONS:         --Patient is to take all scheduled medications on the day of surgery EXCEPT for modifications listed below.    APPROVAL GIVEN to proceed with proposed procedure, without further diagnostic evaluation       Signed Electronically by: Dewayne Santana MD    Copy of this evaluation report is provided to requesting physician.    [facility] Preop Guidelines    Revised Cardiac Risk Index

## 2020-06-04 ENCOUNTER — OFFICE VISIT (OUTPATIENT)
Dept: FAMILY MEDICINE | Facility: CLINIC | Age: 85
End: 2020-06-04
Payer: COMMERCIAL

## 2020-06-04 VITALS
RESPIRATION RATE: 14 BRPM | DIASTOLIC BLOOD PRESSURE: 76 MMHG | TEMPERATURE: 98.5 F | BODY MASS INDEX: 26.98 KG/M2 | HEART RATE: 87 BPM | SYSTOLIC BLOOD PRESSURE: 110 MMHG | WEIGHT: 178 LBS | HEIGHT: 68 IN | OXYGEN SATURATION: 96 %

## 2020-06-04 DIAGNOSIS — K57.30 DIVERTICULOSIS OF LARGE INTESTINE WITHOUT HEMORRHAGE: Primary | Chronic | ICD-10-CM

## 2020-06-04 DIAGNOSIS — R10.32 LLQ ABDOMINAL PAIN: ICD-10-CM

## 2020-06-04 PROCEDURE — 99213 OFFICE O/P EST LOW 20 MIN: CPT | Performed by: NURSE PRACTITIONER

## 2020-06-04 ASSESSMENT — MIFFLIN-ST. JEOR: SCORE: 1315.9

## 2020-06-04 NOTE — NURSING NOTE
"Initial /76 (BP Location: Right arm, Patient Position: Sitting, Cuff Size: Adult Large)   Pulse 87   Temp 98.5  F (36.9  C) (Tympanic)   Resp 14   Ht 1.727 m (5' 8\")   Wt 80.7 kg (178 lb)   LMP  (LMP Unknown)   SpO2 96%   BMI 27.06 kg/m   Estimated body mass index is 27.06 kg/m  as calculated from the following:    Height as of this encounter: 1.727 m (5' 8\").    Weight as of this encounter: 80.7 kg (178 lb). .      "

## 2020-06-04 NOTE — TELEPHONE ENCOUNTER
Pt requesting Augmentin for Diverticulitis flare.  Last written 8/1/19 #20 + 2 refills.  Pt has Diverticulosis on med list since 6/28/05.  Last seen 5/28/20 for F2F preop appt.   Cataract surgery.   out until 6/10/20.  Appt?  Refill?  Advise.  KPavelRCRISTIANE

## 2020-06-04 NOTE — PATIENT INSTRUCTIONS
Try taking ibuprofen 400mg (2 tablets) 3 times daily for 3-4 days.   If not improving, you can start augmentin. If you do start the augmentin, you should see Dr. Santana following that to discuss your symptoms.

## 2020-06-04 NOTE — PROGRESS NOTES
"Subjective     Bonita Villarreal is a 84 year old female who presents to clinic today for the following health issues:    HPI   Concern - Diverticulitis  Onset: Yesterday     Description:   Heaviness in stomach, lower back ache and mucus rectal discharge     Intensity: moderate    Progression of Symptoms:  worsening    Accompanying Signs & Symptoms:  Na     Previous history of similar problem:   Yes     Precipitating factors:   Worsened by: Na     Alleviating factors:  Improved by: Na     Therapies Tried and outcome: Na     Above HPI reviewed. Additionally, denies fever, chills, body aches. Mild constipation. No diarrhea, hematochezia, melena, nausea, vomiting. Feels \"full\" in her LLQ. Reports that she gets diverticulitis \"all the time\", and is out of Augmentin refills. States the last time she had to take it was in August 2019, and that when this happens and she takes Augmentin, it \"clears right up\". Had a leftover Augmentin at home so she took that this morning.    CT abdomen/pelvis in 8/2019 and 2/2020 show diverticulosis but no diverticulitis. Last colonoscopy in 2013 with diverticulosis, states was advised she can discontinue colonoscopy.        Patient Active Problem List   Diagnosis     Essential hypertension, benign     Generalized anxiety disorder     Diverticulosis of large intestine     Osteopenia of spine     Family history of other cardiovascular diseases     CARDIOVASCULAR SCREENING; LDL GOAL LESS THAN 160     Status post total right knee replacement     Right knee DJD     Headache     Pulmonary infiltrate in right lung on chest x-ray     Acute cystitis     UTI (urinary tract infection)     Nausea with vomiting     Volume depletion, gastrointestinal loss     Hyponatremia     Fracture of sacrum (H)     Sacral pain     Prediabetes     Past Surgical History:   Procedure Laterality Date     ARTHROPLASTY KNEE Right 2/28/2018    Procedure: ARTHROPLASTY KNEE;  Right total knee arthroplasty;  Surgeon: " Anton Grover MD;  Location: [facility]     C APPENDECTOMY       COLONOSCOPY  04/15/02     COLONOSCOPY  3/25/2013    Procedure: COLONOSCOPY;  Colonoscopy  ;  Surgeon: Jamil Porras MD;  Location: [facility]     FLEXIBLE SIGMOIDOSCOPY  96     ORTHOPEDIC SURGERY  22-23 YEARS AGO    BACK     SURGICAL HISTORY OF -       lumbar surgery       Social History     Tobacco Use     Smoking status: Never Smoker     Smokeless tobacco: Never Used   Substance Use Topics     Alcohol use: Yes     Comment: rare     Family History   Problem Relation Age of Onset     Lipids Brother      Heart Disease Brother          at 51 of an MI     Cancer Brother         bone, lung,  82     Prostate Cancer Brother      Cancer Sister         cervical     Lipids Sister      Heart Disease Sister         eldest sister, s/p MI x 2 with stents; diagnosed with AAA, postoperative complications and MI,  at 75     Heart Disease Brother         MI x 2     Lipids Brother      C.A.D. Father          at 39 of an MI     Family History Negative Mother         lived to age 95     Gynecology Sister         cervical Ca         Current Outpatient Medications   Medication Sig Dispense Refill     amoxicillin-clavulanate (AUGMENTIN) 875-125 MG tablet Take 1 tablet by mouth 2 times daily for 10 days 20 tablet 0     atenolol (TENORMIN) 100 MG tablet Take 1 tablet (100 mg) by mouth daily 90 tablet 3     escitalopram (LEXAPRO) 10 MG tablet Take 1 tablet (10 mg) by mouth daily 90 tablet 3     ibuprofen (ADVIL/MOTRIN) 200 MG tablet Take 400-600 mg by mouth as needed (ruptured tail bone pain)        losartan-hydrochlorothiazide (HYZAAR) 100-12.5 MG tablet Take 1 tablet by mouth daily 90 tablet 3     MEGARED OMEGA-3 KRILL OIL PO Take 1 capsule by mouth daily       multivitamin (CENTRUM SILVER) tablet Take 1 tablet by mouth daily         Reviewed and updated as needed this visit by Provider  Tobacco  Allergies  Meds  Problems  Med Hx  Surg Hx  Fam  "Hx         Review of Systems   Constitutional, HEENT, cardiovascular, pulmonary, gi and gu systems are negative, except as otherwise noted.      Objective    /76 (BP Location: Right arm, Patient Position: Sitting, Cuff Size: Adult Large)   Pulse 87   Temp 98.5  F (36.9  C) (Tympanic)   Resp 14   Ht 1.727 m (5' 8\")   Wt 80.7 kg (178 lb)   LMP  (LMP Unknown)   SpO2 96%   BMI 27.06 kg/m    Body mass index is 27.06 kg/m .  Physical Exam  Vitals signs and nursing note reviewed.   Constitutional:       General: She is not in acute distress.     Appearance: Normal appearance.   HENT:      Head: Normocephalic and atraumatic.      Mouth/Throat:      Mouth: Mucous membranes are moist.   Neck:      Musculoskeletal: Neck supple.   Cardiovascular:      Rate and Rhythm: Normal rate.   Pulmonary:      Effort: Pulmonary effort is normal.   Abdominal:      General: Bowel sounds are normal.      Palpations: Abdomen is soft.      Tenderness: There is abdominal tenderness (mild, LLQ).   Skin:     General: Skin is warm and dry.   Neurological:      General: No focal deficit present.      Mental Status: She is alert.   Psychiatric:         Mood and Affect: Mood normal.         Behavior: Behavior normal.          Diagnostic Test Results:  Labs reviewed in Epic  none         Assessment & Plan     1. Diverticulosis of large intestine without hemorrhage  I do not suspect infection today given her symptoms. We talked for a long time that this is likely not truly diverticulitis, but she is quite adamant that Augmentin is the only thing that helps. We discussed current GI literature that suggests antibiotics may not be helpful for diverticulitis, and that NSAIDs can be helpful. She agreed to try ibuprofen 400mg 3 times daily for the next few days to see if this may be helpful, and only if this doesn't improve will she fill the augmentin prescription. If not improving, follow up with PCP in a week.  - amoxicillin-clavulanate " (AUGMENTIN) 875-125 MG tablet; Take 1 tablet by mouth 2 times daily for 10 days  Dispense: 20 tablet; Refill: 0    2. LLQ abdominal pain  See above.  - amoxicillin-clavulanate (AUGMENTIN) 875-125 MG tablet; Take 1 tablet by mouth 2 times daily for 10 days  Dispense: 20 tablet; Refill: 0       Patient Instructions   Try taking ibuprofen 400mg (2 tablets) 3 times daily for 3-4 days.   If not improving, you can start augmentin. If you do start the augmentin, you should see Dr. Santana following that to discuss your symptoms.      Return in about 1 week (around 6/11/2020) for Recheck of symptoms.    JESI Geller [facility]

## 2020-06-04 NOTE — TELEPHONE ENCOUNTER
Last Written Prescription Date:  Augmentin 10/22/2018  Last Fill Quantity: 20,  # refills: 2   Last office visit: 3/10/2020 with prescribing provider:  Max Chawla Office Visit:      [facility]        Patient is calling as she is having a diverticulosis flare up.

## 2020-06-18 ENCOUNTER — OFFICE VISIT (OUTPATIENT)
Dept: FAMILY MEDICINE | Facility: CLINIC | Age: 85
End: 2020-06-18
Payer: COMMERCIAL

## 2020-06-18 VITALS
HEIGHT: 68 IN | RESPIRATION RATE: 16 BRPM | HEART RATE: 70 BPM | BODY MASS INDEX: 27.28 KG/M2 | DIASTOLIC BLOOD PRESSURE: 80 MMHG | SYSTOLIC BLOOD PRESSURE: 130 MMHG | WEIGHT: 180 LBS | TEMPERATURE: 97.9 F | OXYGEN SATURATION: 98 %

## 2020-06-18 DIAGNOSIS — R35.0 URINE FREQUENCY: ICD-10-CM

## 2020-06-18 DIAGNOSIS — N39.41 URGENCY INCONTINENCE: Primary | ICD-10-CM

## 2020-06-18 DIAGNOSIS — R82.90 NONSPECIFIC FINDING ON EXAMINATION OF URINE: ICD-10-CM

## 2020-06-18 DIAGNOSIS — N30.00 ACUTE CYSTITIS WITHOUT HEMATURIA: ICD-10-CM

## 2020-06-18 LAB
ALBUMIN UR-MCNC: NEGATIVE MG/DL
APPEARANCE UR: CLEAR
BACTERIA #/AREA URNS HPF: ABNORMAL /HPF
BILIRUB UR QL STRIP: NEGATIVE
COLOR UR AUTO: YELLOW
GLUCOSE UR STRIP-MCNC: NEGATIVE MG/DL
HGB UR QL STRIP: NEGATIVE
KETONES UR STRIP-MCNC: ABNORMAL MG/DL
LEUKOCYTE ESTERASE UR QL STRIP: ABNORMAL
NITRATE UR QL: NEGATIVE
NON-SQ EPI CELLS #/AREA URNS LPF: ABNORMAL /LPF
PH UR STRIP: 6 PH (ref 5–7)
RBC #/AREA URNS AUTO: ABNORMAL /HPF
SOURCE: ABNORMAL
SP GR UR STRIP: 1.02 (ref 1–1.03)
UROBILINOGEN UR STRIP-ACNC: 1 EU/DL (ref 0.2–1)
WBC #/AREA URNS AUTO: ABNORMAL /HPF

## 2020-06-18 PROCEDURE — 99213 OFFICE O/P EST LOW 20 MIN: CPT | Performed by: FAMILY MEDICINE

## 2020-06-18 PROCEDURE — 81001 URINALYSIS AUTO W/SCOPE: CPT | Performed by: FAMILY MEDICINE

## 2020-06-18 PROCEDURE — 87086 URINE CULTURE/COLONY COUNT: CPT | Performed by: FAMILY MEDICINE

## 2020-06-18 RX ORDER — CIPROFLOXACIN 500 MG/1
500 TABLET, FILM COATED ORAL 2 TIMES DAILY
Qty: 6 TABLET | Refills: 0 | Status: SHIPPED | OUTPATIENT
Start: 2020-06-18 | End: 2020-06-21

## 2020-06-18 RX ORDER — OXYBUTYNIN CHLORIDE 5 MG/1
5 TABLET, EXTENDED RELEASE ORAL DAILY
Qty: 30 TABLET | Refills: 1 | Status: SHIPPED | OUTPATIENT
Start: 2020-06-18 | End: 2020-08-20

## 2020-06-18 ASSESSMENT — PATIENT HEALTH QUESTIONNAIRE - PHQ9: SUM OF ALL RESPONSES TO PHQ QUESTIONS 1-9: 0

## 2020-06-18 ASSESSMENT — MIFFLIN-ST. JEOR: SCORE: 1319.97

## 2020-06-18 ASSESSMENT — PAIN SCALES - GENERAL: PAINLEVEL: NO PAIN (0)

## 2020-06-18 NOTE — PATIENT INSTRUCTIONS
Our Clinic hours are:  Mondays    7:20 am - 7 pm  Tues -  Fri  7:20 am - 5 pm    Clinic Phone: [phone]    The clinic lab opens at 7:30 am Mon - Fri and appointments are required.    [facility]. [phone]  Monday  8 am - 7pm  Tues - Fri 8 am - 5:30 pm

## 2020-06-18 NOTE — PROGRESS NOTES
Subjective     Bonita Villarreal is a 85 year old female who presents to clinic today for the following health issues:    HPI   Chief Complaint   Patient presents with     Diarrhea     off and on. last episode with diarhea was 2 weeks ago.     Urgency     urine and frequency x3 weeks       URINARY TRACT SYMPTOMS  Onset:     Description:   Painful urination (Dysuria): no            Frequency: YES  Blood in urine (Hematuria): no   Delay in urine (Hesitency): YES    Intensity: moderate    Progression of Symptoms:  same and constant    Accompanying Signs & Symptoms:  Fever/chills: no   Flank pain YES- right side of back  Nausea and vomiting: no   Any vaginal symptoms: none  Abdominal/Pelvic Pain: no     History:   History of frequent UTI's: no   History of kidney stones: no   Sexually Active: no   Possibility of pregnancy: No    Precipitating factors:       Therapies Tried and outcome: Increase fluid intake      Patient Active Problem List   Diagnosis     Essential hypertension, benign     Generalized anxiety disorder     Diverticulosis of large intestine     Osteopenia of spine     Family history of other cardiovascular diseases     CARDIOVASCULAR SCREENING; LDL GOAL LESS THAN 160     Status post total right knee replacement     Right knee DJD     Headache     Pulmonary infiltrate in right lung on chest x-ray     Acute cystitis     UTI (urinary tract infection)     Nausea with vomiting     Volume depletion, gastrointestinal loss     Hyponatremia     Fracture of sacrum (H)     Sacral pain     Prediabetes     Past Surgical History:   Procedure Laterality Date     ARTHROPLASTY KNEE Right 2/28/2018    Procedure: ARTHROPLASTY KNEE;  Right total knee arthroplasty;  Surgeon: Anton Grover MD;  Location: [facility]     C APPENDECTOMY       COLONOSCOPY  04/15/02     COLONOSCOPY  3/25/2013    Procedure: COLONOSCOPY;  Colonoscopy  ;  Surgeon: Jamil Porras MD;  Location: [facility]     FLEXIBLE SIGMOIDOSCOPY  07/29/96      ORTHOPEDIC SURGERY  22-23 YEARS AGO    BACK     SURGICAL HISTORY OF -       lumbar surgery       Social History     Tobacco Use     Smoking status: Never Smoker     Smokeless tobacco: Never Used   Substance Use Topics     Alcohol use: Yes     Comment: rare     Family History   Problem Relation Age of Onset     Lipids Brother      Heart Disease Brother          at 51 of an MI     Cancer Brother         bone, lung,  82     Prostate Cancer Brother      Cancer Sister         cervical     Lipids Sister      Heart Disease Sister         eldest sister, s/p MI x 2 with stents; diagnosed with AAA, postoperative complications and MI,  at 75     Heart Disease Brother         MI x 2     Lipids Brother      C.A.D. Father          at 39 of an MI     Family History Negative Mother         lived to age 95     Gynecology Sister         cervical Ca         Current Outpatient Medications   Medication Sig Dispense Refill     atenolol (TENORMIN) 100 MG tablet Take 1 tablet (100 mg) by mouth daily 90 tablet 3     escitalopram (LEXAPRO) 10 MG tablet Take 1 tablet (10 mg) by mouth daily 90 tablet 3     ibuprofen (ADVIL/MOTRIN) 200 MG tablet Take 400-600 mg by mouth as needed (ruptured tail bone pain)        losartan-hydrochlorothiazide (HYZAAR) 100-12.5 MG tablet Take 1 tablet by mouth daily 90 tablet 3     MEGARED OMEGA-3 KRILL OIL PO Take 1 capsule by mouth daily       multivitamin (CENTRUM SILVER) tablet Take 1 tablet by mouth daily       oxybutynin ER (DITROPAN-XL) 5 MG 24 hr tablet Take 1 tablet (5 mg) by mouth daily 30 tablet 1     Allergies   Allergen Reactions     Bactrim [Sulfamethoxazole W/Trimethoprim] Nausea and Vomiting     Lisinopril Cough       Reviewed and updated as needed this visit by Provider  Tobacco  Allergies  Meds  Problems  Med Hx  Surg Hx  Fam Hx         Review of Systems   Constitutional, neuro, ENT, endocrine, pulmonary, cardiac, gastrointestinal, genitourinary, musculoskeletal, integument  "and psychiatric systems are negative, except as otherwise noted.       Objective    /80   Pulse 70   Temp 97.9  F (36.6  C) (Tympanic)   Resp 16   Ht 1.727 m (5' 8\")   Wt 81.6 kg (180 lb)   LMP  (LMP Unknown)   SpO2 98%   Breastfeeding No   BMI 27.37 kg/m    Body mass index is 27.37 kg/m .  Physical Exam   GENERAL: healthy, alert and no distress  ABDOMEN: Soft, nondistended, nontender, no hepatosplenomegaly. No palpable masses.   BACK: no CVA tenderness, no paralumbar tenderness    Diagnostic Test Results:  Labs reviewed in Epic    UA RESULTS:  Recent Labs   Lab Test 06/18/20  1400   COLOR Yellow   APPEARANCE Clear   URINEGLC Negative   URINEBILI Negative   URINEKETONE Trace*   SG 1.025   UBLD Negative   URINEPH 6.0   PROTEIN Negative   UROBILINOGEN 1.0   NITRITE Negative   LEUKEST Moderate*   RBCU O - 2   WBCU 10-25*          Assessment & Plan     1. Urgency incontinence  - oxybutynin ER (DITROPAN-XL) 5 MG 24 hr tablet; Take 1 tablet (5 mg) by mouth daily  Dispense: 30 tablet; Refill: 1    2. Acute cystitis without hematuria  - ciprofloxacin (CIPRO) 500 MG tablet; Take 1 tablet (500 mg) by mouth 2 times daily for 3 days  Dispense: 6 tablet; Refill: 0    3. Urine frequency  - UA reflex to Microscopic and Culture  - Urine Microscopic    4. Nonspecific finding on examination of urine  - Urine Culture Aerobic Bacterial               Return in about 2 weeks (around 7/2/2020) for if not improved or sooner if worsening.    Dewayne Santana MD  [facility]    "

## 2020-06-19 LAB
BACTERIA SPEC CULT: NORMAL
SPECIMEN SOURCE: NORMAL

## 2020-08-19 DIAGNOSIS — N39.41 URGENCY INCONTINENCE: ICD-10-CM

## 2020-08-19 DIAGNOSIS — Z11.59 ENCOUNTER FOR SCREENING FOR OTHER VIRAL DISEASES: Primary | ICD-10-CM

## 2020-08-19 NOTE — TELEPHONE ENCOUNTER
"Requested Prescriptions   Pending Prescriptions Disp Refills     oxybutynin ER (DITROPAN-XL) 5 MG 24 hr tablet [Pharmacy Med Name: oxybutynin chloride ER 5 mg tablet,extended release 24 hr] 30 tablet 1     Sig: Take 1 tablet (5 mg) by mouth daily       Muscarinic Antagonists (Urinary Incontinence Agents) Passed - 8/19/2020  8:00 AM        Passed - Recent (12 mo) or future (30 days) visit within the authorizing provider's specialty     Patient has had an office visit with the authorizing provider or a provider within the authorizing providers department within the previous 12 mos or has a future within next 30 days. See \"Patient Info\" tab in inbasket, or \"Choose Columns\" in Meds & Orders section of the refill encounter.              Passed - Medication is Oxybutynin and patient is 5 years of age or older        Passed - Patient does not have a diagnosis of glaucoma on the problem list     If glaucoma diagnosis is new, refer refill to physician.          Passed - Medication is active on med list        Passed - Patient is 18 years of age or older             "

## 2020-08-20 RX ORDER — OXYBUTYNIN CHLORIDE 5 MG/1
5 TABLET, EXTENDED RELEASE ORAL DAILY
Qty: 30 TABLET | Refills: 1 | Status: SHIPPED | OUTPATIENT
Start: 2020-08-20 | End: 2020-09-24

## 2020-09-24 ENCOUNTER — OFFICE VISIT (OUTPATIENT)
Dept: FAMILY MEDICINE | Facility: CLINIC | Age: 85
End: 2020-09-24
Payer: COMMERCIAL

## 2020-09-24 VITALS
HEIGHT: 68 IN | BODY MASS INDEX: 27.37 KG/M2 | DIASTOLIC BLOOD PRESSURE: 70 MMHG | WEIGHT: 180.6 LBS | RESPIRATION RATE: 16 BRPM | HEART RATE: 62 BPM | SYSTOLIC BLOOD PRESSURE: 128 MMHG | TEMPERATURE: 98.5 F | OXYGEN SATURATION: 96 %

## 2020-09-24 DIAGNOSIS — M17.12 PRIMARY OSTEOARTHRITIS OF LEFT KNEE: ICD-10-CM

## 2020-09-24 DIAGNOSIS — N39.41 URGENCY INCONTINENCE: ICD-10-CM

## 2020-09-24 DIAGNOSIS — I10 ESSENTIAL HYPERTENSION, BENIGN: Chronic | ICD-10-CM

## 2020-09-24 DIAGNOSIS — Z23 NEED FOR PROPHYLACTIC VACCINATION AND INOCULATION AGAINST INFLUENZA: ICD-10-CM

## 2020-09-24 DIAGNOSIS — F41.1 GENERALIZED ANXIETY DISORDER: ICD-10-CM

## 2020-09-24 DIAGNOSIS — Z01.818 PREOP GENERAL PHYSICAL EXAM: Primary | ICD-10-CM

## 2020-09-24 PROBLEM — R51.9 HEADACHE: Status: RESOLVED | Noted: 2018-06-28 | Resolved: 2020-09-24

## 2020-09-24 PROBLEM — N39.0 UTI (URINARY TRACT INFECTION): Status: RESOLVED | Noted: 2020-02-29 | Resolved: 2020-09-24

## 2020-09-24 PROBLEM — N30.00 ACUTE CYSTITIS: Status: RESOLVED | Noted: 2020-02-29 | Resolved: 2020-09-24

## 2020-09-24 PROBLEM — R11.2 NAUSEA WITH VOMITING: Status: RESOLVED | Noted: 2020-03-02 | Resolved: 2020-09-24

## 2020-09-24 PROBLEM — R91.8 PULMONARY INFILTRATE IN RIGHT LUNG ON CHEST X-RAY: Status: RESOLVED | Noted: 2018-06-29 | Resolved: 2020-09-24

## 2020-09-24 PROBLEM — E87.1 HYPONATREMIA: Status: RESOLVED | Noted: 2020-03-02 | Resolved: 2020-09-24

## 2020-09-24 PROBLEM — E86.9 VOLUME DEPLETION, GASTROINTESTINAL LOSS: Status: RESOLVED | Noted: 2020-03-02 | Resolved: 2020-09-24

## 2020-09-24 LAB
ANION GAP SERPL CALCULATED.3IONS-SCNC: 4 MMOL/L (ref 3–14)
BASOPHILS # BLD AUTO: 0 10E9/L (ref 0–0.2)
BASOPHILS NFR BLD AUTO: 0.4 %
BUN SERPL-MCNC: 21 MG/DL (ref 7–30)
CALCIUM SERPL-MCNC: 9.4 MG/DL (ref 8.5–10.1)
CHLORIDE SERPL-SCNC: 103 MMOL/L (ref 94–109)
CO2 SERPL-SCNC: 27 MMOL/L (ref 20–32)
CREAT SERPL-MCNC: 0.79 MG/DL (ref 0.52–1.04)
DIFFERENTIAL METHOD BLD: NORMAL
EOSINOPHIL # BLD AUTO: 0.2 10E9/L (ref 0–0.7)
EOSINOPHIL NFR BLD AUTO: 3.2 %
ERYTHROCYTE [DISTWIDTH] IN BLOOD BY AUTOMATED COUNT: 12.8 % (ref 10–15)
GFR SERPL CREATININE-BSD FRML MDRD: 69 ML/MIN/{1.73_M2}
GLUCOSE SERPL-MCNC: 96 MG/DL (ref 70–99)
HCT VFR BLD AUTO: 42.9 % (ref 35–47)
HGB BLD-MCNC: 14 G/DL (ref 11.7–15.7)
LYMPHOCYTES # BLD AUTO: 1.5 10E9/L (ref 0.8–5.3)
LYMPHOCYTES NFR BLD AUTO: 21.9 %
MCH RBC QN AUTO: 29.7 PG (ref 26.5–33)
MCHC RBC AUTO-ENTMCNC: 32.6 G/DL (ref 31.5–36.5)
MCV RBC AUTO: 91 FL (ref 78–100)
MONOCYTES # BLD AUTO: 0.9 10E9/L (ref 0–1.3)
MONOCYTES NFR BLD AUTO: 13.1 %
NEUTROPHILS # BLD AUTO: 4.2 10E9/L (ref 1.6–8.3)
NEUTROPHILS NFR BLD AUTO: 61.4 %
PLATELET # BLD AUTO: 256 10E9/L (ref 150–450)
POTASSIUM SERPL-SCNC: 4.3 MMOL/L (ref 3.4–5.3)
RBC # BLD AUTO: 4.72 10E12/L (ref 3.8–5.2)
SODIUM SERPL-SCNC: 134 MMOL/L (ref 133–144)
WBC # BLD AUTO: 6.8 10E9/L (ref 4–11)

## 2020-09-24 PROCEDURE — 36415 COLL VENOUS BLD VENIPUNCTURE: CPT | Performed by: FAMILY MEDICINE

## 2020-09-24 PROCEDURE — 90662 IIV NO PRSV INCREASED AG IM: CPT | Performed by: FAMILY MEDICINE

## 2020-09-24 PROCEDURE — 85025 COMPLETE CBC W/AUTO DIFF WBC: CPT | Performed by: FAMILY MEDICINE

## 2020-09-24 PROCEDURE — G0008 ADMIN INFLUENZA VIRUS VAC: HCPCS | Performed by: FAMILY MEDICINE

## 2020-09-24 PROCEDURE — 80048 BASIC METABOLIC PNL TOTAL CA: CPT | Performed by: FAMILY MEDICINE

## 2020-09-24 PROCEDURE — 99214 OFFICE O/P EST MOD 30 MIN: CPT | Mod: 25 | Performed by: FAMILY MEDICINE

## 2020-09-24 RX ORDER — OXYBUTYNIN CHLORIDE 5 MG/1
5 TABLET, EXTENDED RELEASE ORAL DAILY
Qty: 90 TABLET | Refills: 4 | Status: SHIPPED | OUTPATIENT
Start: 2020-09-24 | End: 2021-09-09

## 2020-09-24 RX ORDER — ESCITALOPRAM OXALATE 10 MG/1
10 TABLET ORAL DAILY
Qty: 90 TABLET | Refills: 3 | Status: SHIPPED | OUTPATIENT
Start: 2020-09-24 | End: 2021-09-09

## 2020-09-24 ASSESSMENT — PAIN SCALES - GENERAL: PAINLEVEL: EXTREME PAIN (9)

## 2020-09-24 ASSESSMENT — MIFFLIN-ST. JEOR: SCORE: 1322.7

## 2020-09-24 NOTE — H&P (VIEW-ONLY)
[facility]  [address]  [facility] [address]  Phone: [phone]  Primary Provider: Dewayne Sandoval  Pre-op Performing Provider: DEWAYNE SANDOVAL    PREOPERATIVE EVALUATION:  Today's date: 9/24/2020    Bonita Villarreal is a 85 year old female who presents for a preoperative evaluation.    Surgical Information:  Surgery Details 9/24/2020   Surgery/Procedure: Left Total Knee Arthroplasty   Surgery Location: [facility]   Surgeon: Dr. Sandoval   Surgery Date: 10/6/2020   Time of Surgery: TBD   Where patient plans to recover: At home with family     Fax number for surgical facility:   Type of Anesthesia Anticipated: to be determined    Subjective     HPI related to upcoming procedure: Left knee Arthroscopy  Preop Questions 9/24/2020   1. Have you ever had a heart attack or stroke? No   2. Have you ever had surgery on your heart or blood vessels, such as a stent placement, a coronary artery bypass, or surgery on an artery in your head, neck, heart, or legs? No   3. Do you have chest pain with activity? No   4. Do you have a history of  heart failure? No   5. Do you currently have a cold, bronchitis or symptoms of other infection? No   6. Do you have a cough, shortness of breath, or wheezing? No   7. Do you or anyone in your family have previous history of blood clots? No   8. Do you or does anyone in your family have a serious bleeding problem such as prolonged bleeding following surgeries or cuts? No   9. Have you ever had problems with anemia or been told to take iron pills? No   10. Have you had any abnormal blood loss such as black, tarry or bloody stools, or abnormal vaginal bleeding? No   11. Have you ever had a blood transfusion? No   Are you willing to have a blood transfusion if it is medically needed before, during, or after your surgery? Yes   13. Have you or any of your relatives ever had problems with anesthesia? No   14. Do you have  sleep apnea, excessive snoring or daytime drowsiness? No   15. Do you have any artifical heart valves or other implanted medical devices like a pacemaker, defibrillator, or continuous glucose monitor? No   16. Do you have artificial joints? YES - right knee   17. Are you allergic to latex? No   18. Is there any chance that you may be pregnant? No       [place] ([place] reviewed:  reviewed- no concerns    See problem list for active medical problems.  Problems all longstanding and stable, except as noted/documented.  See ROS for pertinent symptoms related to these conditions.    HYPERTENSION - Patient has longstanding history of HTN , currently denies any symptoms referable to elevated blood pressure. Specifically denies chest pain, palpitations, dyspnea, orthopnea, PND or peripheral edema. Blood pressure readings have been in normal range. Current medication regimen is as listed below. Patient denies any side effects of medication.       Review of Systems  Constitutional, neuro, ENT, endocrine, pulmonary, cardiac, gastrointestinal, genitourinary, musculoskeletal, integument and psychiatric systems are negative, except as otherwise noted.    Patient Active Problem List    Diagnosis Date Noted     Prediabetes 05/06/2020     Priority: Medium     Nausea with vomiting 03/02/2020     Priority: Medium     Volume depletion, gastrointestinal loss 03/02/2020     Priority: Medium     Hyponatremia 03/02/2020     Priority: Medium     Fracture of sacrum (H) 03/02/2020     Priority: Medium     Sustained 1/26/2020       Sacral pain 03/02/2020     Priority: Medium     Acute cystitis 02/29/2020     Priority: Medium     UTI (urinary tract infection) 02/29/2020     Priority: Medium     Pulmonary infiltrate in right lung on chest x-ray 06/29/2018     Priority: Medium     Headache 06/28/2018     Priority: Medium     Status post total right knee replacement 02/28/2018     Priority: Medium     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      Priority: Medium     CARDIOVASCULAR SCREENING; LDL GOAL LESS THAN 160 10/31/2010     Priority: Medium     LDL in 140s, HDL in 60s  Dresser risk is 6% (average at her age is 14%)       Essential hypertension, benign 2005     Priority: Medium     had coronary CT scan  --- no evidence of plaque  started on atenolol and ASA 2005 for /94  physician in [place] switched her to Lotrel 5/10 due to her having dizzy spells  HCTZ added 3/05, pt felt mouth too dried out   pt more anxious and BP still elevated -- switched back to Atenolol       Diverticulosis of large intestine 2005     Priority: Medium     colonoscopy , disease mod - severe  Problem list name updated by automated process. Provider to review       Osteopenia of spine 2005     Priority: Medium     T score -1.3    Risk calculator based on  T-score and personal data showed 1% risk of hip fracture over next 10 years, 14% risk of any fracture over next ten years; consider recheck DEXA at age 77, continue calcium and exercise until then (current recommendations to consider treatment if hip fx risk reaches 3% or any fracture risk reaches 20%)  Problem list name updated by automated process. Provider to review       Family history of other cardiovascular diseases 2005     Priority: Medium     father  at 39 of MI  brother  at 51 of MI  another brother -- MI x 2  eldest sister -- MI x 2 with stents  mother and maternal aunts lived to 80's and mid 90's  Problem list name updated by automated process. Provider to review and confirm  Problem list name updated by automated process. Provider to review       Generalized anxiety disorder 2005     Priority: Medium     onset after patient was diagnosed with hypertension fall         Past Medical History:   Diagnosis Date     Advance Care Planning 2012    Advance Care Planning 3/20/2016: Receipt of ACP document:  Received: Health Care Directive which was  "witnessed or notarized on 2/25/16.  Document not previously scanned.  Validation form completed and sent with document to be scanned.  Code Status needs to be updated to reflect choices in most recent ACP document.  Confirmed/documented designated decision maker(s).  Added by Cata Terrell RN, Advance Care Planning Liaison. Advance Care Planning 2/28/12: Patient does not have an Advance/Health Care Directive (HCD), given \"What is Advance Care Planning?\" flyer. Teena Peace       Arthritis      Diverticulitis of colon 6/28/2005    Patient keeps Rx for Augmentin to use PRN Problem list name updated by automated process. Provider to review     Diverticulosis of colon (without mention of hemorrhage)     history of recurrent diverticulitis     Hypertension      Past Surgical History:   Procedure Laterality Date     ARTHROPLASTY KNEE Right 2/28/2018    Procedure: ARTHROPLASTY KNEE;  Right total knee arthroplasty;  Surgeon: Anton Grover MD;  Location: [facility]     C APPENDECTOMY       COLONOSCOPY  04/15/02     COLONOSCOPY  3/25/2013    Procedure: COLONOSCOPY;  Colonoscopy  ;  Surgeon: Jamil Porras MD;  Location: [facility]     FLEXIBLE SIGMOIDOSCOPY  07/29/96     ORTHOPEDIC SURGERY  22-23 YEARS AGO    BACK     SURGICAL HISTORY OF -       lumbar surgery     Current Outpatient Medications   Medication Sig Dispense Refill     atenolol (TENORMIN) 100 MG tablet Take 1 tablet (100 mg) by mouth daily 90 tablet 3     escitalopram (LEXAPRO) 10 MG tablet Take 1 tablet (10 mg) by mouth daily 90 tablet 3     ibuprofen (ADVIL/MOTRIN) 200 MG tablet Take 400-600 mg by mouth as needed (ruptured tail bone pain)        losartan-hydrochlorothiazide (HYZAAR) 100-12.5 MG tablet Take 1 tablet by mouth daily 90 tablet 3     MEGARED OMEGA-3 KRILL OIL PO Take 1 capsule by mouth daily       multivitamin (CENTRUM SILVER) tablet Take 1 tablet by mouth daily       oxybutynin ER (DITROPAN-XL) 5 MG 24 hr tablet Take 1 tablet (5 mg) by mouth " "daily 30 tablet 1       Allergies   Allergen Reactions     Bactrim [Sulfamethoxazole W/Trimethoprim] Nausea and Vomiting     Lisinopril Cough        Social History     Tobacco Use     Smoking status: Never Smoker     Smokeless tobacco: Never Used   Substance Use Topics     Alcohol use: Yes     Comment: rare     Family History   Problem Relation Age of Onset     Lipids Brother      Heart Disease Brother          at 51 of an MI     Cancer Brother         bone, lung,  82     Prostate Cancer Brother      Cancer Sister         cervical     Lipids Sister      Heart Disease Sister         eldest sister, s/p MI x 2 with stents; diagnosed with AAA, postoperative complications and MI,  at 75     Heart Disease Brother         MI x 2     Lipids Brother      C.A.D. Father          at 39 of an MI     Family History Negative Mother         lived to age 95     Gynecology Sister         cervical Ca     History   Drug Use No            Objective   /70   Pulse 62   Temp 98.5  F (36.9  C) (Tympanic)   Resp 16   Ht 1.727 m (5' 8\")   Wt 81.9 kg (180 lb 9.6 oz)   LMP  (LMP Unknown)   SpO2 96%   Breastfeeding No   BMI 27.46 kg/m    Physical Exam    GENERAL APPEARANCE: healthy, alert and no distress     EYES: EOMI, PERRL     HENT: ear canals and TM's normal and nose and mouth without ulcers or lesions     NECK: no adenopathy, no asymmetry, masses, or scars and thyroid normal to palpation     RESP: lungs clear to auscultation - no rales, rhonchi or wheezes     CV: regular rates and rhythm, normal S1 S2, no S3 or S4 and no murmur, click or rub     ABDOMEN:  soft, nontender, no HSM or masses and bowel sounds normal     MS: extremities normal- no gross deformities noted, no evidence of inflammation in joints, FROM in all extremities.     SKIN: no suspicious lesions or rashes     NEURO: Normal strength and tone, sensory exam grossly normal, mentation intact and speech normal     PSYCH: mentation appears normal. and " affect normal/bright     LYMPHATICS: No cervical adenopathy    Recent Labs   Lab Test 05/06/20  1028 03/11/20  2113 03/01/20  0542   HGB  --  12.8 11.8   PLT  --  388 266    133 136   POTASSIUM 4.6 4.1 4.1   CR 0.73 0.61 0.66   A1C 5.7*  --   --         PRE-OP Diagnostics:  Labs pending at this time.  Results will be reviewed when available.  No EKG required, no history of coronary heart disease, significant arrhythmia, peripheral arterial disease or other structural heart disease.         Assessment & Plan   The proposed surgical procedure is considered INTERMEDIATE risk.    REVISED CARDIAC RISK INDEX  The patient has the following serious cardiovascular risks for perioperative complications:  No serious cardiac risks = 0 points    INTERPRETATION: 0 points: Class I (very low risk - 0.4% complication rate)       1. Preop general physical exam  - CBC with platelets differential    2. Primary osteoarthritis of left knee    3. Essential hypertension, benign  Well controlled. Check labs.   - Basic metabolic panel    4. Urgency incontinence  Well controlled. Refilled medication.     - oxybutynin ER (DITROPAN-XL) 5 MG 24 hr tablet; Take 1 tablet (5 mg) by mouth daily  Dispense: 90 tablet; Refill: 4    5. GENERALIZED ANXIETY DIS  Well controlled. Refilled medication.     - escitalopram (LEXAPRO) 10 MG tablet; Take 1 tablet (10 mg) by mouth daily  Dispense: 90 tablet; Refill: 3    6. Need for prophylactic vaccination and inoculation against influenza  - FLUZONE HIGH DOSE 65+  [62362]  - ADMIN INFLUENZA (For MEDICARE Patients ONLY) []    The patient has the following additional risks and recommendations for perioperative complications:     - No identified additional risk factors other than previously addressed     MEDICATION INSTRUCTIONS:  Patient is to take all scheduled medications on the day of surgery    RECOMMENDATION:  APPROVAL GIVEN to proceed with proposed procedure, without further diagnostic  evaluation.    No follow-ups on file.    Signed Electronically by: Dewayne Santana MD    Copy of this evaluation report is provided to requesting physician.    Preop [facility] Preop Guidelines    Revised Cardiac Risk Index

## 2020-09-24 NOTE — PROGRESS NOTES
[facility]  [address]  [facility] [address]  Phone: [phone]  Primary Provider: Dewayne Sandoval  Pre-op Performing Provider: DEWAYNE SANDOVAL    PREOPERATIVE EVALUATION:  Today's date: 9/24/2020    Bonita Villarreal is a 85 year old female who presents for a preoperative evaluation.    Surgical Information:  Surgery Details 9/24/2020   Surgery/Procedure: Left Total Knee Arthroplasty   Surgery Location: [facility]   Surgeon: Dr. Sandoval   Surgery Date: 10/6/2020   Time of Surgery: TBD   Where patient plans to recover: At home with family     Fax number for surgical facility:   Type of Anesthesia Anticipated: to be determined    Subjective     HPI related to upcoming procedure: Left knee Arthroscopy  Preop Questions 9/24/2020   1. Have you ever had a heart attack or stroke? No   2. Have you ever had surgery on your heart or blood vessels, such as a stent placement, a coronary artery bypass, or surgery on an artery in your head, neck, heart, or legs? No   3. Do you have chest pain with activity? No   4. Do you have a history of  heart failure? No   5. Do you currently have a cold, bronchitis or symptoms of other infection? No   6. Do you have a cough, shortness of breath, or wheezing? No   7. Do you or anyone in your family have previous history of blood clots? No   8. Do you or does anyone in your family have a serious bleeding problem such as prolonged bleeding following surgeries or cuts? No   9. Have you ever had problems with anemia or been told to take iron pills? No   10. Have you had any abnormal blood loss such as black, tarry or bloody stools, or abnormal vaginal bleeding? No   11. Have you ever had a blood transfusion? No   Are you willing to have a blood transfusion if it is medically needed before, during, or after your surgery? Yes   13. Have you or any of your relatives ever had problems with anesthesia? No   14. Do you have  sleep apnea, excessive snoring or daytime drowsiness? No   15. Do you have any artifical heart valves or other implanted medical devices like a pacemaker, defibrillator, or continuous glucose monitor? No   16. Do you have artificial joints? YES - right knee   17. Are you allergic to latex? No   18. Is there any chance that you may be pregnant? No       RX [place] ([place] reviewed:  reviewed- no concerns    See problem list for active medical problems.  Problems all longstanding and stable, except as noted/documented.  See ROS for pertinent symptoms related to these conditions.    HYPERTENSION - Patient has longstanding history of HTN , currently denies any symptoms referable to elevated blood pressure. Specifically denies chest pain, palpitations, dyspnea, orthopnea, PND or peripheral edema. Blood pressure readings have been in normal range. Current medication regimen is as listed below. Patient denies any side effects of medication.       Review of Systems  Constitutional, neuro, ENT, endocrine, pulmonary, cardiac, gastrointestinal, genitourinary, musculoskeletal, integument and psychiatric systems are negative, except as otherwise noted.    Patient Active Problem List    Diagnosis Date Noted     Prediabetes 05/06/2020     Priority: Medium     Nausea with vomiting 03/02/2020     Priority: Medium     Volume depletion, gastrointestinal loss 03/02/2020     Priority: Medium     Hyponatremia 03/02/2020     Priority: Medium     Fracture of sacrum (H) 03/02/2020     Priority: Medium     Sustained 1/26/2020       Sacral pain 03/02/2020     Priority: Medium     Acute cystitis 02/29/2020     Priority: Medium     UTI (urinary tract infection) 02/29/2020     Priority: Medium     Pulmonary infiltrate in right lung on chest x-ray 06/29/2018     Priority: Medium     Headache 06/28/2018     Priority: Medium     Status post total right knee replacement 02/28/2018     Priority: Medium     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      Priority: Medium     CARDIOVASCULAR SCREENING; LDL GOAL LESS THAN 160 10/31/2010     Priority: Medium     LDL in 140s, HDL in 60s  [place] risk is 6% (average at her age is 14%)       Essential hypertension, benign 2005     Priority: Medium     had coronary CT scan  --- no evidence of plaque  started on atenolol and ASA 2005 for /94  physician in [place] switched her to Lotrel 5/10 due to her having dizzy spells  HCTZ added 3/05, pt felt mouth too dried out   pt more anxious and BP still elevated -- switched back to Atenolol       Diverticulosis of large intestine 2005     Priority: Medium     colonoscopy , disease mod - severe  Problem list name updated by automated process. Provider to review       Osteopenia of spine 2005     Priority: Medium     T score -1.3    Risk calculator based on  T-score and personal data showed 1% risk of hip fracture over next 10 years, 14% risk of any fracture over next ten years; consider recheck DEXA at age 77, continue calcium and exercise until then (current recommendations to consider treatment if hip fx risk reaches 3% or any fracture risk reaches 20%)  Problem list name updated by automated process. Provider to review       Family history of other cardiovascular diseases 2005     Priority: Medium     father  at 39 of MI  brother  at 51 of MI  another brother -- MI x 2  eldest sister -- MI x 2 with stents  mother and maternal aunts lived to 80's and mid 90's  Problem list name updated by automated process. Provider to review and confirm  Problem list name updated by automated process. Provider to review       Generalized anxiety disorder 2005     Priority: Medium     onset after patient was diagnosed with hypertension fall         Past Medical History:   Diagnosis Date     Advance Care Planning 2012    Advance Care Planning 3/20/2016: Receipt of ACP document:  Received: Health Care Directive which was  "witnessed or notarized on 2/25/16.  Document not previously scanned.  Validation form completed and sent with document to be scanned.  Code Status needs to be updated to reflect choices in most recent ACP document.  Confirmed/documented designated decision maker(s).  Added by Cata Terrell RN, Advance Care Planning Liaison. Advance Care Planning 2/28/12: Patient does not have an Advance/Health Care Directive (HCD), given \"What is Advance Care Planning?\" flyer. Teena Peace       Arthritis      Diverticulitis of colon 6/28/2005    Patient keeps Rx for Augmentin to use PRN Problem list name updated by automated process. Provider to review     Diverticulosis of colon (without mention of hemorrhage)     history of recurrent diverticulitis     Hypertension      Past Surgical History:   Procedure Laterality Date     ARTHROPLASTY KNEE Right 2/28/2018    Procedure: ARTHROPLASTY KNEE;  Right total knee arthroplasty;  Surgeon: Anton Grover MD;  Location: [facility] OR     C APPENDECTOMY       COLONOSCOPY  04/15/02     COLONOSCOPY  3/25/2013    Procedure: COLONOSCOPY;  Colonoscopy  ;  Surgeon: Jamil Porras MD;  Location: [facility] GI     FLEXIBLE SIGMOIDOSCOPY  07/29/96     ORTHOPEDIC SURGERY  22-23 YEARS AGO    BACK     SURGICAL HISTORY OF -       lumbar surgery     Current Outpatient Medications   Medication Sig Dispense Refill     atenolol (TENORMIN) 100 MG tablet Take 1 tablet (100 mg) by mouth daily 90 tablet 3     escitalopram (LEXAPRO) 10 MG tablet Take 1 tablet (10 mg) by mouth daily 90 tablet 3     ibuprofen (ADVIL/MOTRIN) 200 MG tablet Take 400-600 mg by mouth as needed (ruptured tail bone pain)        losartan-hydrochlorothiazide (HYZAAR) 100-12.5 MG tablet Take 1 tablet by mouth daily 90 tablet 3     MEGARED OMEGA-3 KRILL OIL PO Take 1 capsule by mouth daily       multivitamin (CENTRUM SILVER) tablet Take 1 tablet by mouth daily       oxybutynin ER (DITROPAN-XL) 5 MG 24 hr tablet Take 1 tablet (5 mg) by mouth " "daily 30 tablet 1       Allergies   Allergen Reactions     Bactrim [Sulfamethoxazole W/Trimethoprim] Nausea and Vomiting     Lisinopril Cough        Social History     Tobacco Use     Smoking status: Never Smoker     Smokeless tobacco: Never Used   Substance Use Topics     Alcohol use: Yes     Comment: rare     Family History   Problem Relation Age of Onset     Lipids Brother      Heart Disease Brother          at 51 of an MI     Cancer Brother         bone, lung,  82     Prostate Cancer Brother      Cancer Sister         cervical     Lipids Sister      Heart Disease Sister         eldest sister, s/p MI x 2 with stents; diagnosed with AAA, postoperative complications and MI,  at 75     Heart Disease Brother         MI x 2     Lipids Brother      C.A.D. Father          at 39 of an MI     Family History Negative Mother         lived to age 95     Gynecology Sister         cervical Ca     History   Drug Use No            Objective   /70   Pulse 62   Temp 98.5  F (36.9  C) (Tympanic)   Resp 16   Ht 1.727 m (5' 8\")   Wt 81.9 kg (180 lb 9.6 oz)   LMP  (LMP Unknown)   SpO2 96%   Breastfeeding No   BMI 27.46 kg/m    Physical Exam    GENERAL APPEARANCE: healthy, alert and no distress     EYES: EOMI, PERRL     HENT: ear canals and TM's normal and nose and mouth without ulcers or lesions     NECK: no adenopathy, no asymmetry, masses, or scars and thyroid normal to palpation     RESP: lungs clear to auscultation - no rales, rhonchi or wheezes     CV: regular rates and rhythm, normal S1 S2, no S3 or S4 and no murmur, click or rub     ABDOMEN:  soft, nontender, no HSM or masses and bowel sounds normal     MS: extremities normal- no gross deformities noted, no evidence of inflammation in joints, FROM in all extremities.     SKIN: no suspicious lesions or rashes     NEURO: Normal strength and tone, sensory exam grossly normal, mentation intact and speech normal     PSYCH: mentation appears normal. and " affect normal/bright     LYMPHATICS: No cervical adenopathy    Recent Labs   Lab Test 05/06/20  1028 03/11/20  2113 03/01/20  0542   HGB  --  12.8 11.8   PLT  --  388 266    133 136   POTASSIUM 4.6 4.1 4.1   CR 0.73 0.61 0.66   A1C 5.7*  --   --         PRE-OP Diagnostics:  Labs pending at this time.  Results will be reviewed when available.  No EKG required, no history of coronary heart disease, significant arrhythmia, peripheral arterial disease or other structural heart disease.         Assessment & Plan   The proposed surgical procedure is considered INTERMEDIATE risk.    REVISED CARDIAC RISK INDEX  The patient has the following serious cardiovascular risks for perioperative complications:  No serious cardiac risks = 0 points    INTERPRETATION: 0 points: Class I (very low risk - 0.4% complication rate)       1. Preop general physical exam  - CBC with platelets differential    2. Primary osteoarthritis of left knee    3. Essential hypertension, benign  Well controlled. Check labs.   - Basic metabolic panel    4. Urgency incontinence  Well controlled. Refilled medication.     - oxybutynin ER (DITROPAN-XL) 5 MG 24 hr tablet; Take 1 tablet (5 mg) by mouth daily  Dispense: 90 tablet; Refill: 4    5. GENERALIZED ANXIETY DIS  Well controlled. Refilled medication.     - escitalopram (LEXAPRO) 10 MG tablet; Take 1 tablet (10 mg) by mouth daily  Dispense: 90 tablet; Refill: 3    6. Need for prophylactic vaccination and inoculation against influenza  - FLUZONE HIGH DOSE 65+  [86852]  - ADMIN INFLUENZA (For MEDICARE Patients ONLY) []    The patient has the following additional risks and recommendations for perioperative complications:     - No identified additional risk factors other than previously addressed     MEDICATION INSTRUCTIONS:  Patient is to take all scheduled medications on the day of surgery    RECOMMENDATION:  APPROVAL GIVEN to proceed with proposed procedure, without further diagnostic  evaluation.    No follow-ups on file.    Signed Electronically by: Dewayne Santana MD    Copy of this evaluation report is provided to requesting physician.    Preop [facility] Preop Guidelines    Revised Cardiac Risk Index

## 2020-09-30 ENCOUNTER — ANESTHESIA EVENT (OUTPATIENT)
Dept: SURGERY | Facility: CLINIC | Age: 85
End: 2020-09-30
Payer: COMMERCIAL

## 2020-10-02 DIAGNOSIS — Z11.59 ENCOUNTER FOR SCREENING FOR OTHER VIRAL DISEASES: ICD-10-CM

## 2020-10-02 LAB
SARS-COV-2 RNA SPEC QL NAA+PROBE: NOT DETECTED
SPECIMEN SOURCE: NORMAL

## 2020-10-02 PROCEDURE — U0003 INFECTIOUS AGENT DETECTION BY NUCLEIC ACID (DNA OR RNA); SEVERE ACUTE RESPIRATORY SYNDROME CORONAVIRUS 2 (SARS-COV-2) (CORONAVIRUS DISEASE [COVID-19]), AMPLIFIED PROBE TECHNIQUE, MAKING USE OF HIGH THROUGHPUT TECHNOLOGIES AS DESCRIBED BY CMS-2020-01-R: HCPCS | Performed by: ORTHOPAEDIC SURGERY

## 2020-10-06 ENCOUNTER — ANESTHESIA (OUTPATIENT)
Dept: SURGERY | Facility: CLINIC | Age: 85
End: 2020-10-06
Payer: COMMERCIAL

## 2020-10-06 ENCOUNTER — APPOINTMENT (OUTPATIENT)
Dept: GENERAL RADIOLOGY | Facility: CLINIC | Age: 85
End: 2020-10-06
Attending: ORTHOPAEDIC SURGERY
Payer: COMMERCIAL

## 2020-10-06 ENCOUNTER — HOSPITAL ENCOUNTER (OUTPATIENT)
Facility: CLINIC | Age: 85
Discharge: HOME | End: 2020-10-07
Attending: ORTHOPAEDIC SURGERY | Admitting: ORTHOPAEDIC SURGERY
Payer: COMMERCIAL

## 2020-10-06 DIAGNOSIS — Z96.652 STATUS POST LEFT KNEE REPLACEMENT: Primary | ICD-10-CM

## 2020-10-06 PROBLEM — Z96.659 STATUS POST TOTAL KNEE REPLACEMENT: Status: ACTIVE | Noted: 2020-10-06

## 2020-10-06 PROCEDURE — 250N000009 HC RX 250: Performed by: NURSE ANESTHETIST, CERTIFIED REGISTERED

## 2020-10-06 PROCEDURE — 999N000065 XR KNEE PORT LT 1/2 VW: Mod: LT

## 2020-10-06 PROCEDURE — 360N000027 HC SURGERY LEVEL 4 EA 15 ADDTL MIN: Performed by: ORTHOPAEDIC SURGERY

## 2020-10-06 PROCEDURE — 250N000011 HC RX IP 250 OP 636: Performed by: PHYSICIAN ASSISTANT

## 2020-10-06 PROCEDURE — 250N000013 HC RX MED GY IP 250 OP 250 PS 637: Performed by: ORTHOPAEDIC SURGERY

## 2020-10-06 PROCEDURE — 272N000001 HC OR GENERAL SUPPLY STERILE: Performed by: ORTHOPAEDIC SURGERY

## 2020-10-06 PROCEDURE — 250N000011 HC RX IP 250 OP 636: Performed by: NURSE ANESTHETIST, CERTIFIED REGISTERED

## 2020-10-06 PROCEDURE — 271N000001 HC OR GENERAL SUPPLY NON-STERILE: Performed by: ORTHOPAEDIC SURGERY

## 2020-10-06 PROCEDURE — 370N000002 HC ANESTHESIA TECHNICAL FEE, EACH ADDTL 15 MIN: Performed by: ORTHOPAEDIC SURGERY

## 2020-10-06 PROCEDURE — 250N000009 HC RX 250: Performed by: REGISTERED NURSE

## 2020-10-06 PROCEDURE — 999N000135 HC STATISTIC PRE PROC ASSESS I: Performed by: ORTHOPAEDIC SURGERY

## 2020-10-06 PROCEDURE — 250N000011 HC RX IP 250 OP 636: Performed by: ORTHOPAEDIC SURGERY

## 2020-10-06 PROCEDURE — 761N000003 HC RECOVERY PHASE 1 LEVEL 2 FIRST HR: Performed by: ORTHOPAEDIC SURGERY

## 2020-10-06 PROCEDURE — 258N000003 HC RX IP 258 OP 636: Performed by: NURSE ANESTHETIST, CERTIFIED REGISTERED

## 2020-10-06 PROCEDURE — 360N000026 HC SURGERY LEVEL 4 1ST 30 MIN: Performed by: ORTHOPAEDIC SURGERY

## 2020-10-06 PROCEDURE — 258N000003 HC RX IP 258 OP 636: Performed by: ORTHOPAEDIC SURGERY

## 2020-10-06 PROCEDURE — 278N000051 HC OR IMPLANT GENERAL: Performed by: ORTHOPAEDIC SURGERY

## 2020-10-06 PROCEDURE — 370N000001 HC ANESTHESIA TECHNICAL FEE, 1ST 30 MIN: Performed by: ORTHOPAEDIC SURGERY

## 2020-10-06 PROCEDURE — 250N000013 HC RX MED GY IP 250 OP 250 PS 637: Performed by: PHYSICIAN ASSISTANT

## 2020-10-06 PROCEDURE — 250N000009 HC RX 250: Performed by: ORTHOPAEDIC SURGERY

## 2020-10-06 PROCEDURE — 250N000011 HC RX IP 250 OP 636: Performed by: REGISTERED NURSE

## 2020-10-06 PROCEDURE — 250N000013 HC RX MED GY IP 250 OP 250 PS 637: Performed by: NURSE ANESTHETIST, CERTIFIED REGISTERED

## 2020-10-06 PROCEDURE — C1776 JOINT DEVICE (IMPLANTABLE): HCPCS | Performed by: ORTHOPAEDIC SURGERY

## 2020-10-06 DEVICE — IMP INSERT ARTICULAR S&N LGN CR XLPE SZ5-6 9MM 71453121: Type: IMPLANTABLE DEVICE | Site: KNEE | Status: FUNCTIONAL

## 2020-10-06 DEVICE — IMPLANTABLE DEVICE: Type: IMPLANTABLE DEVICE | Site: KNEE | Status: FUNCTIONAL

## 2020-10-06 DEVICE — BONE CEMENT PALACOS 00-1112-140-01: Type: IMPLANTABLE DEVICE | Site: KNEE | Status: FUNCTIONAL

## 2020-10-06 DEVICE — IMP BASEPLATE TIBIAL GENESIS II SZ 5 LT TI 71420168: Type: IMPLANTABLE DEVICE | Site: KNEE | Status: FUNCTIONAL

## 2020-10-06 DEVICE — IMP COMP PATELLA GENESIS II 9X35MM 71420578: Type: IMPLANTABLE DEVICE | Site: KNEE | Status: FUNCTIONAL

## 2020-10-06 RX ORDER — PROPOFOL 10 MG/ML
INJECTION, EMULSION INTRAVENOUS CONTINUOUS PRN
Status: DISCONTINUED | OUTPATIENT
Start: 2020-10-06 | End: 2020-10-06

## 2020-10-06 RX ORDER — DIMENHYDRINATE 50 MG/ML
25 INJECTION, SOLUTION INTRAMUSCULAR; INTRAVENOUS
Status: DISCONTINUED | OUTPATIENT
Start: 2020-10-06 | End: 2020-10-06 | Stop reason: HOSPADM

## 2020-10-06 RX ORDER — HYDROMORPHONE HYDROCHLORIDE 1 MG/ML
0.2 INJECTION, SOLUTION INTRAMUSCULAR; INTRAVENOUS; SUBCUTANEOUS
Status: DISCONTINUED | OUTPATIENT
Start: 2020-10-06 | End: 2020-10-07 | Stop reason: HOSPADM

## 2020-10-06 RX ORDER — BUPIVACAINE HYDROCHLORIDE 7.5 MG/ML
INJECTION, SOLUTION INTRASPINAL PRN
Status: DISCONTINUED | OUTPATIENT
Start: 2020-10-06 | End: 2020-10-06

## 2020-10-06 RX ORDER — HYDROXYZINE HYDROCHLORIDE 25 MG/1
25 TABLET, FILM COATED ORAL EVERY 6 HOURS PRN
Status: DISCONTINUED | OUTPATIENT
Start: 2020-10-06 | End: 2020-10-07 | Stop reason: HOSPADM

## 2020-10-06 RX ORDER — NALOXONE HYDROCHLORIDE 0.4 MG/ML
.1-.4 INJECTION, SOLUTION INTRAMUSCULAR; INTRAVENOUS; SUBCUTANEOUS
Status: DISCONTINUED | OUTPATIENT
Start: 2020-10-06 | End: 2020-10-06

## 2020-10-06 RX ORDER — PROCHLORPERAZINE MALEATE 5 MG
5 TABLET ORAL EVERY 6 HOURS PRN
Status: DISCONTINUED | OUTPATIENT
Start: 2020-10-06 | End: 2020-10-07 | Stop reason: HOSPADM

## 2020-10-06 RX ORDER — LIDOCAINE HYDROCHLORIDE 10 MG/ML
INJECTION, SOLUTION INFILTRATION; PERINEURAL PRN
Status: DISCONTINUED | OUTPATIENT
Start: 2020-10-06 | End: 2020-10-06

## 2020-10-06 RX ORDER — IBUPROFEN 600 MG/1
600 TABLET, FILM COATED ORAL EVERY 8 HOURS PRN
Qty: 30 TABLET | Refills: 0
Start: 2020-10-06 | End: 2024-07-15

## 2020-10-06 RX ORDER — BISACODYL 10 MG
10 SUPPOSITORY, RECTAL RECTAL DAILY PRN
Status: DISCONTINUED | OUTPATIENT
Start: 2020-10-06 | End: 2020-10-07 | Stop reason: HOSPADM

## 2020-10-06 RX ORDER — EPINEPHRINE 1 MG/ML
INJECTION, SOLUTION, CONCENTRATE INTRAVENOUS PRN
Status: DISCONTINUED | OUTPATIENT
Start: 2020-10-06 | End: 2020-10-06

## 2020-10-06 RX ORDER — BUPIVACAINE HYDROCHLORIDE 5 MG/ML
INJECTION, SOLUTION EPIDURAL; INTRACAUDAL
Status: DISCONTINUED | OUTPATIENT
Start: 2020-10-06 | End: 2020-10-06

## 2020-10-06 RX ORDER — POLYETHYLENE GLYCOL 3350 17 G/17G
17 POWDER, FOR SOLUTION ORAL DAILY
Status: DISCONTINUED | OUTPATIENT
Start: 2020-10-07 | End: 2020-10-07 | Stop reason: HOSPADM

## 2020-10-06 RX ORDER — MAGNESIUM SULFATE HEPTAHYDRATE 40 MG/ML
2 INJECTION, SOLUTION INTRAVENOUS ONCE
Status: DISCONTINUED | OUTPATIENT
Start: 2020-10-06 | End: 2020-10-06 | Stop reason: HOSPADM

## 2020-10-06 RX ORDER — LIDOCAINE 40 MG/G
CREAM TOPICAL
Status: DISCONTINUED | OUTPATIENT
Start: 2020-10-06 | End: 2020-10-06 | Stop reason: HOSPADM

## 2020-10-06 RX ORDER — HYDROMORPHONE HYDROCHLORIDE 1 MG/ML
0.4 INJECTION, SOLUTION INTRAMUSCULAR; INTRAVENOUS; SUBCUTANEOUS
Status: DISCONTINUED | OUTPATIENT
Start: 2020-10-06 | End: 2020-10-07 | Stop reason: HOSPADM

## 2020-10-06 RX ORDER — METHOCARBAMOL 500 MG/1
500 TABLET, FILM COATED ORAL EVERY 6 HOURS PRN
Status: DISCONTINUED | OUTPATIENT
Start: 2020-10-06 | End: 2020-10-07 | Stop reason: HOSPADM

## 2020-10-06 RX ORDER — ONDANSETRON 2 MG/ML
INJECTION INTRAMUSCULAR; INTRAVENOUS PRN
Status: DISCONTINUED | OUTPATIENT
Start: 2020-10-06 | End: 2020-10-06

## 2020-10-06 RX ORDER — ALBUTEROL SULFATE 0.83 MG/ML
2.5 SOLUTION RESPIRATORY (INHALATION) EVERY 4 HOURS PRN
Status: DISCONTINUED | OUTPATIENT
Start: 2020-10-06 | End: 2020-10-06 | Stop reason: HOSPADM

## 2020-10-06 RX ORDER — ACETAMINOPHEN 325 MG/1
975 TABLET ORAL ONCE
Status: COMPLETED | OUTPATIENT
Start: 2020-10-06 | End: 2020-10-06

## 2020-10-06 RX ORDER — CEFAZOLIN SODIUM 1 G/50ML
1 INJECTION, SOLUTION INTRAVENOUS EVERY 8 HOURS
Status: COMPLETED | OUTPATIENT
Start: 2020-10-06 | End: 2020-10-07

## 2020-10-06 RX ORDER — ACETAMINOPHEN 325 MG/1
975 TABLET ORAL EVERY 8 HOURS
Status: DISCONTINUED | OUTPATIENT
Start: 2020-10-06 | End: 2020-10-07 | Stop reason: HOSPADM

## 2020-10-06 RX ORDER — AMOXICILLIN 250 MG
1 CAPSULE ORAL 2 TIMES DAILY
Status: DISCONTINUED | OUTPATIENT
Start: 2020-10-06 | End: 2020-10-07 | Stop reason: HOSPADM

## 2020-10-06 RX ORDER — HYDROMORPHONE HYDROCHLORIDE 1 MG/ML
.3-.5 INJECTION, SOLUTION INTRAMUSCULAR; INTRAVENOUS; SUBCUTANEOUS EVERY 5 MIN PRN
Status: DISCONTINUED | OUTPATIENT
Start: 2020-10-06 | End: 2020-10-06 | Stop reason: HOSPADM

## 2020-10-06 RX ORDER — OXYCODONE HYDROCHLORIDE 5 MG/1
5 TABLET ORAL EVERY 4 HOURS PRN
Status: DISCONTINUED | OUTPATIENT
Start: 2020-10-06 | End: 2020-10-07 | Stop reason: HOSPADM

## 2020-10-06 RX ORDER — SODIUM CHLORIDE, SODIUM LACTATE, POTASSIUM CHLORIDE, CALCIUM CHLORIDE 600; 310; 30; 20 MG/100ML; MG/100ML; MG/100ML; MG/100ML
INJECTION, SOLUTION INTRAVENOUS CONTINUOUS
Status: DISCONTINUED | OUTPATIENT
Start: 2020-10-06 | End: 2020-10-07 | Stop reason: HOSPADM

## 2020-10-06 RX ORDER — LIDOCAINE 40 MG/G
CREAM TOPICAL
Status: DISCONTINUED | OUTPATIENT
Start: 2020-10-06 | End: 2020-10-07 | Stop reason: HOSPADM

## 2020-10-06 RX ORDER — CEFAZOLIN SODIUM 1 G/3ML
1 INJECTION, POWDER, FOR SOLUTION INTRAMUSCULAR; INTRAVENOUS SEE ADMIN INSTRUCTIONS
Status: DISCONTINUED | OUTPATIENT
Start: 2020-10-06 | End: 2020-10-06 | Stop reason: HOSPADM

## 2020-10-06 RX ORDER — ONDANSETRON 2 MG/ML
4 INJECTION INTRAMUSCULAR; INTRAVENOUS EVERY 30 MIN PRN
Status: DISCONTINUED | OUTPATIENT
Start: 2020-10-06 | End: 2020-10-06 | Stop reason: HOSPADM

## 2020-10-06 RX ORDER — CELECOXIB 200 MG/1
200 CAPSULE ORAL ONCE
Status: COMPLETED | OUTPATIENT
Start: 2020-10-06 | End: 2020-10-06

## 2020-10-06 RX ORDER — OXYCODONE HYDROCHLORIDE 5 MG/1
10 TABLET ORAL EVERY 4 HOURS PRN
Status: DISCONTINUED | OUTPATIENT
Start: 2020-10-06 | End: 2020-10-07 | Stop reason: HOSPADM

## 2020-10-06 RX ORDER — FENTANYL CITRATE 50 UG/ML
INJECTION, SOLUTION INTRAMUSCULAR; INTRAVENOUS PRN
Status: DISCONTINUED | OUTPATIENT
Start: 2020-10-06 | End: 2020-10-06

## 2020-10-06 RX ORDER — ONDANSETRON 2 MG/ML
4 INJECTION INTRAMUSCULAR; INTRAVENOUS EVERY 6 HOURS PRN
Status: DISCONTINUED | OUTPATIENT
Start: 2020-10-06 | End: 2020-10-07 | Stop reason: HOSPADM

## 2020-10-06 RX ORDER — OXYCODONE HYDROCHLORIDE 5 MG/1
5-10 TABLET ORAL
Qty: 56 TABLET | Refills: 0 | Status: SHIPPED | OUTPATIENT
Start: 2020-10-06 | End: 2021-04-19

## 2020-10-06 RX ORDER — DOCUSATE SODIUM 100 MG/1
100 CAPSULE, LIQUID FILLED ORAL 2 TIMES DAILY
Status: DISCONTINUED | OUTPATIENT
Start: 2020-10-06 | End: 2020-10-07 | Stop reason: HOSPADM

## 2020-10-06 RX ORDER — HYDROXYZINE HYDROCHLORIDE 10 MG/1
10 TABLET, FILM COATED ORAL EVERY 6 HOURS PRN
Qty: 30 TABLET | Refills: 0 | Status: SHIPPED | OUTPATIENT
Start: 2020-10-06 | End: 2021-04-19

## 2020-10-06 RX ORDER — EPHEDRINE SULFATE 50 MG/ML
INJECTION, SOLUTION INTRAMUSCULAR; INTRAVENOUS; SUBCUTANEOUS PRN
Status: DISCONTINUED | OUTPATIENT
Start: 2020-10-06 | End: 2020-10-06

## 2020-10-06 RX ORDER — ACETAMINOPHEN 325 MG/1
650 TABLET ORAL EVERY 4 HOURS PRN
Status: DISCONTINUED | OUTPATIENT
Start: 2020-10-09 | End: 2020-10-07 | Stop reason: HOSPADM

## 2020-10-06 RX ORDER — DEXAMETHASONE SODIUM PHOSPHATE 4 MG/ML
INJECTION, SOLUTION INTRA-ARTICULAR; INTRALESIONAL; INTRAMUSCULAR; INTRAVENOUS; SOFT TISSUE PRN
Status: DISCONTINUED | OUTPATIENT
Start: 2020-10-06 | End: 2020-10-06

## 2020-10-06 RX ORDER — AMOXICILLIN 250 MG
1-2 CAPSULE ORAL DAILY PRN
Qty: 30 TABLET | Refills: 0 | Status: SHIPPED | OUTPATIENT
Start: 2020-10-06 | End: 2021-04-19

## 2020-10-06 RX ORDER — MEPERIDINE HYDROCHLORIDE 25 MG/ML
12.5 INJECTION INTRAMUSCULAR; INTRAVENOUS; SUBCUTANEOUS EVERY 5 MIN PRN
Status: DISCONTINUED | OUTPATIENT
Start: 2020-10-06 | End: 2020-10-06 | Stop reason: HOSPADM

## 2020-10-06 RX ORDER — SODIUM CHLORIDE, SODIUM LACTATE, POTASSIUM CHLORIDE, CALCIUM CHLORIDE 600; 310; 30; 20 MG/100ML; MG/100ML; MG/100ML; MG/100ML
INJECTION, SOLUTION INTRAVENOUS CONTINUOUS
Status: DISCONTINUED | OUTPATIENT
Start: 2020-10-06 | End: 2020-10-06 | Stop reason: HOSPADM

## 2020-10-06 RX ORDER — GABAPENTIN 300 MG/1
300 CAPSULE ORAL ONCE
Status: COMPLETED | OUTPATIENT
Start: 2020-10-06 | End: 2020-10-06

## 2020-10-06 RX ORDER — ONDANSETRON 4 MG/1
4 TABLET, ORALLY DISINTEGRATING ORAL EVERY 30 MIN PRN
Status: DISCONTINUED | OUTPATIENT
Start: 2020-10-06 | End: 2020-10-06 | Stop reason: HOSPADM

## 2020-10-06 RX ORDER — LIDOCAINE HYDROCHLORIDE 10 MG/ML
INJECTION, SOLUTION INFILTRATION; PERINEURAL
Status: DISCONTINUED | OUTPATIENT
Start: 2020-10-06 | End: 2020-10-06

## 2020-10-06 RX ORDER — CEFAZOLIN SODIUM 2 G/100ML
2 INJECTION, SOLUTION INTRAVENOUS
Status: COMPLETED | OUTPATIENT
Start: 2020-10-06 | End: 2020-10-06

## 2020-10-06 RX ORDER — ACETAMINOPHEN 325 MG/1
650 TABLET ORAL EVERY 4 HOURS PRN
Qty: 100 TABLET | Refills: 0
Start: 2020-10-06 | End: 2021-09-09

## 2020-10-06 RX ORDER — FENTANYL CITRATE 50 UG/ML
25-50 INJECTION, SOLUTION INTRAMUSCULAR; INTRAVENOUS
Status: DISCONTINUED | OUTPATIENT
Start: 2020-10-06 | End: 2020-10-06 | Stop reason: HOSPADM

## 2020-10-06 RX ORDER — LIDOCAINE HYDROCHLORIDE AND EPINEPHRINE BITARTRATE 20; .01 MG/ML; MG/ML
INJECTION, SOLUTION SUBCUTANEOUS PRN
Status: DISCONTINUED | OUTPATIENT
Start: 2020-10-06 | End: 2020-10-06

## 2020-10-06 RX ORDER — ONDANSETRON 4 MG/1
4 TABLET, ORALLY DISINTEGRATING ORAL EVERY 6 HOURS PRN
Status: DISCONTINUED | OUTPATIENT
Start: 2020-10-06 | End: 2020-10-07 | Stop reason: HOSPADM

## 2020-10-06 RX ORDER — TRANEXAMIC ACID 650 MG/1
1950 TABLET ORAL ONCE
Status: COMPLETED | OUTPATIENT
Start: 2020-10-06 | End: 2020-10-06

## 2020-10-06 RX ORDER — METOPROLOL TARTRATE 1 MG/ML
1-2 INJECTION, SOLUTION INTRAVENOUS EVERY 5 MIN PRN
Status: DISCONTINUED | OUTPATIENT
Start: 2020-10-06 | End: 2020-10-06 | Stop reason: HOSPADM

## 2020-10-06 RX ORDER — KETOROLAC TROMETHAMINE 15 MG/ML
15 INJECTION, SOLUTION INTRAMUSCULAR; INTRAVENOUS EVERY 6 HOURS
Status: DISCONTINUED | OUTPATIENT
Start: 2020-10-07 | End: 2020-10-07 | Stop reason: HOSPADM

## 2020-10-06 RX ORDER — NALOXONE HYDROCHLORIDE 0.4 MG/ML
.1-.4 INJECTION, SOLUTION INTRAMUSCULAR; INTRAVENOUS; SUBCUTANEOUS
Status: DISCONTINUED | OUTPATIENT
Start: 2020-10-06 | End: 2020-10-07 | Stop reason: HOSPADM

## 2020-10-06 RX ORDER — KETAMINE HYDROCHLORIDE 10 MG/ML
0.25 INJECTION, SOLUTION INTRAMUSCULAR; INTRAVENOUS ONCE
Status: DISCONTINUED | OUTPATIENT
Start: 2020-10-06 | End: 2020-10-06 | Stop reason: HOSPADM

## 2020-10-06 RX ORDER — ROPIVACAINE HYDROCHLORIDE 5 MG/ML
INJECTION, SOLUTION EPIDURAL; INFILTRATION; PERINEURAL PRN
Status: DISCONTINUED | OUTPATIENT
Start: 2020-10-06 | End: 2020-10-06

## 2020-10-06 RX ADMIN — CEFAZOLIN SODIUM 2 G: 2 INJECTION, SOLUTION INTRAVENOUS at 14:01

## 2020-10-06 RX ADMIN — CEFAZOLIN SODIUM 1 G: 1 INJECTION, SOLUTION INTRAVENOUS at 19:29

## 2020-10-06 RX ADMIN — EPINEPHRINE 0.2 MG: 1 INJECTION, SOLUTION INTRAMUSCULAR; SUBCUTANEOUS at 14:07

## 2020-10-06 RX ADMIN — DOCUSATE SODIUM 100 MG: 100 CAPSULE, LIQUID FILLED ORAL at 19:30

## 2020-10-06 RX ADMIN — ACETAMINOPHEN 975 MG: 325 TABLET, FILM COATED ORAL at 19:30

## 2020-10-06 RX ADMIN — Medication 5 MG: at 15:20

## 2020-10-06 RX ADMIN — LIDOCAINE HYDROCHLORIDE 100 MG: 10 INJECTION, SOLUTION INFILTRATION; PERINEURAL at 14:10

## 2020-10-06 RX ADMIN — CELECOXIB 200 MG: 200 CAPSULE ORAL at 12:14

## 2020-10-06 RX ADMIN — LIDOCAINE HYDROCHLORIDE 0.1 ML: 10 INJECTION, SOLUTION EPIDURAL; INFILTRATION; INTRACAUDAL; PERINEURAL at 12:43

## 2020-10-06 RX ADMIN — SODIUM CHLORIDE, POTASSIUM CHLORIDE, SODIUM LACTATE AND CALCIUM CHLORIDE 1000 ML: 600; 310; 30; 20 INJECTION, SOLUTION INTRAVENOUS at 12:44

## 2020-10-06 RX ADMIN — BUPIVACAINE HYDROCHLORIDE IN DEXTROSE 1.6 ML: 7.5 INJECTION, SOLUTION SUBARACHNOID at 14:07

## 2020-10-06 RX ADMIN — MIDAZOLAM 1 MG: 1 INJECTION INTRAMUSCULAR; INTRAVENOUS at 14:12

## 2020-10-06 RX ADMIN — SODIUM CHLORIDE, POTASSIUM CHLORIDE, SODIUM LACTATE AND CALCIUM CHLORIDE: 600; 310; 30; 20 INJECTION, SOLUTION INTRAVENOUS at 18:35

## 2020-10-06 RX ADMIN — ONDANSETRON 4 MG: 2 INJECTION INTRAMUSCULAR; INTRAVENOUS at 15:39

## 2020-10-06 RX ADMIN — Medication 5 ML: at 15:58

## 2020-10-06 RX ADMIN — ROPIVACAINE HYDROCHLORIDE 20 ML: 5 INJECTION, SOLUTION EPIDURAL; INFILTRATION; PERINEURAL at 15:59

## 2020-10-06 RX ADMIN — Medication 5 MG: at 14:41

## 2020-10-06 RX ADMIN — MIDAZOLAM 1 MG: 1 INJECTION INTRAMUSCULAR; INTRAVENOUS at 14:02

## 2020-10-06 RX ADMIN — OXYCODONE HYDROCHLORIDE 5 MG: 5 TABLET ORAL at 22:03

## 2020-10-06 RX ADMIN — ASPIRIN 325 MG: 325 TABLET, COATED ORAL at 19:30

## 2020-10-06 RX ADMIN — ACETAMINOPHEN 975 MG: 325 TABLET, FILM COATED ORAL at 12:13

## 2020-10-06 RX ADMIN — SENNOSIDES AND DOCUSATE SODIUM 1 TABLET: 8.6; 5 TABLET ORAL at 19:31

## 2020-10-06 RX ADMIN — FENTANYL CITRATE 50 MCG: 50 INJECTION, SOLUTION INTRAMUSCULAR; INTRAVENOUS at 14:02

## 2020-10-06 RX ADMIN — PROPOFOL 50 MCG/KG/MIN: 10 INJECTION, EMULSION INTRAVENOUS at 14:10

## 2020-10-06 RX ADMIN — DEXAMETHASONE SODIUM PHOSPHATE 4 MG: 4 INJECTION, SOLUTION INTRA-ARTICULAR; INTRALESIONAL; INTRAMUSCULAR; INTRAVENOUS; SOFT TISSUE at 15:59

## 2020-10-06 RX ADMIN — FENTANYL CITRATE 50 MCG: 50 INJECTION, SOLUTION INTRAMUSCULAR; INTRAVENOUS at 14:23

## 2020-10-06 RX ADMIN — TRANEXAMIC ACID 1950 MG: 650 TABLET ORAL at 12:13

## 2020-10-06 RX ADMIN — GABAPENTIN 300 MG: 300 CAPSULE ORAL at 12:14

## 2020-10-06 RX ADMIN — LIDOCAINE HYDROCHLORIDE 20 MG: 10 INJECTION, SOLUTION INFILTRATION; PERINEURAL at 14:05

## 2020-10-06 ASSESSMENT — MIFFLIN-ST. JEOR: SCORE: 1301.83

## 2020-10-06 ASSESSMENT — ENCOUNTER SYMPTOMS: DYSRHYTHMIAS: 1

## 2020-10-06 NOTE — PROCEDURES
10/06/20    PREOPERATIVE DIAGNOSES: Primary left knee degenerative joint disease.   POSTOPERATIVE DIAGNOSIS: Primary left knee degenerative joint disease.   PROCEDURE: Left total knee arthroplasty.   SURGEON: Calixto Santana MD   ASSISTANT: Ale Gong PA-C The presence of the PA was necessary for safe progression of the case.   ANESTHESIA: Spinal with MAC.   ESTIMATED BLOOD LOSS: 50 mL.   TOURNIQUET TIME: 36 minutes at 250 mmHg.   COMPLICATIONS: None apparent.   DISPOSITION: Stable to PACU.    IMPLANTS USED: [employer] cobalt chrome Legion size 7 CR femoral component with a size 5 S+ tibial component, size 5 by 9mm CR polyethylene and 35 mm patella.     INDICATIONS: Bonita is an 85 year old female with a history of progressive left knee pain due to end stage arthritis. Unfortunately, she failed conservative management including therapy and injections. After discussing risks, benefits and surgery, she elected to proceed with a left total knee replacement understanding the risks of infection, damage to vessels and nerves, blood clots, stiffness, ongoing pain, need for revision surgery, need for transfusion.     PROCEDURE: The patient was brought to the preoperative holding area where the left knee was marked. Consent was reviewed. She then was transferred to the operating theater. After induction of successful spinal anesthesia, she was placed supine with a bump under the left hip.  A timeout was performed, verifying correct patient, surgery, location. She received preoperative antibiotics as well as transexemic acid. The left lower extremity was then prepped and draped in standard sterile fashion.     We exsanguinated the leg and inflated the tourniquet. We then made a longitudinal incision over the anterior aspect of the knee. Dissection was carried down through skin and subcutaneous tissue. A medial parapatellar arthrotomy was made, exposing end stage lateral arthritis.  Synovial tissue from the  suprapatellar pouch excised. The anterior horn of the medial meniscus was released and a gentle medial release was performed. Then, the remainder of the fat pad was excised. We turned our attention to the patella. Using a free hand technique, this was resected down to 12 mm and sized to a 35mm, then punched and a metal protector plate was placed. We then turned our attention to the femur. Preoperatively, there was a 10 degree flexion contracture.  Therefore, using an intramedullary alignment guide, 11.5 mm of distal femur was resected in 5 degrees of valgus.     We then turned our attention to the tibia.  An extramedullary alignment cut was used to resect 3 mm off the low medial side, perpendicular to the mechanical axis.  We then turned our attention back to the distal femur and sized it to a size 7.  The epicondylar axis was established and we placed our 4-in-1 cutting block perpendicular to it, in 1 degrees of external rotation. With this in place, our anterior, posterior and chamfer distal femur cuts were made.  We then used a laminar  to open the joint in order to remove medial and lateral meniscus remnants as well as posterior osteophytes.  The PCL was intact.  A variety of polyethylene were trialed, and we felt a 9mm was best, with range of motion from full extension to 135 of flexion, stability to varus and valgus stress, normal POLO test, and the patella tracked well.  After this, sized our tibial component to a 5.  We then drilled and punched our tibial component, lining it with the medial 1/3 of the tibial tubercle. The knee was thoroughly irrigated using pulse lavage. The final components were then cemented in place. All excess cement was removed and the knee was placed into full extension while the cement was curing. Also, the wound was instilled with dilute Betadine solution. Once the cement had cured, we retrialed our components with a 9mm CR poly with findings as above. We then placed the  final poly.  The tourniquet was deflated with hemostasis achieved.  The capsular layer was closed with #1 Stratafix, at which point there was 130 degrees of flexion with gravity.  Subcutaneous tissues with 2-0 Vicryl, skin with a 3-0 Monocryl. A sterile dressing was applied and the patient was awoken from anesthesia and transferred to PACU in stable condition.     POSTOPERATIVE PLAN:   1. Weightbearing as tolerated left lower extremity with PT for mobilization.   2. Deep venous thrombosis prophylaxis: Aspirin 325mg daily x 6 weeks.   3. Perioperative antibiotics.   4. Follow up in 2 weeks for wound check.     DEBBIE SANDOVAL MD

## 2020-10-06 NOTE — ANESTHESIA PROCEDURE NOTES
Procedure note : intrathecal      Staff -   CRNA: Markie Meeks APRN CRNA  Performed By: CRNA  Pre-Procedure    Location: OR    Procedure Times:10/6/2020 2:03 PM and 10/6/2020 2:08 PM  Pre-Anesthestic Checklist: patient identified, IV checked, site marked, risks and benefits discussed, informed consent, monitors and equipment checked, pre-op evaluation, at physician/surgeon's request and post-op pain management    Timeout  Correct Patient: Yes   Correct Procedure: Yes   Correct Site: Yes   Correct Laterality: N/A   Correct Position: Yes   Site Marked: Yes   .   Procedure Documentation  ASA 2  Diagnosis:pain.    Procedure: intrathecal, .   Patient Position:sitting Insertion Site:L3-4  (midline approach)     Patient Prep/Sterile Barriers; mask, sterile gloves, povidone-iodine 7.5% surgical scrub, patient draped.  .  Needle:  Spinal Needle (gauge): 25  Spinal/LP Needle Length (inches): 3.5 # of attempts: 1 and # of redirects:  Introducer used .        Assessment/Narrative  Paresthesias: No.  .  .  clear CSF fluid removed . Time Injected: 14:07      10/6/2020 2:07 PM

## 2020-10-06 NOTE — ANESTHESIA POSTPROCEDURE EVALUATION
Patient: Bonita Villarreal    Procedure(s):  Total Knee Arthroplasty    Diagnosis:Arthritis of left knee [M17.12]  Diagnosis Additional Information: No value filed.    Anesthesia Type:  Spinal, Peripheral Nerve Block    Note:  Anesthesia Post Evaluation    Patient location during evaluation: PACU  Patient participation: Able to participate in evaluation but full recovery from regional anesthesia has not yet ocurrred but is anticipated to occur within 48 hours  Level of consciousness: awake and alert  Pain management: adequate  Airway patency: patent  Cardiovascular status: acceptable and hemodynamically stable  Respiratory status: acceptable, room air and spontaneous ventilation  Hydration status: acceptable  PONV: none     Anesthetic complications: None          Last vitals:  Vitals:    10/06/20 1600 10/06/20 1612 10/06/20 1615   BP: (!) 112/92  132/79   Pulse: 66  69   Resp: 9  21   Temp:      SpO2: 99% 98% 98%         Electronically Signed By: JESI Silva CRNA  October 6, 2020  4:24 PM

## 2020-10-06 NOTE — INTERVAL H&P NOTE
The History and Physical has been reviewed, the patient has been examined and no changes have occurred in the patient's condition since the H & P was completed.

## 2020-10-06 NOTE — ANESTHESIA CARE TRANSFER NOTE
Patient: Bonita Villarreal    Procedure(s):  Total Knee Arthroplasty    Diagnosis: Arthritis of left knee [M17.12]  Diagnosis Additional Information: No value filed.    Anesthesia Type:   Spinal, Peripheral Nerve Block     Note:  Airway :Room Air  Patient transferred to:PACU  Handoff Report: Identifed the Patient, Identified the Reponsible Provider, Reviewed the pertinent medical history, Discussed the surgical course, Reviewed Intra-OP anesthesia mangement and issues during anesthesia, Set expectations for post-procedure period and Allowed opportunity for questions and acknowledgement of understanding      Vitals: (Last set prior to Anesthesia Care Transfer)    CRNA VITALS  10/6/2020 1510 - 10/6/2020 1610      10/6/2020             Pulse:  70    SpO2:  97 %                Electronically Signed By: JESI Silva CRNA  October 6, 2020  4:24 PM

## 2020-10-06 NOTE — ANESTHESIA PREPROCEDURE EVALUATION
"Anesthesia Pre-Procedure Evaluation    Patient: Bonita Villarreal   MRN: [id number] : 1935          Preoperative Diagnosis: Arthritis of left knee [M17.12]    Procedure(s):  Total Knee Arthroplasty    Past Medical History:   Diagnosis Date     Advance Care Planning 2012    Advance Care Planning 3/20/2016: Receipt of ACP document:  Received: Health Care Directive which was witnessed or notarized on 16.  Document not previously scanned.  Validation form completed and sent with document to be scanned.  Code Status needs to be updated to reflect choices in most recent ACP document.  Confirmed/documented designated decision maker(s).  Added by Cata Terrell RN, Advance Care Planning Liaison. Advance Care Planning 12: Patient does not have an Advance/Health Care Directive (HCD), given \"What is Advance Care Planning?\" flyer. Teena Peace       Arthritis      Diverticulitis of colon 2005    Patient keeps Rx for Augmentin to use PRN Problem list name updated by automated process. Provider to review     Diverticulosis of colon (without mention of hemorrhage)     history of recurrent diverticulitis     Hypertension      Past Surgical History:   Procedure Laterality Date     ARTHROPLASTY KNEE Right 2018    Procedure: ARTHROPLASTY KNEE;  Right total knee arthroplasty;  Surgeon: Anton Grover MD;  Location: [facility]      APPENDECTOMY       COLONOSCOPY  04/15/02     COLONOSCOPY  3/25/2013    Procedure: COLONOSCOPY;  Colonoscopy  ;  Surgeon: Jamil Porras MD;  Location: [facility]     FLEXIBLE SIGMOIDOSCOPY  96     ORTHOPEDIC SURGERY  22-23 YEARS AGO    BACK     SURGICAL HISTORY OF -       lumbar surgery       Anesthesia Evaluation     . Pt has had prior anesthetic. Type: General, MAC and Regional    No history of anesthetic complications          ROS/MED HX    ENT/Pulmonary:  - neg pulmonary ROS     Neurologic:  - neg neurologic ROS     Cardiovascular:     (+) hypertension----. " : . . . :. dysrhythmias Other, . Previous cardiac testing date:results:Stress Testdate:8-2018 results:Impression  1. Myocardial perfusion imaging using single isotope technique  demonstrated normal myocardial perfusion. There is no ischemia or  infarction identified on this study.   2. Gated images demonstrated normal wall motion and normal left  ventricular chamber size. The left ventricular systolic function is  normal, with an ejection fraction measured at 52%.  3. No previous study available for comparisonECG reviewed date:7-2018 results:Sinus Rhythm   -Left bundle branch block.     ABNORMAL    date: results:          METS/Exercise Tolerance:  >4 METS   Hematologic:  - neg hematologic  ROS       Musculoskeletal:   (+) arthritis,  -       GI/Hepatic:     (+) Other GI/Hepatic diverticulosis      Renal/Genitourinary:  - ROS Renal section negative       Endo:     (+) Other Endocrine Disorder prediabetes.      Psychiatric:     (+) psychiatric history anxiety      Infectious Disease:  - neg infectious disease ROS       Malignancy:      - no malignancy   Other:    - neg other ROS                      Physical Exam  Normal systems: cardiovascular, pulmonary and dental    Airway   Mallampati: I  TM distance: >3 FB  Neck ROM: full    Dental     Cardiovascular       Pulmonary             Lab Results   Component Value Date    WBC 6.8 09/24/2020    HGB 14.0 09/24/2020    HCT 42.9 09/24/2020     09/24/2020    .0 (H) 06/29/2018    SED 33 (H) 06/28/2018     09/24/2020    POTASSIUM 4.3 09/24/2020    CHLORIDE 103 09/24/2020    CO2 27 09/24/2020    BUN 21 09/24/2020    CR 0.79 09/24/2020    GLC 96 09/24/2020    JEFF 9.4 09/24/2020    ALBUMIN 3.4 02/29/2020    PROTTOTAL 6.9 02/29/2020    ALT 18 02/29/2020    AST 22 02/29/2020    ALKPHOS 242 (H) 02/29/2020    BILITOTAL 0.7 02/29/2020    INR 0.94 02/28/2018    TSH 2.05 07/12/2018    T4 0.87 03/09/2005       Preop Vitals  BP Readings from Last 3 Encounters:  "  10/06/20 (!) 169/86   09/24/20 128/70   06/18/20 130/80    Pulse Readings from Last 3 Encounters:   10/06/20 62   09/24/20 62   06/18/20 70      Resp Readings from Last 3 Encounters:   10/06/20 18   09/24/20 16   06/18/20 16    SpO2 Readings from Last 3 Encounters:   10/06/20 96%   09/24/20 96%   06/18/20 98%      Temp Readings from Last 1 Encounters:   10/06/20 36.7  C (98  F) (Oral)    Ht Readings from Last 1 Encounters:   10/06/20 1.727 m (5' 8\")      Wt Readings from Last 1 Encounters:   10/06/20 79.8 kg (176 lb)    Estimated body mass index is 26.76 kg/m  as calculated from the following:    Height as of this encounter: 1.727 m (5' 8\").    Weight as of this encounter: 79.8 kg (176 lb).       Anesthesia Plan      History & Physical Review  History and physical reviewed and following examination; no interval change.    ASA Status:  2 .    NPO Status:  > 6 hours    Plan for Spinal and Peripheral Nerve Block Maintenance will be Balanced.    PONV prophylaxis:  Ondansetron (or other 5HT-3) and Dexamethasone or Solumedrol         Postoperative Care  Postoperative pain management:  IV analgesics, Oral pain medications, Peripheral nerve block (Single Shot) and Neuraxial analgesia.      Consents  Anesthetic plan, risks, benefits and alternatives discussed with:  Patient.  Use of blood products discussed: Yes.   Use of blood products discussed with Patient.  .                 JESI Silva CRNA  "

## 2020-10-06 NOTE — ANESTHESIA PROCEDURE NOTES
Procedure note : Adductor canal      Staff -   CRNA: Antoinette Carvalho APRN CRNA  Other Anesthesia Staff: Millie Markham  Performed By: AUGUST and CRNA  Pre-Procedure    Location:   Procedure Times:10/6/2020 3:46 PM and 10/6/2020 4:00 PM  Pre-Anesthestic Checklist: patient identified, IV checked, site marked, risks and benefits discussed, informed consent, monitors and equipment checked, pre-op evaluation and post-op pain management    Timeout  Correct Patient: Yes   Correct Procedure: Yes   Correct Site: Yes   Correct Laterality: Yes   Correct Position: Yes   Site Marked: Yes   .   Procedure Documentation    Diagnosis:PAIN.    Procedure: Adductor canal, left.   Patient Position:supine   Ultrasound used to identify targeted nerve, plexus, or vascular marker and placed a needle adjacent to it., Ultrasound was used to visualize the spread of the anesthetic in close proximity to the above stated nerve. A permanent image is entered into the patient's record.  Patient Prep/Sterile Barriers; mask, sterile gloves, chlorhexidine gluconate and isopropyl alcohol, patient draped.  .  Needle: insulated   Needle Gauge: 17.    Needle Length (Inches) 2   Insertion Method: Single Shot.        Assessment/Narrative  Paresthesias: No.  .  The placement was negative for: blood aspirated, painful injection and site bleeding.  Bolus given via needle. No blood aspirated via catheter.   Secured via.   Complications: none. Test dose of 5 mL lidocaine 2% w/ 1:200,000 epinephrine at 15:58. .

## 2020-10-07 ENCOUNTER — PATIENT OUTREACH (OUTPATIENT)
Dept: CARE COORDINATION | Facility: CLINIC | Age: 85
End: 2020-10-07

## 2020-10-07 ENCOUNTER — APPOINTMENT (OUTPATIENT)
Dept: PHYSICAL THERAPY | Facility: CLINIC | Age: 85
End: 2020-10-07
Attending: ORTHOPAEDIC SURGERY
Payer: COMMERCIAL

## 2020-10-07 VITALS
TEMPERATURE: 97.5 F | WEIGHT: 176 LBS | RESPIRATION RATE: 16 BRPM | SYSTOLIC BLOOD PRESSURE: 124 MMHG | OXYGEN SATURATION: 94 % | BODY MASS INDEX: 26.67 KG/M2 | HEART RATE: 65 BPM | HEIGHT: 68 IN | DIASTOLIC BLOOD PRESSURE: 48 MMHG

## 2020-10-07 LAB — HGB BLD-MCNC: 12.2 G/DL (ref 11.7–15.7)

## 2020-10-07 PROCEDURE — 85018 HEMOGLOBIN: CPT | Performed by: ORTHOPAEDIC SURGERY

## 2020-10-07 PROCEDURE — 250N000013 HC RX MED GY IP 250 OP 250 PS 637: Performed by: ORTHOPAEDIC SURGERY

## 2020-10-07 PROCEDURE — 36415 COLL VENOUS BLD VENIPUNCTURE: CPT | Performed by: ORTHOPAEDIC SURGERY

## 2020-10-07 PROCEDURE — 258N000003 HC RX IP 258 OP 636: Performed by: ORTHOPAEDIC SURGERY

## 2020-10-07 PROCEDURE — 97161 PT EVAL LOW COMPLEX 20 MIN: CPT | Mod: GP | Performed by: PHYSICAL THERAPIST

## 2020-10-07 PROCEDURE — 97116 GAIT TRAINING THERAPY: CPT | Mod: GP | Performed by: PHYSICAL THERAPIST

## 2020-10-07 PROCEDURE — 250N000011 HC RX IP 250 OP 636: Performed by: ORTHOPAEDIC SURGERY

## 2020-10-07 PROCEDURE — 97110 THERAPEUTIC EXERCISES: CPT | Mod: GP | Performed by: PHYSICAL THERAPIST

## 2020-10-07 RX ADMIN — SENNOSIDES AND DOCUSATE SODIUM 1 TABLET: 8.6; 5 TABLET ORAL at 08:28

## 2020-10-07 RX ADMIN — ASPIRIN 325 MG: 325 TABLET, COATED ORAL at 08:27

## 2020-10-07 RX ADMIN — METHOCARBAMOL 500 MG: 500 TABLET, FILM COATED ORAL at 00:45

## 2020-10-07 RX ADMIN — CEFAZOLIN SODIUM 1 G: 1 INJECTION, SOLUTION INTRAVENOUS at 03:34

## 2020-10-07 RX ADMIN — HYDROMORPHONE HYDROCHLORIDE 0.4 MG: 1 INJECTION, SOLUTION INTRAMUSCULAR; INTRAVENOUS; SUBCUTANEOUS at 01:07

## 2020-10-07 RX ADMIN — ACETAMINOPHEN 975 MG: 325 TABLET, FILM COATED ORAL at 03:35

## 2020-10-07 RX ADMIN — OXYCODONE HYDROCHLORIDE 10 MG: 5 TABLET ORAL at 06:01

## 2020-10-07 RX ADMIN — KETOROLAC TROMETHAMINE 15 MG: 15 INJECTION, SOLUTION INTRAMUSCULAR; INTRAVENOUS at 06:29

## 2020-10-07 RX ADMIN — SODIUM CHLORIDE, POTASSIUM CHLORIDE, SODIUM LACTATE AND CALCIUM CHLORIDE: 600; 310; 30; 20 INJECTION, SOLUTION INTRAVENOUS at 06:36

## 2020-10-07 RX ADMIN — KETOROLAC TROMETHAMINE 15 MG: 15 INJECTION, SOLUTION INTRAMUSCULAR; INTRAVENOUS at 00:45

## 2020-10-07 RX ADMIN — OXYCODONE HYDROCHLORIDE 5 MG: 5 TABLET ORAL at 10:43

## 2020-10-07 NOTE — PROGRESS NOTES
"SPIRITUAL HEALTH SERVICES  [facility] - Med/Surg    Referral Source: Avinash requested visit during her pre-op phone call    - I was not able to visit Avinash before her surgery and had hoped to visit the next morning.  I made one attempt but she was with PT.  When I came back for second attempt she was hearing her discharge instructions from [facility].  I waited and as she was being wheeled to the door she thanked me for the quick attempt and said, \"Why don't you say a quick prayer for my transition home and rehab ahead.\"  I promised that I would.    - Avinash my prayer is that God would provide for you a healthy recovery; that his hand of healing would be on you, and that the rehab process, as it plays out day by day would go well.  May your pain be minimal and managed.  May you sense the love and care of your family around you and the presence of God.  Amen.    Plan: No follow up visit because of discharge.    Cristhian Watkins M.A., [facility]  Lead   [facility]  Office: [phone]    "

## 2020-10-07 NOTE — PROGRESS NOTES
Clinic Care Coordination Contact  [facility]/Voicemail    Referral Source: IP Handoff  Clinical Data: Care Coordinator Outreach  Outreach attempted x 1.  Left message on patient's voicemail with call back information and requested return call.  Plan: Care Coordinator will send care coordination introduction letter with care coordinator contact information and explanation of care coordination services via mail. Care Coordinator will try to reach patient again in 1-2 business days.  Denzel Reyes RN  Primary Care Clinic RN Care Coordinator  [facility]   [phone]

## 2020-10-07 NOTE — PLAN OF CARE
"[facility] ADMISSION NOTE    Patient admitted to room 2205 at approximately 1816 via cart from surgery. Patient was accompanied by transport tech.     Verbal SBAR report received from Rubina OREILLY prior to patient arrival.     Patient trasferred to bed via air julissa. Patient alert and oriented X 3. The patient is not having any pain. 0-10 Pain Scale: 0. Admission vital signs: Blood pressure (!) (P) 150/67, pulse (P) 65, temperature (P) 97.3  F (36.3  C), temperature source (P) Oral, resp. rate (P) 16, height 1.727 m (5' 8\"), weight 79.8 kg (176 lb), SpO2 (P) 96 %, not currently breastfeeding. Patient was oriented to plan of care, call light, bed controls, tv, telephone, bathroom, and visiting hours.     Risk Assessment    The following safety risks were identified during admission: fall. Yellow risk band applied: YES.     Skin Initial Assessment    This writer admitted this patient and completed a full skin assessment and Neal score in the Adult PCS flowsheet. Appropriate interventions initiated as needed.     Secondary skin check completed by Miguelina CHAU RN.         Education    Patient has a [place] to Observation order: No  Observation education completed and documented: N/A      Kelly Villalta RN      "

## 2020-10-07 NOTE — PLAN OF CARE
[facility] DISCHARGE NOTE    Patient discharged to home at 11:15 AM via wheel chair. Accompanied by staff. Discharge instructions reviewed with patient, opportunity offered to ask questions. Prescriptions sent to patients preferred pharmacy. All belongings sent with patient.    Danielle Campos RN

## 2020-10-07 NOTE — PLAN OF CARE
Physical Therapy Discharge Summary    Reason for therapy discharge:    Discharged to home with home therapy.    Progress towards therapy goal(s). See goals on Care Plan in [facility] electronic health record for goal details.  Goals met   One time RX session- Mobility sufficient for return home.  HEP issued.    Therapy recommendation(s):    Continued therapy is recommended.  Rationale/Recommendations:  HC PT to progress ROM/ strength and gait activities .

## 2020-10-07 NOTE — PROGRESS NOTES
"                  Orthopaedics Progress Note      Post-operative Day: 1 Day Post-Op    Procedure(s):  Total Knee Arthroplasty      Subjective:    Pain: minimal - well controlled w/pain meds.   Chest pain, SOB:  No  Voiding, no nausea/vomiting.     Objective:  Blood pressure 124/48, pulse 65, temperature 97.5  F (36.4  C), temperature source Oral, resp. rate 16, height 1.727 m (5' 8\"), weight 79.8 kg (176 lb), SpO2 94 %, not currently breastfeeding.    Patient Vitals for the past 24 hrs:   BP Temp Temp src Pulse Resp SpO2 Height Weight   10/07/20 0716 124/48 97.5  F (36.4  C) Oral 65 16 94 % -- --   10/07/20 0250 (!) 143/65 97.3  F (36.3  C) Oral 71 16 95 % -- --   10/07/20 0136 -- -- -- -- -- 95 % -- --   10/07/20 0041 -- -- -- -- -- 93 % -- --   10/06/20 2242 (!) 160/81 97.6  F (36.4  C) Oral 71 16 95 % -- --   10/06/20 2215 -- -- -- -- 14 -- -- --   10/06/20 2203 -- -- -- -- 16 -- -- --   10/06/20 1935 (!) 150/67 97.3  F (36.3  C) Oral 65 16 96 % -- --   10/06/20 1825 (!) 159/77 -- -- 64 16 -- -- --   10/06/20 1715 (!) 150/82 -- -- 64 14 98 % -- --   10/06/20 1700 (!) 159/74 -- -- 65 19 95 % -- --   10/06/20 1645 137/69 97.5  F (36.4  C) Axillary 65 12 98 % -- --   10/06/20 1630 134/70 -- -- 65 15 99 % -- --   10/06/20 1615 132/79 -- -- 69 21 98 % -- --   10/06/20 1612 -- -- -- -- -- 98 % -- --   10/06/20 1600 (!) 112/92 -- -- 66 9 99 % -- --   10/06/20 1544 123/64 99.1  F (37.3  C) Axillary 72 16 96 % -- --   10/06/20 1157 (!) 169/86 98  F (36.7  C) Oral 62 18 96 % 1.727 m (5' 8\") 79.8 kg (176 lb)       Wt Readings from Last 4 Encounters:   10/06/20 79.8 kg (176 lb)   09/24/20 81.9 kg (180 lb 9.6 oz)   06/18/20 81.6 kg (180 lb)   06/04/20 80.7 kg (178 lb)         Motor function, sensation, and circulation intact   Yes  Wound status: incisions are clean dry and intact. Yes  Calf tenderness: Bilateral  No    Pertinent Labs   Lab Results: personally reviewed.     Recent Labs   Lab Test 10/07/20  0626 09/24/20  1103 " 05/06/20  1028 03/11/20  2113 03/01/20  0542 06/29/18  0607 06/29/18  0607 06/28/18  1710 02/28/18  1349 02/28/18  1349   INR  --   --   --   --   --   --   --   --   --  0.94   HGB 12.2 14.0  --  12.8 11.8   < > 11.9 13.0   < > 13.7   HCT  --  42.9  --  38.6 35.9   < > 37.7 39.5   < > 40.9   MCV  --  91  --  91 92   < > 93 89   < > 92   PLT  --  256  --  388 266   < > 187 250   < > 280   NA  --  134 140 133 136   < > 134 129*   < >  --    CRP  --   --   --   --   --   --  242.0* >1,900.0*  --   --     < > = values in this interval not displayed.       Plan: Anticoagulation protocol:  mg daily  x 42  days            Pain medications:  oxycodone and tylenol            Weight bearing status:  WBAT            Disposition:  Home with home PT today             Continue cares and rehabilitation     Report completed by:  Ale Gong PA-C  Date: 10/7/2020  Time: 8:23 AM

## 2020-10-07 NOTE — PLAN OF CARE
"A&O. Up with assist x 1 and walker and gait belt to bedside commode. Surgical ACE wrap is clean, dry, and intact. Patient has full sensation. CMS intact. LR infusing continuously at 75 ml/hr. Capno in place. Bladder scanned for 368 ml overnight. Voiding adequately. 10 mg Oxy PRN x 1, 0.4 mg IV Dilaudid PRN x 1, and Robaxin PRN x 1 used for pain control, which is improving. No reports of N&V. /48   Pulse 65   Temp 97.5  F (36.4  C) (Oral)   Resp 16   Ht 1.727 m (5' 8\")   Wt 79.8 kg (176 lb)   LMP  (LMP Unknown)   SpO2 94%   Breastfeeding No   BMI 26.76 kg/m  .       Sheila Jones RN on 10/7/2020 at 7:40 AM    "

## 2020-10-07 NOTE — CONSULTS
Care Management Assessment and Discharge Consult    General Information:  Patient's communication style: spoken language (English or Bilingual)  Hearing Difficulty or Deaf: no  Wear Glasses or Blind: yes  Cognitive/Neuro/Behavioral: WDL                 Advance Care Planning: Advance Care Planning Reviewed: no concerns identified       Living Environment:   People in home: child(obdulio), adult  Current living Arrangements: house          Family/Social Support:  Care provided by: self, child(obdulio)  Provides care for: no one  Description of Support System: Supportive, Involved  Description of Support System: Supportive, Involved  Support Assessment: Adequate family and caregiver support    Lifestyle & Psychosocial Needs:        Socioeconomic History     Marital status:      Spouse name: Not on file     Number of children: Not on file     Years of education: Not on file     Highest education level: Not on file     Tobacco Use     Smoking status: Never Smoker     Smokeless tobacco: Never Used   Substance and Sexual Activity     Alcohol use: Yes     Comment: rare     Drug use: No     Sexual activity: Not Currently     Partners: Male       Functional Status:  Prior to admission patient needed assistance:    Assesssment of Functional Status: Not at baseline with mobility    Current Resources:   Skilled Home Care Services:  none  Community Resources: None  Equipment currently used at home: walker, rolling(not using at baseline)  Supplies currently used at home:  none    Employment:  Employment Status: retired,    Financial/Environmental Concerns: other (see comments)(NA),      Values/Beliefs:  Spiritual, Cultural Beliefs, Jewish Practices, Values that affect care: no             Discharge Planning:  Expected Discharge Date: 10/07/20  Concerns to be Addressed: all concerns addressed in this encounter, discharge planning       Anticipated Discharge Disposition: Home, Home Care    Patient/family educated on Medicare  website which has current facility and service quality ratings: yes  Referrals Placed by CM/SW: Homecare  Education Provided on the Discharge Plan:  yes  Patient/Family in Agreement with the Plan: yes      Additional Information  Spoke with patient via phone, introduced self and role.  Patient currently lives in an apartment attached to his son and DIL.  She has no current in-home services.  She is able to perform her own ADLs at baseline, she continues to drive is active in the community.      Discussed discharge plans.  Discussed home care options.  Patient is agreeable to home care services.  She chose [facility] ([phone] Fax: [phone]).  Order/referral placed for PT.      Patient plans for family to provide transportation home upon discharge.      Latrice HEWITT RN  Inpatient Care Coordinator  [facility] [phone]  [facility] [phone]    HOME CARE HAND OFF  Patient Name: Bonita Villarreal    MRN: [id number]    : 1935    Patient Zip Code: [address]    Admit Diagnosis: Arthritis of left knee [M17.12]      Services Pt Needs at Home: PT    Discharge Support: Family/Friend Support    Living Arrangements: With family     or Address Other Than Pt: No    Wound Care: No    Anticipate DC Date: 10/7/2020

## 2020-10-07 NOTE — DISCHARGE SUMMARY
Orthopedics Discharge Summary                                  [facility]     KEN IBRAHIM [id number]   Age: 85 year old  PCP: Dewayne Santana, [phone] 1935     Date of Admission:  10/6/2020  Date of Discharge::  10/7/2020  Discharge Physician:  Ale Gong PA-C    Code status:  Full Code    Admission Information:  Admission Diagnosis:  Arthritis of left knee [M17.12]    Post-Operative Day: 1 Day Post-Op     Reason for admission:  The patient was admitted for the following:Procedure(s) (LRB):  Total Knee Arthroplasty (Left)    Active Problems:    Status post total knee replacement      Allergies:  Bactrim [sulfamethoxazole w/trimethoprim] and Lisinopril    Following the procedure noted above the patient was transferred to the post-op floor and started on:    Therapy:  physical therapy and occupational therapy  Anticoagulation Plan:  mg daily  for 42 days  Pain Management: oxycodone, toradol and tylenol  Weight bearing status: Weight bearing as tolerated     The patient was followed and co-managed by the hospitalist service during the inpatient treatment course  Complications:  None  Consultations:  None     Pertinent Labs   Lab Results: personally reviewed.     Recent Labs   Lab Test 10/07/20  0626 09/24/20  1103 05/06/20  1028 03/11/20  2113 03/01/20  0542 06/29/18  0607 06/29/18  0607 06/28/18  1710 02/28/18  1349 02/28/18  1349   INR  --   --   --   --   --   --   --   --   --  0.94   HGB 12.2 14.0  --  12.8 11.8   < > 11.9 13.0   < > 13.7   HCT  --  42.9  --  38.6 35.9   < > 37.7 39.5   < > 40.9   MCV  --  91  --  91 92   < > 93 89   < > 92   PLT  --  256  --  388 266   < > 187 250   < > 280   NA  --  134 140 133 136   < > 134 129*   < >  --    CRP  --   --   --   --   --   --  242.0* >1,900.0*  --   --     < > = values in this interval not displayed.          Discharge Information:  Condition at discharge: Stable  Discharge destination:   Admited to home care:   Agency: [facility]  Discharged to home     Medications at discharge:  Current Discharge Medication List      START taking these medications    Details   acetaminophen (TYLENOL) 325 MG tablet Take 2 tablets (650 mg) by mouth every 4 hours as needed for other (mild pain)  Qty: 100 tablet, Refills: 0    Associated Diagnoses: Status post left knee replacement      aspirin (ASA) 325 MG EC tablet Take 1 tablet (325 mg) by mouth daily  Qty: 40 tablet, Refills: 0    Associated Diagnoses: Status post left knee replacement      hydrOXYzine (ATARAX) 10 MG tablet Take 1 tablet (10 mg) by mouth every 6 hours as needed for itching or anxiety (with pain, moderate pain)  Qty: 30 tablet, Refills: 0    Associated Diagnoses: Status post left knee replacement      oxyCODONE (ROXICODONE) 5 MG tablet Take 1-2 tablets (5-10 mg) by mouth every 3 hours as needed for pain (Moderate to Severe)  Qty: 56 tablet, Refills: 0    Associated Diagnoses: Status post left knee replacement      senna-docusate (SENOKOT-S/PERICOLACE) 8.6-50 MG tablet Take 1-2 tablets by mouth daily as needed for constipation Take while on oral narcotics to prevent or treat constipation.  Qty: 30 tablet, Refills: 0    Comments: While taking narcotics  Associated Diagnoses: Status post left knee replacement         CONTINUE these medications which have CHANGED    Details   ibuprofen (ADVIL/MOTRIN) 600 MG tablet Take 1 tablet (600 mg) by mouth every 8 hours as needed for pain (mild)  Qty: 30 tablet, Refills: 0    Associated Diagnoses: Status post left knee replacement         CONTINUE these medications which have NOT CHANGED    Details   atenolol (TENORMIN) 100 MG tablet Take 1 tablet (100 mg) by mouth daily  Qty: 90 tablet, Refills: 3    Associated Diagnoses: Essential hypertension, benign      escitalopram (LEXAPRO) 10 MG tablet Take 1 tablet (10 mg) by mouth daily  Qty: 90 tablet, Refills: 3    Associated Diagnoses: Generalized anxiety disorder       losartan-hydrochlorothiazide (HYZAAR) 100-12.5 MG tablet Take 1 tablet by mouth daily  Qty: 90 tablet, Refills: 3    Associated Diagnoses: Essential hypertension, benign      MEGARED OMEGA-3 KRILL OIL PO Take 1 capsule by mouth daily      multivitamin (CENTRUM SILVER) tablet Take 1 tablet by mouth daily      oxybutynin ER (DITROPAN-XL) 5 MG 24 hr tablet Take 1 tablet (5 mg) by mouth daily  Qty: 90 tablet, Refills: 4    Associated Diagnoses: Urgency incontinence                        Follow-Up Care:  Patient should be seen in the office in 10-14 days by the Orthopedic Surgeon/Physician Assistant.  Call [phone] for appointment or questions.    Ale Gong PA-C

## 2020-10-07 NOTE — LETTER
[place] CARE COORDINATION  [facility]  [address]  [facility] [address]  Phone: [phone]    October 8, 2020    Bonita Villarreal  [address] [address] [place] [id number]      Dear Bonita,    I am a clinic care coordinator who works with Dewayne Santana MD at [facility]. I recently tried to call and was unable to reach you. Below is a description of clinic care coordination and how I can further assist you.      The clinic care coordination team is made up of a registered nurse,  and community health worker who understand the health care system. The goal of clinic care coordination is to help you manage your health and improve access to the health care system in the most efficient manner. The team can assist you in meeting your health care goals by providing education, coordinating services, strengthening the communication among your providers and supporting you with any resource needs.    Please feel free to contact me at [phone] with any questions or concerns. We are focused on providing you with the highest-quality healthcare experience possible and that all starts with you.     Sincerely,     Denzel Reyes RN  Clinic Care Coordinator

## 2020-10-07 NOTE — PROGRESS NOTES
[facility]  Spoke with patient to discuss plans for HC. Patient to be discharged home today and has agreed to have [facility] follow with PT. Patient care support center processing referral. Patient verbalized understanding that initial visit is scheduled for 10/8/20. Patient has 24 hour phone number for [facility] for any questions or concerns.    Anahi Guillen RN   [facility] Liaison   [phone]

## 2020-10-07 NOTE — PLAN OF CARE
"Patient alert and orientated X 3. Reporting numbness and tingling present in LLE starting to get feeling. Dangled at bedside this evening. Patient was eager moving in bed but was weak and unsteady to stand related to not being able to feel her leg. Reports minimal pain. Unable to void. Bladder scanned at 2120 114 ml. Patient does report feeling dry. IV fluids are infusing, capnograpy on, and PCD's are on RLE. Patient eating and drinking well with no complaints.   2200 PO oxycodone given as patient reports pain in right knee starting.   Continue to monitor and plan of cares.     BP (!) 160/81   Pulse 71   Temp 97.6  F (36.4  C) (Oral)   Resp 16   Ht 1.727 m (5' 8\")   Wt 79.8 kg (176 lb)   LMP  (LMP Unknown)   SpO2 95%   Breastfeeding No   BMI 26.76 kg/m       Kelly Villalta RN on 10/6/2020 at 11:00 PM          "

## 2020-10-07 NOTE — PROGRESS NOTES
10/07/20 0959   Quick Adds   Type of Visit Initial PT Evaluation   Living Environment   Current Living Arrangements house   Home Accessibility no concerns   Transportation Anticipated family or friend will provide   Living Environment Comments apartment attached to  her son's home   Self-Care   Regular Exercise Yes   Activity/Exercise Type walking  (1 mile)   Equipment Currently Used at Home none   Disability/Function   Hearing Difficulty or Deaf no   Wear Glasses or Blind yes   Concentrating, Remembering or Making Decisions Difficulty no   Difficulty Communicating no   Difficulty Eating/Swallowing no   Walking or Climbing Stairs Difficulty yes   Mobility Management PLOF- Indep. amb. w/o a device, pain, limping gait pattern per pt   Dressing/Bathing Difficulty no   Toileting no   Doing Errands Independently Difficulty (such as shopping) no   Fall history within last six months yes  (end of jan slipped on ice)   Number of times patient has fallen within last six months 1   General Information   Onset of Illness/Injury or Date of Surgery 10/06/20   Referring Physician Calixto Santana MD    Patient/Family Therapy Goals Statement (PT) return home   Pertinent History of Current Problem (include personal factors and/or comorbidities that impact the POC) Primary left knee degenerative joint disease. ; s/p Left total knee arthroplasty.    Weight-Bearing Status - LLE weight-bearing as tolerated   General Observations Pt alert, up in the chair- reporting pain at 2/10   Pain Assessment   Patient Currently in Pain Yes, see Vital Sign flowsheet   Integumentary/Edema   Integumentary/Edema Comments Nt- compression ace wrap in place   Range of Motion (ROM)   ROM Comment 0-5-70   MMT: Knee, Rehab Eval   Knee Extension - Left Side (3/5) fair, left   Bed Mobility   Comment (Bed Mobility) Indep sitting> supine   Transfers   Transfer Safety Comments SBA sit<> stand w/ RW   Gait/Stairs (Locomotion)   [place] Level (Gait)  supervision   Assistive Device (Gait) walker, front-wheeled   Distance in Feet (Required for LE Total Joints)   (80 feet)   Pattern (Gait) step-through   Deviations/Abnormal Patterns (Gait) reyes decreased   Maintains Weight-bearing Status (Gait) able to maintain   Comment (Gait/Stairs) Stairs- NT, no steps at home   Balance   Balance Comments Good dynamic standing balance   Sensory Examination   Sensory Perception patient reports no sensory changes   Clinical Impression   Criteria for Skilled Therapeutic Intervention yes, treatment indicated   PT Diagnosis (PT) Left total knee arthroplasty.    Influenced by the following impairments Decreased ROM/ strength Left LE, pain   Functional limitations due to impairments Altered mobility   Clinical Presentation Stable/Uncomplicated   Clinical Presentation Rationale clinical judgement   Clinical Decision Making (Complexity) low complexity   Therapy Frequency (PT) One time eval and treatment only   Predicted Duration of Therapy Intervention (days/wks) one session   Planned Therapy Interventions (PT) gait training;home exercise program;ROM (range of motion);stair training;strengthening   Anticipated Equipment Needs at Discharge (PT)   (Pt has 4ww for home use)   Risk & Benefits of therapy have been explained care plan/treatment goals reviewed;risks/benefits reviewed;patient   Clinical Impression Comments One time RX session- Mobility sufficient for return home.  HEP issued.  Screend for OT needs-  pt declines as a she does not anticiapte difficulty w/ ADLS- dressing, toileting, showering- has equipment from previous surgery    PT Discharge Planning    PT Discharge Recommendation (DC Rec) home with home care physical therapy   PT Rationale for DC Rec   (POD 1 status)   PT Brief overview of current status  One time seesion- Mobility suffiicient to return home;  PT recommneded    Therapy Certification   Start of care date 10/07/20   Certification date from 10/07/20  "  Certification date to 10/07/20   [facility] AM-PAC  \"6 Clicks\" V.2 Basic Mobility Inpatient Short Form   1. Turning from your back to your side while in a flat bed without using bedrails? 4 - None   2. Moving from lying on your back to sitting on the side of a flat bed without using bedrails? 4 - None   3. Moving to and from a bed to a chair (including a wheelchair)? 4 - None   4. Standing up from a chair using your arms (e.g., wheelchair, or bedside chair)? 4 - None   5. To walk in hospital room? 4 - None   6. Climbing 3-5 steps with a railing? 3 - A Little   Basic Mobility Raw Score (Score out of 24.Lower scores equate to lower levels of function) 23   Total Evaluation Time   Total Evaluation Time (Minutes) 10     "

## 2020-10-07 NOTE — PLAN OF CARE
[facility]      OUTPATIENT PHYSICAL THERAPY EVALUATION  PLAN OF TREATMENT FOR OUTPATIENT REHABILITATION  (COMPLETE FOR INITIAL CLAIMS ONLY)  Patient's Last Name, First Name, M.I.  YOB: 1935  Bonita Villarreal                        Provider's Name  [facility] Medical Record No.  [id number]                               Onset Date:  10/06/20   Start of Care Date:  10/07/20      Type:     _X_PT   ___OT   ___SLP Medical Diagnosis:   Left total knee arthroplasty.                         PT Diagnosis:  Left total knee arthroplasty.    Visits from SOC:  1   _________________________________________________________________________________  Plan of Treatment/Functional Goals    Planned Interventions: gait training, home exercise program, ROM (range of motion), stair training, strengthening     Goals: See Physical Therapy Goals on Care Plan in [facility] electronic health record.    Therapy Frequency: One time eval and treatment only  Predicted Duration of Therapy Intervention: one session  _________________________________________________________________________________    I CERTIFY THE NEED FOR THESE SERVICES FURNISHED UNDER        THIS PLAN OF TREATMENT AND WHILE UNDER MY CARE     (Physician co-signature of this document indicates review and certification of the therapy plan).                Certification date from: 10/07/20, Certification date to: 10/07/20    Referring Physician: Calixto Santana MD             Initial Assessment        See Physical Therapy evaluation dated 10/07/20 in Epic electronic health record.

## 2020-10-08 NOTE — PROGRESS NOTES
Clinic Care Coordination Contact  [facility]/Voicemail    Referral Source: IP Handoff  Clinical Data: Care Coordinator Outreach  Outreach attempted x 2.  Left message on patient's voicemail with call back information and requested return call.  Plan: Care Coordinator will send care coordination introduction letter with care coordinator contact information and explanation of care coordination services via mail. Care Coordinator will do no further outreaches at this time.  Denzel Reyes RN  Primary Care Clinic RN Care Coordinator  [facility]   [phone]

## 2020-10-27 ENCOUNTER — HOSPITAL ENCOUNTER (OUTPATIENT)
Dept: PHYSICAL THERAPY | Facility: CLINIC | Age: 85
Setting detail: THERAPIES SERIES
End: 2020-10-27
Attending: ORTHOPAEDIC SURGERY
Payer: COMMERCIAL

## 2020-10-27 PROCEDURE — 97161 PT EVAL LOW COMPLEX 20 MIN: CPT | Mod: GP | Performed by: PHYSICAL THERAPIST

## 2020-10-27 PROCEDURE — 97110 THERAPEUTIC EXERCISES: CPT | Mod: GP | Performed by: PHYSICAL THERAPIST

## 2020-10-27 NOTE — PROGRESS NOTES
Bonita Villarreal  1935 Physical Therapy Initial Evaluation  10/27/20 1100   General Information   Type of Visit Initial OP Ortho PT Evaluation   Start of Care Date 10/27/20   Referring Physician Calixto Santana MD   Patient/Family Goals Statement Walk without a cane   Orders Evaluate and Treat   Date of Order 10/06/20   Certification Required? Yes   Medical Diagnosis S/P left TKA   Surgical/Medical history reviewed Yes   Precautions/Limitations no known precautions/limitations   Body Part(s)   Body Part(s) Knee   Presentation and Etiology   Pertinent history of current problem (include personal factors and/or comorbidities that impact the POC) Had left TKA on 10/6/2020. Had home therapy for 3 visits. Had some knee pain last night and felt like knee locked. Has had muscle cramps since the surgery, but those have gone away for the most part. Bruising has come down as well. Has been icing it a lot. Has been doing exercises at home. / Comorbidities - Sacral pain, Prediabetes, Osteopenia of spine, Fracture of sacrum   Impairments C. Swelling;E. Decreased flexibility;L. Tingling;M. Locking or catching   Functional Limitations perform activities of daily living;perform desired leisure / sports activities   Symptom Location Left knee   How/Where did it occur Other  (Surgery)   Onset date of current episode/exacerbation 10/06/20  (Date of surgery)   Chronicity Chronic   Pain rating (0-10 point scale) Worst (/10);Best (/10)   Best (/10) 1   Worst (/10) 3   Pain quality C. Aching   Frequency of pain/symptoms B. Intermittent   Pain/symptoms exacerbated by A. Sitting;G. Certain positions   Pain/symptoms eased by A. Sitting   Progression of symptoms since onset: Improved   Prior Level of Function   Functional Level Prior Comment Independent in ADLs   Current Level of Function   Patient role/employment history F. Retired   Living environment House/townhome   Home/community accessibility 3 steps into the house with rail    Current equipment-Gait/Locomotion Standard cane   Fall Risk Screen   Fall screen completed by PT   Have you fallen 2 or more times in the past year? No   Have you fallen and had an injury in the past year? Yes   Timed Up and Go score (seconds) 12   Is patient a fall risk? No   Abuse Screen (yes response referral indicated)   Feels Unsafe at Home or Work/School no   Feels Threatened by Someone no   Does Anyone Try to Keep You From Having Contact with Others or Doing Things Outside Your Home? no   Physical Signs of Abuse Present no   Knee Objective Findings   Side (if bilateral, select both right and left) Left   Observation Mild swelling of knee and lower leg   Integumentary  Surgical site healing well with no signs of infection. Mild ecchymosis   Gait/Locomotion Uses single point cane with step through gait pattern and minimal heel strike and toe off.   Left Knee Extension AROM 11 degrees short of TKE / Right - 5 degrees short of TKE   Left Knee Flexion PROM 104 / Right - 121   Left Knee Flexion Strength <3/5 due to ROM deficits   Left Knee Extension Strength <3/5 due to ROM deficits   Left Hamstring Flexibility Moderately restricted B   Planned Therapy Interventions   Planned Therapy Interventions manual therapy;neuromuscular re-education;ROM;strengthening;stretching   Planned Modality Interventions   Planned Modality Interventions Cryotherapy;Hot packs;Ultrasound   Clinical Impression   Criteria for Skilled Therapeutic Interventions Met yes, treatment indicated   PT Diagnosis Left TKA with gait deficits   Influenced by the following impairments Pain, ROM and strength deficits   Functional limitations due to impairments Difficulty ambulating, stair climbing   Clinical Presentation Stable/Uncomplicated   Clinical Presentation Rationale Several comorbidities impacting PT / 1 body system   Clinical Decision Making (Complexity) Low complexity   Therapy Frequency   (1-2 times / week)   Predicted Duration of Therapy  Intervention (days/wks) 6 weeks   Risk & Benefits of therapy have been explained Yes   Patient, Family & other staff in agreement with plan of care Yes   Education Assessment   Preferred Learning Style Listening;Reading;Demonstration;Pictures/video   Barriers to Learning No barriers   ORTHO GOALS   PT Ortho Eval Goals 1;2;3;4   Ortho Goal 1   Goal Identifier HEP   Goal Description Pt will be independent in HEP in order to achieve and maintain long term treatment goals.   Target Date 11/27/20   Ortho Goal 2   Goal Identifier ROM   Goal Description Pt will be have ROM within 3 degrees of uninvolved side to aid in stair ascend and descent.   Target Date 12/08/20   Ortho Goal 3   Goal Identifier Ambulation   Goal Description Pt will be able to ambulate community distances without the use of an assistive device safely.   Target Date 12/08/20   Ortho Goal 4   Goal Identifier Stairs   Goal Description Pt will be able to ascend and descend 1 flight of stairs with a reciprical gait pattern and a handrail assist safely.   Target Date 12/08/20   Total Evaluation Time   PT Sarah Low Complexity Minutes (20437) 20   Therapy Certification   Certification date from 10/27/20   Certification date to 12/08/20   Medical Diagnosis S/P left TKA     ERICH Bain, PT

## 2020-10-27 NOTE — PROGRESS NOTES
[facility]          OUTPATIENT PHYSICAL THERAPY ORTHOPEDIC EVALUATION  PLAN OF TREATMENT FOR OUTPATIENT REHABILITATION  (COMPLETE FOR INITIAL CLAIMS ONLY)  Patient's Last Name, First Name, M.I.  YOB: 1935  Bointa Villarreal    Provider s Name:  [facility]   Medical Record No.  [id number]   Start of Care Date:  10/27/20   Onset Date:  10/06/20(Date of surgery)   Type:     _X__PT   ___OT   ___SLP Medical Diagnosis:  S/P left TKA     PT Diagnosis:  Left TKA with gait deficits   Visits from SOC:  1      _________________________________________________________________________________  Plan of Treatment/Functional Goals:  manual therapy, neuromuscular re-education, ROM, strengthening, stretching     Cryotherapy, Hot packs, Ultrasound     Goals  Goal Identifier: HEP  Goal Description: Pt will be independent in HEP in order to achieve and maintain long term treatment goals.  Target Date: 11/27/20    Goal Identifier: ROM  Goal Description: Pt will be have ROM within 3 degrees of uninvolved side to aid in stair ascend and descent.  Target Date: 12/08/20    Goal Identifier: Ambulation  Goal Description: Pt will be able to ambulate community distances without the use of an assistive device safely.  Target Date: 12/08/20    Goal Identifier: Stairs  Goal Description: Pt will be able to ascend and descend 1 flight of stairs with a reciprical gait pattern and a handrail assist safely.  Target Date: 12/08/20                                                Therapy Frequency:  (1-2 times / week)  Predicted Duration of Therapy Intervention:  6 weeks    Oli Osman, PT                 I CERTIFY THE NEED FOR THESE SERVICES FURNISHED UNDER        THIS PLAN OF TREATMENT AND WHILE UNDER MY CARE .             Physician Signature               Date    X_____________________________________________________                             Certification Date From:  10/27/20    Certification Date To:  12/08/20    Referring Provider:  Calixto Santana MD    Initial Assessment        See Epic Evaluation Start of Care Date: 10/27/20

## 2020-10-30 ENCOUNTER — HOSPITAL ENCOUNTER (OUTPATIENT)
Dept: PHYSICAL THERAPY | Facility: CLINIC | Age: 85
Setting detail: THERAPIES SERIES
End: 2020-10-30
Attending: ORTHOPAEDIC SURGERY
Payer: COMMERCIAL

## 2020-10-30 ENCOUNTER — DOCUMENTATION ONLY (OUTPATIENT)
Dept: OTHER | Facility: CLINIC | Age: 85
End: 2020-10-30

## 2020-10-30 PROCEDURE — 97110 THERAPEUTIC EXERCISES: CPT | Mod: GP | Performed by: PHYSICAL THERAPIST

## 2020-11-04 ENCOUNTER — HOSPITAL ENCOUNTER (OUTPATIENT)
Dept: PHYSICAL THERAPY | Facility: CLINIC | Age: 85
Setting detail: THERAPIES SERIES
End: 2020-11-04
Attending: ORTHOPAEDIC SURGERY
Payer: COMMERCIAL

## 2020-11-04 PROCEDURE — 97110 THERAPEUTIC EXERCISES: CPT | Mod: GP | Performed by: PHYSICAL THERAPIST

## 2020-11-06 ENCOUNTER — HOSPITAL ENCOUNTER (OUTPATIENT)
Dept: PHYSICAL THERAPY | Facility: CLINIC | Age: 85
Setting detail: THERAPIES SERIES
End: 2020-11-06
Attending: ORTHOPAEDIC SURGERY
Payer: COMMERCIAL

## 2020-11-06 PROCEDURE — 97110 THERAPEUTIC EXERCISES: CPT | Mod: GP | Performed by: PHYSICAL THERAPIST

## 2020-11-13 ENCOUNTER — HOSPITAL ENCOUNTER (OUTPATIENT)
Dept: PHYSICAL THERAPY | Facility: CLINIC | Age: 85
Setting detail: THERAPIES SERIES
End: 2020-11-13
Attending: ORTHOPAEDIC SURGERY
Payer: COMMERCIAL

## 2020-11-13 PROCEDURE — 97110 THERAPEUTIC EXERCISES: CPT | Mod: GP | Performed by: PHYSICAL THERAPIST

## 2020-11-18 ENCOUNTER — HOSPITAL ENCOUNTER (OUTPATIENT)
Dept: PHYSICAL THERAPY | Facility: CLINIC | Age: 85
Setting detail: THERAPIES SERIES
End: 2020-11-18
Attending: ORTHOPAEDIC SURGERY
Payer: COMMERCIAL

## 2020-11-18 PROCEDURE — 97110 THERAPEUTIC EXERCISES: CPT | Mod: GP | Performed by: PHYSICAL THERAPIST

## 2020-11-20 ENCOUNTER — HOSPITAL ENCOUNTER (OUTPATIENT)
Dept: PHYSICAL THERAPY | Facility: CLINIC | Age: 85
Setting detail: THERAPIES SERIES
End: 2020-11-20
Attending: ORTHOPAEDIC SURGERY
Payer: COMMERCIAL

## 2020-11-20 PROCEDURE — 97110 THERAPEUTIC EXERCISES: CPT | Mod: GP | Performed by: PHYSICAL THERAPIST

## 2020-11-24 ENCOUNTER — HOSPITAL ENCOUNTER (OUTPATIENT)
Dept: PHYSICAL THERAPY | Facility: CLINIC | Age: 85
Setting detail: THERAPIES SERIES
End: 2020-11-24
Attending: ORTHOPAEDIC SURGERY
Payer: COMMERCIAL

## 2020-11-24 PROCEDURE — 97110 THERAPEUTIC EXERCISES: CPT | Mod: GP | Performed by: PHYSICAL THERAPIST

## 2020-11-24 NOTE — PROGRESS NOTES
Bonita Villarreal  1935  Diagnosis - S/P Left TKA Physical Therapy Discharge Note  11/24/20 1300   Signing Clinician's Name / Credentials   Signing clinician's name / credentials Kp Osman PT, DPT   Session Number   Session Number 8 (Start of Care Date - 10/27/2020) (Progress Note Date Range - 10/27/2020 - 11/24/2020)   Progress Note/Recertification   Progress Note Due Date 12/08/20   Progress Note Completed Date 11/24/20   Recertification Due Date 12/08/20   Adult Goals   PT Ortho Eval Goals 1;2;3;4   Ortho Goal 1   Goal Identifier HEP   Goal Description Pt will be independent in HEP in order to achieve and maintain long term treatment goals.   Target Date 11/27/20   Date Met 11/13/20   Ortho Goal 2   Goal Identifier ROM   Goal Description Pt will be have ROM within 3 degrees of uninvolved side to aid in stair ascend and descent.   Target Date 12/08/20   Date Met 11/24/20   Ortho Goal 3   Goal Identifier Ambulation   Goal Description Pt will be able to ambulate community distances without the use of an assistive device safely.   Target Date 12/08/20   Date Met 11/18/20   Ortho Goal 4   Goal Identifier Stairs   Goal Description Pt will be able to ascend and descend 1 flight of stairs with a reciprical gait pattern and a handrail assist safely.   Target Date 12/08/20   Date Met 11/24/20   Subjective Report   Subjective Report Knee has been a little achy the last couple of days. Able to go for 15-20 minute walks right now. HEP going fine.     Objective Measures   Objective Measures Objective Measure 1;Objective Measure 2;Objective Measure 3   Objective Measure 1   Objective Measure ROM   Details 0-9-122   Objective Measure 2   Objective Measure ROM (Uninvolved)   Details 0-9-125   Objective Measure 3   Objective Measure Gait   Details Ambulates without AD with good balance / Stair ascent and descend with a reciprical gait pattern and a handrail assist   Treatment Interventions   Interventions Therapeutic  Procedure/Exercise;Manual Therapy;Gait Training   Therapeutic Procedure/exercise   Therapeutic Procedures: strength, endurance, ROM, flexibillity minutes (94663) 26   Skilled Intervention ROM and strengthening exercise education   Patient Response Improved motion   Treatment Detail UBE with seat at 11 x5 mins / AAROM for knee flexion in sitting x10 with 10 second holds / AAROM for knee extension in supine x5 with 30 second holds / Mini squats at sink x12 and x15 / Resisted TKE in standing x15 with YTB / Step-ups 2x10 on 6 inch step / Side steps at counter x6 lengths with YTB /    Plan   Homework Osteopenia   Home program Step-ups / Mini squats at sink / TKE in standing / Quad sets x10 with 5 second holds / HS sets x10 with 5 second holds / Heel slides x10 / SLR in supine x12 / AAROM for knee flexion in sitting x10 with 5 second holds / AAROM for knee extension in supine x6 with 5 second holds / LAQ x10 with 5 second holds / Knee flexion on stairs   Updates to plan of care Discharged   Comments   Comments Pt has done well in physical therapy and has met all goals. Pt states that she is able to complete all functional activities on a daily basis without issue. Pt would like to be discharged to [facility] and will continue with her exercises independently.   Total Session Time   Timed Code Treatment Minutes 26   Total Treatment Time (sum of timed and untimed services) 26   AMBULATORY CLINICS ONLY-MEDICAL AND TREATMENT DIAGNOSIS   PT Diagnosis Left TKA with gait deficits     Referring Physician - Calixto Santana MD

## 2021-01-29 NOTE — PATIENT INSTRUCTIONS

## 2021-04-19 ENCOUNTER — OFFICE VISIT (OUTPATIENT)
Dept: FAMILY MEDICINE | Facility: CLINIC | Age: 86
End: 2021-04-19
Payer: COMMERCIAL

## 2021-04-19 VITALS
BODY MASS INDEX: 30.28 KG/M2 | WEIGHT: 188.4 LBS | SYSTOLIC BLOOD PRESSURE: 128 MMHG | TEMPERATURE: 97.8 F | OXYGEN SATURATION: 96 % | HEIGHT: 66 IN | DIASTOLIC BLOOD PRESSURE: 76 MMHG | HEART RATE: 61 BPM

## 2021-04-19 DIAGNOSIS — M62.838 MUSCLE SPASM: Primary | ICD-10-CM

## 2021-04-19 DIAGNOSIS — M25.512 ACUTE PAIN OF LEFT SHOULDER: ICD-10-CM

## 2021-04-19 PROCEDURE — 99213 OFFICE O/P EST LOW 20 MIN: CPT | Performed by: NURSE PRACTITIONER

## 2021-04-19 RX ORDER — CYCLOBENZAPRINE HCL 5 MG
5 TABLET ORAL 3 TIMES DAILY PRN
Qty: 20 TABLET | Refills: 0 | Status: SHIPPED | OUTPATIENT
Start: 2021-04-19 | End: 2021-09-09

## 2021-04-19 ASSESSMENT — MIFFLIN-ST. JEOR: SCORE: 1330.3

## 2021-04-19 ASSESSMENT — PAIN SCALES - GENERAL: PAINLEVEL: MODERATE PAIN (5)

## 2021-04-19 NOTE — PATIENT INSTRUCTIONS
Do some stretching and massage to the area where you have pain.     PT will help as well.     ADVIL okay in small amounts.     Increase water.     Use Ice/ heat. Okay to use topical ointments like bengay.

## 2021-04-19 NOTE — PROGRESS NOTES
"    Assessment & Plan       ICD-10-CM    1. Muscle spasm  M62.838 cyclobenzaprine (FLEXERIL) 5 MG tablet     PHYSICAL THERAPY REFERRAL   2. Acute pain of left shoulder  M25.512 cyclobenzaprine (FLEXERIL) 5 MG tablet     PHYSICAL THERAPY REFERRAL     No significant history of injury.  Based on patient's symptoms I suspect muscle spasm.  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  If symptoms worsen or fail to improve with low-dose muscle relaxant and physical therapy would recommend imaging such as an x-ray.  Follow-up if no improvement in the next 2 to 4 weeks.  She is in agreement this plan.           BMI:   Estimated body mass index is 30.18 kg/m  as calculated from the following:    Height as of this encounter: 1.683 m (5' 6.25\").    Weight as of this encounter: 85.5 kg (188 lb 6.4 oz).           Return in about 2 weeks (around 5/3/2021) for ongoing symptoms if not improving.    JESI Trejo CNP  [facility]   Bonita is a 85 year old who presents for the following health issues     HPI     Musculoskeletal problem/pain  Onset/Duration: 2 weeks  Description  Location: shoulder - left. Symptoms started on the left side of the neck and radiates into the shoulder.  Denies numbness or tingling in the hand/ arm.   Joint Swelling: no  Redness: no  Pain: YES, with ROM of the shoulder with arms forward and above the head. Pulling shoulder back.  Some pain when moving head up and back to the left.   Warmth: no  Intensity:  5/10 currently, 10/10 at worst  Progression of Symptoms:  same  Accompanying signs and symptoms:   Fevers: no  Numbness/tingling/weakness: YES- some weakness  History  Trauma to the area: no  Recent illness:  no  Previous similar problem: no  Previous evaluation:  no  Precipitating or alleviating factors:  Aggravating factors include: just a constant ache, goes to the back of her neck and sometimes her neck will " "ache  Therapies tried and outcome:  Ibuprofen, does give some relief. Took 600mg once yesterday and \"I did fair for most of the day after the ibuprofen\".     Sitting up does not cause worse symptoms and feels okay.         Review of Systems   Constitutional, HEENT, cardiovascular, pulmonary, gi and gu systems are negative, except as otherwise noted.      Objective    /76 (BP Location: Right arm, Patient Position: Chair, Cuff Size: Adult Large)   Pulse 61   Temp 97.8  F (36.6  C) (Tympanic)   Ht 1.683 m (5' 6.25\")   Wt 85.5 kg (188 lb 6.4 oz)   LMP  (LMP Unknown)   SpO2 96%   BMI 30.18 kg/m    Body mass index is 30.18 kg/m .     Physical Exam   GENERAL: healthy, alert and no distress  RESP: lungs clear to auscultation - no rales, rhonchi or wheezes  CV: regular rate and rhythm, normal S1 S2, no S3 or S4, no murmur, click or rub, no peripheral edema and peripheral pulses strong  MS: no gross musculoskeletal defects noted, no edema  MS: Pain to palpation to left trapezius muscle and around scapula. ROM of left arm is within normal limits. Some pain with rotation present.   NEURO: Normal strength and tone, mentation intact and speech normal  PSYCH: mentation appears normal, affect normal/bright                "

## 2021-05-10 DIAGNOSIS — I10 ESSENTIAL HYPERTENSION, BENIGN: ICD-10-CM

## 2021-05-11 RX ORDER — ATENOLOL 100 MG/1
100 TABLET ORAL DAILY
Qty: 90 TABLET | Refills: 1 | Status: SHIPPED | OUTPATIENT
Start: 2021-05-11 | End: 2021-09-09

## 2021-05-23 ENCOUNTER — HEALTH MAINTENANCE LETTER (OUTPATIENT)
Age: 86
End: 2021-05-23

## 2021-05-28 ENCOUNTER — OFFICE VISIT (OUTPATIENT)
Dept: FAMILY MEDICINE | Facility: CLINIC | Age: 86
End: 2021-05-28
Payer: COMMERCIAL

## 2021-05-28 VITALS
SYSTOLIC BLOOD PRESSURE: 128 MMHG | RESPIRATION RATE: 14 BRPM | BODY MASS INDEX: 30.05 KG/M2 | DIASTOLIC BLOOD PRESSURE: 68 MMHG | TEMPERATURE: 98.5 F | HEART RATE: 60 BPM | HEIGHT: 66 IN | WEIGHT: 187 LBS | OXYGEN SATURATION: 97 %

## 2021-05-28 DIAGNOSIS — L02.91 PURULENT ABSCESS: ICD-10-CM

## 2021-05-28 DIAGNOSIS — N61.1 ABSCESS OF BREAST, LEFT: Primary | ICD-10-CM

## 2021-05-28 PROCEDURE — 10060 I&D ABSCESS SIMPLE/SINGLE: CPT | Performed by: NURSE PRACTITIONER

## 2021-05-28 PROCEDURE — 99213 OFFICE O/P EST LOW 20 MIN: CPT | Mod: 25 | Performed by: NURSE PRACTITIONER

## 2021-05-28 RX ORDER — DOXYCYCLINE 100 MG/1
100 CAPSULE ORAL 2 TIMES DAILY
Qty: 20 CAPSULE | Refills: 0 | Status: SHIPPED | OUTPATIENT
Start: 2021-05-28 | End: 2021-09-09

## 2021-05-28 ASSESSMENT — PAIN SCALES - GENERAL: PAINLEVEL: SEVERE PAIN (7)

## 2021-05-28 ASSESSMENT — MIFFLIN-ST. JEOR: SCORE: 1323.95

## 2021-05-28 NOTE — PROGRESS NOTES
"    Assessment & Plan       ICD-10-CM    1. Abscess of breast, left  N61.1 doxycycline hyclate (VIBRAMYCIN) 100 MG capsule     DRAIN SKIN ABSCESS SIMPLE/SINGLE   2. Purulent abscess  L02.91 doxycycline hyclate (VIBRAMYCIN) 100 MG capsule     DRAIN SKIN ABSCESS SIMPLE/SINGLE     Purulent breast abscess present.  I&D completed in clinic with improvement of acute symptoms.  Doxycycline prescribed twice daily for 10 days.  Follow-up in 4 days for reevaluation.             BMI:   Estimated body mass index is 29.96 kg/m  as calculated from the following:    Height as of this encounter: 1.683 m (5' 6.25\").    Weight as of this encounter: 84.8 kg (187 lb).       Patient Instructions   Patient Education     Abscess (Incision & Drainage)  An abscess is sometimes called a boil. It happens when bacteria get trapped under the skin and start to grow. Pus forms inside the abscess as the body responds to the bacteria. An abscess can happen with an insect bite, ingrown hair, blocked oil gland, pimple, cyst, or puncture wound.   Your healthcare provider has drained the pus from your abscess. If the abscess pocket was large, your healthcare provider may have put in gauze packing. Your provider will need to remove or replace it on your next visit. . You may not need antibiotics to treat a simple abscess, unless the infection is spreading into the skin around the wound (cellulitis).   The wound will take about 1 to 2 weeks to heal, depending on the size of the abscess. Healthy tissue will grow from the bottom and sides of the opening until it seals over.   Home care  These tips can help your wound heal:    The wound may drain for the first 2 days. Cover the wound with a clean dry dressing. Change the dressing if it becomes soaked with blood or pus.    If a gauze packing was placed inside the abscess pocket, you may be told to remove it yourself. You may do this in the shower. Once the packing is removed, you should wash the area in the " shower, or clean the area as directed by your provider. Continue to do this until the skin opening has closed. Make sure you wash your hands after changing the packing or cleaning the wound.    If you were prescribed antibiotics, take them as directed until they are all gone.    You may use acetaminophen or ibuprofen to control pain, unless another pain medicine was prescribed. If you have liver disease or ever had a stomach ulcer, talk with your healthcare provider before using these medicines.  Follow-up care  Follow up with your healthcare provider, or as advised. If a gauze packing was put in your wound, it should be removed in 1 to 2 days. Check your wound every day for any signs that the infection is getting worse. The signs are listed below.   When to seek medical advice  Call your healthcare provider right away if any of these occur:    Increasing redness or swelling    Red streaks in the skin leading away from the wound    Increasing local pain or swelling    Continued pus draining from the wound 2 days after treatment    Fever of 100.4 F (38 C) or higher, or as directed by your healthcare provider    Boil returns when you are at home  Flinto last reviewed this educational content on 8/1/2019 2000-2021 [employer]. All rights reserved. This information is not intended as a substitute for professional medical care. Always follow your healthcare professional's instructions.               Return in about 4 days (around 6/1/2021) for Follow up.    JESI Trejo [facility]    Darlene Mesa is a 85 year old who presents for the following health issues     HPI     Chief Complaint   Patient presents with     Mass     pt woke up 2 days ago and found a sore lump under left breast red, painful, hard, tried putting antibiotic ointment on it but did not help     No history of similar issues. No cancer history. She reports that symptoms started 2 days ago  "and has progressively gotten more sore and a little bit more red and painful.  She has tried applying ointment without any improvement.  She has not previously had abscesses or boils in the past.      Review of Systems   Constitutional, HEENT, cardiovascular, pulmonary, gi and gu systems are negative, except as otherwise noted.      Objective    /68 (BP Location: Right arm, Patient Position: Chair, Cuff Size: Adult Large)   Pulse 60   Temp 98.5  F (36.9  C) (Tympanic)   Resp 14   Ht 1.683 m (5' 6.25\")   Wt 84.8 kg (187 lb)   LMP  (LMP Unknown)   SpO2 97%   Breastfeeding No   BMI 29.96 kg/m    Body mass index is 29.96 kg/m .  Physical Exam   GENERAL: healthy, alert and no distress  SKIN: Acute abscess present on the flexor folds beneath the left breast with localized erythremia.  Boil measures 4 cm long and 2-1/2 cm wide with purulent drainage present below.  Localized erythremia present surrounding boil is approximately 13 cm in diameter.    Procedure:  Patient in procedure room to in supine position.  Area was cleansed with iodine.  Lidocaine with epinephrine was used to anesthetize the boil.  Boil was lanced in the center 1 cm length with moderate to copious amounts of serosanguineous discharge with purulent drainage.  Pressure applied to expel drainage.  Idoform utilized in approximately 5 cm placed in open cavity with 2 cm tail left.  Bacitracin ointment applied directly over open wound, wound was covered with 2 x 2 gauze and taped.  Patient tolerated procedure well and is without complaint.  She expressed immediate relief of pain and pressure after I&D.    "

## 2021-05-28 NOTE — PATIENT INSTRUCTIONS
Patient Education     Abscess (Incision & Drainage)  An abscess is sometimes called a boil. It happens when bacteria get trapped under the skin and start to grow. Pus forms inside the abscess as the body responds to the bacteria. An abscess can happen with an insect bite, ingrown hair, blocked oil gland, pimple, cyst, or puncture wound.   Your healthcare provider has drained the pus from your abscess. If the abscess pocket was large, your healthcare provider may have put in gauze packing. Your provider will need to remove or replace it on your next visit. . You may not need antibiotics to treat a simple abscess, unless the infection is spreading into the skin around the wound (cellulitis).   The wound will take about 1 to 2 weeks to heal, depending on the size of the abscess. Healthy tissue will grow from the bottom and sides of the opening until it seals over.   Home care  These tips can help your wound heal:    The wound may drain for the first 2 days. Cover the wound with a clean dry dressing. Change the dressing if it becomes soaked with blood or pus.    If a gauze packing was placed inside the abscess pocket, you may be told to remove it yourself. You may do this in the shower. Once the packing is removed, you should wash the area in the shower, or clean the area as directed by your provider. Continue to do this until the skin opening has closed. Make sure you wash your hands after changing the packing or cleaning the wound.    If you were prescribed antibiotics, take them as directed until they are all gone.    You may use acetaminophen or ibuprofen to control pain, unless another pain medicine was prescribed. If you have liver disease or ever had a stomach ulcer, talk with your healthcare provider before using these medicines.  Follow-up care  Follow up with your healthcare provider, or as advised. If a gauze packing was put in your wound, it should be removed in 1 to 2 days. Check your wound every day for  any signs that the infection is getting worse. The signs are listed below.   When to seek medical advice  Call your healthcare provider right away if any of these occur:    Increasing redness or swelling    Red streaks in the skin leading away from the wound    Increasing local pain or swelling    Continued pus draining from the wound 2 days after treatment    Fever of 100.4 F (38 C) or higher, or as directed by your healthcare provider    Boil returns when you are at home  Fred last reviewed this educational content on 8/1/2019 2000-2021 [employer]. All rights reserved. This information is not intended as a substitute for professional medical care. Always follow your healthcare professional's instructions.

## 2021-06-01 ENCOUNTER — OFFICE VISIT (OUTPATIENT)
Dept: FAMILY MEDICINE | Facility: CLINIC | Age: 86
End: 2021-06-01
Payer: COMMERCIAL

## 2021-06-01 VITALS
TEMPERATURE: 97.7 F | BODY MASS INDEX: 30.05 KG/M2 | OXYGEN SATURATION: 97 % | HEIGHT: 66 IN | SYSTOLIC BLOOD PRESSURE: 90 MMHG | WEIGHT: 187 LBS | DIASTOLIC BLOOD PRESSURE: 64 MMHG | HEART RATE: 75 BPM | RESPIRATION RATE: 14 BRPM

## 2021-06-01 DIAGNOSIS — L02.91 PURULENT ABSCESS: ICD-10-CM

## 2021-06-01 DIAGNOSIS — N61.1 ABSCESS OF BREAST, LEFT: Primary | ICD-10-CM

## 2021-06-01 PROCEDURE — 99213 OFFICE O/P EST LOW 20 MIN: CPT | Performed by: NURSE PRACTITIONER

## 2021-06-01 ASSESSMENT — MIFFLIN-ST. JEOR: SCORE: 1323.95

## 2021-06-01 ASSESSMENT — PAIN SCALES - GENERAL: PAINLEVEL: NO PAIN (0)

## 2021-06-01 NOTE — PATIENT INSTRUCTIONS
Patient Education     Wound Care  Taking good care of your wound will help it heal. Your healthcare provider may show you how to clean and dress the wound. He or she will also explain how to tell if the wound is healing normally. If you are unsure of how to take care of the wound, ask what dressing to use and how often you should change the bandages. Below are the basic steps.   Wash your hands    Tips for washing your hands:    Use liquid soap and lather for 2 minutes. Scrub between your fingers and under your nails.    Rinse with clean, running water, keeping your fingers pointed down.    Use a paper towel to dry your hands and to turn off the faucet.  Remove the used dressing  Here are suggestions for removing the dressing:     If dressing changes cause you pain, take your pain medicine as prescribed by your healthcare provider 30 minutes before dressing changes.    Set up your supplies.    Put on disposable gloves if you re dressing a wound for someone else or your wound is infected.    Loosen the tape by pulling gently toward the wound.    Gently take off the old dressing. If the dressing is stuck to the wound, moisten it with saline (if available) or clean water.    If you have a drain or tube in the wound, be careful not to pull on it.    Remove the dressing 1 layer at a time and put it in a plastic bag. Seal the bag and put it in the trash.    Remove your gloves.  Inspect and dress the wound  Check the wound carefully:    Each time you change the dressing, check the wound carefully to be sure it s healing normally. Check that your wound appears to be pink and moist, and is free of infection.      Wash your hands again. Put on a new pair of gloves.    Clean and dress the wound as directed by your healthcare provider or nurse. Don't put anything in the wound that is not prescribed or directed by your healthcare provider. If you have a drain or tube, be careful not to pull on it. Secure the drain or tube as  well.    Put all unused supplies in a clean plastic bag. Seal the bag and store it in a clean, dry area between dressing changes.     Wash your hands again.  Call your healthcare provider  Call your healthcare provider if you see any of the following signs of a problem:     Bleeding that soaks the dressing    Pink fluid weeping from the wound    Increased drainage or drainage that is yellow, yellow-green, or foul-smelling    Increased swelling or pain, or redness or swelling in the skin around the wound    A change in the color of the wound, or if streaks develop in a direction away from the wound    The area between any stitches opens up    An increase in the size of the wound    A fever of 100.4 F (38 C) or higher, or as directed by your healthcare provider    Chills, increased fatigue, or a loss of appetite  Fred last reviewed this educational content on 11/1/2019 2000-2021 [employer]. All rights reserved. This information is not intended as a substitute for professional medical care. Always follow your healthcare professional's instructions.

## 2021-06-01 NOTE — PROGRESS NOTES
"    Assessment & Plan       ICD-10-CM    1. Abscess of breast, left  N61.1    2. Purulent abscess  L02.91      Resolved abscess following I&D.  Open wound continues to be present.  Discussed utilizing bacitracin and covering with gauze during the day and keeping open to air at night.  She may stop the doxycycline on day 7.  Follow-up if lesion returns.  She is in agreement this plan.             BMI:   Estimated body mass index is 29.96 kg/m  as calculated from the following:    Height as of this encounter: 1.683 m (5' 6.25\").    Weight as of this encounter: 84.8 kg (187 lb).           No follow-ups on file.    Ana Alba, JESI CNP  [facility]   Bonita is a 85 year old who presents for the following health issues     HPI     Chief Complaint   Patient presents with     RECHECK     cyst removal area pt states is going ok nothing is draining any more, no more pain   Cyst was I&D did on 5/28 in clinic.  Refer to my note.  Symptoms have significantly improved.  She reports that the area drained for the first 2 days. This then reduced she denies any further pain. The antibiotic has caused mild stomach pain and headache. Otherwise she has felt well. No other systemic symptoms are present.         Review of Systems   Constitutional, HEENT, cardiovascular, pulmonary, gi and gu systems are negative, except as otherwise noted.      Objective    BP 90/64 (BP Location: Right arm, Patient Position: Chair, Cuff Size: Adult Large)   Pulse 75   Temp 97.7  F (36.5  C) (Tympanic)   Resp 14   Ht 1.683 m (5' 6.25\")   Wt 84.8 kg (187 lb)   LMP  (LMP Unknown)   SpO2 97%   Breastfeeding No   BMI 29.96 kg/m    Body mass index is 29.96 kg/m .  Physical Exam   GENERAL: healthy, alert and no distress  SKIN: no suspicious lesions or rashes and Healing I&D site with 1cm opening below the left breast. No purulent drainage.  Localized erythremia has resolved.  No pain or tenderness " surrounding the lesion.  No warmth.

## 2021-07-06 DIAGNOSIS — I10 ESSENTIAL HYPERTENSION, BENIGN: ICD-10-CM

## 2021-07-07 RX ORDER — LOSARTAN POTASSIUM AND HYDROCHLOROTHIAZIDE 12.5; 1 MG/1; MG/1
TABLET ORAL
Qty: 90 TABLET | Refills: 0 | Status: SHIPPED | OUTPATIENT
Start: 2021-07-07 | End: 2021-09-09

## 2021-09-09 ENCOUNTER — OFFICE VISIT (OUTPATIENT)
Dept: FAMILY MEDICINE | Facility: CLINIC | Age: 86
End: 2021-09-09
Payer: COMMERCIAL

## 2021-09-09 VITALS
OXYGEN SATURATION: 95 % | RESPIRATION RATE: 16 BRPM | WEIGHT: 186.4 LBS | HEIGHT: 66 IN | SYSTOLIC BLOOD PRESSURE: 120 MMHG | BODY MASS INDEX: 29.96 KG/M2 | HEART RATE: 59 BPM | DIASTOLIC BLOOD PRESSURE: 74 MMHG | TEMPERATURE: 97 F

## 2021-09-09 DIAGNOSIS — F41.1 GENERALIZED ANXIETY DISORDER: ICD-10-CM

## 2021-09-09 DIAGNOSIS — Z13.29 SCREENING FOR ENDOCRINE, METABOLIC AND IMMUNITY DISORDER: ICD-10-CM

## 2021-09-09 DIAGNOSIS — Z13.220 LIPID SCREENING: ICD-10-CM

## 2021-09-09 DIAGNOSIS — I10 ESSENTIAL HYPERTENSION, BENIGN: ICD-10-CM

## 2021-09-09 DIAGNOSIS — N39.41 URGENCY INCONTINENCE: ICD-10-CM

## 2021-09-09 DIAGNOSIS — Z00.00 ENCOUNTER FOR MEDICARE ANNUAL WELLNESS EXAM: Primary | ICD-10-CM

## 2021-09-09 DIAGNOSIS — Z13.228 SCREENING FOR ENDOCRINE, METABOLIC AND IMMUNITY DISORDER: ICD-10-CM

## 2021-09-09 DIAGNOSIS — Z13.0 SCREENING FOR ENDOCRINE, METABOLIC AND IMMUNITY DISORDER: ICD-10-CM

## 2021-09-09 DIAGNOSIS — Z78.0 ASYMPTOMATIC MENOPAUSAL STATE: ICD-10-CM

## 2021-09-09 LAB
ANION GAP SERPL CALCULATED.3IONS-SCNC: 6 MMOL/L (ref 3–14)
BUN SERPL-MCNC: 20 MG/DL (ref 7–30)
CALCIUM SERPL-MCNC: 8.7 MG/DL (ref 8.5–10.1)
CHLORIDE BLD-SCNC: 106 MMOL/L (ref 94–109)
CHOLEST SERPL-MCNC: 244 MG/DL
CO2 SERPL-SCNC: 25 MMOL/L (ref 20–32)
CREAT SERPL-MCNC: 0.86 MG/DL (ref 0.52–1.04)
FASTING STATUS PATIENT QL REPORTED: NO
GFR SERPL CREATININE-BSD FRML MDRD: 62 ML/MIN/1.73M2
GLUCOSE BLD-MCNC: 90 MG/DL (ref 70–99)
HDLC SERPL-MCNC: 65 MG/DL
LDLC SERPL CALC-MCNC: 142 MG/DL
NONHDLC SERPL-MCNC: 179 MG/DL
POTASSIUM BLD-SCNC: 4.2 MMOL/L (ref 3.4–5.3)
SODIUM SERPL-SCNC: 137 MMOL/L (ref 133–144)
TRIGL SERPL-MCNC: 184 MG/DL

## 2021-09-09 PROCEDURE — 90662 IIV NO PRSV INCREASED AG IM: CPT | Performed by: FAMILY MEDICINE

## 2021-09-09 PROCEDURE — G0008 ADMIN INFLUENZA VIRUS VAC: HCPCS | Performed by: FAMILY MEDICINE

## 2021-09-09 PROCEDURE — 99397 PER PM REEVAL EST PAT 65+ YR: CPT | Mod: 25 | Performed by: FAMILY MEDICINE

## 2021-09-09 PROCEDURE — 99214 OFFICE O/P EST MOD 30 MIN: CPT | Mod: 25 | Performed by: FAMILY MEDICINE

## 2021-09-09 PROCEDURE — 36415 COLL VENOUS BLD VENIPUNCTURE: CPT | Performed by: FAMILY MEDICINE

## 2021-09-09 PROCEDURE — 80048 BASIC METABOLIC PNL TOTAL CA: CPT | Performed by: FAMILY MEDICINE

## 2021-09-09 PROCEDURE — 80061 LIPID PANEL: CPT | Performed by: FAMILY MEDICINE

## 2021-09-09 RX ORDER — ATENOLOL 100 MG/1
100 TABLET ORAL DAILY
Qty: 90 TABLET | Refills: 4 | Status: SHIPPED | OUTPATIENT
Start: 2021-09-09 | End: 2022-12-02

## 2021-09-09 RX ORDER — OXYBUTYNIN CHLORIDE 5 MG/1
5 TABLET, EXTENDED RELEASE ORAL DAILY
Qty: 90 TABLET | Refills: 4 | Status: SHIPPED | OUTPATIENT
Start: 2021-09-09 | End: 2022-11-02

## 2021-09-09 RX ORDER — LOSARTAN POTASSIUM AND HYDROCHLOROTHIAZIDE 12.5; 1 MG/1; MG/1
1 TABLET ORAL DAILY
Qty: 90 TABLET | Refills: 4 | Status: ON HOLD | OUTPATIENT
Start: 2021-09-09 | End: 2022-07-21

## 2021-09-09 RX ORDER — ESCITALOPRAM OXALATE 10 MG/1
10 TABLET ORAL DAILY
Qty: 90 TABLET | Refills: 4 | Status: SHIPPED | OUTPATIENT
Start: 2021-09-09 | End: 2022-11-28

## 2021-09-09 ASSESSMENT — ACTIVITIES OF DAILY LIVING (ADL): CURRENT_FUNCTION: NO ASSISTANCE NEEDED

## 2021-09-09 ASSESSMENT — MIFFLIN-ST. JEOR: SCORE: 1316.22

## 2021-09-09 NOTE — PATIENT INSTRUCTIONS
Shingrix is the new shingles vaccine. It is typically a pharmacy benefit under most insurances. The [facility] pharmacy can check your insurance and give it if it's covered and you don't need an appointment to do this.    Call [facility] department to schedule your bone density scan appointment at [phone].   Your BMI is Body mass index is Body mass index is 29.86 kg/m .     A BMI of 18.5 to 24.9 is in the healthy range. A person with a BMI of 25 to 29.9 is considered overweight, and someone with a BMI of 30 or greater is considered obese. More than two-thirds of American adults are considered overweight or obese.  Weight management is a personal decision.  If you are interested in exploring weight loss strategies, the following discussion covers the approaches that may be successful. Body mass index (BMI) is one way to tell whether you are at a healthy weight, overweight, or obese. It measures your weight in relation to your height.  Being overweight or obese increases the risk for further weight gain. Excess weight may lead to heart disease and diabetes.  Creating and following plans for healthy eating and physical activity may help you improve your health.  Weight control is part of healthy lifestyle and includes exercise, emotional health, and healthy eating habits. Careful eating habits lifelong are the mainstay of weight control. Though there are significant health benefits from weight loss, long-term weight loss with diet alone may be very difficult to achieve- studies show long-term success with dietary management alone in less than 10% of people. Attaining a healthy weight may be especially difficult to achieve in those with severe obesity. In some cases, medications, devices and surgical management might be considered.  What can you do?    Keep a food journal to help with mindful eating and finding ways to modify your diet.      Reduce the amount of processed food in your diet. Focus on  adding vegetables, and lean proteins.    Reduce dietary carbohydrates. Limiting to  gm of carbohydrates per day has been shows to help boost weight loss.  If you have diabetes or are on diabetic medications do not do this without talking to your physician or healthcare provider.    Diet combined with exercise helps maintain muscle while optimizing fat loss. Strength training is particularly important for building and maintaining muscle mass. Exercise helps reduce stress, increase energy, and improves fitness. Increasing exercise without diet control, however, may not burn enough calories to loose weight.         Start walking three days a week 10-20 minutes at a time    Work towards walking thirty minutes five days a week      Eventually, increase the speed of your walking for 1-2 minutes at time    In addition, we recommend that you review healthy lifestyles and methods for weight loss available through the National Institutes of Health patient information sites:  http://win.niddk.nih.gov/publications/index.htm    Also look into health and wellness programs that may be available through your health insurance provider, employer, local community center, or [employer]ish[employer] club.  Patient Education   Personalized Prevention Plan  You are due for the preventive services outlined below.  Your care team is available to assist you in scheduling these services.  If you have already completed any of these items, please share that information with your care team to update in your medical record.  Health Maintenance Due   Topic Date Due     Zoster (Shingles) Vaccine (2 of 3) 01/12/2010     FALL RISK ASSESSMENT  06/18/2021       Urinary Incontinence, Female (Adult)   Urinary incontinence means loss of bladder control. This problem affects many women, especially as they get older. If you have incontinence, you may be embarrassed to ask for help. But know that this problem can be treated.   Types of Incontinence  There are different  types of incontinence. Two of the main types are described here. You can have more than one type.     Stress incontinence. With this type, urine leaks when pressure (stress) is put on the bladder. This may happen when you cough, sneeze, or laugh. Stress incontinence most often occurs because the pelvic floor muscles that support the bladder and urethra are weak. This can happen after pregnancy and vaginal childbirth or a hysterectomy. It can also be due to excess body weight or hormone changes.    Urge incontinence (also called overactive bladder). With this type, a sudden urge to urinate is felt often. This may happen even though there may not be much urine in the bladder. The need to urinate often during the night is common. Urge incontinence most often occurs because of bladder spasms. This may be due to bladder irritation or infection. Damage to bladder nerves or pelvic muscles, constipation, and certain medicines can also lead to urge incontinence.  Treatment depends on the cause. Further evaluation is needed to find the type you have. This will likely include an exam and certain tests. Based on the results, you and your healthcare provider can then plan treatment. Until a diagnosis is made, the home care tips below can help ease symptoms.   Home care    Do pelvic floor muscle exercises, if they are prescribed. The pelvic floor muscles help support the bladder and urethra. Many women find that their symptoms improve when doing special exercises that strengthen these muscles. To do the exercises, contract the muscles you would use to stop your stream of urine. But do this when you re not urinating. Hold for 10 seconds, then relax. Repeat 10 to 20 times in a row, at least 3 times a day. Your healthcare provider may give you other instructions for how to do the exercises and how often.    Keep a bladder diary. This helps track how often and how much you urinate over a set period of time. Bring this diary with you  to your next visit with the provider. The information can help your provider learn more about your bladder problem.    Lose weight, if advised to by your provider. Extra weight puts pressure on the bladder. Your provider can help you create a weight-loss plan that s right for you. This may include exercising more and making certain diet changes.    Don't have foods and drinks that may irritate the bladder. These can include alcohol and caffeinated drinks.    Quit smoking. Smoking and other tobacco use can lead to a long-term (chronic) cough that strains the pelvic floor muscles. Smoking may also damage the bladder and urethra. Talk with your provider about treatments or methods you can use to quit smoking.    If drinking large amounts of fluid makes you have symptoms, you may be advised to limit your fluid intake. You may also be advised to drink most of your fluids during the day and to limit fluids at night.    If you re worried about urine leakage or accidents, you may wear absorbent pads to catch urine. Change the pads often. This helps reduce discomfort. It may also reduce the risk of skin or bladder infections.    Follow-up care  Follow up with your healthcare provider, or as directed. It may take some to find the right treatment for your problem. But healthy lifestyle changes can be made right away. These include such things as exercising on a regular basis, eating a healthy diet, losing weight (if needed), and quitting smoking. Your treatment plan may include special therapies or medicines. Certain procedures or surgery may also be options. Talk about any questions you have with your provider.   When to seek medical advice  Call the healthcare provider right away if any of these occur:    Fever of 100.4 F (38 C) or higher, or as directed by your provider    Bladder pain or fullness    Belly swelling    Nausea or vomiting    Back pain    Weakness, dizziness, or fainting  Fred last reviewed this  educational content on 1/1/2020 2000-2021 [employer]. All rights reserved. This information is not intended as a substitute for professional medical care. Always follow your healthcare professional's instructions.

## 2021-09-09 NOTE — PROGRESS NOTES
"SUBJECTIVE:   Bonita Villarreal is a 86 year old female who presents for Preventive Visit.      Patient has been advised of split billing requirements and indicates understanding: Yes   Are you in the first 12 months of your Medicare coverage?  No    Healthy Habits:     In general, how would you rate your overall health?  Good    Frequency of exercise:  6-7 days/week    Duration of exercise:  15-30 minutes    Do you usually eat at least 4 servings of fruit and vegetables a day, include whole grains    & fiber and avoid regularly eating high fat or \"junk\" foods?  Yes    Taking medications regularly:  Yes    Medication side effects:  None    Ability to successfully perform activities of daily living:  No assistance needed    Home Safety:  No safety concerns identified    Hearing Impairment:  No hearing concerns    In the past 6 months, have you been bothered by leaking of urine? Yes    In general, how would you rate your overall mental or emotional health?  Excellent      PHQ-2 Total Score: 0    Additional concerns today:  Yes    Do you feel safe in your environment? Yes    Have you ever done Advance Care Planning? (For example, a Health Directive, POLST, or a discussion with a medical provider or your loved ones about your wishes): Yes, advance care planning is on file.       Fall risk       Cognitive Screening   1) Repeat 3 items (Leader, Season, Table)    2) Clock draw: NORMAL  3) 3 item recall: Recalls 2 objects   Results: NORMAL clock, 1-2 items recalled: COGNITIVE IMPAIRMENT LESS LIKELY    Mini-CogTM Copyright ISAC Fuller. Licensed by the author for use in [facility]; reprinted with permission (juany@.[email or web address]). All rights reserved.      Do you have sleep apnea, excessive snoring or daytime drowsiness?: no    Reviewed and updated as needed this visit by clinical staff  Tobacco  Allergies  Meds  Problems  Med Hx  Surg Hx  Fam Hx  Soc Hx          Reviewed and updated as needed this visit by " Provider  Tobacco  Allergies  Meds  Problems  Med Hx  Surg Hx  Fam Hx         Social History     Tobacco Use     Smoking status: Never Smoker     Smokeless tobacco: Never Used   Substance Use Topics     Alcohol use: Yes     Comment: rare         Alcohol Use 9/9/2021   Prescreen: >3 drinks/day or >7 drinks/week? No   Prescreen: >3 drinks/day or >7 drinks/week? -               Current providers sharing in care for this patient include:   Patient Care Team:  Dewayne Santana MD as PCP - General (Family Practice)  Ana Alba APRN CNP as Assigned PCP    The following health maintenance items are reviewed in Epic and correct as of today:  Health Maintenance Due   Topic Date Due     ZOSTER IMMUNIZATION (2 of 3) 01/12/2010     FALL RISK ASSESSMENT  06/18/2021     Labs reviewed in EPIC  Patient Active Problem List   Diagnosis     Essential hypertension, benign     Generalized anxiety disorder     Diverticulosis of large intestine     Osteopenia of spine     Family history of other cardiovascular diseases     CARDIOVASCULAR SCREENING; LDL GOAL LESS THAN 160     Status post total right knee replacement     Right knee DJD     Fracture of sacrum (H)     Sacral pain     Prediabetes     Status post total knee replacement     Urgency incontinence     Past Surgical History:   Procedure Laterality Date     ARTHROPLASTY KNEE Right 2/28/2018    Procedure: ARTHROPLASTY KNEE;  Right total knee arthroplasty;  Surgeon: Anton Grover MD;  Location: [facility]     ARTHROPLASTY KNEE Left 10/6/2020    Procedure: Total Knee Arthroplasty;  Surgeon: Calixto Santana MD;  Location: [facility]      APPENDECTOMY       COLONOSCOPY  04/15/02     COLONOSCOPY  3/25/2013    Procedure: COLONOSCOPY;  Colonoscopy  ;  Surgeon: Jamil Porras MD;  Location: [facility]     FLEXIBLE SIGMOIDOSCOPY  07/29/96     ORTHOPEDIC SURGERY  22-23 YEARS AGO    BACK     SURGICAL HISTORY OF -       lumbar surgery       Social History     Tobacco  Use     Smoking status: Never Smoker     Smokeless tobacco: Never Used   Substance Use Topics     Alcohol use: Yes     Comment: rare     Family History   Problem Relation Age of Onset     Lipids Brother      Heart Disease Brother          at 51 of an MI     Cancer Brother         bone, lung,  82     Prostate Cancer Brother      Cancer Sister         cervical     Lipids Sister      Heart Disease Sister         eldest sister, s/p MI x 2 with stents; diagnosed with AAA, postoperative complications and MI,  at 75     Heart Disease Brother         MI x 2     Lipids Brother      C.A.D. Father          at 39 of an MI     Family History Negative Mother         lived to age 95     Gynecology Sister         cervical Ca         Current Outpatient Medications   Medication Sig Dispense Refill     atenolol (TENORMIN) 100 MG tablet Take 1 tablet (100 mg) by mouth daily 90 tablet 4     escitalopram (LEXAPRO) 10 MG tablet Take 1 tablet (10 mg) by mouth daily 90 tablet 4     ibuprofen (ADVIL/MOTRIN) 600 MG tablet Take 1 tablet (600 mg) by mouth every 8 hours as needed for pain (mild) 30 tablet 0     losartan-hydrochlorothiazide (HYZAAR) 100-12.5 MG tablet Take 1 tablet by mouth daily 90 tablet 4     multivitamin (CENTRUM SILVER) tablet Take 1 tablet by mouth daily       oxybutynin ER (DITROPAN-XL) 5 MG 24 hr tablet Take 1 tablet (5 mg) by mouth daily 90 tablet 4     amoxicillin (AMOXIL) 500 MG capsule Take 1 capsule (500 mg) by mouth 2 times daily for 10 days 20 capsule 0     Allergies   Allergen Reactions     Bactrim [Sulfamethoxazole W/Trimethoprim] Nausea and Vomiting     Lisinopril Cough     Mammogram Screening: Mammogram Screening - Patient over age 75, has elected to discontinue screenings.      Pertinent mammograms are reviewed under the imaging tab.    Review of Systems  Constitutional, neuro, ENT, endocrine, pulmonary, cardiac, gastrointestinal, genitourinary, musculoskeletal, integument and psychiatric  "systems are negative, except as otherwise noted.     OBJECTIVE:   /74   Pulse 59   Temp 97  F (36.1  C) (Tympanic)   Resp 16   Ht 1.683 m (5' 6.25\")   Wt 84.6 kg (186 lb 6.4 oz)   LMP  (LMP Unknown)   SpO2 95%   BMI 29.86 kg/m   Estimated body mass index is 29.86 kg/m  as calculated from the following:    Height as of this encounter: 1.683 m (5' 6.25\").    Weight as of this encounter: 84.6 kg (186 lb 6.4 oz).  Physical Exam  GENERAL APPEARANCE: healthy, alert and no distress  EYES: Eyes grossly normal to inspection, PERRL and conjunctivae and sclerae normal  HENT: ear canals and TM's normal  NECK: no adenopathy, no asymmetry, masses, or scars and thyroid normal to palpation  RESP: lungs clear to auscultation - no rales, rhonchi or wheezes  CV: regular rate and rhythm, normal S1 S2, no S3 or S4, no murmur, click or rub, no peripheral edema and peripheral pulses strong  ABDOMEN: soft, nontender, no hepatosplenomegaly, no masses and bowel sounds normal  MS: no musculoskeletal defects are noted and gait is age appropriate without ataxia  SKIN: no suspicious lesions or rashes  NEURO: Normal strength and tone, sensory exam grossly normal, mentation intact and speech normal  PSYCH: mentation appears normal and affect normal/bright    Diagnostic Test Results:  Labs reviewed in Epic    ASSESSMENT / PLAN:   Encounter for Medicare annual wellness exam    Essential hypertension, benign  Well controlled. Refilled medication.   Check labs..  - atenolol (TENORMIN) 100 MG tablet; Take 1 tablet (100 mg) by mouth daily  - losartan-hydrochlorothiazide (HYZAAR) 100-12.5 MG tablet; Take 1 tablet by mouth daily    GENERALIZED ANXIETY DIS  Well controlled. Refilled medication.     - escitalopram (LEXAPRO) 10 MG tablet; Take 1 tablet (10 mg) by mouth daily    Urgency incontinence  Well controlled. Refilled medication.     - oxybutynin ER (DITROPAN-XL) 5 MG 24 hr tablet; Take 1 tablet (5 mg) by mouth daily    Asymptomatic " "menopausal state  - DX Hip/Pelvis/Spine; Future    Screening for endocrine, metabolic and immunity disorder    - Basic metabolic panel; Future  - Basic metabolic panel    Lipid screening  - Lipid panel reflex to direct LDL Fasting; Future  - Lipid panel reflex to direct LDL Fasting        Patient has been advised of split billing requirements and indicates understanding: Yes  COUNSELING:  Reviewed preventive health counseling, as reflected in patient instructions    Estimated body mass index is 29.86 kg/m  as calculated from the following:    Height as of this encounter: 1.683 m (5' 6.25\").    Weight as of this encounter: 84.6 kg (186 lb 6.4 oz).        She reports that she has never smoked. She has never used smokeless tobacco.      Appropriate preventive services were discussed with this patient, including applicable screening as appropriate for cardiovascular disease, diabetes, osteopenia/osteoporosis, and glaucoma.  As appropriate for age/gender, discussed screening for colorectal cancer, prostate cancer, breast cancer, and cervical cancer. Checklist reviewing preventive services available has been given to the patient.    Reviewed patients plan of care and provided an AVS. The Intermediate Care Plan ( asthma action plan, low back pain action plan, and migraine action plan) for Bonita meets the Care Plan requirement. This Care Plan has been established and reviewed with the Patient.    Counseling Resources:  ATP IV Guidelines  Pooled Cohorts Equation Calculator  Breast Cancer Risk Calculator  Breast Cancer: Medication to Reduce Risk  FRAX Risk Assessment  ICSI Preventive Guidelines  Dietary Guidelines for Americans, 2010  USDA's MyPlate  ASA Prophylaxis  Lung CA Screening    Dewayne Santana MD  [facility]    Identified Health Risks:  "

## 2021-09-09 NOTE — NURSING NOTE
"Initial /74   Pulse 59   Temp 97  F (36.1  C) (Tympanic)   Resp 16   Ht 1.683 m (5' 6.25\")   Wt 84.6 kg (186 lb 6.4 oz)   LMP  (LMP Unknown)   SpO2 95%   BMI 29.86 kg/m   Estimated body mass index is 29.86 kg/m  as calculated from the following:    Height as of this encounter: 1.683 m (5' 6.25\").    Weight as of this encounter: 84.6 kg (186 lb 6.4 oz). .      "

## 2021-09-19 ENCOUNTER — VIRTUAL VISIT (OUTPATIENT)
Dept: URGENT CARE | Facility: CLINIC | Age: 86
End: 2021-09-19
Payer: COMMERCIAL

## 2021-09-19 DIAGNOSIS — J01.90 ACUTE NON-RECURRENT SINUSITIS, UNSPECIFIED LOCATION: ICD-10-CM

## 2021-09-19 DIAGNOSIS — Z20.822 SUSPECTED COVID-19 VIRUS INFECTION: Primary | ICD-10-CM

## 2021-09-19 PROCEDURE — 99213 OFFICE O/P EST LOW 20 MIN: CPT | Mod: 95

## 2021-09-19 RX ORDER — AMOXICILLIN 500 MG/1
500 CAPSULE ORAL 2 TIMES DAILY
Qty: 20 CAPSULE | Refills: 0 | Status: SHIPPED | OUTPATIENT
Start: 2021-09-19 | End: 2021-09-29

## 2021-09-19 NOTE — PATIENT INSTRUCTIONS
Please call [phone] to schedule your Covid test.  Test results are typically available 18 to 24 hours after you complete your test.  If your test is positive you will be called by an [facility] nurse.  Test results are always available on [employer].  Please quarantine until you know your test results.    If you are still having symptoms in 48 hours please fill the prescription for amoxicillin I sent to [facility] pharmacy to treat you for a sinus infection

## 2021-09-19 NOTE — PROGRESS NOTES
Bonita is a 86 year old who is being evaluated via a billable telephone visit.      What phone number would you like to be contacted at?  [phone]  How would you like to obtain your AVS? MyChart    Assessment & Plan     Suspected COVID-19 virus infection  Is feeling slightly better today, however recommend getting tested for Covid.  - Symptomatic COVID-19 Virus (Coronavirus) by PCR; Future    Acute non-recurrent sinusitis, unspecified location  If still having symptoms in 48 hours recommend she fill her prescription for amoxicillin to treat for sinusitis due to her severe nasal congestion.  - amoxicillin (AMOXIL) 500 MG capsule; Take 1 capsule (500 mg) by mouth 2 times daily for 10 days      15 minutes spent on the date of the encounter doing chart review, patient visit and documentation         See Patient Instructions    Return in about 1 week (around 9/26/2021), or if symptoms worsen or fail to improve.    Virtual Urgent Care  [facility] VIRTUAL URGENT CARE    Subjective   Bonita is a 86 year old who presents for the following health issues     HPI     86-year-old female presents to virtual urgent care via a telephone visit for Covid concern.    6 days ago woke up with symptoms of cough, sneezing and sinus congestion.  Denies any fevers or chills but was getting night sweats from time to time.  States she has been coughing so hard lately that her sides hurt.  Denies any shortness of breath.  Is fully vaccinated for Covid 19 with the Pfizer vaccine.  States today she is feeling better.  Has not had any night sweats for the past 24 hours.  Still has severe nasal congestion though.  No history of allergies.  Son bought her some allergy medication and she feels this is helping her.    Review of Systems   Constitutional, HEENT, cardiovascular, pulmonary, gi and gu systems are negative, except as otherwise noted.      Objective           Vitals:  No vitals were obtained today due to virtual  visit.    Physical Exam   healthy, alert and no distress  PSYCH: Alert and oriented times 3; coherent speech, normal   rate and volume, able to articulate logical thoughts, able   to abstract reason, no tangential thoughts, no hallucinations   or delusions  Her affect is normal  RESP: No cough, no audible wheezing, able to talk in full sentences  Remainder of exam unable to be completed due to telephone visits            Phone call duration: 8 minutes

## 2021-09-22 ENCOUNTER — HOSPITAL ENCOUNTER (OUTPATIENT)
Dept: BONE DENSITY | Facility: CLINIC | Age: 86
Discharge: HOME | End: 2021-09-22
Attending: FAMILY MEDICINE | Admitting: FAMILY MEDICINE
Payer: COMMERCIAL

## 2021-09-22 DIAGNOSIS — Z78.0 ASYMPTOMATIC MENOPAUSAL STATE: ICD-10-CM

## 2021-09-22 PROCEDURE — 77080 DXA BONE DENSITY AXIAL: CPT

## 2021-09-23 NOTE — PROGRESS NOTES
"    Information on urinary incontinence and treatment options given to patient.  Answers for HPI/ROS submitted by the patient on 9/9/2021  In general, how would you rate your overall physical health?: good  Frequency of exercise:: 6-7 days/week  Do you usually eat at least 4 servings of fruit and vegetables a day, include whole grains & fiber, and avoid regularly eating high fat or \"junk\" foods? : Yes  Taking medications regularly:: Yes  Medication side effects:: None  Activities of Daily Living: no assistance needed  Home safety: no safety concerns identified  Hearing Impairment:: no hearing concerns  In the past 6 months, have you been bothered by leaking of urine?: Yes  In general, how would you rate your overall mental or emotional health?: excellent  Additional concerns today:: Yes  Duration of exercise:: 15-30 minutes        "

## 2021-10-01 NOTE — RESULT ENCOUNTER NOTE
Please call the patient with the results. Notify that bone density scan shows osteoporosis.  Please have her schedule a follow-up visit for additional labs and discussion of treatment options.

## 2021-10-21 ENCOUNTER — OFFICE VISIT (OUTPATIENT)
Dept: FAMILY MEDICINE | Facility: CLINIC | Age: 86
End: 2021-10-21
Payer: COMMERCIAL

## 2021-10-21 VITALS
SYSTOLIC BLOOD PRESSURE: 132 MMHG | TEMPERATURE: 98.1 F | BODY MASS INDEX: 30.05 KG/M2 | WEIGHT: 187 LBS | OXYGEN SATURATION: 94 % | DIASTOLIC BLOOD PRESSURE: 66 MMHG | RESPIRATION RATE: 18 BRPM | HEIGHT: 66 IN | HEART RATE: 76 BPM

## 2021-10-21 DIAGNOSIS — Z13.0 SCREENING FOR ENDOCRINE, METABOLIC AND IMMUNITY DISORDER: ICD-10-CM

## 2021-10-21 DIAGNOSIS — Z13.228 SCREENING FOR ENDOCRINE, METABOLIC AND IMMUNITY DISORDER: ICD-10-CM

## 2021-10-21 DIAGNOSIS — M81.0 AGE-RELATED OSTEOPOROSIS WITHOUT CURRENT PATHOLOGICAL FRACTURE: ICD-10-CM

## 2021-10-21 DIAGNOSIS — Z13.29 SCREENING FOR ENDOCRINE, METABOLIC AND IMMUNITY DISORDER: ICD-10-CM

## 2021-10-21 DIAGNOSIS — F41.1 GENERALIZED ANXIETY DISORDER: Primary | Chronic | ICD-10-CM

## 2021-10-21 LAB
CALCIUM SERPL-MCNC: 9.4 MG/DL (ref 8.5–10.1)
DEPRECATED CALCIDIOL+CALCIFEROL SERPL-MC: 27 UG/L (ref 20–75)
PHOSPHATE SERPL-MCNC: 4.3 MG/DL (ref 2.5–4.5)
PTH-INTACT SERPL-MCNC: 44 PG/ML (ref 18–80)
TSH SERPL DL<=0.005 MIU/L-ACNC: 3.74 MU/L (ref 0.4–4)

## 2021-10-21 PROCEDURE — 96127 BRIEF EMOTIONAL/BEHAV ASSMT: CPT | Mod: 59 | Performed by: FAMILY MEDICINE

## 2021-10-21 PROCEDURE — 82310 ASSAY OF CALCIUM: CPT | Performed by: FAMILY MEDICINE

## 2021-10-21 PROCEDURE — 82306 VITAMIN D 25 HYDROXY: CPT | Performed by: FAMILY MEDICINE

## 2021-10-21 PROCEDURE — 36415 COLL VENOUS BLD VENIPUNCTURE: CPT | Performed by: FAMILY MEDICINE

## 2021-10-21 PROCEDURE — 83970 ASSAY OF PARATHORMONE: CPT | Performed by: FAMILY MEDICINE

## 2021-10-21 PROCEDURE — 84100 ASSAY OF PHOSPHORUS: CPT | Performed by: FAMILY MEDICINE

## 2021-10-21 PROCEDURE — 99214 OFFICE O/P EST MOD 30 MIN: CPT | Performed by: FAMILY MEDICINE

## 2021-10-21 PROCEDURE — 84443 ASSAY THYROID STIM HORMONE: CPT | Performed by: FAMILY MEDICINE

## 2021-10-21 RX ORDER — IBANDRONATE SODIUM 150 MG/1
150 TABLET, FILM COATED ORAL
Qty: 3 TABLET | Refills: 4 | Status: SHIPPED | OUTPATIENT
Start: 2021-10-21 | End: 2021-12-10

## 2021-10-21 ASSESSMENT — ANXIETY QUESTIONNAIRES
5. BEING SO RESTLESS THAT IT IS HARD TO SIT STILL: NOT AT ALL
IF YOU CHECKED OFF ANY PROBLEMS ON THIS QUESTIONNAIRE, HOW DIFFICULT HAVE THESE PROBLEMS MADE IT FOR YOU TO DO YOUR WORK, TAKE CARE OF THINGS AT HOME, OR GET ALONG WITH OTHER PEOPLE: NOT DIFFICULT AT ALL
6. BECOMING EASILY ANNOYED OR IRRITABLE: NOT AT ALL
1. FEELING NERVOUS, ANXIOUS, OR ON EDGE: NOT AT ALL
3. WORRYING TOO MUCH ABOUT DIFFERENT THINGS: NOT AT ALL
7. FEELING AFRAID AS IF SOMETHING AWFUL MIGHT HAPPEN: NOT AT ALL
GAD7 TOTAL SCORE: 0
2. NOT BEING ABLE TO STOP OR CONTROL WORRYING: NOT AT ALL

## 2021-10-21 ASSESSMENT — PATIENT HEALTH QUESTIONNAIRE - PHQ9
SUM OF ALL RESPONSES TO PHQ QUESTIONS 1-9: 0
5. POOR APPETITE OR OVEREATING: NOT AT ALL

## 2021-10-21 ASSESSMENT — MIFFLIN-ST. JEOR: SCORE: 1318.95

## 2021-10-21 NOTE — PROGRESS NOTES
Assessment & Plan     Generalized anxiety disorder  Discussed increasing lexapro but she feels anxiety really just flared from recent illness. Discussed with patient that she can call and we can increase Lexapro if still struggling with mood. We discussed medication management today.    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  Discussed use and side effects. Labs as below.  - IBANdronate (BONIVA) 150 MG tablet; Take 1 tablet (150 mg) by mouth every 30 days  - Vitamin D Deficiency; Future  - Calcium; Future  - Phosphorus; Future  - Parathyroid Hormone Intact; Future  - Vitamin D Deficiency  - Calcium  - Phosphorus  - Parathyroid Hormone Intact    Screening for endocrine, metabolic and immunity disorder  - TSH with free T4 reflex; Future  - TSH with free T4 reflex                 No follow-ups on file.    Dewayne Santana MD  [facility]        Darlene Mesa is a 86 year old who presents for the following health issues     HPI     Anxiety Follow-Up    How are you doing with your anxiety since your last visit? Worsened duriing illness a beginning of the month.  Her medication did not help    Are you having other symptoms that might be associated with anxiety? No    Have you had a significant life event? Health Concerns     Are you feeling depressed? No    Do you have any concerns with your use of alcohol or other drugs? No    Social History     Tobacco Use     Smoking status: Never Smoker     Smokeless tobacco: Never Used   Substance Use Topics     Alcohol use: Yes     Comment: rare     Drug use: No     JAMSHID-7 SCORE 7/13/2018 7/20/2018 10/21/2021   Total Score - - -   Total Score 1 2 0     PHQ 5/28/2020 6/18/2020 10/21/2021   PHQ-9 Total Score 0 0 0   Q9: Thoughts of better off dead/self-harm past 2 weeks Not at all Not at all Not at all     Last PHQ-9 10/21/2021   1.  Little interest or pleasure in doing things 0   2.  Feeling down,  "depressed, or hopeless 0   3.  Trouble falling or staying asleep, or sleeping too much 0   4.  Feeling tired or having little energy 0   5.  Poor appetite or overeating 0   6.  Feeling bad about yourself 0   7.  Trouble concentrating 0   8.  Moving slowly or restless 0   Q9: Thoughts of better off dead/self-harm past 2 weeks 0   PHQ-9 Total Score 0   Difficulty at work, home, or with people Not difficult at all     JAMSHID-7  10/21/2021   1. Feeling nervous, anxious, or on edge 0   2. Not being able to stop or control worrying 0   3. Worrying too much about different things 0   4. Trouble relaxing 0   5. Being so restless that it is hard to sit still 0   6. Becoming easily annoyed or irritable 0   7. Feeling afraid, as if something awful might happen 0   JAMSHID-7 Total Score 0   If you checked any problems, how difficult have they made it for you to do your work, take care of things at home, or get along with other people? Not difficult at all         How many servings of fruits and vegetables do you eat daily?  4 or more    On average, how many sweetened beverages do you drink each day (Examples: soda, juice, sweet tea, etc.  Do NOT count diet or artificially sweetened beverages)?   0    How many days per week do you exercise enough to make your heart beat faster? 7    How many minutes a day do you exercise enough to make your heart beat faster? 20 - 29    How many days per week do you miss taking your medication? 0    Patient wants to discuss recent DEXA Scan.    Review of Systems   Constitutional, neuro, ENT, endocrine, pulmonary, cardiac, gastrointestinal, genitourinary, musculoskeletal, integument and psychiatric systems are negative, except as otherwise noted.       Objective    /66   Pulse 76   Temp 98.1  F (36.7  C) (Tympanic)   Resp 18   Ht 1.683 m (5' 6.25\")   Wt 84.8 kg (187 lb)   LMP  (LMP Unknown)   SpO2 94%   BMI 29.96 kg/m    Body mass index is 29.96 kg/m .  Physical Exam   GENERAL: Pleasant, " well appearing female.  PSYCH: Alert and oriented x3, neatly dressed and well groomed, makes good eye contact, fluid speech - non-pressured, no psychomotor agitation or slowing, good memory, judgement and insight, no auditory or visual hallucinations, bright affect which is mood congruent. No suicidal ideation.

## 2021-10-21 NOTE — PATIENT INSTRUCTIONS
I would recommend optimizing calcium and vitamin D. I would recommend 1500 mg of calcium daily along with 1000 international units of vitamin D daily.     Also start Boniva once a month. Easiest is to take on the first of the month. Take on an empty stomach with a full glass of water. Stay upright for an hour after taking.

## 2021-10-22 ASSESSMENT — ANXIETY QUESTIONNAIRES: GAD7 TOTAL SCORE: 0

## 2021-12-09 ENCOUNTER — TELEPHONE (OUTPATIENT)
Dept: FAMILY MEDICINE | Facility: CLINIC | Age: 86
End: 2021-12-09
Payer: COMMERCIAL

## 2021-12-09 NOTE — TELEPHONE ENCOUNTER
Patient started Boniva 150 mg on 10/21/21. She is experiencing a lot of heart burn several times during the day. She is not wanting to take the Boniva any more due to the side effects. She is wondering if she can stop this medication and be prescribed something else or just not take anything at all. Routed to provider.  Sophy Pleitez RN

## 2021-12-10 NOTE — TELEPHONE ENCOUNTER
She can stop the Boniva.  She will likely have similar side effects with all oral bone density medications since they all have potential to cause heartburn.  We could switch to an injected medication such as Prolia.  This medication does require lab monitoring.  Medication information is below which she can send via [employer] if she would like to consider this medication.    Xgeva or Prolia  Generic Name: Denosumab  Drug type  Denosumab works to slow down the breakdown of the bones and increase bone strength.  What this drug is used for  Bone metastasis, osteopenia, osteoporosis, giant cell tumor of the bone, hypercalcemia of malignancy, or ____________________.  How this drug is given  This drug is given as an injection under the skin (sub-cutaneous). The amount of medicine that you receive depends on many things. Your doctor will decide on the best dose for you.  Make sure your health care team knows about all the medicines and supplements you take. This will help prevent drug interactions.  Side effects  Most people do not have all the side effects listed. Side effects will usually go away after the treatment is complete. Some of the rare side effects are not listed here, so tell your doctor if you have any unusual symptoms.  Common side effects   (occur in more than 3 out of 10 people):    Low levels of phosphorus in blood    Pain in bones or joints    Feel sick to your stomach (nausea)    Feel tired and weak (fatigue)  Less common side effects   (occur in less than 3 out of 10 people):    Jaw problems - it is important to see your dentist and have good oral health    Low levels of calcium in blood    Cough    Headache    Skin rash    Loose, watery stools (diarrhea)  Call your doctor right away if you have:    Signs of a drug allergy: Breathing problems; feel like your throat is closing up; swelling of the mouth, face, lips or tongue; or hives (red, itchy blotches on the skin).    Fever of 100.5  F (38  C) or  higher or chills  Call your doctor within 24 hours if you have:    Muscle twitching or cramps (sign of low blood calcium)    Mouth, jaw or tooth pain    Nausea that makes it hard to eat (not helped by medicine from your doctor)    Vomiting more than 5 times in 24 hours    Loose or watery stools 4 to 6 times in 24 hours (not controlled by medicine)    Extreme tiredness that keeps you from caring for yourself  Other information  ________________________________________________________________________________________________________________________________________________________________________  If you have any side effects, call us. We can help you manage these problems.  For more information, see:  www.chemocare.com  http://www.nlm.nih.gov/medlineplus/druginformation.html  http://www.cancer.gov/cancertopics/coping/chemotherapy-and-you  For informational purposes only. Not to replace the advice of your health care provider.   Copyright   2017 [facility]. All rights reserved. [employer] 714863 - 01/18.

## 2021-12-10 NOTE — TELEPHONE ENCOUNTER
Patient notified of recommendations. She does not want to do the injectable medication. Patient is out of town for the next month. She will continue to take the Boniva until she returns and she will then make an appointment to discuss alternative medications.  Sophy Pleitez RN

## 2022-07-17 ENCOUNTER — HOSPITAL ENCOUNTER (EMERGENCY)
Facility: CLINIC | Age: 87
Discharge: ANOTHER HOSPITAL | End: 2022-07-17
Attending: EMERGENCY MEDICINE | Admitting: EMERGENCY MEDICINE
Payer: COMMERCIAL

## 2022-07-17 ENCOUNTER — APPOINTMENT (OUTPATIENT)
Dept: GENERAL RADIOLOGY | Facility: CLINIC | Age: 87
End: 2022-07-17
Attending: EMERGENCY MEDICINE
Payer: COMMERCIAL

## 2022-07-17 VITALS
TEMPERATURE: 98.4 F | BODY MASS INDEX: 24.25 KG/M2 | OXYGEN SATURATION: 94 % | DIASTOLIC BLOOD PRESSURE: 82 MMHG | HEIGHT: 68 IN | HEART RATE: 74 BPM | WEIGHT: 160 LBS | SYSTOLIC BLOOD PRESSURE: 179 MMHG | RESPIRATION RATE: 18 BRPM

## 2022-07-17 DIAGNOSIS — S72.141A CLOSED COMMINUTED INTERTROCHANTERIC FRACTURE OF RIGHT FEMUR, INITIAL ENCOUNTER (H): ICD-10-CM

## 2022-07-17 PROBLEM — S72.143D: Status: ACTIVE | Noted: 2022-07-17

## 2022-07-17 LAB
ANION GAP SERPL CALCULATED.3IONS-SCNC: 7 MMOL/L (ref 3–14)
APTT PPP: 27 SECONDS (ref 22–38)
BASOPHILS # BLD AUTO: 0 10E3/UL (ref 0–0.2)
BASOPHILS NFR BLD AUTO: 0 %
BUN SERPL-MCNC: 28 MG/DL (ref 7–30)
CALCIUM SERPL-MCNC: 8.9 MG/DL (ref 8.5–10.1)
CHLORIDE BLD-SCNC: 109 MMOL/L (ref 94–109)
CO2 SERPL-SCNC: 25 MMOL/L (ref 20–32)
CREAT SERPL-MCNC: 0.78 MG/DL (ref 0.52–1.04)
EOSINOPHIL # BLD AUTO: 0.2 10E3/UL (ref 0–0.7)
EOSINOPHIL NFR BLD AUTO: 3 %
ERYTHROCYTE [DISTWIDTH] IN BLOOD BY AUTOMATED COUNT: 12.4 % (ref 10–15)
GFR SERPL CREATININE-BSD FRML MDRD: 74 ML/MIN/1.73M2
GLUCOSE BLD-MCNC: 139 MG/DL (ref 70–99)
HCT VFR BLD AUTO: 39.5 % (ref 35–47)
HGB BLD-MCNC: 13.2 G/DL (ref 11.7–15.7)
IMM GRANULOCYTES # BLD: 0 10E3/UL
IMM GRANULOCYTES NFR BLD: 0 %
INR PPP: 0.92 (ref 0.85–1.15)
LYMPHOCYTES # BLD AUTO: 1.5 10E3/UL (ref 0.8–5.3)
LYMPHOCYTES NFR BLD AUTO: 22 %
MCH RBC QN AUTO: 30 PG (ref 26.5–33)
MCHC RBC AUTO-ENTMCNC: 33.4 G/DL (ref 31.5–36.5)
MCV RBC AUTO: 90 FL (ref 78–100)
MONOCYTES # BLD AUTO: 0.5 10E3/UL (ref 0–1.3)
MONOCYTES NFR BLD AUTO: 8 %
NEUTROPHILS # BLD AUTO: 4.6 10E3/UL (ref 1.6–8.3)
NEUTROPHILS NFR BLD AUTO: 67 %
NRBC # BLD AUTO: 0 10E3/UL
NRBC BLD AUTO-RTO: 0 /100
PLATELET # BLD AUTO: 236 10E3/UL (ref 150–450)
POTASSIUM BLD-SCNC: 4.3 MMOL/L (ref 3.4–5.3)
RBC # BLD AUTO: 4.4 10E6/UL (ref 3.8–5.2)
SARS-COV-2 RNA RESP QL NAA+PROBE: NEGATIVE
SODIUM SERPL-SCNC: 141 MMOL/L (ref 133–144)
WBC # BLD AUTO: 6.9 10E3/UL (ref 4–11)

## 2022-07-17 PROCEDURE — 87635 SARS-COV-2 COVID-19 AMP PRB: CPT | Performed by: EMERGENCY MEDICINE

## 2022-07-17 PROCEDURE — 80048 BASIC METABOLIC PNL TOTAL CA: CPT | Performed by: EMERGENCY MEDICINE

## 2022-07-17 PROCEDURE — 73502 X-RAY EXAM HIP UNI 2-3 VIEWS: CPT

## 2022-07-17 PROCEDURE — 85610 PROTHROMBIN TIME: CPT | Performed by: EMERGENCY MEDICINE

## 2022-07-17 PROCEDURE — 250N000011 HC RX IP 250 OP 636: Performed by: EMERGENCY MEDICINE

## 2022-07-17 PROCEDURE — 96374 THER/PROPH/DIAG INJ IV PUSH: CPT

## 2022-07-17 PROCEDURE — 99285 EMERGENCY DEPT VISIT HI MDM: CPT | Performed by: EMERGENCY MEDICINE

## 2022-07-17 PROCEDURE — 36415 COLL VENOUS BLD VENIPUNCTURE: CPT | Performed by: EMERGENCY MEDICINE

## 2022-07-17 PROCEDURE — 96375 TX/PRO/DX INJ NEW DRUG ADDON: CPT

## 2022-07-17 PROCEDURE — 85025 COMPLETE CBC W/AUTO DIFF WBC: CPT | Performed by: EMERGENCY MEDICINE

## 2022-07-17 PROCEDURE — 96376 TX/PRO/DX INJ SAME DRUG ADON: CPT

## 2022-07-17 PROCEDURE — 99285 EMERGENCY DEPT VISIT HI MDM: CPT | Mod: 25

## 2022-07-17 PROCEDURE — 85730 THROMBOPLASTIN TIME PARTIAL: CPT | Performed by: EMERGENCY MEDICINE

## 2022-07-17 PROCEDURE — C9803 HOPD COVID-19 SPEC COLLECT: HCPCS

## 2022-07-17 RX ORDER — HYDROMORPHONE HYDROCHLORIDE 1 MG/ML
0.5 INJECTION, SOLUTION INTRAMUSCULAR; INTRAVENOUS; SUBCUTANEOUS
Status: COMPLETED | OUTPATIENT
Start: 2022-07-17 | End: 2022-07-17

## 2022-07-17 RX ORDER — ONDANSETRON 2 MG/ML
4 INJECTION INTRAMUSCULAR; INTRAVENOUS ONCE
Status: COMPLETED | OUTPATIENT
Start: 2022-07-17 | End: 2022-07-17

## 2022-07-17 RX ADMIN — HYDROMORPHONE HYDROCHLORIDE 0.5 MG: 1 INJECTION, SOLUTION INTRAMUSCULAR; INTRAVENOUS; SUBCUTANEOUS at 22:59

## 2022-07-17 RX ADMIN — HYDROMORPHONE HYDROCHLORIDE 0.5 MG: 1 INJECTION, SOLUTION INTRAMUSCULAR; INTRAVENOUS; SUBCUTANEOUS at 20:58

## 2022-07-17 RX ADMIN — HYDROMORPHONE HYDROCHLORIDE 0.5 MG: 1 INJECTION, SOLUTION INTRAMUSCULAR; INTRAVENOUS; SUBCUTANEOUS at 21:28

## 2022-07-17 RX ADMIN — ONDANSETRON 4 MG: 2 INJECTION INTRAMUSCULAR; INTRAVENOUS at 20:55

## 2022-07-17 ASSESSMENT — ENCOUNTER SYMPTOMS
NAUSEA: 0
CHILLS: 0
ADENOPATHY: 0
CONFUSION: 0
FEVER: 0
MYALGIAS: 0
ARTHRALGIAS: 1
ABDOMINAL PAIN: 0
SORE THROAT: 0
HEADACHES: 0
DYSURIA: 0
WEAKNESS: 0
DIARRHEA: 0
EYE DISCHARGE: 0
SHORTNESS OF BREATH: 0
FREQUENCY: 0
AGITATION: 0
LIGHT-HEADEDNESS: 0
COUGH: 0
VOMITING: 0
COLOR CHANGE: 0

## 2022-07-18 ENCOUNTER — APPOINTMENT (OUTPATIENT)
Dept: GENERAL RADIOLOGY | Facility: CLINIC | Age: 87
DRG: 481 | End: 2022-07-18
Attending: ORTHOPAEDIC SURGERY
Payer: COMMERCIAL

## 2022-07-18 ENCOUNTER — HOSPITAL ENCOUNTER (INPATIENT)
Facility: CLINIC | Age: 87
LOS: 3 days | Discharge: NURSING, REHAB OR LONG-TERM CARE | DRG: 481 | End: 2022-07-21
Attending: INTERNAL MEDICINE | Admitting: INTERNAL MEDICINE
Payer: COMMERCIAL

## 2022-07-18 DIAGNOSIS — I10 ESSENTIAL HYPERTENSION, BENIGN: ICD-10-CM

## 2022-07-18 DIAGNOSIS — S72.141A DISPLACED INTERTROCHANTERIC FRACTURE OF RIGHT FEMUR, INITIAL ENCOUNTER FOR CLOSED FRACTURE (H): ICD-10-CM

## 2022-07-18 DIAGNOSIS — S72.141A CLOSED DISPLACED INTERTROCHANTERIC FRACTURE OF RIGHT FEMUR, INITIAL ENCOUNTER (H): Primary | ICD-10-CM

## 2022-07-18 DIAGNOSIS — S72.141A CLOSED COMMINUTED INTERTROCHANTERIC FRACTURE OF RIGHT FEMUR, INITIAL ENCOUNTER (H): Primary | ICD-10-CM

## 2022-07-18 PROBLEM — N39.41 URGENCY INCONTINENCE: Status: ACTIVE | Noted: 2021-09-23

## 2022-07-18 PROBLEM — S72.141D CLOSED COMMINUTED INTERTROCHANTERIC FRACTURE OF RIGHT FEMUR WITH ROUTINE HEALING: Status: ACTIVE | Noted: 2022-07-17

## 2022-07-18 PROCEDURE — 250N000013 HC RX MED GY IP 250 OP 250 PS 637: Performed by: INTERNAL MEDICINE

## 2022-07-18 PROCEDURE — 120N000001 HC R&B MED SURG/OB

## 2022-07-18 PROCEDURE — 73552 X-RAY EXAM OF FEMUR 2/>: CPT | Mod: RT

## 2022-07-18 PROCEDURE — 258N000003 HC RX IP 258 OP 636: Performed by: INTERNAL MEDICINE

## 2022-07-18 PROCEDURE — 99207 PR NOT IN PERSON INPATIENT CONSULT STATISTICAL MARKER: CPT | Performed by: INTERNAL MEDICINE

## 2022-07-18 PROCEDURE — 99221 1ST HOSP IP/OBS SF/LOW 40: CPT | Mod: AI | Performed by: INTERNAL MEDICINE

## 2022-07-18 PROCEDURE — 999N000147 HC STATISTIC PT IP EVAL DEFER: Performed by: PHYSICAL THERAPIST

## 2022-07-18 PROCEDURE — 250N000011 HC RX IP 250 OP 636: Performed by: INTERNAL MEDICINE

## 2022-07-18 PROCEDURE — 250N000011 HC RX IP 250 OP 636: Performed by: NURSE PRACTITIONER

## 2022-07-18 RX ORDER — ONDANSETRON 4 MG/1
4 TABLET, ORALLY DISINTEGRATING ORAL EVERY 6 HOURS PRN
Status: DISCONTINUED | OUTPATIENT
Start: 2022-07-18 | End: 2022-07-19

## 2022-07-18 RX ORDER — ACETAMINOPHEN 325 MG/1
650 TABLET ORAL EVERY 4 HOURS PRN
Status: DISCONTINUED | OUTPATIENT
Start: 2022-07-18 | End: 2022-07-19

## 2022-07-18 RX ORDER — NALOXONE HYDROCHLORIDE 0.4 MG/ML
0.2 INJECTION, SOLUTION INTRAMUSCULAR; INTRAVENOUS; SUBCUTANEOUS
Status: DISCONTINUED | OUTPATIENT
Start: 2022-07-18 | End: 2022-07-21 | Stop reason: HOSPADM

## 2022-07-18 RX ORDER — HYDROMORPHONE HYDROCHLORIDE 1 MG/ML
0.5 INJECTION, SOLUTION INTRAMUSCULAR; INTRAVENOUS; SUBCUTANEOUS EVERY 4 HOURS PRN
Status: DISCONTINUED | OUTPATIENT
Start: 2022-07-18 | End: 2022-07-18

## 2022-07-18 RX ORDER — ACETAMINOPHEN 325 MG/1
650 TABLET ORAL EVERY 6 HOURS PRN
Status: DISCONTINUED | OUTPATIENT
Start: 2022-07-18 | End: 2022-07-18

## 2022-07-18 RX ORDER — HYDROMORPHONE HCL IN WATER/PF 6 MG/30 ML
0.2 PATIENT CONTROLLED ANALGESIA SYRINGE INTRAVENOUS
Status: DISCONTINUED | OUTPATIENT
Start: 2022-07-18 | End: 2022-07-18

## 2022-07-18 RX ORDER — OXYCODONE HYDROCHLORIDE 5 MG/1
5 TABLET ORAL EVERY 4 HOURS PRN
Status: DISCONTINUED | OUTPATIENT
Start: 2022-07-18 | End: 2022-07-19

## 2022-07-18 RX ORDER — OXYBUTYNIN CHLORIDE 5 MG/1
5 TABLET, EXTENDED RELEASE ORAL DAILY
Status: DISCONTINUED | OUTPATIENT
Start: 2022-07-18 | End: 2022-07-21 | Stop reason: HOSPADM

## 2022-07-18 RX ORDER — AMOXICILLIN 250 MG
1 CAPSULE ORAL 2 TIMES DAILY
Status: DISCONTINUED | OUTPATIENT
Start: 2022-07-18 | End: 2022-07-19

## 2022-07-18 RX ORDER — ESCITALOPRAM OXALATE 10 MG/1
10 TABLET ORAL DAILY
Status: DISCONTINUED | OUTPATIENT
Start: 2022-07-18 | End: 2022-07-21 | Stop reason: HOSPADM

## 2022-07-18 RX ORDER — ATENOLOL 50 MG/1
100 TABLET ORAL DAILY
Status: DISCONTINUED | OUTPATIENT
Start: 2022-07-18 | End: 2022-07-21

## 2022-07-18 RX ORDER — BISACODYL 10 MG
10 SUPPOSITORY, RECTAL RECTAL DAILY PRN
Status: DISCONTINUED | OUTPATIENT
Start: 2022-07-18 | End: 2022-07-19

## 2022-07-18 RX ORDER — ACETAMINOPHEN 650 MG/1
650 SUPPOSITORY RECTAL EVERY 6 HOURS PRN
Status: DISCONTINUED | OUTPATIENT
Start: 2022-07-18 | End: 2022-07-18

## 2022-07-18 RX ORDER — MULTIPLE VITAMINS W/ MINERALS TAB 9MG-400MCG
1 TAB ORAL DAILY
Status: DISCONTINUED | OUTPATIENT
Start: 2022-07-18 | End: 2022-07-21 | Stop reason: HOSPADM

## 2022-07-18 RX ORDER — LIDOCAINE 40 MG/G
CREAM TOPICAL
Status: DISCONTINUED | OUTPATIENT
Start: 2022-07-18 | End: 2022-07-19

## 2022-07-18 RX ORDER — HYDROMORPHONE HYDROCHLORIDE 1 MG/ML
0.5 INJECTION, SOLUTION INTRAMUSCULAR; INTRAVENOUS; SUBCUTANEOUS ONCE
Status: COMPLETED | OUTPATIENT
Start: 2022-07-18 | End: 2022-07-18

## 2022-07-18 RX ORDER — ONDANSETRON 2 MG/ML
4 INJECTION INTRAMUSCULAR; INTRAVENOUS EVERY 6 HOURS PRN
Status: DISCONTINUED | OUTPATIENT
Start: 2022-07-18 | End: 2022-07-19

## 2022-07-18 RX ORDER — HYDROMORPHONE HYDROCHLORIDE 1 MG/ML
0.5 INJECTION, SOLUTION INTRAMUSCULAR; INTRAVENOUS; SUBCUTANEOUS
Status: DISCONTINUED | OUTPATIENT
Start: 2022-07-18 | End: 2022-07-18

## 2022-07-18 RX ORDER — HYDROMORPHONE HYDROCHLORIDE 1 MG/ML
0.5 INJECTION, SOLUTION INTRAMUSCULAR; INTRAVENOUS; SUBCUTANEOUS
Status: DISCONTINUED | OUTPATIENT
Start: 2022-07-18 | End: 2022-07-19

## 2022-07-18 RX ORDER — OXYCODONE HYDROCHLORIDE 5 MG/1
10 TABLET ORAL EVERY 4 HOURS PRN
Status: DISCONTINUED | OUTPATIENT
Start: 2022-07-18 | End: 2022-07-19

## 2022-07-18 RX ORDER — SODIUM CHLORIDE, SODIUM LACTATE, POTASSIUM CHLORIDE, CALCIUM CHLORIDE 600; 310; 30; 20 MG/100ML; MG/100ML; MG/100ML; MG/100ML
INJECTION, SOLUTION INTRAVENOUS CONTINUOUS
Status: DISCONTINUED | OUTPATIENT
Start: 2022-07-18 | End: 2022-07-19

## 2022-07-18 RX ORDER — NALOXONE HYDROCHLORIDE 0.4 MG/ML
0.4 INJECTION, SOLUTION INTRAMUSCULAR; INTRAVENOUS; SUBCUTANEOUS
Status: DISCONTINUED | OUTPATIENT
Start: 2022-07-18 | End: 2022-07-21 | Stop reason: HOSPADM

## 2022-07-18 RX ORDER — ACETAMINOPHEN 650 MG/1
650 SUPPOSITORY RECTAL EVERY 4 HOURS PRN
Status: DISCONTINUED | OUTPATIENT
Start: 2022-07-18 | End: 2022-07-19

## 2022-07-18 RX ORDER — AMOXICILLIN 250 MG
2 CAPSULE ORAL 2 TIMES DAILY
Status: DISCONTINUED | OUTPATIENT
Start: 2022-07-18 | End: 2022-07-19

## 2022-07-18 RX ADMIN — ACETAMINOPHEN 650 MG: 325 TABLET, FILM COATED ORAL at 19:38

## 2022-07-18 RX ADMIN — SENNOSIDES AND DOCUSATE SODIUM 1 TABLET: 50; 8.6 TABLET ORAL at 08:30

## 2022-07-18 RX ADMIN — HYDROMORPHONE HYDROCHLORIDE 0.5 MG: 1 INJECTION, SOLUTION INTRAMUSCULAR; INTRAVENOUS; SUBCUTANEOUS at 20:13

## 2022-07-18 RX ADMIN — SODIUM CHLORIDE, POTASSIUM CHLORIDE, SODIUM LACTATE AND CALCIUM CHLORIDE: 600; 310; 30; 20 INJECTION, SOLUTION INTRAVENOUS at 01:43

## 2022-07-18 RX ADMIN — HYDROMORPHONE HYDROCHLORIDE 0.5 MG: 1 INJECTION, SOLUTION INTRAMUSCULAR; INTRAVENOUS; SUBCUTANEOUS at 11:19

## 2022-07-18 RX ADMIN — OXYBUTYNIN CHLORIDE 5 MG: 5 TABLET, EXTENDED RELEASE ORAL at 08:30

## 2022-07-18 RX ADMIN — ACETAMINOPHEN 650 MG: 325 TABLET, FILM COATED ORAL at 15:06

## 2022-07-18 RX ADMIN — HYDROMORPHONE HYDROCHLORIDE 0.5 MG: 1 INJECTION, SOLUTION INTRAMUSCULAR; INTRAVENOUS; SUBCUTANEOUS at 03:24

## 2022-07-18 RX ADMIN — ESCITALOPRAM OXALATE 10 MG: 10 TABLET ORAL at 08:30

## 2022-07-18 RX ADMIN — HYDROMORPHONE HYDROCHLORIDE 0.5 MG: 1 INJECTION, SOLUTION INTRAMUSCULAR; INTRAVENOUS; SUBCUTANEOUS at 09:36

## 2022-07-18 RX ADMIN — HYDROMORPHONE HYDROCHLORIDE 0.5 MG: 1 INJECTION, SOLUTION INTRAMUSCULAR; INTRAVENOUS; SUBCUTANEOUS at 16:26

## 2022-07-18 RX ADMIN — ACETAMINOPHEN 650 MG: 325 TABLET, FILM COATED ORAL at 02:57

## 2022-07-18 RX ADMIN — SODIUM CHLORIDE, POTASSIUM CHLORIDE, SODIUM LACTATE AND CALCIUM CHLORIDE: 600; 310; 30; 20 INJECTION, SOLUTION INTRAVENOUS at 19:43

## 2022-07-18 RX ADMIN — HYDROCHLOROTHIAZIDE: 12.5 CAPSULE ORAL at 08:29

## 2022-07-18 RX ADMIN — ACETAMINOPHEN 650 MG: 325 TABLET, FILM COATED ORAL at 23:20

## 2022-07-18 RX ADMIN — HYDROMORPHONE HYDROCHLORIDE 0.2 MG: 0.2 INJECTION, SOLUTION INTRAMUSCULAR; INTRAVENOUS; SUBCUTANEOUS at 01:38

## 2022-07-18 RX ADMIN — HYDROMORPHONE HYDROCHLORIDE 0.5 MG: 1 INJECTION, SOLUTION INTRAMUSCULAR; INTRAVENOUS; SUBCUTANEOUS at 06:27

## 2022-07-18 RX ADMIN — Medication 1 TABLET: at 08:29

## 2022-07-18 RX ADMIN — ACETAMINOPHEN 650 MG: 325 TABLET, FILM COATED ORAL at 08:29

## 2022-07-18 RX ADMIN — SENNOSIDES AND DOCUSATE SODIUM 1 TABLET: 50; 8.6 TABLET ORAL at 20:14

## 2022-07-18 RX ADMIN — HYDROMORPHONE HYDROCHLORIDE 0.5 MG: 1 INJECTION, SOLUTION INTRAMUSCULAR; INTRAVENOUS; SUBCUTANEOUS at 23:20

## 2022-07-18 RX ADMIN — SODIUM CHLORIDE, POTASSIUM CHLORIDE, SODIUM LACTATE AND CALCIUM CHLORIDE: 600; 310; 30; 20 INJECTION, SOLUTION INTRAVENOUS at 09:44

## 2022-07-18 RX ADMIN — ATENOLOL 100 MG: 50 TABLET ORAL at 08:30

## 2022-07-18 ASSESSMENT — ACTIVITIES OF DAILY LIVING (ADL)
DOING_ERRANDS_INDEPENDENTLY_DIFFICULTY: NO
WALKING_OR_CLIMBING_STAIRS: OTHER (SEE COMMENTS)
ADLS_ACUITY_SCORE: 22
ADLS_ACUITY_SCORE: 35
CONCENTRATING,_REMEMBERING_OR_MAKING_DECISIONS_DIFFICULTY: NO
VISION_MANAGEMENT: GLASSES
DIFFICULTY_EATING/SWALLOWING: NO
ADLS_ACUITY_SCORE: 22
HEARING_DIFFICULTY_OR_DEAF: NO
ADLS_ACUITY_SCORE: 22
WEAR_GLASSES_OR_BLIND: YES
ADLS_ACUITY_SCORE: 22
WALKING_OR_CLIMBING_STAIRS_DIFFICULTY: YES
DRESSING/BATHING_DIFFICULTY: NO
DIFFICULTY_COMMUNICATING: NO
ADLS_ACUITY_SCORE: 22
ADLS_ACUITY_SCORE: 22
TOILETING_ISSUES: NO

## 2022-07-18 NOTE — ED NOTES
States bilateral knee replacement but states they are ok. Pt states unable to straighten right leg

## 2022-07-18 NOTE — PROVIDER NOTIFICATION
Patient awoke from resting, reporting pain 9-10/10 in right hip, requesting pain medication. Provided with warm blanket to right hip, requesting muscle relaxer and/or increase in frequency of IV dilaudid for pain management.

## 2022-07-18 NOTE — PROVIDER NOTIFICATION
Notified Dr. Garg of pain 10/10 about 1 hour after receiving PRN dilaudid 0.2 mg. Patient had received 0.5 mg dilaudid x 3 in ED prior to admission. Patient has tylenol available, otherwise no other pain medication. Requesting dose modification and/or adjunct pain management option.

## 2022-07-18 NOTE — PROGRESS NOTES
[facility]    Medicine Progress Note - Hospitalist Service    Date of Admission:  7/18/2022    Assessment & Plan          Bonita Villarreal is a 87 year old female admitted on 7/18/2022. She had a mechanical fall and has a fracture of her right femur.       Comminuted fracture intertrochanteric fracture of the right femur  The patient presented after a mechanical fall and has a fracture of her right hip requiring surgical intervention. She has no cardiovascular history and is a low risk for cardiovascular perioperative events.     Managed by orthopedics    N.p.o. at midnight    Lactated Ringer's 100 mL/HR    Bedrest, 5 pound Lin's traction placed on right lower extremity    Pain control with oxycodone and Dilaudid    Orthopedic surgical repair planned for afternoon of 7/19    Physical therapy evaluation following surgery    Recommendation for outpatient rehab pending      Essential hypertension    Continue atenolol, losartan-hydrochlorothiazide      Anxiety    Continue escitalopram      History of urge incontinence    Continue oxybutynin       Diet: Regular Diet Adult  NPO per Anesthesia Guidelines for Procedure/Surgery Except for: Meds    DVT Prophylaxis: Pneumatic Compression Devices  Fong Catheter: Not present  Central Lines: None  Cardiac Monitoring: None  Code Status: Full Code      Disposition Plan      Expected Discharge Date: 07/23/2022                The patient's care was discussed with the Attending Physician, Dr. Lance, Bedside Nurse and Care Coordinator/.    Vivek Hathaway NP  Hospitalist Service  [facility]  Securely message with the Vocera Web Console (learn more here)  Text page via [employer] Paging/Directory         Clinically Significant Risk Factors Present on Admission                 # Hypertension: home medication list includes antihypertensive(s)           ______________________________________________________________________    Interval History   Patient was brought into the emergency room after falling at home when exiting bed.  In emergency room she was found to have a right comminuted intertrochanteric hip fracture.  On the floor she has been experiencing significant pain for which additional doses of Dilaudid have been administered.  We have placed her right lower extremity and 5 pounds Lin's traction and she will be n.p.o. at midnight for surgical repair on 7/19 by Dr. Alonso.    Data reviewed today: I reviewed all medications, new labs and imaging results over the last 24 hours. I personally reviewed no images or EKG's today.    Physical Exam   Vital Signs: Temp: 99.4  F (37.4  C) Temp src: Oral BP: 138/66 Pulse: 61   Resp: 16 SpO2: 98 % O2 Device: None (Room air) Oxygen Delivery: 2 LPM  Weight: 0 lbs 0 oz  Constitutional: awake, alert, cooperative, no apparent distress, and appears stated age  ENT: normocepalic, without obvious abnormality, atramatic  Respiratory: No increased work of breathing, good air exchange, clear to auscultation bilaterally, no crackles or wheezing  Cardiovascular: normal apical pulses  and normal S1 and S2  GI: normal bowel sounds, non-distended and non-tender  Skin: no bruising or bleeding and normal skin color, texture, turgor  Musculoskeletal: full range of motion noted  tone is normal  with exception of  RIGHT HIP: Has fractured right femur, RLE placed in 5 pounds Lin's traction  Neurologic: Awake, alert, oriented to name, place and time.  Cranial nerves II-XII are grossly intact.  Motor is 5 out of 5 bilaterally.      Data   Recent Labs   Lab 07/17/22  2036   WBC 6.9   HGB 13.2   MCV 90      INR 0.92      POTASSIUM 4.3   CHLORIDE 109   CO2 25   BUN 28   CR 0.78   ANIONGAP 7   JEFF 8.9   *     Recent Results (from the past 24 hour(s))   Pelvis XR w/ unilateral hip right    Narrative    EXAM: XR PELVIS AND  HIP RIGHT 1 VIEW  LOCATION: [facility]  DATE/TIME: 7/17/2022 8:11 PM    INDICATION: Fall, right hip pain.  COMPARISON: None.      Impression    IMPRESSION: Comminuted intertrochanteric fracture of the right hip. Degenerative change at the right hip joint. Left hip negative for fracture. Advanced degenerative change at the symphysis pubis with potential old posttraumatic deformity of the left   pubic body. Pelvis otherwise negative.   XR Femur Port Right 2 Views    Narrative    EXAMINATION:  FEMUR PORTABLE RIGHT TWO VIEWS    DATE:  7/18/2022 2:05 PM     CLINICAL INFORMATION: Preoperative planning.    ADDITIONAL INFORMATION: None    COMPARISON: None    FINDINGS: Comminuted intertrochanteric fracture right hip. No evidence  for dislocation. Diffuse demineralization. Degenerative change of the  symphysis pubis. Postoperative change right total knee arthroplasty.    MERY AMARO MD         SYSTEM ID:  [id number]     Medications     lactated ringers 100 mL/hr at 07/18/22 0944       atenolol  100 mg Oral Daily     escitalopram  10 mg Oral Daily     losartan-hydrochlorothiazide (HYZAAR) 100-12.5 mg combo dose   Oral Daily     multivitamin w/minerals  1 tablet Oral Daily     oxybutynin ER  5 mg Oral Daily     senna-docusate  1 tablet Oral BID    Or     senna-docusate  2 tablet Oral BID     sodium chloride (PF)  3 mL Intracatheter Q8H     The use of Dragon/SendGrid dictation services may have been used to construct the content in this note; any grammatical or spelling errors are non-intentional. Please contact the author of this note directly if you are in need of any clarification.

## 2022-07-18 NOTE — PLAN OF CARE
Goal Outcome Evaluation:        Overall Patient Progress: no change    Outcome Evaluation: Patient is alert and oriented. CMS intact, adequate pulses present. Denies numbness or tingling. BUCKS traction placed on left leg with 5# weight. PRN Tylenol and dilaudid utilized for pain management. Most recent dose has been most effective. Placed continuous pulse ox and now 2L 02 via NC to maintain sats above 90%. Continues on NPO status with sips with meds. Surgery is TBD, no time scheduled yet.    Update: Patient is a regular diet and will become NPO per anesthesia guidelines tonight for anticipated surgery 7/19/22 at 1700.     Problem: Plan of Care - These are the overarching goals to be used throughout the patient stay.    Goal: Plan of Care Review/Shift Note  Description: The Plan of Care Review/Shift note should be completed every shift.  The Outcome Evaluation is a brief statement about your assessment that the patient is improving, declining, or no change.  This information will be displayed automatically on your shift note.  Outcome: Ongoing, Progressing  Flowsheets (Taken 7/18/2022 1324)  Outcome Evaluation: Patient is alert and oriented. CMS intact, adequate pulses present. Denies numbness or tingling. BUCKS traction placed on left leg with 5# weight. PRN Tylenol and dilaudid utilized for pain management. Most recent dose has been most effective. Placed continuous pulse ox and now 2L 02 via NC to maintain sats above 90%. Continues on NPO status with sips with meds. Surgery is TBD, no time scheduled yet.  Overall Patient Progress: no change  Goal: Absence of Hospital-Acquired Illness or Injury  Intervention: Identify and Manage Fall Risk  Recent Flowsheet Documentation  Taken 7/18/2022 4230 by Quin García, RN  Safety Promotion/Fall Prevention:    activity supervised    bed alarm on    assistive device/personal items within reach    clutter free environment maintained    fall prevention program  maintained  Intervention: Prevent Skin Injury  Recent Flowsheet Documentation  Taken 7/18/2022 0826 by Quin García RN  Body Position: supine, head elevated  Taken 7/18/2022 0800 by Quin García RN  Body Position: turned  Intervention: Prevent and Manage VTE (Venous Thromboembolism) Risk  Recent Flowsheet Documentation  Taken 7/18/2022 0826 by Quin García RN  VTE Prevention/Management: SCDs (sequential compression devices) on  Activity Management: bedrest  Taken 7/18/2022 0800 by Quin García RN  Activity Management: activity adjusted per tolerance  Goal: Optimal Comfort and Wellbeing  Intervention: Monitor Pain and Promote Comfort  Recent Flowsheet Documentation  Taken 7/18/2022 1119 by Quin García RN  Pain Management Interventions:    medication (see MAR)    rest  Taken 7/18/2022 0936 by Quin García RN  Pain Management Interventions:    medication (see MAR)    rest    repositioned  Taken 7/18/2022 0826 by Quin García RN  Pain Management Interventions:    medication (see MAR)    rest    repositioned  Intervention: Provide Person-Centered Care  Flowsheets (Taken 7/18/2022 1326)  Trust Relationship/Rapport:    care explained    choices provided    questions answered  Goal: Readiness for Transition of Care  Outcome: Ongoing, Progressing  Flowsheets (Taken 7/18/2022 1326)  Transportation Anticipated: family or friend will provide  Intervention: Mutually Develop Transition Plan  Flowsheets (Taken 7/18/2022 1326)  Transportation Anticipated: family or friend will provide     Problem: Embolism (Hip Fracture Medical Management)  Goal: Absence of Embolism  Intervention: Prevent or Manage Embolism Risk  Recent Flowsheet Documentation  Taken 7/18/2022 0826 by Quin García RN  VTE Prevention/Management: SCDs (sequential compression devices) on     Problem: Functional Ability Impaired (Hip Fracture Medical Management)  Goal: Optimal Functional  Performance  Intervention: Promote Optimal Functional Status  Recent Flowsheet Documentation  Taken 7/18/2022 0826 by Quin García RN  Activity Management: bedrest  Taken 7/18/2022 0800 by Quin García RN  Activity Management: activity adjusted per tolerance     Problem: Pain (Hip Fracture Medical Management)  Goal: Acceptable Pain Level  Intervention: Manage Acute Orthopaedic-Related Pain  Recent Flowsheet Documentation  Taken 7/18/2022 1119 by Quin García RN  Pain Management Interventions:    medication (see MAR)    rest  Taken 7/18/2022 0936 by Quin García RN  Pain Management Interventions:    medication (see MAR)    rest    repositioned  Taken 7/18/2022 0826 by Quin García RN  Pain Management Interventions:    medication (see MAR)    rest    repositioned

## 2022-07-18 NOTE — H&P
[facility]    History and Physical - Hospitalist Service       Date of Admission:  7/18/2022    Assessment & Plan      Bonita Villarreal is a 87 year old female admitted on 7/18/2022. She had a mechanical fall and has a fracture of her right hip.     1. Comminuted fracture intertrochanteric fracture of the right femur  The patient presented after a mechanical fall and has a fracture of her right hip requiring surgical intervention. She has no cardiovascular history and is a low risk for cardiovascular perioperative events.   - admit to hospitalist service  - NPO  - bed rest  - pain control  - orthopedic surgery consulted for surgery  - PT after surgery    2. Hypertension  - c/w atenolol, losartan-hydrochlorothiazide    3. Anxiety  - c/w escitalopram          Diet: NPO per Anesthesia Guidelines for Procedure/Surgery Except for: Meds    DVT Prophylaxis: Pneumatic Compression Devices  Fong Catheter: Not present  Central Lines: None  Code Status: Full Code      Clinically Significant Risk Factors Present on Admission                 # Hypertension: home medication list includes antihypertensive(s)          Disposition Plan      Expected Discharge Date: 07/23/2022                The patient's care was discussed with the Patient.        Stephan Garg  [facility]  Securely message with the Vocera Web Console (learn more here)  Text page via [employer] Paging/Directory      Visit/Communication Style   Virtual (Video) communication was used to evaluate Bonita.  Bonita consented to the use of video communication: yes  Video START time: 0110, 7/18/2022  Video STOP time: 0125, 7/18/2022   Patient's location: [facility]   Provider's location during the visit: [facility] Tele-medicine site        ______________________________________________________________________    Chief Complaint   Fall    History is obtained from the  "patient    History of Present Illness   Bonita Villarreal is a 87 year old female who presents to the ED after a fall. She states that she was walking out of her house when she tripped on a step and landed on her right side. She had pain immediately and could not stand up. She denies any dizziness, lightheadedness or loss of consciousness. She otherwise is in good health. She denies any history of cardiovascular problems and is very active at baseline. She denies any SOB or chest pain with exertion.     Review of Systems    General: negative for fever, chills, sweats, weakness  Eyes: negative for blurred vision, loss of vision  Ear Nose and Throat: negative for pharyngitis, speech or swallowing difficulties  Respiratory:  negative for sputum production, wheezing, CASTELLANO, pleuritic pain, sob or cough  Cardiology:  negative for chest pain, palpitations, orthopnea, PND, edema, syncope   Gastrointestinal: negative for abdominal pain, nausea, vomiting, diarrhea, constipation, hematemesis, melena or hematochezia  Genitourinary: negative for frequency, urgency, dysuria, hematuria   Neurological: negative for focal weakness, paresthesia    Past Medical History    I have reviewed this patient's medical history and updated it with pertinent information if needed.   Past Medical History:   Diagnosis Date     Advance Care Planning 2/28/2012    Advance Care Planning 3/20/2016: Receipt of ACP document:  Received: Health Care Directive which was witnessed or notarized on 2/25/16.  Document not previously scanned.  Validation form completed and sent with document to be scanned.  Code Status needs to be updated to reflect choices in most recent ACP document.  Confirmed/documented designated decision maker(s).  Added by Cata Terrell RN, Advance Care Planning Liaison. Advance Care Planning 2/28/12: Patient does not have an Advance/Health Care Directive (HCD), given \"What is Advance Care Planning?\" radhaer. Teena Peace       " Arthritis      Diverticulitis of colon 2005    Patient keeps Rx for Augmentin to use PRN Problem list name updated by automated process. Provider to review     Diverticulosis of colon (without mention of hemorrhage)     history of recurrent diverticulitis     Hypertension        Past Surgical History   I have reviewed this patient's surgical history and updated it with pertinent information if needed.  Past Surgical History:   Procedure Laterality Date     ARTHROPLASTY KNEE Right 2018    Procedure: ARTHROPLASTY KNEE;  Right total knee arthroplasty;  Surgeon: Anton Grover MD;  Location: [facility] OR     ARTHROPLASTY KNEE Left 10/6/2020    Procedure: Total Knee Arthroplasty;  Surgeon: Calixto Santana MD;  Location: [facility] OR     COLONOSCOPY  04/15/02     COLONOSCOPY  3/25/2013    Procedure: COLONOSCOPY;  Colonoscopy  ;  Surgeon: Jamil Porras MD;  Location: [facility] GI     FLEXIBLE SIGMOIDOSCOPY  96     ORTHOPEDIC SURGERY  22-23 YEARS AGO    BACK     SURGICAL HISTORY OF -       lumbar surgery     ZZC APPENDECTOMY         Social History   I have reviewed this patient's social history and updated it with pertinent information if needed.  Social History     Tobacco Use     Smoking status: Never Smoker     Smokeless tobacco: Never Used   Substance Use Topics     Alcohol use: Yes     Comment: rare     Drug use: No       Family History   I have reviewed this patient's family history and updated it with pertinent information if needed.  Family History   Problem Relation Age of Onset     Lipids Brother      Heart Disease Brother          at 51 of an MI     Cancer Brother         bone, lung,  82     Prostate Cancer Brother      Cancer Sister         cervical     Lipids Sister      Heart Disease Sister         eldest sister, s/p MI x 2 with stents; diagnosed with AAA, postoperative complications and MI,  at 75     Heart Disease Brother         MI x 2     Lipids Brother      C.A.D. Father           at 39 of an MI     Family History Negative Mother         lived to age 95     Gynecology Sister         cervical Ca       Prior to Admission Medications   Prior to Admission Medications   Prescriptions Last Dose Informant Patient Reported? Taking?   atenolol (TENORMIN) 100 MG tablet 2022 at 0900  No Yes   Sig: Take 1 tablet (100 mg) by mouth daily   escitalopram (LEXAPRO) 10 MG tablet 2022 at 0900  No Yes   Sig: Take 1 tablet (10 mg) by mouth daily   ibuprofen (ADVIL/MOTRIN) 600 MG tablet 2022 at 0900  No Yes   Sig: Take 1 tablet (600 mg) by mouth every 8 hours as needed for pain (mild)   losartan-hydrochlorothiazide (HYZAAR) 100-12.5 MG tablet 2022 at 0900  No Yes   Sig: Take 1 tablet by mouth daily   multivitamin (CENTRUM SILVER) tablet Past Month at Unknown time Self Yes Yes   Sig: Take 1 tablet by mouth daily   oxybutynin ER (DITROPAN-XL) 5 MG 24 hr tablet 2022 at 0900  No Yes   Sig: Take 1 tablet (5 mg) by mouth daily      Facility-Administered Medications: None     Allergies   Allergies   Allergen Reactions     Bactrim [Sulfamethoxazole W/Trimethoprim] Nausea and Vomiting     Lisinopril Cough       Physical Exam   Vital Signs: Temp: 97.9  F (36.6  C) Temp src: Oral BP: (!) 169/79 Pulse: 75   Resp: 20 SpO2: 93 % O2 Device: None (Room air)    Weight: 0 lbs 0 oz    Gen:  Well-developed, well-nourished, in no acute distress, lying semi-supine in hospital stretcher  HEENT:  Anicteric sclera, PER, hearing intact to voice  Resp:  No accessory muscle use, breath sounds clear; no wheezes no rales no rhonchi  Card:  No murmur, normal S1, S2   Abd:  Soft per RN exam, no TTP, non-distended, normoactive bowel sounds are present  Musc:  Shortened right leg with external rotation.   Psych:  Good insight, oriented to person, place and time, not anxious, not agitated    Data     Recent Labs   Lab 22   WBC 6.9   HGB 13.2   MCV 90      INR 0.92      POTASSIUM 4.3   CHLORIDE  109   CO2 25   BUN 28   CR 0.78   ANIONGAP 7   JEFF 8.9   *         Recent Results (from the past 24 hour(s))   Pelvis XR w/ unilateral hip right    Narrative    EXAM: XR PELVIS AND HIP RIGHT 1 VIEW  LOCATION: [facility]  DATE/TIME: 7/17/2022 8:11 PM    INDICATION: Fall, right hip pain.  COMPARISON: None.      Impression    IMPRESSION: Comminuted intertrochanteric fracture of the right hip. Degenerative change at the right hip joint. Left hip negative for fracture. Advanced degenerative change at the symphysis pubis with potential old posttraumatic deformity of the left   pubic body. Pelvis otherwise negative.

## 2022-07-18 NOTE — ED TRIAGE NOTES
At home and tripped and fell off porch. Pain to right hip. Denies any other sx. No blood thinners

## 2022-07-18 NOTE — PROGRESS NOTES
"[facility]    Progress note    87 year old female HD#O s/p right comminuted intertrochanteric hip fracture in varus  S: Patient seen at bedside in no apparent distress, alert and pleasant.  I discussed possible surgery and that I will be trying to figure out the plan.  She denies numbness, tingling or major pain issues unless she moves then she has a lot of pain.  O: CMS intact right LE, DF/PF intact, 5 out of 5 strength with DF/PF       +2 dorsalis pedis pulse       right hip with minimal swelling and no erythema or ecchymosis and no abrasions       Bilateral calves soft and non tender    Vital signs:  Temp: 97.9  F (36.6  C) Temp src: Oral BP: (!) 169/79 Pulse: 75   Resp: 20 SpO2: 93 % O2 Device: None (Room air)   Height: 174 cm (5' 8.5\")    Estimated body mass index is 23.97 kg/m  as calculated from the following:    Height as of this encounter: 1.74 m (5' 8.5\").    Weight as of 7/17/22: 72.6 kg (160 lb).      Hemoglobin   Date Value Ref Range Status   07/17/2022 13.2 11.7 - 15.7 g/dL Final   10/07/2020 12.2 11.7 - 15.7 g/dL Final     INR   Date Value Ref Range Status   07/17/2022 0.92 0.85 - 1.15 Final   02/28/2018 0.94 0.86 - 1.14 Final         A/P:  1. Pain-patient is working under control with the nurse, IV Dilaudid is being used.  2. DVT Prophylaxis-pneumatics per hospitalist team  3. Weight Bearing Status-nonweightbearing right LE  4.  Plan-anticipate possible surgery.  I am discussing with both Dr. Alonso and Dr. Rivera as both have clinic in the [facility] all day today.  I plan on letting the floor/charge RN no as soon as I find out the plan.  Continue n.p.o. status.    Cirilo Godoy PA-C  7/18/2022           "

## 2022-07-18 NOTE — PLAN OF CARE
Physical Therapy: Orders received. Chart reviewed and discussed with care team.? Physical Therapy not indicated due to patient on bedrest, awaiting surgery final decision, RLE shortened lying in bed.? Defer discharge recommendations to post op evaluation as if standard practice.? Will complete orders.     Thank you for your referral.  Amy Carlos, PT, DPT, ATC, [facility]  O: [phone]  E: [email or web address]

## 2022-07-18 NOTE — PROGRESS NOTES
S-(situation): Patient arrives to room 267 via cart as direct admission from [facility].     B-(background): S/p fall in backyard while gardening, sustained hip fracture. History HTN, pre-diabetes, diverticulosis, previous sacral fracture after falling on ice over 1 year ago.     A-(assessment): Patient transferred to bed via sheet with assist of 4 staff, patient grimacing with movement, minimally able to assist with turning. Patient reporting unable to straighten out right leg, noted to be several inches shorter compared to left leg upon assessment. Bruise on left dorsal hand, previous knee surgery scar present as well. UTV buttocks and back due to patient discomfort with turns. Hypertensive /79, otherwise VSS. Alert and oriented x 4, LS clear, BSA. Rating Pain 10/10 right hip to knee. Purewick in place.     R-(recommendations): Orders reviewed with patient. Will monitor patient per MD orders.     Inpatient nursing criteria listed below were met:    Health care directives status obtained and documented: Yes  SCD's Documented: Yes  Skin issues/needs documented:Yes  Isolation addressed and Signage used: NA  Fall Prevention: Care plan updated Yes Education given and documented Yes  Care Plan initiated and Co-Morbidities added: Yes  Education Assessment documented:Yes  Admission Education Documented: Yes  If present CAUTI/CLABI Education done: NA  New medication patient education completed and documented (Possible Side Effects of Common Medications handout): Yes  Allergies Reviewed: Yes  Admission Medication Reconciliation completed: Yes  Home medications if not able to send immediately home with family stored here: NA  Reminder note placed in discharge instructions regarding home meds: NA  Individualized care needs/preferences addressed and charted: Yes  Provider Notified that patient has arrived to the unit: Yes

## 2022-07-18 NOTE — ED PROVIDER NOTES
History     Chief Complaint   Patient presents with     Fall     HPI  Bonita Villarreal is a 87 year old female with a history of hypertension, diverticulosis, and past sacral fracture who presents after mechanical fall on her deck.  She reports that she fell onto her right hip onto the cement.  She did not hit her head.  She denies loss of consciousness.  She has no neck pain.  She denies chest pain or shortness of breath.  She has no abdominal pain.  She felt well prior to this without evidence of fever or chills.  She has no urine or bowel complaints.  She denies nausea or vomiting.  She was brought in by ambulance after she was unable to get up off the ground and could not straighten her right leg.    Allergies:  Allergies   Allergen Reactions     Bactrim [Sulfamethoxazole W/Trimethoprim] Nausea and Vomiting     Lisinopril Cough       Problem List:    Patient Active Problem List    Diagnosis Date Noted     Closed comminuted intertrochanteric fracture of proximal end of femur with routine healing 07/17/2022     Priority: Medium     Urgency incontinence 09/23/2021     Priority: Medium     Status post total knee replacement 10/06/2020     Priority: Medium     Prediabetes 05/06/2020     Priority: Medium     Fracture of sacrum (H) 03/02/2020     Priority: Medium     Sustained 1/26/2020       Sacral pain 03/02/2020     Priority: Medium     Status post total right knee replacement 02/28/2018     Priority: Medium     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     Priority: Medium     CARDIOVASCULAR SCREENING; LDL GOAL LESS THAN 160 10/31/2010     Priority: Medium     LDL in 140s, HDL in 60s  [place] risk is 6% (average at her age is 14%)       Essential hypertension, benign 06/28/2005     Priority: Medium     had coronary CT scan 2003 --- no evidence of plaque  started on atenolol and ASA Jan 2005 for /94  physician in [place] switched her to Lotrel 5/10 due to her having dizzy spells  HCTZ added 3/05, pt felt mouth too  dried out   pt more anxious and BP still elevated -- switched back to Atenolol       Diverticulosis of large intestine 2005     Priority: Medium     colonoscopy , disease mod - severe  Problem list name updated by automated process. Provider to review       Osteopenia of spine 2005     Priority: Medium     T score -1.3    Risk calculator based on 2003 T-score and personal data showed 1% risk of hip fracture over next 10 years, 14% risk of any fracture over next ten years; consider recheck DEXA at age 77, continue calcium and exercise until then (current recommendations to consider treatment if hip fx risk reaches 3% or any fracture risk reaches 20%)  Problem list name updated by automated process. Provider to review       Family history of other cardiovascular diseases 2005     Priority: Medium     father  at 39 of MI  brother  at 51 of MI  another brother -- MI x 2  eldest sister -- MI x 2 with stents  mother and maternal aunts lived to 80's and mid 90's  Problem list name updated by automated process. Provider to review and confirm  Problem list name updated by automated process. Provider to review       Generalized anxiety disorder 2005     Priority: Medium     onset after patient was diagnosed with hypertension fall           Past Medical History:    Past Medical History:   Diagnosis Date     Advance Care Planning 2012     Arthritis      Diverticulitis of colon 2005     Diverticulosis of colon (without mention of hemorrhage)      Hypertension        Past Surgical History:    Past Surgical History:   Procedure Laterality Date     ARTHROPLASTY KNEE Right 2018    Procedure: ARTHROPLASTY KNEE;  Right total knee arthroplasty;  Surgeon: Anton Grover MD;  Location: [facility]     ARTHROPLASTY KNEE Left 10/6/2020    Procedure: Total Knee Arthroplasty;  Surgeon: Calixto Santana MD;  Location: [facility]     COLONOSCOPY  04/15/02     COLONOSCOPY  3/25/2013     Procedure: COLONOSCOPY;  Colonoscopy  ;  Surgeon: Jamil Porras MD;  Location: [facility]     FLEXIBLE SIGMOIDOSCOPY  96     ORTHOPEDIC SURGERY  22-23 YEARS AGO    BACK     SURGICAL HISTORY OF -       lumbar surgery     ZZC APPENDECTOMY         Family History:    Family History   Problem Relation Age of Onset     Lipids Brother      Heart Disease Brother          at 51 of an MI     Cancer Brother         bone, lung,  82     Prostate Cancer Brother      Cancer Sister         cervical     Lipids Sister      Heart Disease Sister         eldest sister, s/p MI x 2 with stents; diagnosed with AAA, postoperative complications and MI,  at 75     Heart Disease Brother         MI x 2     Lipids Brother      C.A.D. Father          at 39 of an MI     Family History Negative Mother         lived to age 95     Gynecology Sister         cervical Ca       Social History:  Marital Status:   [5]  Social History     Tobacco Use     Smoking status: Never Smoker     Smokeless tobacco: Never Used   Substance Use Topics     Alcohol use: Yes     Comment: rare     Drug use: No        Medications:    atenolol (TENORMIN) 100 MG tablet  escitalopram (LEXAPRO) 10 MG tablet  ibuprofen (ADVIL/MOTRIN) 600 MG tablet  losartan-hydrochlorothiazide (HYZAAR) 100-12.5 MG tablet  multivitamin (CENTRUM SILVER) tablet  oxybutynin ER (DITROPAN-XL) 5 MG 24 hr tablet          Review of Systems   Constitutional: Negative for chills and fever.   HENT: Negative for congestion and sore throat.    Eyes: Negative for discharge.   Respiratory: Negative for cough and shortness of breath.    Cardiovascular: Negative for chest pain and leg swelling.   Gastrointestinal: Negative for abdominal pain, diarrhea, nausea and vomiting.   Genitourinary: Negative for dysuria and frequency.   Musculoskeletal: Positive for arthralgias (right hip). Negative for myalgias.   Skin: Negative for color change and rash.   Neurological: Negative for  "weakness, light-headedness and headaches.   Hematological: Negative for adenopathy.   Psychiatric/Behavioral: Negative for agitation, behavioral problems and confusion.       Physical Exam   BP: (!) 166/88  Pulse: 70  Temp: 98.2  F (36.8  C)  Resp: 16  Height: 172.7 cm (5' 8\")  Weight: 72.6 kg (160 lb)  SpO2: 94 %      Physical Exam  Constitutional:       General: She is not in acute distress.     Appearance: She is well-developed.   HENT:      Head: Normocephalic and atraumatic.   Eyes:      Conjunctiva/sclera: Conjunctivae normal.      Pupils: Pupils are equal, round, and reactive to light.   Neck:      Thyroid: No thyromegaly.      Trachea: No tracheal deviation.   Cardiovascular:      Rate and Rhythm: Normal rate and regular rhythm.      Heart sounds: Normal heart sounds. No murmur heard.  Pulmonary:      Effort: Pulmonary effort is normal. No respiratory distress.      Breath sounds: Normal breath sounds. No wheezing.   Chest:      Chest wall: No tenderness.   Abdominal:      General: There is no distension.      Palpations: Abdomen is soft.      Tenderness: There is no abdominal tenderness.   Musculoskeletal:         General: Tenderness present.      Cervical back: Normal range of motion and neck supple.        Legs:    Skin:     General: Skin is warm.      Findings: No rash.   Neurological:      Mental Status: She is alert and oriented to person, place, and time.      Sensory: No sensory deficit.   Psychiatric:         Behavior: Behavior normal.         ED Course           Procedures              Results for orders placed or performed during the hospital encounter of 07/17/22 (from the past 24 hour(s))   Pelvis XR w/ unilateral hip right    Narrative    EXAM: XR PELVIS AND HIP RIGHT 1 VIEW  LOCATION: [facility]  DATE/TIME: 7/17/2022 8:11 PM    INDICATION: Fall, right hip pain.  COMPARISON: None.      Impression    IMPRESSION: Comminuted intertrochanteric fracture of the right hip. " Degenerative change at the right hip joint. Left hip negative for fracture. Advanced degenerative change at the symphysis pubis with potential old posttraumatic deformity of the left   pubic body. Pelvis otherwise negative.   Basic metabolic panel   Result Value Ref Range    Sodium 141 133 - 144 mmol/L    Potassium 4.3 3.4 - 5.3 mmol/L    Chloride 109 94 - 109 mmol/L    Carbon Dioxide (CO2) 25 20 - 32 mmol/L    Anion Gap 7 3 - 14 mmol/L    Urea Nitrogen 28 7 - 30 mg/dL    Creatinine 0.78 0.52 - 1.04 mg/dL    Calcium 8.9 8.5 - 10.1 mg/dL    Glucose 139 (H) 70 - 99 mg/dL    GFR Estimate 74 >60 mL/min/1.73m2   INR   Result Value Ref Range    INR 0.92 0.85 - 1.15   Partial thromboplastin time   Result Value Ref Range    aPTT 27 22 - 38 Seconds   CBC with platelets and differential   Result Value Ref Range    WBC Count 6.9 4.0 - 11.0 10e3/uL    RBC Count 4.40 3.80 - 5.20 10e6/uL    Hemoglobin 13.2 11.7 - 15.7 g/dL    Hematocrit 39.5 35.0 - 47.0 %    MCV 90 78 - 100 fL    MCH 30.0 26.5 - 33.0 pg    MCHC 33.4 31.5 - 36.5 g/dL    RDW 12.4 10.0 - 15.0 %    Platelet Count 236 150 - 450 10e3/uL    % Neutrophils 67 %    % Lymphocytes 22 %    % Monocytes 8 %    % Eosinophils 3 %    % Basophils 0 %    % Immature Granulocytes 0 %    NRBCs per 100 WBC 0 <1 /100    Absolute Neutrophils 4.6 1.6 - 8.3 10e3/uL    Absolute Lymphocytes 1.5 0.8 - 5.3 10e3/uL    Absolute Monocytes 0.5 0.0 - 1.3 10e3/uL    Absolute Eosinophils 0.2 0.0 - 0.7 10e3/uL    Absolute Basophils 0.0 0.0 - 0.2 10e3/uL    Absolute Immature Granulocytes 0.0 <=0.4 10e3/uL    Absolute NRBCs 0.0 10e3/uL       Medications   HYDROmorphone (PF) (DILAUDID) injection 0.5 mg (0.5 mg Intravenous Given 7/17/22 2128)   ondansetron (ZOFRAN) injection 4 mg (4 mg Intravenous Given 7/17/22 2055)       Assessments & Plan (with Medical Decision Making)     I have reviewed the nursing notes.    I have reviewed the findings, diagnosis, plan and need for follow up with the  patient.  Patient is an 87-year-old female who suffered a mechanical fall landing on her right hip.  She reports right hip pain and inability to straighten the leg.  She came in via ambulance.  She has no numbness or weakness.  She is not on blood thinners.  She has no head injury or loss of consciousness.  She has no chest pain or shortness of breath.  She denies other injuries.    On exam, patient's blood pressure is 166/88 with a pulse rate of 70 and temperature 98.2.  Respirations are 16 and O2 saturations are 94%.    She has no evidence of head injury.  Her neck is supple and nontender.  Her lungs are clear heart is regular.    She has tenderness over her right hip without obvious deformity.  Her right leg does seem shortened.  Patient is not currently requesting anything for pain.  An IV was established.  Bloods will be drawn.  An x-ray of the right hip was ordered.    Patient's white count returned at 6.9 with a hemoglobin of 13.2.  Her coagulation studies are normal.  Basic metabolic profile is normal with the exception of a glucose of 139 which could be expected.    Right hip shows a comminuted intertrochanteric fracture of the right hip with some degenerative changes at the right hip joint.  Left hip is negative.  Pelvis shows no evidence of fracture.    Patient developed increasing pain and required Dilaudid for pain.  She was also given Zofran to counteract the potential side effects of the Dilaudid.  There are no beds currently at [facility].    Patient will need surgical repair of her hip.  I spoke with Dr. Alonso from orthopedics at [facility].  Patient will be transferred there for definitive management of her hip fracture.  I also spoke to Dr. Garg from the hospitalist service.  There do not seem to be any contraindications to surgical repair.    New Prescriptions    No medications on file       Final diagnoses:   Closed comminuted intertrochanteric fracture of right femur,  initial encounter (H)       7/17/2022   [facility] EMERGENCY DEPT     Dante Coughlin MD  07/18/22 2009

## 2022-07-18 NOTE — PROVIDER NOTIFICATION
Pain still not controlled with 0.5mg IV dilaudid and prn Tylenol after 1 hour.  Pt reports the pain is going all the way down her leg.  CRISTIANE Doyle.P. notified.

## 2022-07-19 ENCOUNTER — ANESTHESIA (OUTPATIENT)
Dept: SURGERY | Facility: CLINIC | Age: 87
DRG: 481 | End: 2022-07-19
Payer: COMMERCIAL

## 2022-07-19 ENCOUNTER — ANESTHESIA EVENT (OUTPATIENT)
Dept: SURGERY | Facility: CLINIC | Age: 87
DRG: 481 | End: 2022-07-19
Payer: COMMERCIAL

## 2022-07-19 ENCOUNTER — APPOINTMENT (OUTPATIENT)
Dept: GENERAL RADIOLOGY | Facility: CLINIC | Age: 87
DRG: 481 | End: 2022-07-19
Attending: ORTHOPAEDIC SURGERY
Payer: COMMERCIAL

## 2022-07-19 PROBLEM — S72.141A: Status: ACTIVE | Noted: 2022-07-17

## 2022-07-19 PROBLEM — M80.00XA AGE-RELATED OSTEOPOROSIS WITH CURRENT PATHOLOGICAL FRACTURE: Status: ACTIVE | Noted: 2022-07-19

## 2022-07-19 PROBLEM — S72.141A DISPLACED INTERTROCHANTERIC FRACTURE OF RIGHT FEMUR, INITIAL ENCOUNTER FOR CLOSED FRACTURE (H): Status: ACTIVE | Noted: 2022-07-19

## 2022-07-19 PROBLEM — D64.9 ANEMIA, UNSPECIFIED TYPE: Status: ACTIVE | Noted: 2022-07-19

## 2022-07-19 LAB
ANION GAP SERPL CALCULATED.3IONS-SCNC: 3 MMOL/L (ref 3–14)
BUN SERPL-MCNC: 12 MG/DL (ref 7–30)
CALCIUM SERPL-MCNC: 8.3 MG/DL (ref 8.5–10.1)
CHLORIDE BLD-SCNC: 104 MMOL/L (ref 94–109)
CO2 SERPL-SCNC: 29 MMOL/L (ref 20–32)
CREAT SERPL-MCNC: 0.66 MG/DL (ref 0.52–1.04)
ERYTHROCYTE [DISTWIDTH] IN BLOOD BY AUTOMATED COUNT: 12.3 % (ref 10–15)
GFR SERPL CREATININE-BSD FRML MDRD: 85 ML/MIN/1.73M2
GLUCOSE BLD-MCNC: 105 MG/DL (ref 70–99)
HBA1C MFR BLD: 5.5 % (ref 0–5.6)
HCT VFR BLD AUTO: 32 % (ref 35–47)
HGB BLD-MCNC: 10.7 G/DL (ref 11.7–15.7)
INR PPP: 0.99 (ref 0.85–1.15)
MCH RBC QN AUTO: 31 PG (ref 26.5–33)
MCHC RBC AUTO-ENTMCNC: 33.4 G/DL (ref 31.5–36.5)
MCV RBC AUTO: 93 FL (ref 78–100)
PLATELET # BLD AUTO: 179 10E3/UL (ref 150–450)
POTASSIUM BLD-SCNC: 4.1 MMOL/L (ref 3.4–5.3)
RBC # BLD AUTO: 3.45 10E6/UL (ref 3.8–5.2)
SODIUM SERPL-SCNC: 136 MMOL/L (ref 133–144)
WBC # BLD AUTO: 8.2 10E3/UL (ref 4–11)

## 2022-07-19 PROCEDURE — 250N000009 HC RX 250: Performed by: NURSE ANESTHETIST, CERTIFIED REGISTERED

## 2022-07-19 PROCEDURE — 83036 HEMOGLOBIN GLYCOSYLATED A1C: CPT | Performed by: PEDIATRICS

## 2022-07-19 PROCEDURE — 250N000011 HC RX IP 250 OP 636

## 2022-07-19 PROCEDURE — 999N000179 XR SURGERY CARM FLUORO LESS THAN 5 MIN W STILLS

## 2022-07-19 PROCEDURE — 360N000084 HC SURGERY LEVEL 4 W/ FLUORO, PER MIN: Performed by: ORTHOPAEDIC SURGERY

## 2022-07-19 PROCEDURE — 120N000001 HC R&B MED SURG/OB

## 2022-07-19 PROCEDURE — 0QS606Z REPOSITION RIGHT UPPER FEMUR WITH INTRAMEDULLARY INTERNAL FIXATION DEVICE, OPEN APPROACH: ICD-10-PCS | Performed by: ORTHOPAEDIC SURGERY

## 2022-07-19 PROCEDURE — 250N000013 HC RX MED GY IP 250 OP 250 PS 637: Performed by: ORTHOPAEDIC SURGERY

## 2022-07-19 PROCEDURE — 250N000009 HC RX 250: Performed by: ORTHOPAEDIC SURGERY

## 2022-07-19 PROCEDURE — 82310 ASSAY OF CALCIUM: CPT | Performed by: INTERNAL MEDICINE

## 2022-07-19 PROCEDURE — 250N000011 HC RX IP 250 OP 636: Performed by: PAIN MEDICINE

## 2022-07-19 PROCEDURE — 250N000013 HC RX MED GY IP 250 OP 250 PS 637: Performed by: INTERNAL MEDICINE

## 2022-07-19 PROCEDURE — C1713 ANCHOR/SCREW BN/BN,TIS/BN: HCPCS | Performed by: ORTHOPAEDIC SURGERY

## 2022-07-19 PROCEDURE — 272N000001 HC OR GENERAL SUPPLY STERILE: Performed by: ORTHOPAEDIC SURGERY

## 2022-07-19 PROCEDURE — 250N000011 HC RX IP 250 OP 636: Performed by: NURSE PRACTITIONER

## 2022-07-19 PROCEDURE — 258N000003 HC RX IP 258 OP 636: Performed by: INTERNAL MEDICINE

## 2022-07-19 PROCEDURE — 710N000010 HC RECOVERY PHASE 1, LEVEL 2, PER MIN: Performed by: ORTHOPAEDIC SURGERY

## 2022-07-19 PROCEDURE — 99232 SBSQ HOSP IP/OBS MODERATE 35: CPT | Performed by: PEDIATRICS

## 2022-07-19 PROCEDURE — 250N000011 HC RX IP 250 OP 636: Performed by: NURSE ANESTHETIST, CERTIFIED REGISTERED

## 2022-07-19 PROCEDURE — 85027 COMPLETE CBC AUTOMATED: CPT | Performed by: INTERNAL MEDICINE

## 2022-07-19 PROCEDURE — 85610 PROTHROMBIN TIME: CPT | Performed by: INTERNAL MEDICINE

## 2022-07-19 PROCEDURE — 999N000141 HC STATISTIC PRE-PROCEDURE NURSING ASSESSMENT: Performed by: ORTHOPAEDIC SURGERY

## 2022-07-19 PROCEDURE — 370N000017 HC ANESTHESIA TECHNICAL FEE, PER MIN: Performed by: ORTHOPAEDIC SURGERY

## 2022-07-19 PROCEDURE — 258N000003 HC RX IP 258 OP 636: Performed by: ORTHOPAEDIC SURGERY

## 2022-07-19 PROCEDURE — 27245 TREAT THIGH FRACTURE: CPT | Mod: RT | Performed by: ORTHOPAEDIC SURGERY

## 2022-07-19 PROCEDURE — 36415 COLL VENOUS BLD VENIPUNCTURE: CPT | Performed by: INTERNAL MEDICINE

## 2022-07-19 PROCEDURE — 250N000011 HC RX IP 250 OP 636: Performed by: ORTHOPAEDIC SURGERY

## 2022-07-19 DEVICE — IMPLANTABLE DEVICE: Type: IMPLANTABLE DEVICE | Site: LEG | Status: FUNCTIONAL

## 2022-07-19 DEVICE — IMP WIRE KIRSCHNER STRK 3.0X285MM 1806-0050S: Type: IMPLANTABLE DEVICE | Site: LEG | Status: FUNCTIONAL

## 2022-07-19 DEVICE — IMP SCR BONE STRK G3 LAG 10.5X105MM TI 3060-0105S: Type: IMPLANTABLE DEVICE | Site: LEG | Status: FUNCTIONAL

## 2022-07-19 DEVICE — IMP SCR STRK LOCK 5.0X50MM FT 1896-5050S: Type: IMPLANTABLE DEVICE | Site: LEG | Status: FUNCTIONAL

## 2022-07-19 DEVICE — IMP SCR STRK LOCK 5.0X55MM FT 1896-5055S: Type: IMPLANTABLE DEVICE | Site: LEG | Status: FUNCTIONAL

## 2022-07-19 DEVICE — IMP WIRE KIRSCHNER 3.2X450MM 1210-6450S: Type: IMPLANTABLE DEVICE | Site: LEG | Status: FUNCTIONAL

## 2022-07-19 RX ORDER — BISACODYL 10 MG
10 SUPPOSITORY, RECTAL RECTAL DAILY PRN
Status: DISCONTINUED | OUTPATIENT
Start: 2022-07-19 | End: 2022-07-21 | Stop reason: HOSPADM

## 2022-07-19 RX ORDER — LABETALOL HYDROCHLORIDE 5 MG/ML
5 INJECTION, SOLUTION INTRAVENOUS
Status: DISCONTINUED | OUTPATIENT
Start: 2022-07-19 | End: 2022-07-21 | Stop reason: HOSPADM

## 2022-07-19 RX ORDER — ACETAMINOPHEN 325 MG/1
650 TABLET ORAL EVERY 4 HOURS PRN
Status: DISCONTINUED | OUTPATIENT
Start: 2022-07-22 | End: 2022-07-21 | Stop reason: HOSPADM

## 2022-07-19 RX ORDER — FENTANYL CITRATE 50 UG/ML
50 INJECTION, SOLUTION INTRAMUSCULAR; INTRAVENOUS EVERY 5 MIN PRN
Status: DISCONTINUED | OUTPATIENT
Start: 2022-07-19 | End: 2022-07-19 | Stop reason: HOSPADM

## 2022-07-19 RX ORDER — HYDROXYZINE HYDROCHLORIDE 10 MG/1
10 TABLET, FILM COATED ORAL EVERY 6 HOURS PRN
Status: DISCONTINUED | OUTPATIENT
Start: 2022-07-19 | End: 2022-07-21 | Stop reason: HOSPADM

## 2022-07-19 RX ORDER — AMOXICILLIN 250 MG
1 CAPSULE ORAL 2 TIMES DAILY
Status: DISCONTINUED | OUTPATIENT
Start: 2022-07-19 | End: 2022-07-21 | Stop reason: HOSPADM

## 2022-07-19 RX ORDER — FENTANYL CITRATE-0.9 % NACL/PF 10 MCG/ML
PLASTIC BAG, INJECTION (ML) INTRAVENOUS CONTINUOUS PRN
Status: DISCONTINUED | OUTPATIENT
Start: 2022-07-19 | End: 2022-07-19

## 2022-07-19 RX ORDER — HYDROMORPHONE HYDROCHLORIDE 1 MG/ML
0.5 INJECTION, SOLUTION INTRAMUSCULAR; INTRAVENOUS; SUBCUTANEOUS EVERY 5 MIN PRN
Status: DISCONTINUED | OUTPATIENT
Start: 2022-07-19 | End: 2022-07-19 | Stop reason: HOSPADM

## 2022-07-19 RX ORDER — HYDROMORPHONE HCL IN WATER/PF 6 MG/30 ML
0.2 PATIENT CONTROLLED ANALGESIA SYRINGE INTRAVENOUS
Status: DISCONTINUED | OUTPATIENT
Start: 2022-07-19 | End: 2022-07-21 | Stop reason: HOSPADM

## 2022-07-19 RX ORDER — ONDANSETRON 2 MG/ML
4 INJECTION INTRAMUSCULAR; INTRAVENOUS EVERY 30 MIN PRN
Status: DISCONTINUED | OUTPATIENT
Start: 2022-07-19 | End: 2022-07-19 | Stop reason: HOSPADM

## 2022-07-19 RX ORDER — DIPHENHYDRAMINE HYDROCHLORIDE 50 MG/ML
12.5 INJECTION INTRAMUSCULAR; INTRAVENOUS EVERY 6 HOURS PRN
Status: DISCONTINUED | OUTPATIENT
Start: 2022-07-19 | End: 2022-07-19 | Stop reason: HOSPADM

## 2022-07-19 RX ORDER — DIMENHYDRINATE 50 MG/ML
25 INJECTION, SOLUTION INTRAMUSCULAR; INTRAVENOUS
Status: DISCONTINUED | OUTPATIENT
Start: 2022-07-19 | End: 2022-07-19 | Stop reason: HOSPADM

## 2022-07-19 RX ORDER — PHENYLEPHRINE HYDROCHLORIDE 10 MG/ML
INJECTION INTRAVENOUS PRN
Status: DISCONTINUED | OUTPATIENT
Start: 2022-07-19 | End: 2022-07-19

## 2022-07-19 RX ORDER — PROPOFOL 10 MG/ML
INJECTION, EMULSION INTRAVENOUS CONTINUOUS PRN
Status: DISCONTINUED | OUTPATIENT
Start: 2022-07-19 | End: 2022-07-19

## 2022-07-19 RX ORDER — OXYCODONE HYDROCHLORIDE 5 MG/1
5 TABLET ORAL EVERY 4 HOURS PRN
Status: DISCONTINUED | OUTPATIENT
Start: 2022-07-19 | End: 2022-07-21 | Stop reason: HOSPADM

## 2022-07-19 RX ORDER — OXYCODONE HYDROCHLORIDE 5 MG/1
5 TABLET ORAL EVERY 4 HOURS PRN
Status: DISCONTINUED | OUTPATIENT
Start: 2022-07-19 | End: 2022-07-19 | Stop reason: HOSPADM

## 2022-07-19 RX ORDER — HYDROMORPHONE HCL IN WATER/PF 6 MG/30 ML
0.4 PATIENT CONTROLLED ANALGESIA SYRINGE INTRAVENOUS
Status: DISCONTINUED | OUTPATIENT
Start: 2022-07-19 | End: 2022-07-21 | Stop reason: HOSPADM

## 2022-07-19 RX ORDER — CEFAZOLIN SODIUM 1 G/3ML
1 INJECTION, POWDER, FOR SOLUTION INTRAMUSCULAR; INTRAVENOUS EVERY 8 HOURS
Status: COMPLETED | OUTPATIENT
Start: 2022-07-20 | End: 2022-07-20

## 2022-07-19 RX ORDER — ACETAMINOPHEN 325 MG/1
975 TABLET ORAL EVERY 8 HOURS
Status: DISCONTINUED | OUTPATIENT
Start: 2022-07-19 | End: 2022-07-21 | Stop reason: HOSPADM

## 2022-07-19 RX ORDER — CEFAZOLIN SODIUM/WATER 2 G/20 ML
2 SYRINGE (ML) INTRAVENOUS
Status: COMPLETED | OUTPATIENT
Start: 2022-07-19 | End: 2022-07-19

## 2022-07-19 RX ORDER — ONDANSETRON 2 MG/ML
4 INJECTION INTRAMUSCULAR; INTRAVENOUS EVERY 6 HOURS PRN
Status: DISCONTINUED | OUTPATIENT
Start: 2022-07-19 | End: 2022-07-21 | Stop reason: HOSPADM

## 2022-07-19 RX ORDER — LIDOCAINE 40 MG/G
CREAM TOPICAL
Status: DISCONTINUED | OUTPATIENT
Start: 2022-07-19 | End: 2022-07-21 | Stop reason: HOSPADM

## 2022-07-19 RX ORDER — ONDANSETRON 4 MG/1
4 TABLET, ORALLY DISINTEGRATING ORAL EVERY 30 MIN PRN
Status: DISCONTINUED | OUTPATIENT
Start: 2022-07-19 | End: 2022-07-19 | Stop reason: HOSPADM

## 2022-07-19 RX ORDER — POLYETHYLENE GLYCOL 3350 17 G/17G
17 POWDER, FOR SOLUTION ORAL DAILY
Status: DISCONTINUED | OUTPATIENT
Start: 2022-07-20 | End: 2022-07-21 | Stop reason: HOSPADM

## 2022-07-19 RX ORDER — SODIUM CHLORIDE, SODIUM LACTATE, POTASSIUM CHLORIDE, CALCIUM CHLORIDE 600; 310; 30; 20 MG/100ML; MG/100ML; MG/100ML; MG/100ML
INJECTION, SOLUTION INTRAVENOUS CONTINUOUS
Status: DISCONTINUED | OUTPATIENT
Start: 2022-07-19 | End: 2022-07-21 | Stop reason: HOSPADM

## 2022-07-19 RX ORDER — BUPIVACAINE HYDROCHLORIDE AND EPINEPHRINE 2.5; 5 MG/ML; UG/ML
INJECTION, SOLUTION INFILTRATION; PERINEURAL PRN
Status: DISCONTINUED | OUTPATIENT
Start: 2022-07-19 | End: 2022-07-19 | Stop reason: HOSPADM

## 2022-07-19 RX ORDER — PROCHLORPERAZINE MALEATE 5 MG
5 TABLET ORAL EVERY 6 HOURS PRN
Status: DISCONTINUED | OUTPATIENT
Start: 2022-07-19 | End: 2022-07-21 | Stop reason: HOSPADM

## 2022-07-19 RX ORDER — OXYCODONE HYDROCHLORIDE 5 MG/1
10 TABLET ORAL EVERY 4 HOURS PRN
Status: DISCONTINUED | OUTPATIENT
Start: 2022-07-19 | End: 2022-07-21 | Stop reason: HOSPADM

## 2022-07-19 RX ORDER — FENTANYL CITRATE 50 UG/ML
INJECTION, SOLUTION INTRAMUSCULAR; INTRAVENOUS PRN
Status: DISCONTINUED | OUTPATIENT
Start: 2022-07-19 | End: 2022-07-19

## 2022-07-19 RX ORDER — ONDANSETRON 4 MG/1
4 TABLET, ORALLY DISINTEGRATING ORAL EVERY 6 HOURS PRN
Status: DISCONTINUED | OUTPATIENT
Start: 2022-07-19 | End: 2022-07-21 | Stop reason: HOSPADM

## 2022-07-19 RX ORDER — CEFAZOLIN SODIUM/WATER 2 G/20 ML
2 SYRINGE (ML) INTRAVENOUS SEE ADMIN INSTRUCTIONS
Status: DISCONTINUED | OUTPATIENT
Start: 2022-07-19 | End: 2022-07-19 | Stop reason: HOSPADM

## 2022-07-19 RX ORDER — DIPHENHYDRAMINE HCL 12.5 MG/5ML
12.5 SOLUTION ORAL EVERY 6 HOURS PRN
Status: DISCONTINUED | OUTPATIENT
Start: 2022-07-19 | End: 2022-07-19 | Stop reason: HOSPADM

## 2022-07-19 RX ORDER — SODIUM CHLORIDE, SODIUM LACTATE, POTASSIUM CHLORIDE, CALCIUM CHLORIDE 600; 310; 30; 20 MG/100ML; MG/100ML; MG/100ML; MG/100ML
INJECTION, SOLUTION INTRAVENOUS CONTINUOUS
Status: DISCONTINUED | OUTPATIENT
Start: 2022-07-19 | End: 2022-07-19 | Stop reason: HOSPADM

## 2022-07-19 RX ADMIN — Medication 30 MCG/MIN: at 19:23

## 2022-07-19 RX ADMIN — PHENYLEPHRINE HYDROCHLORIDE 200 MCG: 10 INJECTION INTRAVENOUS at 19:23

## 2022-07-19 RX ADMIN — SENNOSIDES AND DOCUSATE SODIUM 1 TABLET: 50; 8.6 TABLET ORAL at 23:24

## 2022-07-19 RX ADMIN — ACETAMINOPHEN 975 MG: 325 TABLET, FILM COATED ORAL at 23:24

## 2022-07-19 RX ADMIN — ESCITALOPRAM OXALATE 10 MG: 10 TABLET ORAL at 08:34

## 2022-07-19 RX ADMIN — ATENOLOL 100 MG: 50 TABLET ORAL at 08:31

## 2022-07-19 RX ADMIN — PHENYLEPHRINE HYDROCHLORIDE 100 MCG: 10 INJECTION INTRAVENOUS at 19:17

## 2022-07-19 RX ADMIN — Medication 1 UNITS: at 20:09

## 2022-07-19 RX ADMIN — HYDROCHLOROTHIAZIDE: 12.5 CAPSULE ORAL at 08:30

## 2022-07-19 RX ADMIN — Medication 1 TABLET: at 08:30

## 2022-07-19 RX ADMIN — Medication 1 UNITS: at 20:25

## 2022-07-19 RX ADMIN — FENTANYL CITRATE 25 MCG: 50 INJECTION, SOLUTION INTRAMUSCULAR; INTRAVENOUS at 18:45

## 2022-07-19 RX ADMIN — HYDROMORPHONE HYDROCHLORIDE 0.5 MG: 1 INJECTION, SOLUTION INTRAMUSCULAR; INTRAVENOUS; SUBCUTANEOUS at 13:28

## 2022-07-19 RX ADMIN — ACETAMINOPHEN 650 MG: 325 TABLET, FILM COATED ORAL at 08:32

## 2022-07-19 RX ADMIN — ACETAMINOPHEN 650 MG: 325 TABLET, FILM COATED ORAL at 04:16

## 2022-07-19 RX ADMIN — SENNOSIDES AND DOCUSATE SODIUM 1 TABLET: 50; 8.6 TABLET ORAL at 08:35

## 2022-07-19 RX ADMIN — SODIUM CHLORIDE, POTASSIUM CHLORIDE, SODIUM LACTATE AND CALCIUM CHLORIDE: 600; 310; 30; 20 INJECTION, SOLUTION INTRAVENOUS at 05:52

## 2022-07-19 RX ADMIN — SODIUM CHLORIDE, POTASSIUM CHLORIDE, SODIUM LACTATE AND CALCIUM CHLORIDE: 600; 310; 30; 20 INJECTION, SOLUTION INTRAVENOUS at 21:09

## 2022-07-19 RX ADMIN — SODIUM CHLORIDE, POTASSIUM CHLORIDE, SODIUM LACTATE AND CALCIUM CHLORIDE: 600; 310; 30; 20 INJECTION, SOLUTION INTRAVENOUS at 23:18

## 2022-07-19 RX ADMIN — OXYBUTYNIN CHLORIDE 5 MG: 5 TABLET, EXTENDED RELEASE ORAL at 08:32

## 2022-07-19 RX ADMIN — ONDANSETRON 4 MG: 2 INJECTION INTRAMUSCULAR; INTRAVENOUS at 22:14

## 2022-07-19 RX ADMIN — HYDROMORPHONE HYDROCHLORIDE 0.5 MG: 1 INJECTION, SOLUTION INTRAMUSCULAR; INTRAVENOUS; SUBCUTANEOUS at 05:52

## 2022-07-19 RX ADMIN — PROCHLORPERAZINE EDISYLATE 5 MG: 5 INJECTION INTRAMUSCULAR; INTRAVENOUS at 23:25

## 2022-07-19 RX ADMIN — LABETALOL HYDROCHLORIDE 5 MG: 5 INJECTION, SOLUTION INTRAVENOUS at 22:29

## 2022-07-19 RX ADMIN — PROPOFOL 75 MCG/KG/MIN: 10 INJECTION, EMULSION INTRAVENOUS at 18:55

## 2022-07-19 RX ADMIN — Medication 2 G: at 19:47

## 2022-07-19 RX ADMIN — HYDROMORPHONE HYDROCHLORIDE 0.5 MG: 1 INJECTION, SOLUTION INTRAMUSCULAR; INTRAVENOUS; SUBCUTANEOUS at 10:37

## 2022-07-19 ASSESSMENT — ACTIVITIES OF DAILY LIVING (ADL)
ADLS_ACUITY_SCORE: 24
ADLS_ACUITY_SCORE: 22
ADLS_ACUITY_SCORE: 24

## 2022-07-19 NOTE — ANESTHESIA PREPROCEDURE EVALUATION
"Anesthesia Pre-Procedure Evaluation    Patient: Bonita Villarreal   MRN: [id number] : 1935        Procedure : Procedure(s):  OPEN REDUCTION INTERNAL FIXATION, FRACTURE, FEMUR, PROXIMAL          Past Medical History:   Diagnosis Date     Advance Care Planning 2012    Advance Care Planning 3/20/2016: Receipt of ACP document:  Received: Health Care Directive which was witnessed or notarized on 16.  Document not previously scanned.  Validation form completed and sent with document to be scanned.  Code Status needs to be updated to reflect choices in most recent ACP document.  Confirmed/documented designated decision maker(s).  Added by Cata Terrell RN, Advance Care Planning Liaison. Advance Care Planning 12: Patient does not have an Advance/Health Care Directive (HCD), given \"What is Advance Care Planning?\" flyer. Teena Peace       Arthritis      Diverticulitis of colon 2005    Patient keeps Rx for Augmentin to use PRN Problem list name updated by automated process. Provider to review     Diverticulosis of colon (without mention of hemorrhage)     history of recurrent diverticulitis     Hypertension       Past Surgical History:   Procedure Laterality Date     ARTHROPLASTY KNEE Right 2018    Procedure: ARTHROPLASTY KNEE;  Right total knee arthroplasty;  Surgeon: Anton Grover MD;  Location: [facility]     ARTHROPLASTY KNEE Left 10/6/2020    Procedure: Total Knee Arthroplasty;  Surgeon: Calixto Santana MD;  Location: [facility]     COLONOSCOPY  04/15/02     COLONOSCOPY  3/25/2013    Procedure: COLONOSCOPY;  Colonoscopy  ;  Surgeon: Jamil Porras MD;  Location: [facility]     FLEXIBLE SIGMOIDOSCOPY  96     ORTHOPEDIC SURGERY  22-23 YEARS AGO    BACK     SURGICAL HISTORY OF -       lumbar surgery     ZZC APPENDECTOMY        Allergies   Allergen Reactions     Bactrim [Sulfamethoxazole W/Trimethoprim] Nausea and Vomiting     Lisinopril Cough      Social History "     Tobacco Use     Smoking status: Never Smoker     Smokeless tobacco: Never Used   Substance Use Topics     Alcohol use: Yes     Comment: rare      Wt Readings from Last 1 Encounters:   07/19/22 72.6 kg (160 lb)        Anesthesia Evaluation   Pt has had prior anesthetic. Type: General and MAC.    No history of anesthetic complications       ROS/MED HX  ENT/Pulmonary:  - neg pulmonary ROS     Neurologic:  - neg neurologic ROS     Cardiovascular:     (+) Dyslipidemia hypertension-----Previous cardiac testing   Echo: Date: 2018 Results:     Impression  1. Myocardial perfusion imaging using single isotope technique  demonstrated normal myocardial perfusion. There is no ischemia or  infarction identified on this study.   2. Gated images demonstrated normal wall motion and normal left  ventricular chamber size. The left ventricular systolic function is  normal, with an ejection fraction measured at 52%.  3. No previous study available for comparison  Stress Test: Date: Results:    ECG Reviewed: Date: 2018/7/19/2022 Results:  SR with LBBB  7/19/22- SR with LBBB  Cath:  Date: Results:      METS/Exercise Tolerance:     Hematologic:  - neg hematologic  ROS     Musculoskeletal:   (+) fracture, Fracture location: RLE,     GI/Hepatic:  - neg GI/hepatic ROS     Renal/Genitourinary:  - neg Renal ROS     Endo:  - neg endo ROS     Psychiatric/Substance Use:     (+) psychiatric history anxiety     Infectious Disease:  - neg infectious disease ROS     Malignancy:  - neg malignancy ROS     Other:  - neg other ROS          Physical Exam    Airway  airway exam normal      Mallampati: II   TM distance: > 3 FB   Neck ROM: full   Mouth opening: > 3 cm    Respiratory Devices and Support         Dental       (+) caps      Cardiovascular   cardiovascular exam normal       Rhythm and rate: regular and normal     Pulmonary   pulmonary exam normal        breath sounds clear to auscultation           OUTSIDE LABS:  CBC:   Lab Results   Component  Value Date    WBC 8.2 07/19/2022    WBC 6.9 07/17/2022    HGB 10.7 (L) 07/19/2022    HGB 13.2 07/17/2022    HCT 32.0 (L) 07/19/2022    HCT 39.5 07/17/2022     07/19/2022     07/17/2022     BMP:   Lab Results   Component Value Date     07/19/2022     07/17/2022    POTASSIUM 4.1 07/19/2022    POTASSIUM 4.3 07/17/2022    CHLORIDE 104 07/19/2022    CHLORIDE 109 07/17/2022    CO2 29 07/19/2022    CO2 25 07/17/2022    BUN 12 07/19/2022    BUN 28 07/17/2022    CR 0.66 07/19/2022    CR 0.78 07/17/2022     (H) 07/19/2022     (H) 07/17/2022     COAGS:   Lab Results   Component Value Date    PTT 27 07/17/2022    INR 0.99 07/19/2022     POC:   Lab Results   Component Value Date    BGM 95 03/01/2020     HEPATIC:   Lab Results   Component Value Date    ALBUMIN 3.4 02/29/2020    PROTTOTAL 6.9 02/29/2020    ALT 18 02/29/2020    AST 22 02/29/2020    ALKPHOS 242 (H) 02/29/2020    BILITOTAL 0.7 02/29/2020     OTHER:   Lab Results   Component Value Date    LACT 1.3 06/28/2018    A1C 5.5 07/19/2022    JEFF 8.3 (L) 07/19/2022    PHOS 4.3 10/21/2021    TSH 3.74 10/21/2021    T4 0.87 03/09/2005    .0 (H) 06/29/2018    SED 33 (H) 06/28/2018       Anesthesia Plan    ASA Status:  2, emergent    NPO Status:  NPO Appropriate    Anesthesia Type: Spinal.   Induction: Propofol, Intravenous.   Maintenance: TIVA.        Consents    Anesthesia Plan(s) and associated risks, benefits, and realistic alternatives discussed. Questions answered and patient/representative(s) expressed understanding.    - Discussed:     - Discussed with:  Patient    Use of blood products discussed: No .     Postoperative Care    Pain management: IV analgesics, Oral pain medications, Peripheral nerve block (Single Shot).   PONV prophylaxis: Ondansetron (or other 5HT-3), Dexamethasone or Solumedrol     Comments:    Other Comments: The risks and benefits of spinal vs general anesthesia and FI block for postop pain, and the  alternatives where applicable, have been discussed with the patient, and they wish to proceed.            Dante Rollins, APRN CRNA

## 2022-07-19 NOTE — PLAN OF CARE
Goal Outcome Evaluation:    Plan of Care Reviewed With: patient     Overall Patient Progress: no change    Outcome Evaluation: Pt A&Ox4. VSS. Afebrile. [place] traction on right leg continued with 5 lbs of free-hanging weight. PRN tylenol and dilaudid administered x3 to manage pain. O2 thrown on 1L NC for brief period of time, otherwise RA all night. Adequate urine output with purewick. Pt states feeling sore from having to stay in bed. Provided nonpharmacoligcal therapies to help relieve some soreness. Pt states feeling emotional this morning regarding situation. Writer actively listened and answered questions for the pt. Surgery tonight scheduled for around 1700.       Procedure:  OPEN REDUCTION W/ INTERNAL FIXATION (ORIF) DISTAL RADIUS, RIGHT (Right Wrist)    Relevant Problems   CARDIO   (+) Benign essential hypertension      GI/HEPATIC   (+) GERD (gastroesophageal reflux disease)      /RENAL   (+) Benign prostatic hyperplasia with lower urinary tract symptoms      MUSCULOSKELETAL   (+) Osteoarthritis of left patellofemoral joint   (+) Post-traumatic osteoarthritis of right knee        Physical Exam    Airway    Mallampati score: II  TM Distance: >3 FB  Neck ROM: full     Dental       Cardiovascular      Pulmonary      Other Findings        Anesthesia Plan  ASA Score- 2     Anesthesia Type- general with ASA Monitors  Additional Monitors:   Airway Plan: LMA  Comment: Patient seen and examined, history reviewed  Patient to be done under general anesthesia with LMA and routine monitors  SC block for post op pain control  Risks discussed with the patient, consent obtained          Plan Factors-Exercise tolerance (METS): >4 METS  Chart reviewed  Patient summary reviewed  Induction- intravenous  Postoperative Plan-     Informed Consent- Anesthetic plan and risks discussed with patient  I personally reviewed this patient with the CRNA  Discussed and agreed on the Anesthesia Plan with the CRNA  Marily Gordon

## 2022-07-19 NOTE — PROGRESS NOTES
[facility]    Medicine Progress Note - Hospitalist Service    Date of Admission:  7/18/2022    Assessment & Plan        Bonita Villarreal is a 87 year old female admitted on 7/18/2022. She had a mechanical fall and has a fracture of her right femur for which operative repair is planned.       Comminuted fracture intertrochanteric fracture of the right femur    Osteoporosis with pathologic hip fracture  Assessment: DEXA scan September 2021 demonstrating osteoporosis in the right hip so suspect this acute hip fracture is due to osteoporotic fracture after fall, anticipate surgical intervention today.  She remains stable preoperatively.  Plan:    Anticipate routine postoperative care will be managed by orthopedics    N.p.o. preoperatively today    Continue IV fluids with Lactated Ringer's 100 mL/HR while n.p.o.    Bedrest, continue 5 pound Lin's traction placed on right lower extremity preoperatively    Continue pain control with oxycodone and or IV Dilaudid    Anticipate physical therapy evaluation following surgery    Recommend adequate calcium with vitamin D supplementation postoperatively for treatment of osteoporosis and follow-up with PCP to discuss other potential treatment options of osteoporosis    Anemia, unspecified type  Assessment: Acute 2 to 3 g drop in hemoglobin overnight possibly due to occult bleeding from hip fracture or hemodilution or both, at risk for acute blood losses from surgery as well, acute blood losses due to injury and/or surgery could have an impact on her blood pressure reducing her need for antihypertensive medication  Plan:  -Monitor hemoglobin postoperatively  -Consider PRBC transfusion for hemoglobin less than 7      Essential hypertension  Assessment: Chronic and stable hemodynamically preoperatively, may be at risk for hypotension perioperatively depending upon acute blood losses and whether antihypertensive medications are  continued  Plan:    Continuing chronic antihypertensive medications including atenolol and losartan-hydrochlorothiazide for now but may need to hold or even discontinue antihypertensive medications if she develops hypotension perioperatively    History of prediabetes  Assessment: Medical record refers to previous diagnosis of prediabetes with most recent A1c on file from May 2020 of 5.7, fasting blood sugar this morning 105  Plan:  -Check A1c  -Consider monitoring blood sugars during hospitalization if A1c is elevated      Anxiety  Assessment: Chronic and stable  Plan:    Continue chronic escitalopram      History of urge incontinence  Assessment: Chronic and stable  Plan:    Continue chronic oxybutynin         Diet: NPO per Anesthesia Guidelines for Procedure/Surgery Except for: Meds    DVT Prophylaxis: Pneumatic Compression Devices  Fong Catheter: Not present  Central Lines: None  Cardiac Monitoring: None  Code Status: Full Code      Disposition Plan   Anticipate PT evaluation postoperatively to assist with disposition planning     The patient's care was discussed with the Care Coordinator/ and Patient.    Jameel Lance MD  Hospitalist Service  [facility]  Securely message with the Vocera Web Console (learn more here)  Text page via [employer] Paging/Directory         Clinically Significant Risk Factors Present on Admission                     ______________________________________________________________________    Interval History   Patient says she feels better today.  She feels like her right hip pain is better controlled today after starting traction yesterday and with continued doses of pain medication today.  She has no new complaints.  She remains afebrile and has been hemodynamically stable.  She transiently was hypoxic during sleep overnight requiring low-flow oxygen supplementation but weaned off of need for oxygen supplementation after waking today.  She has  had good urine output.  She has been n.p.o. in anticipation of surgery this afternoon.  She is having trouble lying flat in bed all day because she normally is very active.  She lives independently and normally performs her own activities of daily living.  She can normally climb stairs without difficulty.    Data reviewed today: I reviewed all medications, new labs and imaging results over the last 24 hours. I personally reviewed no images or EKG's today.    Physical Exam   Vital Signs: Temp: 98.4  F (36.9  C) Temp src: Oral BP: (!) 140/64 Pulse: 70   Resp: 15 SpO2: 96 % O2 Device: None (Room air)   Patient Vitals for the past 24 hrs:   BP Temp Temp src Pulse Resp SpO2   07/19/22 0832 -- 98.4  F (36.9  C) Oral 70 15 96 %   07/19/22 0830 (!) 140/64 -- -- -- -- --   07/19/22 0009 -- -- -- -- -- 94 %   07/18/22 2338 115/68 98.1  F (36.7  C) Oral 66 18 94 %   07/18/22 1900 127/59 97.5  F (36.4  C) Oral 62 18 94 %   07/18/22 1801 -- -- -- -- -- 93 %   07/18/22 1507 138/66 99.4  F (37.4  C) Oral 61 16 98 %     General Appearance: Pale elderly woman, no acute distress resting in bed  Respiratory: Normal respiratory effort, clear lungs  Cardiovascular: Regular rate and rhythm, good radial pulse, brisk capillary refill  Other: Alert and maintains wakefulness and attention, no confusion    Data   Recent Labs   Lab 07/19/22 0617 07/17/22 2036   WBC 8.2 6.9   HGB 10.7* 13.2   MCV 93 90    236   INR 0.99 0.92    141   POTASSIUM 4.1 4.3   CHLORIDE 104 109   CO2 29 25   BUN 12 28   CR 0.66 0.78   ANIONGAP 3 7   JEFF 8.3* 8.9   * 139*     Most recent hemoglobin A1c in May 2020 was 5.7    Medications     lactated ringers 100 mL/hr at 07/19/22 0552       atenolol  100 mg Oral Daily     ceFAZolin  2 g Intravenous Pre-Op/Pre-procedure x 1 dose     ceFAZolin  2 g Intravenous See Admin Instructions     escitalopram  10 mg Oral Daily     losartan-hydrochlorothiazide (HYZAAR) 100-12.5 mg combo dose   Oral Daily      multivitamin w/minerals  1 tablet Oral Daily     oxybutynin ER  5 mg Oral Daily     senna-docusate  1 tablet Oral BID    Or     senna-docusate  2 tablet Oral BID     sodium chloride (PF)  3 mL Intracatheter Q8H

## 2022-07-19 NOTE — PROGRESS NOTES
S- Transfer to surgery from 267 via bed.    B- Right hip fracture    A- Brief systems assessment: A&Ox4. VSS. Pain mild at rest, complaints of lower back pain. Dilaudid IV with good relief. Derrick bath completed.     R- Transfer to surgery per physician orders. Continue to monitor pt and update physician as needed.      Code status: Full Code  Skin: No skin issues identified  Fall Risk: Yes- Department fall risk interventions implemented.  Isolation and Signage: None  Medication drips upon transfer: none  Blue Bin checked and medications transfer out with patientN/A  Raghu Sears RN

## 2022-07-19 NOTE — PROGRESS NOTES
Ortho Note:    1.  Surgery-scheduled at 5 PM with Dr. Alonso  2.  Dr. Alonso to see the patient today in the afternoon  3.  Continue n.p.o. status  4.  Continue traction 5 pounds right lower extremity, This was adjusted this am.  5.  Saw patient briefly and discussed plan and when surgery was going to happen.  She appreciated this.    Exam:  CMS intact right lower extremity, +2 dorsalis pedis pulse and capillary refill less than 2 seconds  Moving all toes.  Traction intact, weights tied higher so they do not touch the floor this morning.  Bilateral calves soft and nontender to palpation      Cirilo Godoy PA-C

## 2022-07-20 ENCOUNTER — APPOINTMENT (OUTPATIENT)
Dept: OCCUPATIONAL THERAPY | Facility: CLINIC | Age: 87
DRG: 481 | End: 2022-07-20
Attending: INTERNAL MEDICINE
Payer: COMMERCIAL

## 2022-07-20 ENCOUNTER — APPOINTMENT (OUTPATIENT)
Dept: PHYSICAL THERAPY | Facility: CLINIC | Age: 87
DRG: 481 | End: 2022-07-20
Attending: ORTHOPAEDIC SURGERY
Payer: COMMERCIAL

## 2022-07-20 LAB
ANION GAP SERPL CALCULATED.3IONS-SCNC: 6 MMOL/L (ref 3–14)
BUN SERPL-MCNC: 9 MG/DL (ref 7–30)
CALCIUM SERPL-MCNC: 8 MG/DL (ref 8.5–10.1)
CHLORIDE BLD-SCNC: 101 MMOL/L (ref 94–109)
CO2 SERPL-SCNC: 27 MMOL/L (ref 20–32)
CREAT SERPL-MCNC: 0.68 MG/DL (ref 0.52–1.04)
GFR SERPL CREATININE-BSD FRML MDRD: 85 ML/MIN/1.73M2
GLUCOSE BLD-MCNC: 113 MG/DL (ref 70–99)
GLUCOSE BLD-MCNC: 113 MG/DL (ref 70–99)
HGB BLD-MCNC: 9.4 G/DL (ref 11.7–15.7)
POTASSIUM BLD-SCNC: 3.9 MMOL/L (ref 3.4–5.3)
SODIUM SERPL-SCNC: 134 MMOL/L (ref 133–144)

## 2022-07-20 PROCEDURE — 80048 BASIC METABOLIC PNL TOTAL CA: CPT | Performed by: ORTHOPAEDIC SURGERY

## 2022-07-20 PROCEDURE — 97110 THERAPEUTIC EXERCISES: CPT | Mod: GP | Performed by: PHYSICAL THERAPIST

## 2022-07-20 PROCEDURE — 258N000003 HC RX IP 258 OP 636: Performed by: ORTHOPAEDIC SURGERY

## 2022-07-20 PROCEDURE — 97530 THERAPEUTIC ACTIVITIES: CPT | Mod: GP | Performed by: PHYSICAL THERAPIST

## 2022-07-20 PROCEDURE — 250N000013 HC RX MED GY IP 250 OP 250 PS 637: Performed by: ORTHOPAEDIC SURGERY

## 2022-07-20 PROCEDURE — 250N000011 HC RX IP 250 OP 636: Performed by: ORTHOPAEDIC SURGERY

## 2022-07-20 PROCEDURE — 250N000013 HC RX MED GY IP 250 OP 250 PS 637: Performed by: INTERNAL MEDICINE

## 2022-07-20 PROCEDURE — 97161 PT EVAL LOW COMPLEX 20 MIN: CPT | Mod: GP | Performed by: PHYSICAL THERAPIST

## 2022-07-20 PROCEDURE — 99232 SBSQ HOSP IP/OBS MODERATE 35: CPT | Performed by: FAMILY MEDICINE

## 2022-07-20 PROCEDURE — 85018 HEMOGLOBIN: CPT | Performed by: ORTHOPAEDIC SURGERY

## 2022-07-20 PROCEDURE — 36415 COLL VENOUS BLD VENIPUNCTURE: CPT | Performed by: ORTHOPAEDIC SURGERY

## 2022-07-20 PROCEDURE — 120N000001 HC R&B MED SURG/OB

## 2022-07-20 PROCEDURE — 97165 OT EVAL LOW COMPLEX 30 MIN: CPT | Mod: GO | Performed by: OCCUPATIONAL THERAPIST

## 2022-07-20 PROCEDURE — 97535 SELF CARE MNGMENT TRAINING: CPT | Mod: GO | Performed by: OCCUPATIONAL THERAPIST

## 2022-07-20 RX ADMIN — SODIUM CHLORIDE, POTASSIUM CHLORIDE, SODIUM LACTATE AND CALCIUM CHLORIDE: 600; 310; 30; 20 INJECTION, SOLUTION INTRAVENOUS at 05:43

## 2022-07-20 RX ADMIN — OXYBUTYNIN CHLORIDE 5 MG: 5 TABLET, EXTENDED RELEASE ORAL at 08:55

## 2022-07-20 RX ADMIN — ESCITALOPRAM OXALATE 10 MG: 10 TABLET ORAL at 08:55

## 2022-07-20 RX ADMIN — SENNOSIDES AND DOCUSATE SODIUM 1 TABLET: 50; 8.6 TABLET ORAL at 08:56

## 2022-07-20 RX ADMIN — SENNOSIDES AND DOCUSATE SODIUM 1 TABLET: 50; 8.6 TABLET ORAL at 20:38

## 2022-07-20 RX ADMIN — ASPIRIN 325 MG: 325 TABLET ORAL at 08:56

## 2022-07-20 RX ADMIN — OXYCODONE HYDROCHLORIDE 5 MG: 5 TABLET ORAL at 21:44

## 2022-07-20 RX ADMIN — ACETAMINOPHEN 975 MG: 325 TABLET, FILM COATED ORAL at 15:03

## 2022-07-20 RX ADMIN — OXYCODONE HYDROCHLORIDE 5 MG: 5 TABLET ORAL at 06:54

## 2022-07-20 RX ADMIN — CEFAZOLIN 1 G: 1 INJECTION, POWDER, FOR SOLUTION INTRAMUSCULAR; INTRAVENOUS at 12:11

## 2022-07-20 RX ADMIN — CEFAZOLIN 1 G: 1 INJECTION, POWDER, FOR SOLUTION INTRAMUSCULAR; INTRAVENOUS at 04:00

## 2022-07-20 RX ADMIN — ACETAMINOPHEN 975 MG: 325 TABLET, FILM COATED ORAL at 06:54

## 2022-07-20 RX ADMIN — ACETAMINOPHEN 975 MG: 325 TABLET, FILM COATED ORAL at 21:44

## 2022-07-20 RX ADMIN — OXYCODONE HYDROCHLORIDE 5 MG: 5 TABLET ORAL at 02:07

## 2022-07-20 RX ADMIN — Medication 1 TABLET: at 08:56

## 2022-07-20 RX ADMIN — POLYETHYLENE GLYCOL 3350 17 G: 17 POWDER, FOR SOLUTION ORAL at 08:55

## 2022-07-20 ASSESSMENT — ACTIVITIES OF DAILY LIVING (ADL)
ADLS_ACUITY_SCORE: 27
ADLS_ACUITY_SCORE: 24
ADLS_ACUITY_SCORE: 27
ADLS_ACUITY_SCORE: 24
ADLS_ACUITY_SCORE: 24
ADLS_ACUITY_SCORE: 27
ADLS_ACUITY_SCORE: 24
ADLS_ACUITY_SCORE: 27
ADLS_ACUITY_SCORE: 24
PREVIOUS_RESPONSIBILITIES: MEAL PREP;HOUSEKEEPING;LAUNDRY;SHOPPING;YARDWORK;MEDICATION MANAGEMENT;DRIVING;FINANCES
ADLS_ACUITY_SCORE: 24

## 2022-07-20 NOTE — OP NOTE
Procedure Date: 07/19/2022    PREOPERATIVE DIAGNOSIS:  Right low intertrochanteric femur fracture.    POSTOPERATIVE DIAGNOSIS:  Right low intertrochanteric femur fracture.    PROCEDURE:  ORIF, right intertrochanteric femur fracture with long intramedullary farhad.    HISTORY:  This is an 87-year-old female who fell 2 days ago and sustained a low intertrochanteric/subtrochanteric hip fracture.  She was seen at [facility] but they had no availability to keep her there, so she was transferred to [facility].  We attempted surgery yesterday, but scheduling would not allow us to complete the case.  Therefore, she is on for today.    DESCRIPTION OF SURGERY:  After smooth spinal anesthetic, the patient was placed on the Stanton table with the right leg in traction, the left leg flexed and internally rotated moving the foot away from the C-arm.  Reduction was performed with manual manipulation of the right leg.  Once we had adequate reduction, the right hip and leg were prepped and draped in sterile fashion.  Pause was performed for patient verification.  A longitudinal incision was made just above the greater trochanter, carried through subcutaneous tissue through the iliotibial band to expose the greater trochanteric.  Trochanteric entry point was located and a guide pin drilled into this area and down the shaft.  We then overreamed this with the proximal reamer for the gamma farhad system by [employer].  At this point, the shaft was shifted slightly medially and I felt it would be better to pull it lateral.  Thus, I made a second incision where I expected the compression screw to enter and placed a bone hook through this to hook around the shaft and pulled this laterally, which lined things up better.  A guide farhad was placed all the way down to the knee and we selected a depth at 400 mm.  This was to end just above a total knee arthroplasty.  We used an 11 x 400 mm long gamma farhad.  The flexible reamers were used up to 12.5 over the guide  farhad.  The gamma farhad was then assembled and inserted fully.  We checked proximally to ensure it was in proper position, buried at the trochanter and in proper position, aiming for the lower half of the femoral head.  Once we had good position, the guide for the compression screw was inserted and advanced against the lateral cortex.  I drilled a guide pin up into the femoral head and neck.  We were dead center on the lateral and slightly lower half on AP, which was perfect position.  It measured at 105 mm adding the 10 mm measurement for the screw threads and a 105 mm screw was selected.  I reamed to 105 and advanced the 105 screw into place.  We fully locked this and lifted locked as this was completely above the fracture site.  All of the guide attachments were removed.  We then moved the C-arm into lateral position and through a perfect [place] technique, found the appropriate entrance point, made a small incision for this and drilled across both the circular and oblong holes for the screws.  The screws were inserted and checked that they were fully engaged on AP views.  At the conclusion, final x-rays were obtained with the C-arm, AP and lateral of both proximally and distally and wounds were irrigated.  The IT band was closed with running 0 Vicryl suture, subcutaneous tissue closed with running 2-0 Vicryl suture, skin edges closed with staples.  Sterile dressings were applied and the patient was taken to the recovery room in stable condition.  She will be 50% partial weightbearing for 6 weeks.  Staples out in 2 weeks, x-ray at 2 weeks and 6 weeks.  We will use aspirin for anticoagulation.    SURGEON:  Dante Alonso MD.    ASSISTANT:  Vick Davidson MD.    Dante Alonso MD        D: 2022   T: 2022   MT: TAD/CMQA1    Name:     KEN IBRAHIM  MRN:      [id number]        Account:        [id number]   :      1935           Procedure Date: 2022     Document: [id number]

## 2022-07-20 NOTE — PROGRESS NOTES
07/20/22 1119   Quick Adds   Type of Visit Initial Occupational Therapy Evaluation   Living Environment   People in Home child(obdulio), adult   Current Living Arrangements house   Home Accessibility stairs to enter home   Number of Stairs, Main Entrance 1   Stair Railings, Main Entrance none   Transportation Anticipated family or friend will provide   Living Environment Comments lives with son in a mothers apartment.  No AD at baseline.   Self-Care   Usual Activity Tolerance moderate   Current Activity Tolerance fair   Equipment Currently Used at Home none   Fall history within last six months yes   Number of times patient has fallen within last six months 1  (leading to this hospital admission. Reaching down to flower pot and lost balance)   Activity/Exercise/Self-Care Comment Stays active. Enjoys gardening, baking, social gatherings with friends.   Instrumental Activities of Daily Living (IADL)   Previous Responsibilities meal prep;housekeeping;laundry;shopping;yardwork;medication management;driving;finances   General Information   Onset of Illness/Injury or Date of Surgery 07/18/22   Referring Physician Dante Alonso MD   Patient/Family Therapy Goal Statement (OT) return to indep   Additional Occupational Profile Info/Pertinent History of Current Problem 87 year old female POD#1 s/p open reduction and internal fixation of right intertrochanteric femur fracture with long intramedullary farhad by Dr. Alonso   Existing Precautions/Restrictions fall;weight bearing   Left Upper Extremity (Weight-bearing Status) full weight-bearing (FWB)   Right Upper Extremity (Weight-bearing Status) full weight-bearing (FWB)   Left Lower Extremity (Weight-bearing Status) full weight-bearing (FWB)   Right Lower Extremity (Weight-bearing Status) partial weight-bearing (PWB)   General Observations and Info Activity: ambulate with assist   Cognitive Status Examination   Orientation Status orientation to person, place and time    Affect/Mental Status (Cognitive) WFL   Follows Commands WFL   Cognitive Status Comments intact in conversation   Sensory   Sensory Quick Adds No deficits were identified   Pain Assessment   Patient Currently in Pain   (well managed)   Range of Motion Comprehensive   General Range of Motion bilateral upper extremity ROM WFL   Strength Comprehensive (MMT)   Comment, General Manual Muscle Testing (MMT) Assessment BUE WFL   Bed Mobility   Bed Mobility sit-supine   Sit-Supine [place] (Bed Mobility) minimum assist (75% patient effort)   Assistive Device (Bed Mobility) bed rails   Transfers   Transfers toilet transfer   Toilet Transfer   Type (Toilet Transfer) sit-stand   [place] Level (Toilet Transfer) minimum assist (75% patient effort)   Assistive Device (Toilet Transfer) grab bars/safety frame  (heavy use on grab bar)   Activities of Daily Living   BADL Assessment/Intervention toileting   Toileting   Position (Toileting) supported standing   Assistive Devices (Toileting) raised toilet seat;grab bar, toilet   Comment, (Toileting) assist with james cares with patient needing BUEs on walker to offload weight from RLE. Assist with pulling up brief over hips   [place] Level (Toileting) moderate assist (50% patient effort)   Clinical Impression   Criteria for Skilled Therapeutic Interventions Met (OT) Yes, treatment indicated   OT Diagnosis decreased IND with ADLs   Influenced by the following impairments pain, post op precautions, activity tolerance   OT Problem List-Impairments impacting ADL problems related to;activity tolerance impaired;mobility;pain;post-surgical precautions   Assessment of Occupational Performance 5 or more Performance Deficits   Identified Performance Deficits LB dressing, bathing, toileting, home mgmt, driving, leisure, social   Planned Therapy Interventions (OT) ADL retraining;IADL retraining;balance training;strengthening;transfer training;home program guidelines;progressive  activity/exercise;risk factor education   Clinical Decision Making Complexity (OT) low complexity   Risk & Benefits of therapy have been explained evaluation/treatment results reviewed;care plan/treatment goals reviewed;patient   Clinical Impression Comments Patient presenting with decreased IND with ADLs post op with weight bearing precautions. Will benefit from skilled OT for training in ADL compensatory strategies and AE.   OT Discharge Planning   OT Discharge Recommendation (DC Rec) Transitional Care Facility   OT Rationale for DC Rec Patient IND at baseline, now needing increased assist for ADLs and functional mobility post op due to pain and PWB. Rec. TCU to progress functional mobility with fww, training in ADLS with compensatory strategies and AE.  Anticipate excellent rehab prognosis given baseline activity/IND level and patient motivation.   OT Brief overview of current status mod A toileting, min A toilet tx, Min A LB dressing, SBA standing g/h, Min A bed mobility   Total Evaluation Time (Minutes)   Total Evaluation Time (Minutes) 10   OT Goals   Therapy Frequency (OT) 6 times/wk   OT Predicted Duration/Target Date for Goal Attainment 07/27/22   OT Goals Lower Body Dressing;Lower Body Bathing;Toilet Transfer/Toileting;Home Management;Hygiene/Grooming   OT: Hygiene/Grooming modified independent   OT: Lower Body Dressing within precautions;Modified independent;using adaptive equipment   OT: Lower Body Bathing Modified independent;using adaptive equipment;with precautions   OT: Toilet Transfer/Toileting Modified independent;toilet transfer;cleaning and garment management;using adaptive equipment;within precautions   OT: Home Management Modified independent;with light demand household tasks       Thank you for the referral,    NORA Chadwick, OTR/L  [facility] - Occupational Therapy    Phone: [phone]  Email: [email or web address]

## 2022-07-20 NOTE — PROGRESS NOTES
"[facility]    Remote Virtual Visit     Progress note    87 year old female POD#1 s/p open reduction and internal fixation of right intertrochanteric femur fracture with long intramedullary farhad by Dr. Alonso.  S: Patient seen at virtually in no apparent distress, alert and pleasant.  I discussed surgery and rehabilitation with the patient.  She denies numbness, tingling or major pain issues, but has spasms at times. She wants to go home with her son and do in home PT if possible. I told her it depends on how she does in PT.  We did discuss TCU but we will shoot for ade with the son and home PT.  Discussed possible discharge today or tomorrow depending on how she is doing. Discussed partial weight bearing x 6 weeks.   O: CMS intact RLE, moving toes       Aquacel Dressings on, clean and dry with a good seal but some shadowing on top dressing.       R Hip with minimal swelling and no erythema or ecchymosis, but some swelling at right knee    The exam was done with video inspection and the input from the on site RN's physical exam    Vital signs:  Temp: (!) 96.4  F (35.8  C) Temp src: Oral BP: 136/52 Pulse: 73   Resp: 16 SpO2: 95 % O2 Device: None (Room air) Oxygen Delivery: 1 LPM Height: 174 cm (5' 8.5\") Weight: 72.6 kg (160 lb)  Estimated body mass index is 23.97 kg/m  as calculated from the following:    Height as of this encounter: 1.74 m (5' 8.5\").    Weight as of this encounter: 72.6 kg (160 lb).      Hemoglobin   Date Value Ref Range Status   07/20/2022 9.4 (L) 11.7 - 15.7 g/dL Final   10/07/2020 12.2 11.7 - 15.7 g/dL Final     INR   Date Value Ref Range Status   07/19/2022 0.99 0.85 - 1.15 Final   02/28/2018 0.94 0.86 - 1.14 Final         A/P:  1. Pain-controlled with tylenol and oxycodone 5mg and atarax available but she has not taken it  2. DVT Prophylaxis-Asprin 325mg every day x 6 weeks   3. Weight Bearing Status-PWB RLE x 6 weeks  4. Hip Precautions-none  5. Physical Therapy-to " see the patient today.   6. Occupational Therapy-to see the patient today.   7. Case Management-consult put in this AM for dispo  8. Hospitalist-Dr. Lance and Dr. Santana following, Thank you.  She could discharge today per medicine standpoint  9. Incision-no signs or symptoms of infection  10. Plan-If she does well with physical therapy today and home PT gets set up then she could go home today.  I will check back later this AM and see how she did with PT.      Patient was acceptable to this Remote Virtual Visit    Cirilo Godoy PA-C  7/20/2022

## 2022-07-20 NOTE — ANESTHESIA POSTPROCEDURE EVALUATION
Patient: Bonita Villarreal    Procedure: Procedure(s):  OPEN REDUCTION INTERNAL FIXATION, FRACTURE, Right FEMUR, USING INTRAMEDULLARY LEENA       Anesthesia Type:  Spinal    Note:  Disposition: Outpatient   Postop Pain Control: Uneventful            Sign Out: Well controlled pain   PONV: No   Neuro/Psych: Uneventful            Sign Out: Acceptable/Baseline neuro status   Airway/Respiratory: Uneventful            Sign Out: Acceptable/Baseline resp. status   CV/Hemodynamics: Uneventful            Sign Out: Acceptable CV status   Other NRE: NONE   DID A NON-ROUTINE EVENT OCCUR? No    Event details/Postop Comments:  Pt was happy with anesthesia care.  No complications.  I will follow up with the pt if needed.           Last vitals:  Vitals Value Taken Time   /75 07/19/22 2245   Temp 98.1  F (36.7  C) 07/19/22 2245   Pulse 79 07/19/22 2245   Resp 16 07/19/22 2245   SpO2         Electronically Signed By: JESI Tovar CRNA  July 20, 2022  2:07 PM

## 2022-07-20 NOTE — PROGRESS NOTES
S- Transfer to room 267 from PACU.    B- Post op R hip fx    A- Brief systems assessment: Pt vitally stable, afebrile. 95% on RA. Nauseous after Zofran admin in PACU. Compazine x1. Scheduled tylenol given.     R- Transfer to med/surg floor per physician orders. Continue to monitor pt and update physician as needed.      Code status: Full Code  Skin: WDL  Fall Risk: Yes- Department fall risk interventions implemented.  Isolation and Signage: None  Medication drips upon transfer: LR @ 100 mL/hr  Blue Bin checked and medications transfer out with patient N/A

## 2022-07-20 NOTE — PLAN OF CARE
Goal Outcome Evaluation:    Plan of Care Reviewed With: patient, son     Overall Patient Progress: improving    Outcome Evaluation: Pt is A & O x 4, VSS, afebrile, on room air, denies pain, scheduled tylenol per MAR. States some tightless with moving around. X3 aquacells in place, small amount of drainage marked. Good CMS and Cap refill. Eating and drinking well, IV saline locked. Up to the bathroom and chair with x1-2 assist with walker and gait belt. Denies dizziness today.

## 2022-07-20 NOTE — ANESTHESIA PROCEDURE NOTES
Fascia iliaca Procedure Note    Pre-Procedure   Staff -        CRNA: Dante Rollins APRN CRNA       Other Anesthesia Staff: Jamar Manuel       Performed By: AUGUST       Location: post-op       Pre-Anesthestic Checklist: patient identified, IV checked, site marked, risks and benefits discussed, informed consent, monitors and equipment checked, pre-op evaluation, at physician/surgeon's request and post-op pain management  Timeout:       Correct Patient: Yes        Correct Procedure: Yes        Correct Site: Yes        Correct Position: Yes        Correct Laterality: Yes        Site Marked: Yes  Procedure Documentation  Procedure: Fascia iliaca       Laterality: right       Patient Position: supine       Patient Prep/Sterile Barriers: sterile gloves, mask       Skin prep: Chloraprep       Needle Type: insulated       Needle Gauge: 22.        Needle Length (millimeters): 100        Ultrasound guided       1. Ultrasound was used to identify targeted nerve, plexus, vascular marker, or fascial plane and place a needle adjacent to it in real-time.       2. Ultrasound was used to visualize the spread of anesthetic in close proximity to the above referenced structure.       3. A permanent image is entered into the patient's record.       4. The visualized anatomic structures appeared normal.       5. There were no apparent abnormal pathologic findings.    Assessment/Narrative         The placement was negative for: blood aspirated, painful injection and site bleeding       Paresthesias: No.       Test dose of mL at.         Test dose negative, 3 minutes after injection, for signs of intravascular, subdural, or intrathecal injection.       Bolus given via needle..        Secured via.        Insertion/Infusion Method: Single Shot       Complications: none       Injection made incrementally with aspirations every 5 mL.     Comments:  Pt tolerated procedure well, FI block placed in the OR with spinal on board.  No complications  noted.  Will follow if needed.

## 2022-07-20 NOTE — OR NURSING
"Transfer from  pacu to Room 267  Transferred to bed via bed (Glyder Mat,Transfer Boar,Slider Sheet)    S: 88 y/o female  S/P OPEN REDUCTION INTERNAL FIXATION, FRACTURE, Right FEMUR, USING INTRAMEDULLARY LEENA - Right  General         Anesthesia Type:  spinal       Surgeon:  Dr. Alonso       Allergies:  See Medication Reconciliation Record       DNR: NO  (Yes,No)    B:  Pertinent Medical History:   Past Medical History:   Diagnosis Date     Advance Care Planning 2/28/2012    Advance Care Planning 3/20/2016: Receipt of ACP document:  Received: Health Care Directive which was witnessed or notarized on 2/25/16.  Document not previously scanned.  Validation form completed and sent with document to be scanned.  Code Status needs to be updated to reflect choices in most recent ACP document.  Confirmed/documented designated decision maker(s).  Added by Cata Terrell RN, Advance Care Planning Liaison. Advance Care Planning 2/28/12: Patient does not have an Advance/Health Care Directive (HCD), given \"What is Advance Care Planning?\" flyer. Teena Peace       Arthritis      Diverticulitis of colon 6/28/2005    Patient keeps Rx for Augmentin to use PRN Problem list name updated by automated process. Provider to review     Diverticulosis of colon (without mention of hemorrhage)     history of recurrent diverticulitis     Hypertension         (CHF; Heart Disease; Lung Disease; Chronic Pain; Diabetes; Other (Comment)          Surgical History:    Past Surgical History:   Procedure Laterality Date     ARTHROPLASTY KNEE Right 2/28/2018    Procedure: ARTHROPLASTY KNEE;  Right total knee arthroplasty;  Surgeon: Anton Grover MD;  Location: [facility] OR     ARTHROPLASTY KNEE Left 10/6/2020    Procedure: Total Knee Arthroplasty;  Surgeon: Calixto Santana MD;  Location: [facility] OR     COLONOSCOPY  04/15/02     COLONOSCOPY  3/25/2013    Procedure: COLONOSCOPY;  Colonoscopy  ;  Surgeon: Jamil Porras MD;  Location: [facility]     " FLEXIBLE SIGMOIDOSCOPY  07/29/96     ORTHOPEDIC SURGERY  22-23 YEARS AGO    BACK     SURGICAL HISTORY OF -       lumbar surgery     ZZC APPENDECTOMY           A:  EBL: 75        IVF:  1300        UOP:  none        NPO:  ___Yes _x__No tolerating ice chips        Vomiting:  ___Yes _x__No         Drainage: none        Skin Integrity: surgical dressing cdi (Normal; Pressure Ulcer (Location)        RFO: ___Yes___No (identify item if present)        SSI Patient?  ___Yes___No (if yes, see checklist for actions)        Brace/sling/equipment:  ___Yes_x__No (identify item if present)         See PACU record for ongoing assessment, vital signs and pain assessment.    R: Post-Op vitals and assessments as ordered/indicated per patient's condition.       Follow Post-Op orders and notify Physician prn.       Continue to involve patient/family in plan of care and discharge planning.       Reinforce Pre-Operative education.       Implement skin safety interventions as appropriate.    Name: Jeane Velez RN on 7/19/2022 at 10:51 PM

## 2022-07-20 NOTE — CONSULTS
Care Management Initial Consult    General Information  Assessment completed with: Patient,    Type of CM/SW Visit: Initial Assessment    Primary Care Provider verified and updated as needed: Yes   Readmission within the last 30 days:        Reason for Consult: discharge planning  Advance Care Planning: Advance Care Planning Reviewed: present on chart          Communication Assessment  Patient's communication style: spoken language (English or Bilingual)    Hearing Difficulty or Deaf: no   Wear Glasses or Blind: yes    Cognitive  Cognitive/Neuro/Behavioral: WDL  Level of Consciousness: alert  Arousal Level: opens eyes spontaneously  Orientation: oriented x 4  Mood/Behavior: calm, cooperative          Living Environment:   People in home: child(obdulio), adult  Son- Cirilo, Dtr in law- Rocio  Current living Arrangements: house      Able to return to prior arrangements: yes       Family/Social Support:  Care provided by: self, child(obdulio)  Provides care for: no one  Marital Status:   Children          Description of Support System: Supportive, Involved    Support Assessment: Adequate family and caregiver support, Adequate social supports    Current Resources:   Patient receiving home care services: No     Community Resources: None  Equipment currently used at home: none  Supplies currently used at home:      Employment/Financial:  Employment Status: retired        Financial Concerns: No concerns identified           Lifestyle & Psychosocial Needs:  Social Determinants of Health     Tobacco Use: Low Risk      Smoking Tobacco Use: Never Smoker     Smokeless Tobacco Use: Never Used   Alcohol Use: Not on file   Financial Resource Strain: Not on file   Food Insecurity: Not on file   Transportation Needs: Not on file   Physical Activity: Not on file   Stress: Not on file   Social Connections: Not on file   Intimate Partner Violence: Not on file   Depression: Not at risk     PHQ-2 Score: 0   Housing Stability: Not on file  "      Functional Status:  Prior to admission patient needed assistance: None              Mental Health Status:  Mental Health Status: No Current Concerns       Chemical Dependency Status:  Chemical Dependency Status: No Current Concerns             Values/Beliefs:  Spiritual, Cultural Beliefs, Lutheran Practices, Values that affect care: no               Additional Information:    Care Management was consulted for discharge planning.  Writer visited with patient and her son, Cirilo.  Reviewed the information above.  Discussed the current recommendation for TCU placement.  Patient is leaning toward wanting to go to TCU.  Son stated that patient lives in the \"mother in law apartment\" in their home.  He stated that he felt patient's needs could be met at home as his wife, Rocio, worked at a nursing home and know how to provide care.  \"As long as we can help her with her exercises, she should be fine.\"  Discussed home care PT if patient decided to go home.      Writer has sent referrals to the following facilities, per patient request:    [place] [facility] (Phone: [phone] Fax: [phone])- Writer has called and left a message with [facility] (Main: [phone] Admissions: [phone] Fax: [phone])- Writer has sent email communication to Anuja, their admissions person.  She is looking into any bed availability.      Patient stated, \"I will see how I am feeling tomorrow, but right now, I think I might need to go to TCU.\"      Care Management will continue to follow for discharge planning.      MATTHEW Hoyos  [facility]   [phone]     "

## 2022-07-20 NOTE — PROGRESS NOTES
07/20/22 0936 07/20/22 0939 07/20/22 0942   Lying Orthostatic BP   Lying Orthostatic /59  (97)  --   --    Lying Orthostatic Pulse 76 bpm  --   --    Sitting Orthostatic BP   Sitting Orthostatic BP  --  132/55  (69)  --    Sitting Orthostatic Pulse  --  86 bpm  --    Standing Orthostatic BP   Standing Orthostatic BP  --   --  154/62  (100)   Standing Orthostatic Pulse  --   --  103 bpm     Mildly lightheaded throughout the assessment.  Values in () indicate MAP.    Thank you for your referral.  Amy Carlos, PT, DPT, ATC, LAT    [facility]  O: [phone]  E: [email or web address]

## 2022-07-20 NOTE — BRIEF OP NOTE
[facility] Brief Operative Note    Pre-operative diagnosis: Closed displaced intertrochanteric fracture of right femur, initial encounter (H) [F38.332Y]   Post-operative diagnosis Same   Procedure: Procedure(s):  OPEN REDUCTION INTERNAL FIXATION, FRACTURE, Right FEMUR, USING INTRAMEDULLARY LEENA   Surgeon(s): Surgeon(s) and Role:     * Dante Alonso MD - Primary   Assistant Vick Davidson    Estimated blood loss: 75 mL    Specimens: * No specimens in log *   Findings: Comminuted low intertrochanteric femur fracture

## 2022-07-20 NOTE — ANESTHESIA CARE TRANSFER NOTE
Patient: Bointa Villarreal    Procedure: Procedure(s):  OPEN REDUCTION INTERNAL FIXATION, FRACTURE, Right FEMUR, USING INTRAMEDULLARY LEENA       Diagnosis: Closed displaced intertrochanteric fracture of right femur, initial encounter (H) [S72.141A]  Diagnosis Additional Information: No value filed.    Anesthesia Type:   Spinal     Note:    Oropharynx: oropharynx clear of all foreign objects and spontaneously breathing  Level of Consciousness: drowsy  Oxygen Supplementation: face mask    Independent Airway: airway patency satisfactory and stable  Dentition: dentition unchanged  Vital Signs Stable: post-procedure vital signs reviewed and stable  Report to RN Given: handoff report given  Patient transferred to: PACU    Handoff Report: Identifed the Patient, Identified the Reponsible Provider, Reviewed the pertinent medical history, Discussed the surgical course, Reviewed Intra-OP anesthesia mangement and issues during anesthesia, Set expectations for post-procedure period and Allowed opportunity for questions and acknowledgement of understanding      Vitals:  Vitals Value Taken Time   BP     Temp     Pulse     Resp     SpO2         Electronically Signed By: JESI Wolfe CRNA  July 19, 2022  9:58 PM

## 2022-07-20 NOTE — PLAN OF CARE
"Goal Outcome Evaluation:    Plan of Care Reviewed With: patient     Overall Patient Progress: improving    Outcome Evaluation: Pt vitally stable overnight. O2 mid 90's on RA. Hypertensive coming out of PACU. Systolic BP slowly lowered to 140's. Complains of \"spasms\" in R leg. PRN oxy x2, as well as scheduled tylenol x2 to manage pain. Voided early this AM via purewick. Dangled and stood by the bedside, wobbly and slightly dizzy. Back to bed, will try again later.      "

## 2022-07-20 NOTE — ANESTHESIA PROCEDURE NOTES
Intrathecal Procedure Note    Pre-Procedure   Staff -        CRNA: Dante Rollins APRN CRNA       Other Anesthesia Staff: Jamar Manuel       Performed By: CRNA and AUGUST       Location: OR       Pre-Anesthestic Checklist: patient identified, IV checked, risks and benefits discussed, informed consent, monitors and equipment checked and pre-op evaluation  Timeout:       Correct Patient: Yes        Correct Procedure: Yes        Correct Site: Yes        Correct Position: Yes   Procedure Documentation  Procedure: intrathecal       Patient Position: sitting       Patient Prep/Sterile Barriers: sterile gloves, mask, patient draped       Skin prep: Betadine       Insertion Site: L3-4, L4-5. (midline approach).       Needle Gauge: 22.        Needle Length (Inches): 3.5        Spinal Needle Type: Quincke       Introducer used       Introducer: 20 G       # of attempts: 2 and  # of redirects:  3    Assessment/Narrative         Paresthesias: No.       CSF fluid: clear.       Opening pressure was cmH2O while  Lateral.       Comments:  Pt. on floor bed, propofol/fentanyl sedation given and pt turned to right side down without any pain noted from patient, back prep with betadine, 1% xylocaine infiltrated at L4-5 and L3-4, 22 ga quincke 3.5 inch placed, 2 redirects at L4-5 to bone, changed to L3-4, + CSF flow, clear, - Heme, no pain, paresthesia noted/stated, pt. James. Procedure well, pt left on side for 5 minutes and then positioned on fx table, pt very comfortable

## 2022-07-20 NOTE — PROGRESS NOTES
[facility]    Medicine Progress Note - Hospitalist Service    Date of Admission:  7/18/2022    Assessment & Plan        Patient is a 87 year old female admitted on 7/18/2022. She had a mechanical fall and has a fracture of her right femur for which operative repair was performed on 7/19/22, today is POD #1 with patient attempting ambulation with nursing overnight but with slight lightheadedness and drop in blood pressures noted (in the low normal range this morning).  Will hold home antihypertensive medications at this time and monitor closely.         Comminuted fracture intertrochanteric fracture of the right femur    Osteoporosis with pathologic hip fracture  Assessment: DEXA scan September 2021 demonstrating osteoporosis in the right hip so suspect this acute hip fracture is due to osteoporotic fracture after fall, s/p ORIF on 7/19/22, POD #1  Plan:  - Routine postoperative care will be managed by orthopedics  - Continue pain control with oxycodone and or IV Dilaudid  - Anticipate physical therapy evaluation today once blood pressures have been rechecked and are stable  - Recommend adequate calcium with vitamin D supplementation postoperatively for treatment of osteoporosis and follow-up with PCP to discuss other potential treatment options of osteoporosis     Anemia, unspecified type  Assessment: Acute 2 to 3 g drop in hemoglobin prior to surgery, through possibly due to occult bleeding from hip fracture or hemodilution or both.  Hemoglobin did decrease another 1.3 grams following surgery  Plan:  -Monitor hemoglobin again tomorrow or sooner if there is concern for active bleeding.    -Consider PRBC transfusion for hemoglobin less than 7       Essential hypertension  Assessment: Chronic and stable hemodynamically preoperatively, however blood pressures now in the low normal range with patient feeling lightheaded upon standing today  Plan:  - HOLD chronic antihypertensive  medications including atenolol and losartan-hydrochlorothiazide this morning and monitor blood pressures closely     History of prediabetes  Assessment: Medical record refers to previous diagnosis of prediabetes with most recent A1c on file from May 2020 of 5.7, fasting blood sugar this morning 105.  Recheck during this stay is 5.5, indicating no pre-diabetes at this time.    Plan:  -No POCT glucose checks needed  -check with morning labs daily while hospitalized        Anxiety  Assessment: Chronic and stable  Plan:  - Continue chronic escitalopram       History of urge incontinence  Assessment: Chronic and stable  Plan:  - Continue chronic oxybutynin       Diet: Advance Diet as Tolerated: Regular Diet Adult    DVT Prophylaxis: Pneumatic Compression Devices  Fong Catheter: Not present  Central Lines: None  Cardiac Monitoring: None  Code Status: Full Code      Disposition Plan      Expected Discharge Date: 07/21/2022,  3:00 PM  Discharge Delays: Placement - TCU  Destination: inpatient rehabilitation facility;home with help/services  Discharge Comments: Depends on Surgery this afternoon        The patient's care was discussed with the Bedside Nurse, Care Coordinator/ and Patient.    Pao Santana MD  Hospitalist Service  [facility]  Securely message with the Vocera Web Console (learn more here)  Text page via [employer] Paging/Directory         Clinically Significant Risk Factors Present on Admission                     ______________________________________________________________________    Interval History   Patient's blood pressures were hypertensive presurgically and for the majority of the night, however this morning blood pressures are now trending downward to the low normal range with most recent blood pressure 107/46 with patient symptomatic upon standing with mild lightheadedness noted by nursing staff.  Patient otherwise denies no symptoms and states her  pain is fairly well controlled, no nausea.  She has been weaned off O2 supplementation without difficulty.    Data reviewed today: I reviewed all medications, new labs and imaging results over the last 24 hours.    Physical Exam   Vital Signs: Temp: 98.4  F (36.9  C) Temp src: Oral BP: 117/55 Pulse: 94   Resp: 18 SpO2: 92 % O2 Device: None (Room air)    Weight: 160 lbs 0 oz  Constitutional: awake, alert, cooperative, no apparent distress at rest, and appears stated age  Respiratory: No increased work of breathing, good air exchange, clear to auscultation bilaterally, no crackles or wheezing  Cardiovascular: Regular rate and rhythm without murmur  GI: Bowel sounds present, abdomen soft and nondistended  Musculoskeletal: no lower extremity pitting edema present  Neurologic: Awake, alert, oriented to name, place and situation    Data   Recent Labs   Lab 07/20/22  0553 07/19/22  0617 07/17/22  2036   WBC  --  8.2 6.9   HGB 9.4* 10.7* 13.2   MCV  --  93 90   PLT  --  179 236   INR  --  0.99 0.92    136 141   POTASSIUM 3.9 4.1 4.3   CHLORIDE 101 104 109   CO2 27 29 25   BUN 9 12 28   CR 0.68 0.66 0.78   ANIONGAP 6 3 7   JEFF 8.0* 8.3* 8.9   *  113* 105* 139*

## 2022-07-20 NOTE — PROGRESS NOTES
SPIRITUAL HEALTH SERVICES  [facility]  Progress Note    REFERRAL SOURCE: Self    NOTE: I visited Bonita briefly during Morning Rounds.  She was open to my introduction and to brief emotional and spiritual support.  I offered her a blessing.  At admission, Bonita left her Pentecostalism preference undesignated.    PLAN: I will continue to follow patient and be available for any ongoing support needs until her discharge.    Lars Medley, Ph.d,   Spiritual Health Services  [address] [place] [address]    Office: [phone]   [email or web address]

## 2022-07-20 NOTE — PROGRESS NOTES
07/20/22 0900   Quick Adds   Type of Visit Initial PT Evaluation       Present no   Living Environment   People in Home child(obdulio), dependent   Current Living Arrangements house   Home Accessibility stairs to enter home   Number of Stairs, Main Entrance 1   Stair Railings, Main Entrance none  (plan to put a railing in)   Transportation Anticipated family or friend will provide   Living Environment Comments lives with son in his house in a mothers apartment. sleeps in an adjustable bed   Self-Care   Usual Activity Tolerance moderate   Current Activity Tolerance fair   Equipment Currently Used at Home none  (has a 4 wheel walker and a commode at home and walking sticks when walking outside)   Fall history within last six months yes   Number of times patient has fallen within last six months 1   General Information   Onset of Illness/Injury or Date of Surgery 07/19/22   Referring Physician Dr. Alonso   Patient/Family Therapy Goals Statement (PT) return home with son   Pertinent History of Current Problem (include personal factors and/or comorbidities that impact the POC) Patient is a 87 year old female, day 1 status post  ORIF of right intretrochanteric fracture with long intramedullary farhad placement, 75mL EBL, spinal block by Dr. Alonso. patient with a previous medical history of anxiety, R TKA, HTN, osteoporosis, prediabetic.   Existing Precautions/Restrictions fall;weight bearing   Weight-Bearing Status - LUE full weight-bearing   Weight-Bearing Status - RUE full weight-bearing   Weight-Bearing Status - LLE full weight-bearing   Weight-Bearing Status - RLE partial weight-bearing (% in comments)  (50%)   General Observations PT eval and treat status post hip surgery. Activity orders: up with assistance, ambulate   Cognition   Affect/Mental Status (Cognition) WNL   Orientation Status (Cognition) oriented x 4   Follows Commands (Cognition) WNL   Pain Assessment   Patient Currently in Pain  No  (resting no pain, with activity increases to a 4 or 5)   Integumentary/Edema   Integumentary/Edema Comments Surgical incision CDI with bandage applied   Posture    Posture Forward head position   Range of Motion (ROM)   Range of Motion ROM deficits secondary to surgical procedure;ROM deficits secondary to pain   Strength (Manual Muscle Testing)   Strength (Manual Muscle Testing) Deficits observed during functional mobility   Strength Comments Global decreased strength in the RLE, needing UE support to move in the bed during bed mobility. Able to initiate movements   Bed Mobility   Bed Mobility supine-sit   Supine-Sit Coos (Bed Mobility) moderate assist (50% patient effort)   Bed Mobility Limitations decreased ability to use arms for pushing/pulling;decreased ability to use legs for bridging/pushing;impaired ability to control trunk for mobility   Impairments Contributing to Impaired Bed Mobility impaired balance;pain;decreased ROM;decreased strength   Transfers   Transfers sit-stand transfer   Maintains Weight-bearing Status (Transfers) able to maintain   Transfer Safety Concerns Noted decreased step length;decreased weight-shifting ability   Impairments Contributing to Impaired Transfers impaired balance;pain;decreased sensation;decreased strength   Sit-Stand Transfer   Sit-Stand Coos (Transfers) moderate assist (50% patient effort)   Assistive Device (Sit-Stand Transfers) walker, front-wheeled   Gait/Stairs (Locomotion)   Coos Level (Gait) contact guard   Assistive Device (Gait) walker, front-wheeled   Distance in Feet (Required for LE Total Joints) 10   Pattern (Gait) 3-point;step-to   Deviations/Abnormal Patterns (Gait) reyes decreased;gait speed decreased;stride length decreased;weight shifting decreased;base of support, wide   Maintains Weight-bearing Status (Gait) able to maintain   Comment, (Gait/Stairs) pre gait- minimal weight shift side to side while maintaining weight  bearing status with use of UE on the walker and CGA in standing. Able to march in place with minimal weightshifting and use of UE on walker and MIN A- CGA. stair assessment not completed today.   Balance   Balance Comments Sitting balance EOB SBA with use of 2WW, standing balance CGA with L UE on 2WW   Sensory Examination   Sensory Perception other (describe)   Sensory Perception Comments Decreased sensation on RLE comared to L however still able to identify light touch   Coordination   Coordination no deficits were identified   Muscle Tone   Muscle Tone no deficits were identified   Clinical Impression   Criteria for Skilled Therapeutic Intervention Yes, treatment indicated   PT Diagnosis (PT) impaired balance, impaired gait, weakness   Influenced by the following impairments R ORIF of femur   Functional limitations due to impairments Needing a walker to ambulate and only able to partial weightbear through R leg, pain   Clinical Presentation (PT Evaluation Complexity) Evolving/Changing   Clinical Presentation Rationale post op R femue ORIF, clinical judgement   Clinical Decision Making (Complexity) moderate complexity   Planned Therapy Interventions (PT) balance training;bed mobility training;gait training;home exercise program;patient/family education;ROM (range of motion);stair training;strengthening;transfer training;progressive activity/exercise;home program guidelines   Anticipated Equipment Needs at Discharge (PT)   (Decided at next level of care)   Risk & Benefits of therapy have been explained evaluation/treatment results reviewed;care plan/treatment goals reviewed;participants voiced agreement with care plan;participants included;patient   Clinical Impression Comments Patient presents with signs and symptoms of post op ORIF. Patient reports minimal sensation defecits RLE compared to LLE and globally weaker RLE compared to LLE. Patient would benefit from inpatient PT services to work on strengthening, bed  mobility, transfers and ambulation. Patient would benefit from TCU placement to continue to work on strengthening and progressing ambulation, working towards her baseline in order to be successful and safe when returning home.   PT Discharge Planning   PT Discharge Recommendation (DC Rec) Transitional Care Facility   PT Rationale for DC Rec Patient from home in a mother in-laws suite in her sons house. Has one step to enter with no railing and no steps inside the home that she needs to manage. Patient at baseline ambulates around the home with no AD and only uses walking sticks when she goes for walks outside. Patient functioning below baseline in her bed mobility, transfers and ambulation post op R femur ORIF. Patient would benefit from inpatient PT services to work on strengthening, bed mobility, transfers and ambulation. Patient would benefit from TCU placement to continue to work on strengthening and progressing ambulation, working towards her baseline in order to be successful and safe when returning home.   PT Brief overview of current status Bed mobility MOD A, Sit to stand MOD A x2 with 2WW, ambulation 10' with 2WW and CGA   Plan of Care Review   Plan of Care Reviewed With patient   Total Evaluation Time   Total Evaluation Time (Minutes) 15   Physical Therapy Goals   PT Frequency Daily   PT Predicted Duration/Target Date for Goal Attainment 07/22/22   PT Goals Bed Mobility;Transfers;Gait;Stairs   PT: Bed Mobility Modified independent;Supine to/from sit   PT: Transfers Modified independent;Assistive device  (2WW)   PT: Gait Supervision/stand-by assist;Assistive device;Rolling walker;50 feet   PT: Stairs 1 stair;Minimal assist     Thank you for your referral.  Amy Carlos, PT, DPT, ATC, LAT    [facility]  O: [phone]  E: [email or web address]

## 2022-07-21 ENCOUNTER — APPOINTMENT (OUTPATIENT)
Dept: PHYSICAL THERAPY | Facility: CLINIC | Age: 87
DRG: 481 | End: 2022-07-21
Attending: INTERNAL MEDICINE
Payer: COMMERCIAL

## 2022-07-21 VITALS
WEIGHT: 160 LBS | DIASTOLIC BLOOD PRESSURE: 81 MMHG | HEART RATE: 77 BPM | TEMPERATURE: 96.7 F | HEIGHT: 69 IN | BODY MASS INDEX: 23.7 KG/M2 | SYSTOLIC BLOOD PRESSURE: 110 MMHG | RESPIRATION RATE: 16 BRPM | OXYGEN SATURATION: 98 %

## 2022-07-21 LAB
ANION GAP SERPL CALCULATED.3IONS-SCNC: 4 MMOL/L (ref 3–14)
BUN SERPL-MCNC: 13 MG/DL (ref 7–30)
CALCIUM SERPL-MCNC: 8.1 MG/DL (ref 8.5–10.1)
CHLORIDE BLD-SCNC: 101 MMOL/L (ref 94–109)
CO2 SERPL-SCNC: 30 MMOL/L (ref 20–32)
CREAT SERPL-MCNC: 0.66 MG/DL (ref 0.52–1.04)
GFR SERPL CREATININE-BSD FRML MDRD: 85 ML/MIN/1.73M2
GLUCOSE BLD-MCNC: 104 MG/DL (ref 70–99)
GLUCOSE BLDC GLUCOMTR-MCNC: 115 MG/DL (ref 70–99)
HGB BLD-MCNC: 8.6 G/DL (ref 11.7–15.7)
HGB BLD-MCNC: 8.7 G/DL (ref 11.7–15.7)
POTASSIUM BLD-SCNC: 3.7 MMOL/L (ref 3.4–5.3)
SODIUM SERPL-SCNC: 135 MMOL/L (ref 133–144)

## 2022-07-21 PROCEDURE — 85018 HEMOGLOBIN: CPT | Performed by: ORTHOPAEDIC SURGERY

## 2022-07-21 PROCEDURE — 250N000013 HC RX MED GY IP 250 OP 250 PS 637: Performed by: INTERNAL MEDICINE

## 2022-07-21 PROCEDURE — 36415 COLL VENOUS BLD VENIPUNCTURE: CPT | Performed by: ORTHOPAEDIC SURGERY

## 2022-07-21 PROCEDURE — 250N000013 HC RX MED GY IP 250 OP 250 PS 637: Performed by: FAMILY MEDICINE

## 2022-07-21 PROCEDURE — 250N000013 HC RX MED GY IP 250 OP 250 PS 637: Performed by: ORTHOPAEDIC SURGERY

## 2022-07-21 PROCEDURE — 250N000011 HC RX IP 250 OP 636: Performed by: ORTHOPAEDIC SURGERY

## 2022-07-21 PROCEDURE — 99232 SBSQ HOSP IP/OBS MODERATE 35: CPT | Performed by: FAMILY MEDICINE

## 2022-07-21 PROCEDURE — 80048 BASIC METABOLIC PNL TOTAL CA: CPT | Performed by: FAMILY MEDICINE

## 2022-07-21 PROCEDURE — 85018 HEMOGLOBIN: CPT | Performed by: FAMILY MEDICINE

## 2022-07-21 PROCEDURE — 97530 THERAPEUTIC ACTIVITIES: CPT | Mod: GP | Performed by: PHYSICAL THERAPIST

## 2022-07-21 PROCEDURE — 36415 COLL VENOUS BLD VENIPUNCTURE: CPT | Performed by: FAMILY MEDICINE

## 2022-07-21 RX ORDER — ASPIRIN 325 MG
325 TABLET, DELAYED RELEASE (ENTERIC COATED) ORAL 2 TIMES DAILY WITH MEALS
Qty: 80 TABLET | Refills: 0 | Status: SHIPPED | OUTPATIENT
Start: 2022-07-21 | End: 2022-08-01

## 2022-07-21 RX ORDER — ATENOLOL 50 MG/1
50 TABLET ORAL DAILY
Status: DISCONTINUED | OUTPATIENT
Start: 2022-07-21 | End: 2022-07-21 | Stop reason: HOSPADM

## 2022-07-21 RX ORDER — ACETAMINOPHEN 325 MG/1
975 TABLET ORAL EVERY 8 HOURS
Start: 2022-07-21

## 2022-07-21 RX ORDER — OXYCODONE HYDROCHLORIDE 5 MG/1
5 TABLET ORAL EVERY 4 HOURS PRN
Qty: 20 TABLET | Refills: 0 | Status: SHIPPED | OUTPATIENT
Start: 2022-07-21 | End: 2022-07-25

## 2022-07-21 RX ORDER — LOSARTAN POTASSIUM AND HYDROCHLOROTHIAZIDE 12.5; 1 MG/1; MG/1
TABLET ORAL
Qty: 90 TABLET | Refills: 4 | DISCHARGE
Start: 2022-07-21 | End: 2022-11-02

## 2022-07-21 RX ADMIN — ESCITALOPRAM OXALATE 10 MG: 10 TABLET ORAL at 09:08

## 2022-07-21 RX ADMIN — POLYETHYLENE GLYCOL 3350 17 G: 17 POWDER, FOR SOLUTION ORAL at 09:07

## 2022-07-21 RX ADMIN — ATENOLOL 50 MG: 50 TABLET ORAL at 09:09

## 2022-07-21 RX ADMIN — OXYCODONE HYDROCHLORIDE 5 MG: 5 TABLET ORAL at 13:08

## 2022-07-21 RX ADMIN — ASPIRIN 325 MG: 325 TABLET ORAL at 09:08

## 2022-07-21 RX ADMIN — SENNOSIDES AND DOCUSATE SODIUM 1 TABLET: 50; 8.6 TABLET ORAL at 09:08

## 2022-07-21 RX ADMIN — ONDANSETRON 4 MG: 4 TABLET, ORALLY DISINTEGRATING ORAL at 12:14

## 2022-07-21 RX ADMIN — ACETAMINOPHEN 975 MG: 325 TABLET, FILM COATED ORAL at 06:15

## 2022-07-21 RX ADMIN — Medication 1 TABLET: at 09:08

## 2022-07-21 RX ADMIN — OXYBUTYNIN CHLORIDE 5 MG: 5 TABLET, EXTENDED RELEASE ORAL at 09:08

## 2022-07-21 ASSESSMENT — ACTIVITIES OF DAILY LIVING (ADL)
ADLS_ACUITY_SCORE: 27

## 2022-07-21 NOTE — DISCHARGE SUMMARY
DISCHARGE SUMMARY    Primary MD: Dewayne Santana    ADMIT DATE: 7/18/2022  DISCHARGE DATE: 7/21/2022    DISCHARGE DIAGNOSIS:  Right hip intertrochanteric femur fracture     PROCEDURE:  Right hip open-reduction, internal fixation intertrochanteric femur fracture     HISTORY:  Bonita Villarreal is a 87 year old female who fell 3/17/22 sustaining right intertrochanteric femur fracture.  Transferred to [facility] as [facility] was full.      HOSPITAL COURSE:  On 7/19/2022 Ms. Villarreal underwent open-reduction, internal fixation right intertrochanteric femur fracture with Gamma farhad without complications.  Her hemoglobin dropped to a low of 8.6.    Her progress with Physical Therapy was slow  She walked 60 feet by POD # 2.  She is discharged on POD # 2 with wound covered.  She will start Aspirin for blood thinning.  For pain control, a prescription for oxycodone and tylenol  was written.  Return to clinic 1 1/2 weeks for wound check.       50% partial weight bearing.    ALLERGIES:  Bactrim [sulfamethoxazole w/trimethoprim] and Lisinopril            Discharge Medications:     Current Discharge Medication List      START taking these medications    Details   acetaminophen (TYLENOL) 325 MG tablet Take 3 tablets (975 mg) by mouth every 8 hours    Associated Diagnoses: Displaced intertrochanteric fracture of right femur, initial encounter for closed fracture (H)      aspirin (ASA) 325 MG EC tablet Take 1 tablet (325 mg) by mouth 2 times daily (with meals)  Qty: 80 tablet, Refills: 0    Associated Diagnoses: Displaced intertrochanteric fracture of right femur, initial encounter for closed fracture (H)      oxyCODONE (ROXICODONE) 5 MG tablet Take 1 tablet (5 mg) by mouth every 4 hours as needed  Qty: 20 tablet, Refills: 0    Associated Diagnoses: Displaced intertrochanteric fracture of right femur, initial encounter for closed fracture (H)         CONTINUE these medications which have CHANGED    Details    losartan-hydrochlorothiazide (HYZAAR) 100-12.5 MG tablet HOLD at time of TCU admission but anticipate it will be restarted within a week of admissions as blood pressures increase.  Coordinate with TCU provider when and if medication should be restarted.  Previously was on 1 tablet by mouth daily.  Qty: 90 tablet, Refills: 4    Associated Diagnoses: Essential hypertension, benign         CONTINUE these medications which have NOT CHANGED    Details   atenolol (TENORMIN) 100 MG tablet Take 1 tablet (100 mg) by mouth daily  Qty: 90 tablet, Refills: 4    Associated Diagnoses: Essential hypertension, benign      escitalopram (LEXAPRO) 10 MG tablet Take 1 tablet (10 mg) by mouth daily  Qty: 90 tablet, Refills: 4    Associated Diagnoses: Generalized anxiety disorder      ibuprofen (ADVIL/MOTRIN) 600 MG tablet Take 1 tablet (600 mg) by mouth every 8 hours as needed for pain (mild)  Qty: 30 tablet, Refills: 0    Associated Diagnoses: Status post left knee replacement      multivitamin (CENTRUM SILVER) tablet Take 1 tablet by mouth daily      oxybutynin ER (DITROPAN-XL) 5 MG 24 hr tablet Take 1 tablet (5 mg) by mouth daily  Qty: 90 tablet, Refills: 4    Associated Diagnoses: Urgency incontinence             Dante Alonso M.D.  Department of Orthopaedic Surgery  [facility]

## 2022-07-21 NOTE — PROGRESS NOTES
[facility]    Medicine Progress Note - Hospitalist Service    Date of Admission:  7/18/2022    Assessment & Plan        Patient is a 87 year old female admitted on 7/18/2022. She had a mechanical fall and has a fracture of her right femur for which operative repair was performed on 7/19/22, today is POD #2 with patient recovering fairly well post-surgically but did have drop in blood pressures into the low normal range with associated dizziness on POD #1 that has improving with holding of patient's chronic home antihypertension regimen and patient is now becoming mildly hypertensive.  Patient's hemoglobin has also decreased from 10.7 prior to surgery down to 8.6 this morning but without signs of active bleeding.  Recommendation at this time is TCU with referrals pending       Comminuted fracture intertrochanteric fracture of the right femur    Osteoporosis with pathologic hip fracture  Assessment: DEXA scan September 2021 demonstrating osteoporosis in the right hip so suspect this acute hip fracture is due to osteoporotic fracture after fall, s/p ORIF on 7/19/22, POD #2  Plan:  - Routine postoperative care will be managed by orthopedics  - Continue pain control with acetaminophen and prn oxycodone  - physical therapy to continue to work with patient, SW continuing to attempt TCU placement.    - Recommend adequate calcium with vitamin D supplementation at time of discharge for treatment of osteoporosis and follow-up with PCP to discuss other potential treatment options of osteoporosis     Anemia, unspecified type - multifactorial; suspected hemodilutional and acute blood loss from surgical intervention  Assessment: Acute 2 to 3 g drop in hemoglobin prior to surgery, through possibly due to occult bleeding from hip fracture or hemodilution or both.  Hemoglobin did decrease another 2.1 grams following surgery  Plan:  -Monitor hemoglobin later this morning to ensure stabilization  following surgical intervention  -Consider PRBC transfusion for hemoglobin less than 7       Essential hypertension  Assessment: Chronic and stable hemodynamically preoperatively, however blood pressures now in the hypertensive range.  Plan:  - restart home atenolol at 50 mg daily (half of patient's normal home dose) but continue to hold losartan-hydrochlorothiazide this morning and monitor blood pressures closely     History of prediabetes  Assessment: Medical record refers to previous diagnosis of prediabetes with most recent A1c on file from May 2020 of 5.7, fasting blood sugar this morning 105.  Recheck during this stay is 5.5, indicating no pre-diabetes at this time.    Plan:  -No POCT glucose checks needed at time of discharge  -check with morning labs daily while hospitalized   -patient will need outpatient follow up in 6 months for recheck A1C, if still normal diagnosis likely can be removed from patient's chart       Anxiety  Assessment: Chronic and stable  Plan:  - Continue chronic escitalopram at time of discharge       History of urge incontinence  Assessment: Chronic and stable  Plan:  - Continue chronic oxybutynin at time of discharge          Diet: Advance Diet as Tolerated: Regular Diet Adult    DVT Prophylaxis: Pneumatic Compression Devices  Fong Catheter: Not present  Central Lines: None  Cardiac Monitoring: None  Code Status: Full Code      Disposition Plan      Expected Discharge Date: 07/21/2022,  3:00 PM  Discharge Delays: Placement - TCU  Destination: inpatient rehabilitation facility;home with help/services  Discharge Comments: Depends on Surgery this afternoon        The patient's care was discussed with the Bedside Nurse, Care Coordinator/ and Patient.    Pao Santaan MD  Hospitalist Service  [facility]  Securely message with the Vocera Web Console (learn more here)  Text page via [employer] Paging/Directory         Clinically  Significant Risk Factors Present on Admission                     ______________________________________________________________________    Interval History   Patient had a much better night without any recurrent dizziness or lightheadedness.  She has been able to get up and ambulate without difficulty with improved pain control and with good effort.  Continues to need nursing support with transfers.  She is eating well.  Denies any new symptoms or concerns.  No new nursing concerns.    Data reviewed today: I reviewed all medications, new labs and imaging results over the last 24 hours.    Physical Exam   Vital Signs: Temp: (!) 96.7  F (35.9  C) Temp src: Oral BP: (!) 141/59 Pulse: 84   Resp: 16 SpO2: 98 % O2 Device: None (Room air)    Weight: 160 lbs 0 oz  Constitutional: awake, alert, cooperative, no apparent distress, and appears stated age  Respiratory: No increased work of breathing, good air exchange, clear to auscultation bilaterally, no crackles or wheezing  Cardiovascular: Normal apical impulse, regular rate and rhythm, normal S1 and S2, no S3 or S4, and no murmur noted  GI: Bowel sounds present, abdomen soft and nondistended, nontender to palpation  Musculoskeletal: no lower extremity pitting edema present  Neurologic: Awake, alert, oriented to person, place, situation    Data   Recent Labs   Lab  07/21/22  0651 07/21/22  0532 07/20/22  0553 07/19/22  0617 07/17/22  2036   WBC   --   --   --  8.2 6.9   HGB   --  8.6* 9.4* 10.7* 13.2   MCV   --   --   --  93 90   PLT   --   --   --  179 236   INR   --   --   --  0.99 0.92   NA   --  135 134 136 141   POTASSIUM   --  3.7 3.9 4.1 4.3   CHLORIDE   --  101 101 104 109   CO2   --  30 27 29 25   BUN   --  13 9 12 28   CR   --  0.66 0.68 0.66 0.78   ANIONGAP   --  4 6 3 7   JEFF   --  8.1* 8.0* 8.3* 8.9   GLC  115* 104* 113*  113* 105* 139*

## 2022-07-21 NOTE — PLAN OF CARE
Physical Therapy Discharge Summary    Reason for therapy discharge:    Discharged to transitional care facility.    Progress towards therapy goal(s). See goals on Care Plan in [facility] electronic health record for goal details.  Goals partially met.  Barriers to achieving goals:   discharge from facility.    Therapy recommendation(s):    Continued therapy is recommended.  Rationale/Recommendations:  Continued therapy at TCU would be beneficial to work on strengthening,  progressing ambulation and improving tranfters.          Thank you for your referral.   Nakul Murdock PT, DPT, OCS    [facility]   O: [phone]  E: [email or web address]

## 2022-07-21 NOTE — PROGRESS NOTES
"ORTHO PROGRESS NOTE    POD # 2  Procedure(s):  OPEN REDUCTION INTERNAL FIXATION, FRACTURE, Right FEMUR, USING INTRAMEDULLARY LEENA - right on 2022 - 2022    Pain:  mild    /81   Pulse 77   Temp (!) 96.7  F (35.9  C) (Oral)   Resp 16   Ht 1.74 m (5' 8.5\")   Wt 72.6 kg (160 lb)   LMP  (LMP Unknown)   SpO2 98%   BMI 23.97 kg/m      Temp (24hrs), Av  F (36.7  C), Min:96.7  F (35.9  C), Max:99  F (37.2  C)      Recent Labs   Lab Test 22  1015 22  0532 22  0553 22  0617 22  2036 18  0639 18  1349   HGB 8.7* 8.6* 9.4* 10.7* 13.2   < > 13.7   INR  --   --   --  0.99 0.92  --  0.94    < > = values in this interval not displayed.                 Circulation intact.   Sensation intact.   Calves soft and nontender.   Incision is covered      PT  Walked 60 feet.         ASSESSMENT:  Open-reduction, internal fixation right intertrochanteric femur fracture doing well.    PLAN:  discharge to short term rehab today.  Aspirin: Take 1 tablet (325 mg) by mouth twice daily for blood thinning for 6 weeks.   Oxycodone for pain.  Return to clinic 2 weeks for staple removal.    Dante Alonso M.D.  Department of Orthopaedic Surgery  [facility]  Pager: [phone]      "

## 2022-07-21 NOTE — PLAN OF CARE
Occupational Therapy Discharge Summary    Reason for therapy discharge:    Discharged to transitional care facility.    Progress towards therapy goal(s). See goals on Care Plan in [facility] electronic health record for goal details.  Goals partially met.  Barriers to achieving goals:   discharge from facility.    Therapy recommendation(s):    Continued therapy is recommended.  Rationale/Recommendations:  To increase safety and IND with ADLs and functional mobility post op..

## 2022-07-21 NOTE — PROGRESS NOTES
S-(situation): Patient discharged to [facility] via private car with family    B-(background): s/p right hip fracture    A-(assessment): VSS on RA.  Afebrile.  Pt tolerating diet and activity.  Mildly nauseated at lunchtime, and reports need to have BM.  Scheduled senna and miralax given this AM.  Zofran-ODT given for nausea.  Pain controlled with scheduled tylenol and oxycodone as needed  Last bowel movement: 07/21/22      R-(recommendations):Report called to [place]. Listed belongings gathered and sent with patient.     Discharge Nursing Criteria:     Care Plan and Patient education resolved: Yes    Vaccines  Influenza status verified at discharge:  Not Applicable    [facility]  Home medications returned to patient: NA  Medication Bin checked and emptied on discharge Yes  All paperwork sent with patient/Copy of AVS given to patient or family Yes.

## 2022-07-21 NOTE — PROGRESS NOTES
[facility] GERIATRICS  Primary Care Provider & Clinic: Dewayne Santana MD, [address] [facility] [address]  Chief Complaint   Patient presents with     Hospital F/U     [facility] 7/18/2022 - 7/21/2022     [place] Medical Record Number: [id number]  Place of Service Where Encounter Took Place: [facility] (TCU) [[id number]]    Bonita Villarreal is a 87 year old (1935), admitted to the above facility from  [facility]. Hospital stay 7/18/22 through 7/21/22.    HPI:    Brief Summary of Hospital Course: patient admitted to the hospital and treated with surgery for a right femur fracture. Directions are for weight bearing of 50% on that side.   MEDICATION CHANGES: start scheduled acet, asa 325 mg x 6 weeks, oxycodone 5 mg every 4 hours PRN and holding losartan - htz.   RECOMMENDED FOLLOW UP: orthopedics on 8/2  Updates on Status Since Skilled nursing Admission:   7/25 nursing updated surgeon on incision drainage.     Today: patient reports that pain is under good control with just the tylenol. States that the oxycodone upset her stomach but today she is feeling good. Recommend she take the tylenol and oxycodoen with food. It is hard to keep the 50% weight bearing. Bowels are moving fine. Per nursing home EHR - b/ps are 120 - 150's.     CODE STATUS/ADVANCE DIRECTIVES DISCUSSION:  Full Code      ALLERGIES:   Allergies   Allergen Reactions     Bactrim [Sulfamethoxazole W/Trimethoprim] Nausea and Vomiting     Lisinopril Cough      PAST MEDICAL HISTORY:   Past Medical History:   Diagnosis Date     Advance Care Planning 2/28/2012    Advance Care Planning 3/20/2016: Receipt of ACP document:  Received: Health Care Directive which was witnessed or notarized on 2/25/16.  Document not previously scanned.  Validation form completed and sent with document to be scanned.  Code Status needs to be updated to reflect choices in most recent ACP document.   "Confirmed/documented designated decision maker(s).  Added by Cata Terrell RN, Advance Care Planning Liaison. Advance Care Planning 2/28/12: Patient does not have an Advance/Health Care Directive (HCD), given \"What is Advance Care Planning?\" flyer. Teena Heinon       Arthritis      Diverticulitis of colon 6/28/2005    Patient keeps Rx for Augmentin to use PRN Problem list name updated by automated process. Provider to review     Diverticulosis of colon (without mention of hemorrhage)     history of recurrent diverticulitis     Hypertension       PAST SURGICAL HISTORY:   has a past surgical history that includes surgical history of - ; colonoscopy (04/15/02); flexible sigmoidoscopy (07/29/96); APPENDECTOMY; orthopedic surgery (22-23 YEARS AGO); Colonoscopy (3/25/2013); Arthroplasty knee (Right, 2/28/2018); Arthroplasty knee (Left, 10/6/2020); and Open reduction internal fixation rodding intramedullary femur (Right, 7/19/2022).  FAMILY HISTORY: family history includes C.A.D. in her father; Cancer in her brother and sister; Family History Negative in her mother; Gynecology in her sister; Heart Disease in her brother, brother, and sister; Lipids in her brother, brother, and sister; Prostate Cancer in her brother.  SOCIAL HISTORY:   reports that she has never smoked. She has never used smokeless tobacco. She reports current alcohol use. She reports that she does not use drugs.    Post Discharge Medication Reconciliation Status: discharge medications reconciled, continue medications without change  Current Outpatient Medications   Medication Sig     oxyCODONE (ROXICODONE) 5 MG tablet Take 1 tablet (5 mg) by mouth every 4 hours as needed for pain     acetaminophen (TYLENOL) 325 MG tablet Take 3 tablets (975 mg) by mouth every 8 hours     aspirin (ASA) 325 MG EC tablet Take 1 tablet (325 mg) by mouth 2 times daily (with meals)     atenolol (TENORMIN) 100 MG tablet Take 1 tablet (100 mg) by mouth daily     escitalopram " "(LEXAPRO) 10 MG tablet Take 1 tablet (10 mg) by mouth daily     ibuprofen (ADVIL/MOTRIN) 600 MG tablet Take 1 tablet (600 mg) by mouth every 8 hours as needed for pain (mild)     losartan-hydrochlorothiazide (HYZAAR) 100-12.5 MG tablet HOLD at time of TCU admission but anticipate it will be restarted within a week of admissions as blood pressures increase.  Coordinate with TCU provider when and if medication should be restarted.  Previously was on 1 tablet by mouth daily.     multivitamin (CENTRUM SILVER) tablet Take 1 tablet by mouth daily     oxybutynin ER (DITROPAN-XL) 5 MG 24 hr tablet Take 1 tablet (5 mg) by mouth daily     No current facility-administered medications for this visit.       ROS:  4 point ROS including Respiratory, CV, GI and , other than that noted in the HPI,  is negative    Vitals:  BP (!) 152/72   Pulse 65   Temp 98  F (36.7  C)   Resp 20   Ht 1.727 m (5' 8\")   Wt 86.3 kg (190 lb 3.2 oz)   LMP  (LMP Unknown)   SpO2 98%   BMI 28.92 kg/m    Exam:  GENERAL APPEARANCE:  Alert, in no distress  RESP:  respiratory effort normal  ABDOMEN:  normal bowel sounds, soft, nontender,  M/S:  Gait and station observed in bed. no tenderness or swelling of the joints, left leg with moderate edema.   SKIN:  Inspection and palpation of skin and subcutaneous tissue at baseline. Incision not observed.   PSYCH:  insight and judgement, memory intact, affect and mood normal    Lab/Diagnostic data: Pertinent hospital labs: 7/21 hgb 8.7    ASSESSMENT/PLAN:  Aftercare following surgery of the musculoskeletal system  Admitted to the tcu for therapy and nursing care following a hospital stay for a right displaced hip fracture.  -  Continue Physical Therapy / Ocupational Therapy   - continue ASA for DVT prevention per ortho  - continue scheduled acet.  - continue PRN oxycodone   - follow up as directed with orthopedic    Essential hypertension, benign  Most readings controlled. Losartan was placed on hold due to " lower b/ps. Likely able to restart on Wednesday.   - continue to monitor b/p    Anemia due to blood loss, acute  Post op hgb 8.7 . Recheck to ensure stability.     JAMSHID (generalized anxiety disorder)  Stable. Continues on lexapro.     Orders:  Check hgb on Wednesday dx anemia.    Total time spent with patient visit at the skilled nursing facility was 35 min including patient visit and review of past records. Greater than 50% of total time spent with counseling and coordinating care due to need for coordination of care and care planning in skilled nursing facility as well as discussion of expected length of stay, therapy services, and discharge planning, with patient's goal to return to home when goals have been met     Electronically signed by: July Hardy NP

## 2022-07-21 NOTE — PLAN OF CARE
Problem: Plan of Care - These are the overarching goals to be used throughout the patient stay.    Goal: Plan of Care Review/Shift Note  Description: The Plan of Care Review/Shift note should be completed every shift.  The Outcome Evaluation is a brief statement about your assessment that the patient is improving, declining, or no change.  This information will be displayed automatically on your shift note.  7/21/2022 0446 by Cb Villanueva RN  Outcome: Ongoing, Progressing  7/21/2022 0446 by Cb Villanueva RN  Outcome: Ongoing, Progressing  Goal: Absence of Hospital-Acquired Illness or Injury  7/21/2022 0446 by Cb Villanueva RN  Outcome: Ongoing, Progressing  7/21/2022 0446 by Cb Villanueva RN  Outcome: Ongoing, Progressing  Intervention: Identify and Manage Fall Risk  Recent Flowsheet Documentation  Taken 7/21/2022 0200 by Cb Villanueva RN  Safety Promotion/Fall Prevention:   activity supervised   bed alarm on   fall prevention program maintained   lighting adjusted   nonskid shoes/slippers when out of bed   room near nurse's station   room organization consistent   safety round/check completed  Taken 7/20/2022 2100 by Cb Villanueva RN  Safety Promotion/Fall Prevention:   activity supervised   bed alarm on   fall prevention program maintained   lighting adjusted   nonskid shoes/slippers when out of bed   room near nurse's station   room organization consistent   safety round/check completed  Intervention: Prevent Skin Injury  Recent Flowsheet Documentation  Taken 7/21/2022 0200 by Cb Villanueva RN  Body Position: position changed independently  Taken 7/20/2022 2100 by Cb Villanueva RN  Body Position: position changed independently  Intervention: Prevent and Manage VTE (Venous Thromboembolism) Risk  Recent Flowsheet Documentation  Taken 7/21/2022 0200 by Cb Villanueva RN  VTE Prevention/Management: SCDs (sequential compression devices) on  Activity Management: activity adjusted per  tolerance  Taken 7/20/2022 2100 by Cb Villanueva RN  VTE Prevention/Management: SCDs (sequential compression devices) on  Activity Management: activity adjusted per tolerance  Goal: Optimal Comfort and Wellbeing  7/21/2022 0446 by Cb Villanueva RN  Outcome: Ongoing, Progressing  7/21/2022 0446 by Cb Villanueva RN  Outcome: Ongoing, Progressing  Intervention: Monitor Pain and Promote Comfort  Recent Flowsheet Documentation  Taken 7/20/2022 2130 by Cb Villanueva RN  Pain Management Interventions: medication (see MAR)  Intervention: Provide Person-Centered Care  Recent Flowsheet Documentation  Taken 7/21/2022 0200 by Cb Villanueva RN  Trust Relationship/Rapport: care explained  Taken 7/20/2022 2100 by Cb Villanueva RN  Trust Relationship/Rapport: care explained  Goal: Readiness for Transition of Care  7/21/2022 0446 by Cb Villanueva RN  Outcome: Ongoing, Progressing  7/21/2022 0446 by Cb Villanueva RN  Outcome: Ongoing, Progressing     Problem: Adjustment to Injury (Hip Fracture Medical Management)  Goal: Optimal Coping with Change in Health Status  Outcome: Ongoing, Progressing     Problem: Bleeding (Hip Fracture Medical Management)  Goal: Absence of Bleeding  Outcome: Ongoing, Progressing     Problem: Bowel Elimination Impaired (Hip Fracture Medical Management)  Goal: Effective Bowel Elimination  Outcome: Ongoing, Progressing     Problem: Cognitive Decline Risk (Hip Fracture Medical Management)  Goal: Baseline Cognitive Function Maintained  Outcome: Ongoing, Progressing     Problem: Embolism (Hip Fracture Medical Management)  Goal: Absence of Embolism  Outcome: Ongoing, Progressing  Intervention: Prevent or Manage Embolism Risk  Recent Flowsheet Documentation  Taken 7/21/2022 0200 by Cb Villanueva RN  VTE Prevention/Management: SCDs (sequential compression devices) on  Taken 7/20/2022 2100 by Cb Villanueva RN  VTE Prevention/Management: SCDs (sequential compression devices) on     Problem:  "Fracture Stabilization and Management (Hip Fracture Medical Management)  Goal: Fracture Stability  Outcome: Ongoing, Progressing     Problem: Functional Ability Impaired (Hip Fracture Medical Management)  Goal: Optimal Functional Performance  Outcome: Ongoing, Progressing  Intervention: Promote Optimal Functional Status  Recent Flowsheet Documentation  Taken 7/21/2022 0200 by Cb Villanueva RN  Activity Management: activity adjusted per tolerance  Taken 7/20/2022 2100 by Cb Villanueva RN  Activity Management: activity adjusted per tolerance     Problem: Pain (Hip Fracture Medical Management)  Goal: Acceptable Pain Level  Outcome: Ongoing, Progressing  Intervention: Manage Acute Orthopaedic-Related Pain  Recent Flowsheet Documentation  Taken 7/20/2022 2130 by Cb Villanueva RN  Pain Management Interventions: medication (see MAR)     Problem: Urinary Elimination Impaired (Hip Fracture Medical Management)  Goal: Effective Urinary Elimination  Outcome: Ongoing, Progressing     Problem: Hypertension Comorbidity  Goal: Blood Pressure in Desired Range  Outcome: Ongoing, Progressing  Intervention: Maintain Blood Pressure Management  Recent Flowsheet Documentation  Taken 7/21/2022 0200 by Cb Villanueva RN  Medication Review/Management: medications reviewed  Taken 7/20/2022 2100 by Cb Villanueva RN  Medication Review/Management: medications reviewed       Pt remains alert and oriented, has been up walking to the bathroom and walked once in the tran about 75 ft, she has been voiding well without complication, Up with assist of one, CMS remains in tact throughout, aquacel dressing in place is clean with no drainage present. Oxycodone given x1 for pain, Pt has been able to sleep, vitals remain stable. /58 (BP Location: Right arm)   Pulse 82   Temp 99  F (37.2  C) (Oral)   Resp 16   Ht 1.74 m (5' 8.5\")   Wt 72.6 kg (160 lb)   LMP  (LMP Unknown)   SpO2 92%   BMI 23.97 kg/m    Will continue to monitor, assess " and manage pain as needed, and follow plan of care.

## 2022-07-21 NOTE — PROGRESS NOTES
Bonita Villarreal  Gender: female  : 1935  [address] [facility] [id number] [phone] (home)     Medical Record: [id number]  Pharmacy:    [facility] [address] [facility] - [place], [place] - [address]   Primary Care Provider: Dewayne Santana    Parent's names are: Data Unavailable (mother) and Data Unavailable (father).      [facility]  2022     Discharge Phone Call:  Discharge to [facility]

## 2022-07-21 NOTE — PROGRESS NOTES
Care Management Discharge Note    Discharge Date: 07/21/2022       Discharge Disposition: Transitional Care    Discharge Services:  PT and OT at TCU       Discharge Transportation: family or friend will provide    Private pay costs discussed: transportation costs- if needing transport.  Family plans to transport     PAS Confirmation Code:  [id number]    Patient/family educated on Medicare website which has current facility and service quality ratings:  Yes     Education Provided on the Discharge Plan:  yes    Persons Notified of Discharge Plans: [facility]     Patient/Family in Agreement with the Plan: yes    Handoff Referral Completed: Yes    Additional Information:  Writer visited with patient this morning and discussed that she has been accepted for TCU placement for today at [facility] [place] and her insurance has approved the prior authorization.      Patient in agreement with TCU placement at [facility].  Nursing reports patient's last hemoglobin is stable.      Awaiting discharge orders.  Patient plans for her son, Cirilo, to transport her once orders are completed.      PAS-RR    Per DHS regulation, CTS team completed and submitted PAS-RR to [place] Board on Aging Direct Connect via the Senior LinkAge Line. CTS team advised SNF and they are aware a PAS-RR has been submitted.       MATTHEW Hoyos  [facility]   [phone]

## 2022-07-22 ENCOUNTER — PATIENT OUTREACH (OUTPATIENT)
Dept: CARE COORDINATION | Facility: CLINIC | Age: 87
End: 2022-07-22

## 2022-07-22 DIAGNOSIS — Z71.89 OTHER SPECIFIED COUNSELING: ICD-10-CM

## 2022-07-22 NOTE — PROGRESS NOTES
Clinic Care Coordination Contact  Care Coordination Transition Communication         Clinical Data: Patient was hospitalized at [facility] from 7/18/22 to 7/21/22 with diagnosis of fractured femur with surgical repair.     Transition to Facility:    [facility] [place]    Plan: RN/SW Care Coordinator will await notification from facility staff informing RN/SW Care Coordinator of patient's discharge plans/needs. RN/SW Care Coordinator will review chart and outreach to facility staff every 4 weeks and as needed.     Avinash Johnson MSN, RN, PHN, [facility]   Primary Care Clinical RN Care Coordinator  [facility]  7/22/2022   12:32 PM  [email or web address]  Office: [phone]

## 2022-07-25 ENCOUNTER — TRANSITIONAL CARE UNIT VISIT (OUTPATIENT)
Dept: GERIATRICS | Facility: CLINIC | Age: 87
End: 2022-07-25
Payer: COMMERCIAL

## 2022-07-25 ENCOUNTER — TELEPHONE (OUTPATIENT)
Dept: ORTHOPEDICS | Facility: CLINIC | Age: 87
End: 2022-07-25

## 2022-07-25 VITALS
HEIGHT: 68 IN | BODY MASS INDEX: 28.82 KG/M2 | RESPIRATION RATE: 20 BRPM | WEIGHT: 190.2 LBS | DIASTOLIC BLOOD PRESSURE: 72 MMHG | TEMPERATURE: 98 F | SYSTOLIC BLOOD PRESSURE: 152 MMHG | OXYGEN SATURATION: 98 % | HEART RATE: 65 BPM

## 2022-07-25 DIAGNOSIS — D62 ANEMIA DUE TO BLOOD LOSS, ACUTE: ICD-10-CM

## 2022-07-25 DIAGNOSIS — F41.1 GAD (GENERALIZED ANXIETY DISORDER): ICD-10-CM

## 2022-07-25 DIAGNOSIS — Z47.89 AFTERCARE FOLLOWING SURGERY OF THE MUSCULOSKELETAL SYSTEM: Primary | ICD-10-CM

## 2022-07-25 DIAGNOSIS — I10 ESSENTIAL HYPERTENSION, BENIGN: ICD-10-CM

## 2022-07-25 PROCEDURE — 99310 SBSQ NF CARE HIGH MDM 45: CPT | Performed by: NURSE PRACTITIONER

## 2022-07-25 RX ORDER — OXYCODONE HYDROCHLORIDE 5 MG/1
5 TABLET ORAL EVERY 4 HOURS PRN
Qty: 10 TABLET | Refills: 0 | Status: SHIPPED | OUTPATIENT
Start: 2022-07-25 | End: 2022-08-01

## 2022-07-25 NOTE — LETTER
7/25/2022        RE: Bonita Villarreal  [address] [address] [place] [address] [place] [address]        [facility] GERIATRICS  Primary Care Provider & Clinic: Dewayne Santana MD, [address] [facility] [address]  Chief Complaint   Patient presents with     Hospital F/U     [facility] 7/18/2022 - 7/21/2022     [place] Medical Record Number: [id number]  Place [facility] En[facility] (TCU) [4002]    Bonita Villarreal is a 87 year old (1935), admitted to the above facility from  [facility]. Hospital stay 7/18/22 through 7/21/22.    HPI:    Brief Summary of Hospital Course: patient admitted to the hospital and treated with surgery for a right femur fracture. Directions are for weight bearing of 50% on that side.   MEDICATION CHANGES: start scheduled acet, asa 325 mg x 6 weeks, oxycodone 5 mg every 4 hours PRN and holding losartan - htz.   RECOMMENDED FOLLOW UP: orthopedics on 8/2  Updates on Status Since Skilled nursing Admission:   7/25 nursing updated surgeon on incision drainage.     Today: patient reports that pain is under good control with just the tylenol. States that the oxycodone upset her stomach but today she is feeling good. Recommend she take the tylenol and oxycodoen with food. It is hard to keep the 50% weight bearing. Bowels are moving fine. Per nursing home EHR - b/ps are 120 - 150's.     CODE STATUS/ADVANCE DIRECTIVES DISCUSSION:  Full Code      ALLERGIES:   Allergies   Allergen Reactions     Bactrim [Sulfamethoxazole W/Trimethoprim] Nausea and Vomiting     Lisinopril Cough      PAST MEDICAL HISTORY:   Past Medical History:   Diagnosis Date     Advance Care Planning 2/28/2012    Advance Care Planning 3/20/2016: Receipt of ACP document:  Received: Health Care Directive which was witnessed or notarized on 2/25/16.  Document not previously scanned.  Validation form completed and sent with document to be  "scanned.  Code Status needs to be updated to reflect choices in most recent ACP document.  Confirmed/documented designated decision maker(s).  Added by Cata Terrell RN, Advance Care Planning Liaison. Advance Care Planning 2/28/12: Patient does not have an Advance/Health Care Directive (HCD), given \"What is Advance Care Planning?\" flyer. Teena Peace       Arthritis      Diverticulitis of colon 6/28/2005    Patient keeps Rx for Augmentin to use PRN Problem list name updated by automated process. Provider to review     Diverticulosis of colon (without mention of hemorrhage)     history of recurrent diverticulitis     Hypertension       PAST SURGICAL HISTORY:   has a past surgical history that includes surgical history of - ; colonoscopy (04/15/02); flexible sigmoidoscopy (07/29/96); APPENDECTOMY; orthopedic surgery (22-23 YEARS AGO); Colonoscopy (3/25/2013); Arthroplasty knee (Right, 2/28/2018); Arthroplasty knee (Left, 10/6/2020); and Open reduction internal fixation rodding intramedullary femur (Right, 7/19/2022).  FAMILY HISTORY: family history includes C.A.D. in her father; Cancer in her brother and sister; Family History Negative in her mother; Gynecology in her sister; Heart Disease in her brother, brother, and sister; Lipids in her brother, brother, and sister; Prostate Cancer in her brother.  SOCIAL HISTORY:   reports that she has never smoked. She has never used smokeless tobacco. She reports current alcohol use. She reports that she does not use drugs.    Post Discharge Medication Reconciliation Status: discharge medications reconciled, continue medications without change  Current Outpatient Medications   Medication Sig     oxyCODONE (ROXICODONE) 5 MG tablet Take 1 tablet (5 mg) by mouth every 4 hours as needed for pain     acetaminophen (TYLENOL) 325 MG tablet Take 3 tablets (975 mg) by mouth every 8 hours     aspirin (ASA) 325 MG EC tablet Take 1 tablet (325 mg) by mouth 2 times daily (with meals) " "    atenolol (TENORMIN) 100 MG tablet Take 1 tablet (100 mg) by mouth daily     escitalopram (LEXAPRO) 10 MG tablet Take 1 tablet (10 mg) by mouth daily     ibuprofen (ADVIL/MOTRIN) 600 MG tablet Take 1 tablet (600 mg) by mouth every 8 hours as needed for pain (mild)     losartan-hydrochlorothiazide (HYZAAR) 100-12.5 MG tablet HOLD at time of TCU admission but anticipate it will be restarted within a week of admissions as blood pressures increase.  Coordinate with TCU provider when and if medication should be restarted.  Previously was on 1 tablet by mouth daily.     multivitamin (CENTRUM SILVER) tablet Take 1 tablet by mouth daily     oxybutynin ER (DITROPAN-XL) 5 MG 24 hr tablet Take 1 tablet (5 mg) by mouth daily     No current facility-administered medications for this visit.       ROS:  4 point ROS including Respiratory, CV, GI and , other than that noted in the HPI,  is negative    Vitals:  BP (!) 152/72   Pulse 65   Temp 98  F (36.7  C)   Resp 20   Ht 1.727 m (5' 8\")   Wt 86.3 kg (190 lb 3.2 oz)   LMP  (LMP Unknown)   SpO2 98%   BMI 28.92 kg/m    Exam:  GENERAL APPEARANCE:  Alert, in no distress  RESP:  respiratory effort normal  ABDOMEN:  normal bowel sounds, soft, nontender,  M/S:  Gait and station observed in bed. no tenderness or swelling of the joints, left leg with moderate edema.   SKIN:  Inspection and palpation of skin and subcutaneous tissue at baseline. Incision not observed.   PSYCH:  insight and judgement, memory intact, affect and mood normal    Lab/Diagnostic data: Pertinent hospital labs: 7/21 hgb 8.7    ASSESSMENT/PLAN:  Aftercare following surgery of the musculoskeletal system  Admitted to the tcu for therapy and nursing care following a hospital stay for a right displaced hip fracture.  -  Continue Physical Therapy / Ocupational Therapy   - continue ASA for DVT prevention per ortho  - continue scheduled acet.  - continue PRN oxycodone   - follow up as directed with " orthopedic    Essential hypertension, benign  Most readings controlled. Losartan was placed on hold due to lower b/ps. Likely able to restart on Wednesday.   - continue to monitor b/p    Anemia due to blood loss, acute  Post op hgb 8.7 . Recheck to ensure stability.     JAMSHID (generalized anxiety disorder)  Stable. Continues on lexapro.     Orders:  Check hgb on Wednesday dx anemia.    Total time spent with patient visit at the skilled nursing facility was 35 min including patient visit and review of past records. Greater than 50% of total time spent with counseling and coordinating care due to need for coordination of care and care planning in skilled nursing facility as well as discussion of expected length of stay, therapy services, and discharge planning, with patient's goal to return to home when goals have been met     Electronically signed by: July Hardy NP          Sincerely,        July Hardy NP

## 2022-07-25 NOTE — TELEPHONE ENCOUNTER
Pina at patients TCU called regarding drainage from post surgical incisions. TCU states that the incisions do not show signs of infection, but are draining. Dressings have been changed daily. I consulted with Dr. Alonso who recommended that pressure be applied to the incision sites to detur drainage and to hold off on blood thinning medication until drainage stops. Patients TCU agreered with the plan.    [place] Peace MS, ATC  Certified Athletic Trainer

## 2022-07-26 ENCOUNTER — LAB REQUISITION (OUTPATIENT)
Dept: LAB | Facility: CLINIC | Age: 87
End: 2022-07-26
Payer: COMMERCIAL

## 2022-07-26 DIAGNOSIS — D64.9 ANEMIA, UNSPECIFIED: ICD-10-CM

## 2022-07-27 ENCOUNTER — TRANSITIONAL CARE UNIT VISIT (OUTPATIENT)
Dept: GERIATRICS | Facility: CLINIC | Age: 87
End: 2022-07-27

## 2022-07-27 ENCOUNTER — DOCUMENTATION ONLY (OUTPATIENT)
Dept: OTHER | Facility: CLINIC | Age: 87
End: 2022-07-27

## 2022-07-27 VITALS
TEMPERATURE: 97.5 F | SYSTOLIC BLOOD PRESSURE: 145 MMHG | RESPIRATION RATE: 16 BRPM | BODY MASS INDEX: 28.82 KG/M2 | WEIGHT: 190.2 LBS | DIASTOLIC BLOOD PRESSURE: 67 MMHG | OXYGEN SATURATION: 90 % | HEIGHT: 68 IN | HEART RATE: 72 BPM

## 2022-07-27 DIAGNOSIS — D62 ANEMIA DUE TO BLOOD LOSS, ACUTE: ICD-10-CM

## 2022-07-27 DIAGNOSIS — Z47.89 AFTERCARE FOLLOWING SURGERY OF THE MUSCULOSKELETAL SYSTEM: Primary | ICD-10-CM

## 2022-07-27 DIAGNOSIS — I10 ESSENTIAL HYPERTENSION, BENIGN: ICD-10-CM

## 2022-07-27 DIAGNOSIS — F41.1 GAD (GENERALIZED ANXIETY DISORDER): ICD-10-CM

## 2022-07-27 LAB — HGB BLD-MCNC: 9.1 G/DL (ref 11.7–15.7)

## 2022-07-27 PROCEDURE — 99309 SBSQ NF CARE MODERATE MDM 30: CPT | Performed by: NURSE PRACTITIONER

## 2022-07-27 PROCEDURE — 85018 HEMOGLOBIN: CPT | Performed by: NURSE PRACTITIONER

## 2022-07-27 PROCEDURE — P9604 ONE-WAY ALLOW PRORATED TRIP: HCPCS | Performed by: NURSE PRACTITIONER

## 2022-07-27 PROCEDURE — 36415 COLL VENOUS BLD VENIPUNCTURE: CPT | Performed by: NURSE PRACTITIONER

## 2022-07-27 NOTE — LETTER
"    7/27/2022        RE: Bonita Villarreal  [address] [address] [place] [id number]        [facility] GERIATRICS  Primary Care Provider & Clinic: Dewayne Santana MD, [address] [facility] [address]  Chief Complaint   Patient presents with     RECHECK     [place] Medical Record Number: [id number]  Place of Service Where Encounter Took Place: [facility] ON [facility] (U) [[id number]]    Bonita Villarreal is a 87 year old (1935), admitted to the above facility from  [facility]. Hospital stay 7/18/22 through 7/21/22.    HPI:    Brief Summary of Hospital Course: patient admitted to the hospital and treated with surgery for a right femur fracture. Directions are for weight bearing of 50% on that side.   MEDICATION CHANGES: start scheduled acet, asa 325 mg x 6 weeks, oxycodone 5 mg every 4 hours PRN and holding losartan - htz.   RECOMMENDED FOLLOW UP: orthopedics on 8/2  Updates on Status Since Skilled nursing Admission:   7/25 nursing updated surgeon on incision drainage.  7/26 restarted losartan 50 mg and hydrochlorothiazide 12.5 mg    Today: Patient reports therapy is improving.  She would like to walk on the unit with nursing staff.  She continues to have nausea at times.  Recommending that she take her medications with meals as much as possible.  Drainage from the incision has decreased.  Patient also reports that the swelling in her leg has gone down.  She reports having a good bowel movement this morning.  Nursing reports no concerns.  Nursing home EHR reviewed and shows better blood pressures.  Hemoglobin was done today and was 9.1      ROS:  4 point ROS including Respiratory, CV, GI and , other than that noted in the HPI,  is negative    Vitals:  BP (!) 145/67   Pulse 72   Temp 97.5  F (36.4  C)   Resp 16   Ht 1.727 m (5' 8\")   Wt 86.3 kg (190 lb 3.2 oz)   LMP  (LMP Unknown)   SpO2 90%   BMI 28.92 kg/m    Exam:  GENERAL APPEARANCE:  Alert, " in no distress  RESP:  respiratory effort normal  ABDOMEN:  normal bowel sounds, soft, nontender,  M/S:  Gait and station observed in bed. no tenderness or swelling of the joints, left leg with moderate edema.   SKIN:  Inspection and palpation of skin and subcutaneous tissue at baseline. Incision not observed.   PSYCH:  insight and judgement, memory intact, affect and mood normal    Lab/Diagnostic data: Pertinent hospital labs: 7/21 hgb 8.7  LABS DONE AT THE TCU: 7/26 hemoglobin 9.1    ASSESSMENT/PLAN:  Aftercare following surgery of the musculoskeletal system  Admitted to the tcu for therapy and nursing care following a hospital stay for a right displaced hip fracture.  -  Continue Physical Therapy / Ocupational Therapy   - continue ASA for DVT prevention per ortho  - continue scheduled acet.  - continue PRN oxycodone   - follow up as directed with orthopedic  -Updated therapy and  on patient's desire to discharge on Wednesday next week    Essential hypertension, benign  Most readings controlled.  Losartan and hydrochlorothiazide was restarted.  The losartan was restarted at a lower dose at 50 mg.  - continue to monitor b/p    Anemia due to blood loss, acute  Post op hgb 8.7 . Recheck today was 9.1. improving  - monitor PRN.     Orders:  No changes    Electronically signed by: July Hardy NP        Sincerely,        July Hardy NP

## 2022-07-28 NOTE — PROGRESS NOTES
"[facility] GERIATRICS  Primary Care Provider & Clinic: Dewayne Santana MD, [address] [facility] [address]  Chief Complaint   [facility]     [facility]     [place] Medical Record Number: [id number]  Place of Service Where Encounter Took Place: [facility] (TCU) [[id number]]    Bonita Villarreal is a 87 year old (1935), admitted to the above facility from  [facility]. Hospital stay 7/18/22 through 7/21/22.    HPI:    Brief Summary of Hospital Course: patient admitted to the hospital and treated with surgery for a right femur fracture. Directions are for weight bearing of 50% on that side.   MEDICATION CHANGES: start scheduled acet, asa 325 mg x 6 weeks, oxycodone 5 mg every 4 hours PRN and holding losartan - htz.   RECOMMENDED FOLLOW UP: orthopedics on 8/2  Updates on Status Since Skilled nursing Admission:   7/25 nursing updated surgeon on incision drainage.  7/26 restarted losartan 50 mg and hydrochlorothiazide 12.5 mg    Today: Patient reports therapy is improving.  She would like to walk on the unit with nursing staff.  She continues to have nausea at times.  Recommending that she take her medications with meals as much as possible.  Drainage from the incision has decreased.  Patient also reports that the swelling in her leg has gone down.  She reports having a good bowel movement this morning.  Nursing reports no concerns.  Nursing home EHR reviewed and shows better blood pressures.  Hemoglobin was done today and was 9.1      ROS:  4 point ROS including Respiratory, CV, GI and , other than that noted in the HPI,  is negative    Vitals:  BP (!) 145/67   Pulse 72   Temp 97.5  F (36.4  C)   Resp 16   Ht 1.727 m (5' 8\")   Wt 86.3 kg (190 lb 3.2 oz)   LMP  (LMP Unknown)   SpO2 90%   BMI 28.92 kg/m    Exam:  GENERAL APPEARANCE:  Alert, in no distress  RESP:  respiratory effort normal  ABDOMEN:  normal bowel sounds, soft, " nontender,  M/S:  Gait and station observed in bed. no tenderness or swelling of the joints, left leg with moderate edema.   SKIN:  Inspection and palpation of skin and subcutaneous tissue at baseline. Incision not observed.   PSYCH:  insight and judgement, memory intact, affect and mood normal    Lab/Diagnostic data: Pertinent hospital labs: 7/21 hgb 8.7  LABS DONE AT THE TCU: 7/26 hemoglobin 9.1    ASSESSMENT/PLAN:  Aftercare following surgery of the musculoskeletal system  Admitted to the tcu for therapy and nursing care following a hospital stay for a right displaced hip fracture.  -  Continue Physical Therapy / Ocupational Therapy   - continue ASA for DVT prevention per ortho  - continue scheduled acet.  - continue PRN oxycodone   - follow up as directed with orthopedic  -Updated therapy and  on patient's desire to discharge on Wednesday next week    Essential hypertension, benign  Most readings controlled.  Losartan and hydrochlorothiazide was restarted.  The losartan was restarted at a lower dose at 50 mg.  - continue to monitor b/p    Anemia due to blood loss, acute  Post op hgb 8.7 . Recheck today was 9.1. improving  - monitor PRN.     Orders:  No changes    Electronically signed by: July Hardy NP

## 2022-08-01 ENCOUNTER — DISCHARGE SUMMARY NURSING HOME (OUTPATIENT)
Dept: GERIATRICS | Facility: CLINIC | Age: 87
End: 2022-08-01
Payer: COMMERCIAL

## 2022-08-01 VITALS
OXYGEN SATURATION: 96 % | TEMPERATURE: 98.1 F | BODY MASS INDEX: 27.8 KG/M2 | SYSTOLIC BLOOD PRESSURE: 177 MMHG | RESPIRATION RATE: 22 BRPM | DIASTOLIC BLOOD PRESSURE: 86 MMHG | HEIGHT: 68 IN | HEART RATE: 68 BPM | WEIGHT: 183.4 LBS

## 2022-08-01 DIAGNOSIS — D62 ANEMIA DUE TO BLOOD LOSS, ACUTE: ICD-10-CM

## 2022-08-01 DIAGNOSIS — Z47.89 AFTERCARE FOLLOWING SURGERY OF THE MUSCULOSKELETAL SYSTEM: Primary | ICD-10-CM

## 2022-08-01 DIAGNOSIS — I10 ESSENTIAL HYPERTENSION, BENIGN: ICD-10-CM

## 2022-08-01 DIAGNOSIS — F41.1 GAD (GENERALIZED ANXIETY DISORDER): ICD-10-CM

## 2022-08-01 PROCEDURE — 99316 NF DSCHRG MGMT 30 MIN+: CPT | Performed by: NURSE PRACTITIONER

## 2022-08-01 NOTE — PROGRESS NOTES
[facility] GERIATRIC SERVICES DISCHARGE SUMMARY    PATIENT'S NAME: Bonita Villarreal  YOB: 1935  MEDICAL RECORD NUMBER:  [id number]  Place of Service where encounter took place:  [facility] (TCU) [[id number]]    PRIMARY CARE PROVIDER AND CLINIC RESPONSIBLE AFTER TRANSFER: Dewayne Santana [address] [facility] / [facility] [address]    [facility] Provider     Transferring providers: July Hardy NP / Tony Summers MD  Recent Hospitalization/ED:  [facility]. Hospital stay 7/18/22 through 7/21/22.  Date of SNF Admission: 7/21  Date of SNF (anticipated) Discharge: 8/2  Discharged to: previous independent home  Cognitive Scores: bims 15/15  Physical Function: ambulates with walker.  DME: no new    CODE STATUS/ADVANCE DIRECTIVES DISCUSSION:  Full Code   ALLERGIES: Bactrim [sulfamethoxazole w/trimethoprim] and Lisinopril    DISCHARGE DIAGNOSIS/NURSING FACILITY COURSE:   Brief Summary of Hospital Course: patient admitted to the hospital and treated with surgery for a right femur fracture. Directions are for weight bearing of 50% on that side.   MEDICATION CHANGES: start scheduled acet, asa 325 mg x 6 weeks, oxycodone 5 mg every 4 hours PRN and holding losartan - htz.   RECOMMENDED FOLLOW UP: orthopedics on 8/2  Updates on Status Since Skilled nursing Admission:   7/25 nursing updated surgeon on incision drainage.  7/26 restarted losartan 50 mg and hydrochlorothiazide 12.5 mg    Aftercare following surgery of the musculoskeletal system  Admitted to the tcu for therapy and nursing care following a hospital stay for a right displaced hip fracture.  - Continue Physical Therapy / Ocupational Therapy   - continue scheduled acet.  - discontinue oxycodone - hasn't been using.   - follow up as directed with orthopedic  - aspiring was stopped by ortho due to bleeding and drainage from the incision.      Essential hypertension, benign  Most readings controlled.  Losartan and  "hydrochlorothiazide was restarted.  The losartan was restarted at a lower dose at 50 mg, but she can go back to her home dosing when she returns home.      Anemia due to blood loss, acute  Post op hgb 8.7 . Recheck at the tcu was 9.1  - monitor PRN.     PAST MEDICAL HISTORY:  has a past medical history of Advance Care Planning (2/28/2012), Arthritis, Diverticulitis of colon (6/28/2005), Diverticulosis of colon (without mention of hemorrhage), and Hypertension.    She has no past medical history of Complication of anesthesia, Diabetes (H), PONV (postoperative nausea and vomiting), or Sleep apnea.    DISCHARGE MEDICATIONS:  Current Outpatient Medications   Medication Sig Dispense Refill     acetaminophen (TYLENOL) 325 MG tablet Take 3 tablets (975 mg) by mouth every 8 hours       atenolol (TENORMIN) 100 MG tablet Take 1 tablet (100 mg) by mouth daily 90 tablet 4     escitalopram (LEXAPRO) 10 MG tablet Take 1 tablet (10 mg) by mouth daily 90 tablet 4     ibuprofen (ADVIL/MOTRIN) 600 MG tablet Take 1 tablet (600 mg) by mouth every 8 hours as needed for pain (mild) 30 tablet 0     losartan-hydrochlorothiazide (HYZAAR) 100-12.5 MG tablet HOLD at time of TCU admission but anticipate it will be restarted within a week of admissions as blood pressures increase.  Coordinate with TCU provider when and if medication should be restarted.  Previously was on 1 tablet by mouth daily. 90 tablet 4     multivitamin (CENTRUM SILVER) tablet Take 1 tablet by mouth daily       oxybutynin ER (DITROPAN-XL) 5 MG 24 hr tablet Take 1 tablet (5 mg) by mouth daily 90 tablet 4       MEDICATION CHANGES/RATIONALE:   See above.     ROS:    4 point ROS including Respiratory, CV, GI and , other than that noted in the HPI,  is negative    Physical Exam:   Vitals: BP (!) 177/86   Pulse 68   Temp 98.1  F (36.7  C)   Resp 22   Ht 1.727 m (5' 8\")   Wt 83.2 kg (183 lb 6.4 oz)   LMP  (LMP Unknown)   SpO2 96%   BMI 27.89 kg/m    BMI= Body mass index " is 27.89 kg/m .  Alert. In no distress. Edema decreased in right lower extrem form admit.     DISCHARGE PLAN:  Occupational Therapy, Physical Therapy, Registered Nurse and Home Health Aide  Patient instructed to follow-up with:  PCP in 7 days      Current [place] scheduled appointments:  Future Appointments   Date Time Provider Department Center   8/2/2022 10:00 AM Dante Alonso MD [facility] referral needed and placed by this provider: No    Pending labs: none  SNF labs: hgb 9.1  Discharge Instructions:  - Ok to discharge to home with current medications and treatments  - Home Physical Therapy / Ocupational Therapy / RN / HHA  - Follow up with Primary care provider in 1 week  - resume PTA losartan/hctz.   TOTAL DISCHARGE TIME:   Greater than 30 minutes  Electronically signed by:  July Hardy NP    Documentation of Face to Face and Certification for Home Health Services  I certify that services are/were furnished while this patient was under the care of a physician and that a physician or an allowed non-physician practitioner (NPP), had a face-to-face encounter that meets the physician face-to-face encounter requirements. The encounter was in whole, or in part, related to the primary reason for home health. The patient is confined to his/her home and needs intermittent skilled nursing, physical therapy, speech-language pathology, or the continued need for occupational therapy. A plan of care has been established by a physician and is periodically reviewed by a physician.  Date of Face-to-Face Encounter:  8/1/2022.    I certify that, based on my findings, the following services are medically necessary home health services: Nursing, Occupational Therapy and Physical Therapy.    My clinical findings support the need for the above skilled services because: Requires assistance of another person or specialized equipment to access medical services because patient: Is unable to exit home safely on  own due to: ROM limitations....    Patient to re-establish plan of care with their PCP within 7-10 days after leaving the facility to reestablish care.  Medicare certified PECOS provider: July Hardy NP   Date: August 1, 2022

## 2022-08-01 NOTE — LETTER
8/1/2022        RE: Bonita Villarreal  [address] [address] [place] [address] [place] [address]        [facility] GERIATRIC SERVICES DISCHARGE SUMMARY    PATIENT'S NAME: Bonita Villarreal  YOB: 1935  MEDICAL RECORD NUMBER:  [id number]  Place of Service where encounter took place:  [facility] (TCU) [[id number]]    PRIMARY CARE PROVIDER AND CLINIC RESPONSIBLE AFTER TRANSFER: Dewayne Santana [facility] [address] [facility] [address]    [facility] Provider     Transferring providers: July Hardy NP / Tony Summers MD  Recent Hospitalization/ED:  [facility]. Hospital stay 7/18/22 through 7/21/22.  Date of SNF Admission: 7/21  Date of SNF (anticipated) Discharge: 8/2  Discharged to: previous independent home  Cognitive Scores: bims 15/15  Physical Function: ambulates with walker.  DME: no new    CODE STATUS/ADVANCE DIRECTIVES DISCUSSION:  Full Code   ALLERGIES: Bactrim [sulfamethoxazole w/trimethoprim] and Lisinopril    DISCHARGE DIAGNOSIS/NURSING FACILITY COURSE:   Brief Summary of Hospital Course: patient admitted to the hospital and treated with surgery for a right femur fracture. Directions are for weight bearing of 50% on that side.   MEDICATION CHANGES: start scheduled acet, asa 325 mg x 6 weeks, oxycodone 5 mg every 4 hours PRN and holding losartan - htz.   RECOMMENDED FOLLOW UP: orthopedics on 8/2  Updates on Status Since Skilled nursing Admission:   7/25 nursing updated surgeon on incision drainage.  7/26 restarted losartan 50 mg and hydrochlorothiazide 12.5 mg    Aftercare following surgery of the musculoskeletal system  Admitted to the tcu for therapy and nursing care following a hospital stay for a right displaced hip fracture.  - Continue Physical Therapy / Ocupational Therapy   - continue scheduled acet.  - discontinue oxycodone - hasn't been using.   - follow up as directed with orthopedic  - aspiring was stopped by ortho due to bleeding and drainage from the  "incision.      Essential hypertension, benign  Most readings controlled.  Losartan and hydrochlorothiazide was restarted.  The losartan was restarted at a lower dose at 50 mg, but she can go back to her home dosing when she returns home.      Anemia due to blood loss, acute  Post op hgb 8.7 . Recheck at the tcu was 9.1  - monitor PRN.     PAST MEDICAL HISTORY:  has a past medical history of Advance Care Planning (2/28/2012), Arthritis, Diverticulitis of colon (6/28/2005), Diverticulosis of colon (without mention of hemorrhage), and Hypertension.    She has no past medical history of Complication of anesthesia, Diabetes (H), PONV (postoperative nausea and vomiting), or Sleep apnea.    DISCHARGE MEDICATIONS:  Current Outpatient Medications   Medication Sig Dispense Refill     acetaminophen (TYLENOL) 325 MG tablet Take 3 tablets (975 mg) by mouth every 8 hours       atenolol (TENORMIN) 100 MG tablet Take 1 tablet (100 mg) by mouth daily 90 tablet 4     escitalopram (LEXAPRO) 10 MG tablet Take 1 tablet (10 mg) by mouth daily 90 tablet 4     ibuprofen (ADVIL/MOTRIN) 600 MG tablet Take 1 tablet (600 mg) by mouth every 8 hours as needed for pain (mild) 30 tablet 0     losartan-hydrochlorothiazide (HYZAAR) 100-12.5 MG tablet HOLD at time of TCU admission but anticipate it will be restarted within a week of admissions as blood pressures increase.  Coordinate with TCU provider when and if medication should be restarted.  Previously was on 1 tablet by mouth daily. 90 tablet 4     multivitamin (CENTRUM SILVER) tablet Take 1 tablet by mouth daily       oxybutynin ER (DITROPAN-XL) 5 MG 24 hr tablet Take 1 tablet (5 mg) by mouth daily 90 tablet 4       MEDICATION CHANGES/RATIONALE:   See above.     ROS:    4 point ROS including Respiratory, CV, GI and , other than that noted in the HPI,  is negative    Physical Exam:   Vitals: BP (!) 177/86   Pulse 68   Temp 98.1  F (36.7  C)   Resp 22   Ht 1.727 m (5' 8\")   Wt 83.2 kg (183 " lb 6.4 oz)   LMP  (LMP Unknown)   SpO2 96%   BMI 27.89 kg/m    BMI= Body mass index is 27.89 kg/m .  Alert. In no distress. Edema decreased in right lower extrem form admit.     DISCHARGE PLAN:  Occupational Therapy, Physical Therapy, Registered Nurse and Home Health Aide  Patient instructed to follow-up with:  PCP in 7 days      [facility] scheduled appointments:  Future Appointments   Date Time Provider Department Center   8/2/2022 10:00 AM Dante Alonso MD [facility]       [facility] referral needed and placed by this provider: No    Pending labs: none  SNF labs: hgb 9.1  Discharge Instructions:  - Ok to discharge to home with current medications and treatments  - Home Physical Therapy / Ocupational Therapy / RN / HHA  - Follow up with Primary care provider in 1 week  - resume PTA losartan/hctz.   TOTAL DISCHARGE TIME:   Greater than 30 minutes  Electronically signed by:  July Hardy NP    Documentation of Face to Face and Certification for Home Health Services  I certify that services are/were furnished while this patient was under the care of a physician and that a physician or an allowed non-physician practitioner (NPP), had a face-to-face encounter that meets the physician face-to-face encounter requirements. The encounter was in whole, or in part, related to the primary reason for home health. The patient is confined to his/her home and needs intermittent skilled nursing, physical therapy, speech-language pathology, or the continued need for occupational therapy. A plan of care has been established by a physician and is periodically reviewed by a physician.  Date of Face-to-Face Encounter:  8/1/2022.    I certify that, based on my findings, the following services are medically necessary home health services: Nursing, Occupational Therapy and Physical Therapy.    My clinical findings support the need for the above skilled services because: Requires assistance of another person or specialized  equipment to access medical services because patient: Is unable to exit home safely on own due to: ROM limitations....    Patient to re-establish plan of care with their PCP within 7-10 days after leaving the facility to reestablish care.  Medicare certified PECOS provider: July Hardy NP   Date: August 1, 2022          Sincerely,        July Hardy NP

## 2022-08-02 ENCOUNTER — TRANSFERRED RECORDS (OUTPATIENT)
Dept: FAMILY MEDICINE | Facility: CLINIC | Age: 87
End: 2022-08-02

## 2022-08-02 ENCOUNTER — OFFICE VISIT (OUTPATIENT)
Dept: ORTHOPEDICS | Facility: CLINIC | Age: 87
End: 2022-08-02
Payer: COMMERCIAL

## 2022-08-02 VITALS — HEART RATE: 70 BPM | SYSTOLIC BLOOD PRESSURE: 121 MMHG | DIASTOLIC BLOOD PRESSURE: 59 MMHG | RESPIRATION RATE: 20 BRPM

## 2022-08-02 DIAGNOSIS — S72.141D CLOSED DISPLACED INTERTROCHANTERIC FRACTURE OF RIGHT FEMUR WITH ROUTINE HEALING, SUBSEQUENT ENCOUNTER: Primary | ICD-10-CM

## 2022-08-02 PROCEDURE — 99024 POSTOP FOLLOW-UP VISIT: CPT | Performed by: ORTHOPAEDIC SURGERY

## 2022-08-02 NOTE — LETTER
8/2/2022         RE: Bonita Villarreal  [address] [address] [place] [phone]        Dear Colleague,    Thank you for referring your patient, Bonita Villarreal, to the [facility]. Please see a copy of my visit note below.    Follow up open-reduction, internal fixation right intertrochanteric femur fracture with long Gamma farhad.  Wound is healing well.  Staples are removed.   X-ray shows mild shortening of the fracture.  Implant is holding well.    Plan:  Continue 50% partial weight bearing.  Return to clinic 4 weeks with x-ray right hip.      Again, thank you for allowing me to participate in the care of your patient.        Sincerely,        Dante Alonso MD

## 2022-08-02 NOTE — PATIENT INSTRUCTIONS
Staples removed today.  Continue 50% partial weight bearing with walker  Return to clinic 4 weeks for x-ray.

## 2022-08-03 PROBLEM — S72.141D CLOSED DISPLACED INTERTROCHANTERIC FRACTURE OF RIGHT FEMUR WITH ROUTINE HEALING, SUBSEQUENT ENCOUNTER: Status: ACTIVE | Noted: 2022-07-19

## 2022-08-03 NOTE — PROGRESS NOTES
Follow up open-reduction, internal fixation right intertrochanteric femur fracture with long Gamma farhad.  Wound is healing well.  Staples are removed.   X-ray shows mild shortening of the fracture.  Implant is holding well.    Plan:  Continue 50% partial weight bearing.  Return to clinic 4 weeks with x-ray right hip.

## 2022-08-27 ENCOUNTER — MEDICAL CORRESPONDENCE (OUTPATIENT)
Dept: FAMILY MEDICINE | Facility: CLINIC | Age: 87
End: 2022-08-27

## 2022-09-06 ENCOUNTER — OFFICE VISIT (OUTPATIENT)
Dept: ORTHOPEDICS | Facility: CLINIC | Age: 87
End: 2022-09-06
Payer: COMMERCIAL

## 2022-09-06 VITALS
WEIGHT: 185 LBS | DIASTOLIC BLOOD PRESSURE: 86 MMHG | SYSTOLIC BLOOD PRESSURE: 129 MMHG | RESPIRATION RATE: 18 BRPM | HEIGHT: 66 IN | BODY MASS INDEX: 29.73 KG/M2 | HEART RATE: 67 BPM

## 2022-09-06 DIAGNOSIS — Z96.651 STATUS POST TOTAL RIGHT KNEE REPLACEMENT: ICD-10-CM

## 2022-09-06 DIAGNOSIS — S72.141D CLOSED DISPLACED INTERTROCHANTERIC FRACTURE OF RIGHT FEMUR WITH ROUTINE HEALING, SUBSEQUENT ENCOUNTER: Primary | ICD-10-CM

## 2022-09-06 DIAGNOSIS — M25.561 RIGHT KNEE PAIN, UNSPECIFIED CHRONICITY: ICD-10-CM

## 2022-09-06 PROCEDURE — 99024 POSTOP FOLLOW-UP VISIT: CPT | Performed by: ORTHOPAEDIC SURGERY

## 2022-09-06 NOTE — LETTER
9/6/2022         RE: Bonita Villarreal  [address] [address] [place] [phone]        Dear Colleague,    Thank you for referring your patient, Bonita Villarreal, to the [facility]. Please see a copy of my visit note below.    Follow up open-reduction, internal fixation right intertrochanteric femur fracture with long Gamma farhad on 7/19/22.  She is full weight bearing with walker and occasionally walking sticks.  She complains of bruising and pain about knee.    Wound is healing well.  Limited rotation of hip, but no pain with this.  Knee range of motion 0-110 degrees.  X-ray shows mild shortening of the intertrochanteric femur fracture.  Implant is holding well. Healing callus is seen.  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  No problems seen.    Plan:  Weight bearing as tolerated.  Wean from walker.  Return to clinic 4-6 weeks with x-ray right hip.      Again, thank you for allowing me to participate in the care of your patient.        Sincerely,        Dante Alonso MD

## 2022-09-06 NOTE — PATIENT INSTRUCTIONS
Weight bearing as tolerated.  Wean from walker.  Return to clinic 4-6 weeks for repeat x-ray.  Knee looks good.

## 2022-09-06 NOTE — PROGRESS NOTES
Follow up open-reduction, internal fixation right intertrochanteric femur fracture with long Gamma farhad on 7/19/22.  She is full weight bearing with walker and occasionally walking sticks.  She complains of bruising and pain about knee.    Wound is healing well.  Limited rotation of hip, but no pain with this.  Knee range of motion 0-110 degrees.  X-ray shows mild shortening of the intertrochanteric femur fracture.  Implant is holding well. Healing callus is seen.  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  No problems seen.    Plan:  Weight bearing as tolerated.  Wean from walker.  Return to clinic 4-6 weeks with x-ray right hip.

## 2022-09-26 ENCOUNTER — OFFICE VISIT (OUTPATIENT)
Dept: FAMILY MEDICINE | Facility: CLINIC | Age: 87
End: 2022-09-26
Payer: COMMERCIAL

## 2022-09-26 VITALS
DIASTOLIC BLOOD PRESSURE: 80 MMHG | OXYGEN SATURATION: 97 % | TEMPERATURE: 99.1 F | WEIGHT: 170 LBS | HEART RATE: 78 BPM | SYSTOLIC BLOOD PRESSURE: 132 MMHG | RESPIRATION RATE: 14 BRPM | BODY MASS INDEX: 27.23 KG/M2

## 2022-09-26 DIAGNOSIS — B02.9 HERPES ZOSTER WITHOUT COMPLICATION: Primary | ICD-10-CM

## 2022-09-26 DIAGNOSIS — K21.9 GASTROESOPHAGEAL REFLUX DISEASE, UNSPECIFIED WHETHER ESOPHAGITIS PRESENT: ICD-10-CM

## 2022-09-26 PROCEDURE — 99214 OFFICE O/P EST MOD 30 MIN: CPT | Performed by: NURSE PRACTITIONER

## 2022-09-26 RX ORDER — HYDROCODONE BITARTRATE AND ACETAMINOPHEN 5; 325 MG/1; MG/1
1 TABLET ORAL EVERY 6 HOURS PRN
Qty: 15 TABLET | Refills: 0 | Status: SHIPPED | OUTPATIENT
Start: 2022-09-26 | End: 2022-09-29

## 2022-09-26 RX ORDER — PREDNISONE 20 MG/1
40 TABLET ORAL DAILY
Qty: 10 TABLET | Refills: 0 | Status: SHIPPED | OUTPATIENT
Start: 2022-09-26 | End: 2022-10-01

## 2022-09-26 RX ORDER — VALACYCLOVIR HYDROCHLORIDE 1 G/1
1000 TABLET, FILM COATED ORAL 3 TIMES DAILY
Qty: 21 TABLET | Refills: 0 | Status: SHIPPED | OUTPATIENT
Start: 2022-09-26 | End: 2022-12-02

## 2022-09-26 ASSESSMENT — PAIN SCALES - GENERAL: PAINLEVEL: MODERATE PAIN (5)

## 2022-09-26 NOTE — PATIENT INSTRUCTIONS
Stop ibuprofen.   Start the ant acid    Okay to use norco for severe pain.   Start the prednisone tomorrow.   Start the valacylovir this evening.

## 2022-09-26 NOTE — PROGRESS NOTES
"  Assessment & Plan     Herpes zoster without complication  Rash consistent with herpes zoster presentation. Reviewed valacyclovir and prednisone, risks and benefits reviewed. Discussed acetaminophen for pain control, can use Norco for severe pain.  - valACYclovir (VALTREX) 1000 mg tablet  Dispense: 21 tablet; Refill: 0  - predniSONE (DELTASONE) 20 MG tablet  Dispense: 10 tablet; Refill: 0  - HYDROcodone-acetaminophen (NORCO) 5-325 MG tablet  Dispense: 15 tablet; Refill: 0    Gastroesophageal reflux disease, unspecified whether esophagitis present  New heartburn and abdominal pain with recent episode of 1 dark stool, concerning for GI bleed. Advised she stop NSAID use. Start omeprazole daily for two weeks and recommend follow-up if symptoms persist or worsen. Defer cbc today as stools have been otherwise normal.   - omeprazole (PRILOSEC) 20 MG DR capsule  Dispense: 30 capsule; Refill: 0               BMI:   Estimated body mass index is 27.23 kg/m  as calculated from the following:    Height as of 9/6/22: 1.683 m (5' 6.25\").    Weight as of this encounter: 77.1 kg (170 lb).           Return in about 1 week (around 10/3/2022) for ongoing symptoms if not improving.    Ana Alba, JESI CNP  [facility]   Bonita is a 87 year old, presenting for the following health issues:  Rash (Concerned she has shingles - is feeling nausea and heartburn due to the location of the rash on her chest)    HPI     Rash  Onset/Duration: Friday morning   Description  Location: right chest around to her back   Character: painful, burning, red  Itching: no  Intensity:  moderate  Progression of Symptoms:  same  Accompanying signs and symptoms:   Fever: No  Body aches or joint pain: No  Sore throat symptoms: No  Recent cold symptoms: No  History:           Previous episodes of similar rash: None  New exposures:  None  Recent travel: No  Exposure to similar rash: No  Precipitating or alleviating " factors: Recently broke her femur and had surgery   Therapies tried and outcome: hydrocortisone cream -  effective    She reports she started to feel pain on her torso Thursday night, the next day she noticed a rash that seemed to appear all at once. The rash is on her right torso, under breast and going onto her right back. Rash is red with blisters, painful. Feels sore, sometimes burning, a little itchy at times. She thinks that the rash on her back is improving, though the rash under her breast persists. No new lotions, soaps, or detergents. She has used hydrocortisone cream on the rash which seems to help. She has also been consistently using ibuprofen 400 mg at bedtime since rash onset which helps with the pain.     She also reports GI symptoms. She notes that she started to get some abdominal pain yesterday, described as a funny ache. Appetite has decreased. She also notes some nausea and belching which started yesterday, and heartburn which started today. She reports that today she had one dark appearing stool. She denies lightheadedness or dizziness, denies fatigue, no vomiting.       Review of Systems   Per HPI      Objective    /80   Pulse 78   Temp 99.1  F (37.3  C) (Tympanic)   Resp 14   Wt 77.1 kg (170 lb)   LMP  (LMP Unknown)   SpO2 97%   Breastfeeding No   BMI 27.23 kg/m    Body mass index is 27.23 kg/m .  Physical Exam   GENERAL: healthy, alert and no distress  SKIN: erythema and vesicular rash with areas of crusting underneath right breast and extending to right back in dermatome pattern  Abdomen: no rebound tenderness. No significant pain to palpation. No guarding.

## 2022-10-05 ENCOUNTER — NURSE TRIAGE (OUTPATIENT)
Dept: FAMILY MEDICINE | Facility: CLINIC | Age: 87
End: 2022-10-05

## 2022-10-05 DIAGNOSIS — B02.9 HERPES ZOSTER WITHOUT COMPLICATION: ICD-10-CM

## 2022-10-05 NOTE — TELEPHONE ENCOUNTER
Patient calling with questions regarding shingles  Continues to have pain and areas are now weeping  Had stopped valacyclovir, advised to take this medication  Given care advice per protocol  Declines hydrocodone refill at this time, taking aleve and tolerating that well  Will call back if needing valerianoco  Tifafnie Murdock RN on 10/5/2022 at 8:26 AM      Reason for Disposition    [1] Shingles rash already diagnosed and [2] taking antiviral medication    Protocols used: SHINGLES (ZOSTER)-A-

## 2022-10-11 ENCOUNTER — OFFICE VISIT (OUTPATIENT)
Dept: ORTHOPEDICS | Facility: CLINIC | Age: 87
End: 2022-10-11
Payer: COMMERCIAL

## 2022-10-11 ENCOUNTER — ANCILLARY PROCEDURE (OUTPATIENT)
Dept: GENERAL RADIOLOGY | Facility: CLINIC | Age: 87
End: 2022-10-11
Attending: ORTHOPAEDIC SURGERY
Payer: COMMERCIAL

## 2022-10-11 VITALS
HEIGHT: 66 IN | HEART RATE: 70 BPM | BODY MASS INDEX: 27.32 KG/M2 | DIASTOLIC BLOOD PRESSURE: 93 MMHG | WEIGHT: 170 LBS | SYSTOLIC BLOOD PRESSURE: 149 MMHG | RESPIRATION RATE: 18 BRPM

## 2022-10-11 DIAGNOSIS — S72.141D CLOSED DISPLACED INTERTROCHANTERIC FRACTURE OF RIGHT FEMUR WITH ROUTINE HEALING, SUBSEQUENT ENCOUNTER: Primary | ICD-10-CM

## 2022-10-11 DIAGNOSIS — S72.141D CLOSED DISPLACED INTERTROCHANTERIC FRACTURE OF RIGHT FEMUR WITH ROUTINE HEALING, SUBSEQUENT ENCOUNTER: ICD-10-CM

## 2022-10-11 PROCEDURE — 73502 X-RAY EXAM HIP UNI 2-3 VIEWS: CPT | Mod: TC | Performed by: RADIOLOGY

## 2022-10-11 PROCEDURE — 99024 POSTOP FOLLOW-UP VISIT: CPT | Performed by: ORTHOPAEDIC SURGERY

## 2022-10-11 NOTE — LETTER
10/11/2022         RE: Bonita Villarreal  [id number] [address] [place] [id number]        Dear Colleague,    Thank you for referring your patient, Bonita Villarreal, to the [facility]. Please see a copy of my visit note below.    Follow up open-reduction, internal fixation right intertrochanteric femur fracture with long Gamma farhad on 7/19/22.  She is full weight bearing with walking sticks.  She complains of tightness in thigh.    Wound is healing well.  Limited rotation of hip, but no pain with this.  Knee range of motion 0-110 degrees.  X-ray shows mild shortening of the intertrochanteric femur fracture.  No change in position since 9/6/22.  Implant is holding well. Healing callus is seen.  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  No problems seen.    Assessment:  Right intertrochanteric femur fracture is healing well and stable.  Plan:  Weight bearing as tolerated.  Resume activity as tolerated.  Return to clinic as needed.      Again, thank you for allowing me to participate in the care of your patient.        Sincerely,        Dante Alonso MD

## 2022-10-11 NOTE — PATIENT INSTRUCTIONS
Bonita to follow up with Primary Care provider regarding elevated blood pressure.    Weight bearing as tolerated.  Cane as needed.  Return to clinic as needed.

## 2022-10-11 NOTE — PROGRESS NOTES
Follow up open-reduction, internal fixation right intertrochanteric femur fracture with long Gamma farhad on 7/19/22.  She is full weight bearing with walking sticks.  She complains of tightness in thigh.    Wound is healing well.  Limited rotation of hip, but no pain with this.  Knee range of motion 0-110 degrees.  X-ray shows mild shortening of the intertrochanteric femur fracture.  No change in position since 9/6/22.  Implant is holding well. Healing callus is seen.  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  No problems seen.    Assessment:  Right intertrochanteric femur fracture is healing well and stable.  Plan:  Weight bearing as tolerated.  Resume activity as tolerated.  Return to clinic as needed.

## 2022-10-17 ENCOUNTER — IMMUNIZATION (OUTPATIENT)
Dept: FAMILY MEDICINE | Facility: CLINIC | Age: 87
End: 2022-10-17
Payer: COMMERCIAL

## 2022-10-17 PROCEDURE — G0008 ADMIN INFLUENZA VIRUS VAC: HCPCS | Mod: 59

## 2022-10-17 PROCEDURE — 0124A COVID-19,PF,PFIZER BOOSTER BIVALENT: CPT

## 2022-10-17 PROCEDURE — 91312 COVID-19,PF,PFIZER BOOSTER BIVALENT: CPT

## 2022-10-17 PROCEDURE — 90662 IIV NO PRSV INCREASED AG IM: CPT

## 2022-10-26 DIAGNOSIS — M81.0 AGE-RELATED OSTEOPOROSIS WITHOUT CURRENT PATHOLOGICAL FRACTURE: ICD-10-CM

## 2022-10-26 RX ORDER — IBANDRONATE SODIUM 150 MG/1
150 TABLET, FILM COATED ORAL
Qty: 3 TABLET | Refills: 0 | Status: SHIPPED | OUTPATIENT
Start: 2022-10-26 | End: 2022-12-02

## 2022-10-26 NOTE — TELEPHONE ENCOUNTER
"Routing refill request to provider for review/approval because:  Drug not active on patient's medication list    Pending Prescriptions:                       Disp   Refills    IBANdronate (BONIVA) 150 MG tablet [Pharma*3 tabl*4        Sig: Take 1 tablet (150 mg) by mouth every 30 days    Requested Prescriptions   Pending Prescriptions Disp Refills    IBANdronate (BONIVA) 150 MG tablet [Pharmacy Med Name: ibandronate 150 mg tablet] 3 tablet 4     Sig: Take 1 tablet (150 mg) by mouth every 30 days       Bisphosphonates Failed - 10/26/2022 10:34 AM        Failed - Medication is active on med list        Passed - Recent (12 mo) or future (30 days) visit within the authorizing provider's specialty     Patient has had an office visit with the authorizing provider or a provider within the authorizing providers department within the previous 12 mos or has a future within next 30 days. See \"Patient Info\" tab in inbasket, or \"Choose Columns\" in Meds & Orders section of the refill encounter.              Passed - Dexa on file within past 2 years     Please review last Dexa result.           Passed - Patient is age 18 or older        Passed - Normal serum creatinine on file within past 12 months     Recent Labs   Lab Test 07/21/22  0532 02/27/20  1919 08/15/19  1139   CR 0.66   < >  --    CREAT  --   --  0.8    < > = values in this interval not displayed.       Ok to refill medication if creatinine is low             Tiffanie Murdock RN on 10/26/2022 at 1:02 PM    "

## 2022-10-31 DIAGNOSIS — I10 ESSENTIAL HYPERTENSION, BENIGN: ICD-10-CM

## 2022-11-02 RX ORDER — LOSARTAN POTASSIUM AND HYDROCHLOROTHIAZIDE 12.5; 1 MG/1; MG/1
TABLET ORAL
Qty: 90 TABLET | Refills: 0 | Status: SHIPPED | OUTPATIENT
Start: 2022-11-02 | End: 2022-12-02

## 2022-11-19 ENCOUNTER — HEALTH MAINTENANCE LETTER (OUTPATIENT)
Age: 87
End: 2022-11-19

## 2022-11-27 DIAGNOSIS — F41.1 GENERALIZED ANXIETY DISORDER: ICD-10-CM

## 2022-11-28 RX ORDER — ESCITALOPRAM OXALATE 10 MG/1
10 TABLET ORAL DAILY
Qty: 90 TABLET | Refills: 3 | Status: SHIPPED | OUTPATIENT
Start: 2022-11-28 | End: 2022-12-02

## 2022-12-02 ENCOUNTER — OFFICE VISIT (OUTPATIENT)
Dept: FAMILY MEDICINE | Facility: CLINIC | Age: 87
End: 2022-12-02
Payer: COMMERCIAL

## 2022-12-02 VITALS
SYSTOLIC BLOOD PRESSURE: 138 MMHG | RESPIRATION RATE: 16 BRPM | BODY MASS INDEX: 27.97 KG/M2 | DIASTOLIC BLOOD PRESSURE: 84 MMHG | OXYGEN SATURATION: 94 % | HEART RATE: 64 BPM | WEIGHT: 174 LBS | HEIGHT: 66 IN | TEMPERATURE: 98.6 F

## 2022-12-02 DIAGNOSIS — N39.41 URGENCY INCONTINENCE: ICD-10-CM

## 2022-12-02 DIAGNOSIS — F41.1 GENERALIZED ANXIETY DISORDER: ICD-10-CM

## 2022-12-02 DIAGNOSIS — I10 ESSENTIAL HYPERTENSION, BENIGN: ICD-10-CM

## 2022-12-02 DIAGNOSIS — Z00.00 ENCOUNTER FOR MEDICARE ANNUAL WELLNESS EXAM: Primary | ICD-10-CM

## 2022-12-02 DIAGNOSIS — M81.0 AGE-RELATED OSTEOPOROSIS WITHOUT CURRENT PATHOLOGICAL FRACTURE: ICD-10-CM

## 2022-12-02 PROCEDURE — 99214 OFFICE O/P EST MOD 30 MIN: CPT | Mod: 25 | Performed by: FAMILY MEDICINE

## 2022-12-02 PROCEDURE — G0439 PPPS, SUBSEQ VISIT: HCPCS | Performed by: FAMILY MEDICINE

## 2022-12-02 RX ORDER — ATENOLOL 100 MG/1
100 TABLET ORAL DAILY
Qty: 90 TABLET | Refills: 4 | Status: SHIPPED | OUTPATIENT
Start: 2022-12-02 | End: 2023-09-21

## 2022-12-02 RX ORDER — OXYBUTYNIN CHLORIDE 5 MG/1
5 TABLET, EXTENDED RELEASE ORAL DAILY
Qty: 90 TABLET | Refills: 4 | Status: SHIPPED | OUTPATIENT
Start: 2022-12-02 | End: 2023-09-21

## 2022-12-02 RX ORDER — IBANDRONATE SODIUM 150 MG/1
150 TABLET, FILM COATED ORAL
Qty: 3 TABLET | Refills: 4 | Status: SHIPPED | OUTPATIENT
Start: 2022-12-02 | End: 2023-09-21

## 2022-12-02 RX ORDER — ESCITALOPRAM OXALATE 10 MG/1
10 TABLET ORAL DAILY
Qty: 90 TABLET | Refills: 4 | Status: SHIPPED | OUTPATIENT
Start: 2022-12-02 | End: 2023-09-21

## 2022-12-02 RX ORDER — LOSARTAN POTASSIUM AND HYDROCHLOROTHIAZIDE 12.5; 1 MG/1; MG/1
TABLET ORAL
Qty: 90 TABLET | Refills: 4 | Status: SHIPPED | OUTPATIENT
Start: 2022-12-02 | End: 2023-09-21

## 2022-12-02 ASSESSMENT — PAIN SCALES - GENERAL: PAINLEVEL: SEVERE PAIN (6)

## 2022-12-02 ASSESSMENT — ACTIVITIES OF DAILY LIVING (ADL): CURRENT_FUNCTION: NO ASSISTANCE NEEDED

## 2022-12-02 NOTE — PROGRESS NOTES
"SUBJECTIVE:   Bonita is a 87 year old who presents for Preventive Visit.  Patient has been advised of split billing requirements and indicates understanding: Yes  Are you in the first 12 months of your Medicare coverage?  No    Healthy Habits:     In general, how would you rate your overall health?  Good    Frequency of exercise:  4-5 days/week    Duration of exercise:  15-30 minutes    Do you usually eat at least 4 servings of fruit and vegetables a day, include whole grains    & fiber and avoid regularly eating high fat or \"junk\" foods?  Yes    Taking medications regularly:  Yes    Medication side effects:  None    Ability to successfully perform activities of daily living:  No assistance needed    Home Safety:  No safety concerns identified    Hearing Impairment:  Feel that people are mumbling or not speaking clearly    In the past 6 months, have you been bothered by leaking of urine?  No    In general, how would you rate your overall mental or emotional health?  Good      PHQ-2 Total Score: 0    Additional concerns today:  No      Have you ever done Advance Care Planning? (For example, a Health Directive, POLST, or a discussion with a medical provider or your loved ones about your wishes): Yes, advance care planning is on file.       Fall risk  Fallen 2 or more times in the past year?: No  Any fall with injury in the past year?: Yes    Cognitive Screening   1) Repeat 3 items (Leader, Season, Table)    2) Clock draw: NORMAL  3) 3 item recall: Recalls 3 objects  Results: 3 items recalled: COGNITIVE IMPAIRMENT LESS LIKELY    Mini-CogTM Copyright ISAC Fuller. Licensed by the author for use in [facility]; reprinted with permission (juany@.[facility]). All rights reserved.      Do you have sleep apnea, excessive snoring or daytime drowsiness?: no    Reviewed and updated as needed this visit by clinical staff   Tobacco  Allergies  Meds  Problems  Med Hx  Surg Hx  Fam Hx          Reviewed and updated as " needed this visit by Provider   Tobacco  Allergies  Meds  Problems  Med Hx  Surg Hx  Fam Hx         Social History     Tobacco Use     Smoking status: Never     Smokeless tobacco: Never   Substance Use Topics     Alcohol use: Yes     Comment: rare         Alcohol Use 12/2/2022   Prescreen: >3 drinks/day or >7 drinks/week? No   Prescreen: >3 drinks/day or >7 drinks/week? -           Hypertension Follow-up      Do you check your blood pressure regularly outside of the clinic? No     Are you following a low salt diet? Yes    Are your blood pressures ever more than 140 on the top number (systolic) OR more   than 90 on the bottom number (diastolic), for example 140/90? No      Current providers sharing in care for this patient include:   Patient Care Team:  Dewayne Santana MD as PCP - General (Family Practice)  Dante Alonso MD as Assigned Musculoskeletal Provider  Dewayne Santana MD as Assigned PCP    The following health maintenance items are reviewed in Epic and correct as of today:  Health Maintenance   Topic Date Due     ZOSTER IMMUNIZATION (2 of 3) 01/12/2010     MEDICARE ANNUAL WELLNESS VISIT  09/09/2022     ANNUAL REVIEW OF HM ORDERS  09/26/2023     FALL RISK ASSESSMENT  12/02/2023     DTAP/TDAP/TD IMMUNIZATION (4 - Td or Tdap) 03/14/2026     ADVANCE CARE PLANNING  12/02/2027     PHQ-2 (once per calendar year)  Completed     INFLUENZA VACCINE  Completed     Pneumococcal Vaccine: 65+ Years  Completed     COVID-19 Vaccine  Completed     IPV IMMUNIZATION  Aged Out     MENINGITIS IMMUNIZATION  Aged Out     Labs reviewed in EPIC  Patient Active Problem List   Diagnosis     Essential hypertension, benign     Generalized anxiety disorder     Diverticulosis of large intestine     Osteopenia of spine     Family history of other cardiovascular diseases     CARDIOVASCULAR SCREENING; LDL GOAL LESS THAN 160     Status post total right knee replacement     Right knee DJD     Fracture of  sacrum (H)     Sacral pain     Prediabetes     Status post total knee replacement     Urgency incontinence     Closed comminuted intertrochanteric fracture of right femur (H)     Anemia, unspecified type     Age-related osteoporosis with current pathological fracture     Closed displaced intertrochanteric fracture of right femur with routine healing, subsequent encounter     Past Surgical History:   Procedure Laterality Date     ARTHROPLASTY KNEE Right 2018    Procedure: ARTHROPLASTY KNEE;  Right total knee arthroplasty;  Surgeon: Anton Grover MD;  Location: [facility]     ARTHROPLASTY KNEE Left 10/6/2020    Procedure: Total Knee Arthroplasty;  Surgeon: Calixto Santana MD;  Location: [facility] OR     COLONOSCOPY  04/15/02     COLONOSCOPY  3/25/2013    Procedure: COLONOSCOPY;  Colonoscopy  ;  Surgeon: Jamil Porras MD;  Location: [facility]     FLEXIBLE SIGMOIDOSCOPY  96     OPEN REDUCTION INTERNAL FIXATION RODDING INTRAMEDULLARY FEMUR Right 2022    Procedure: OPEN REDUCTION INTERNAL FIXATION, FRACTURE, Right FEMUR, USING INTRAMEDULLARY LEENA;  Surgeon: Dante Alonso MD;  Location:  OR     ORTHOPEDIC SURGERY  22-23 YEARS AGO    BACK     SURGICAL HISTORY OF -       lumbar surgery     ZZC APPENDECTOMY         Social History     Tobacco Use     Smoking status: Never     Smokeless tobacco: Never   Substance Use Topics     Alcohol use: Yes     Comment: rare     Family History   Problem Relation Age of Onset     Lipids Brother      Heart Disease Brother          at 51 of an MI     Cancer Brother         bone, lung,  82     Prostate Cancer Brother      Cancer Sister         cervical     Lipids Sister      Heart Disease Sister         eldest sister, s/p MI x 2 with stents; diagnosed with AAA, postoperative complications and MI,  at 75     Heart Disease Brother         MI x 2     Lipids Brother      C.A.D. Father          at 39 of an MI     Family History Negative Mother          "lived to age 95     Gynecology Sister         cervical Ca         Current Outpatient Medications   Medication Sig Dispense Refill     acetaminophen (TYLENOL) 325 MG tablet Take 3 tablets (975 mg) by mouth every 8 hours       atenolol (TENORMIN) 100 MG tablet Take 1 tablet (100 mg) by mouth daily 90 tablet 4     escitalopram (LEXAPRO) 10 MG tablet Take 1 tablet (10 mg) by mouth daily 90 tablet 4     IBANdronate (BONIVA) 150 MG tablet Take 1 tablet (150 mg) by mouth every 30 days 3 tablet 4     ibuprofen (ADVIL/MOTRIN) 600 MG tablet Take 1 tablet (600 mg) by mouth every 8 hours as needed for pain (mild) 30 tablet 0     losartan-hydrochlorothiazide (HYZAAR) 100-12.5 MG tablet Take 1 tablet by mouth daily 90 tablet 4     multivitamin (CENTRUM SILVER) tablet Take 1 tablet by mouth daily       oxybutynin ER (DITROPAN XL) 5 MG 24 hr tablet Take 1 tablet (5 mg) by mouth daily 90 tablet 4     Allergies   Allergen Reactions     Bactrim [Sulfamethoxazole W/Trimethoprim] Nausea and Vomiting     Lisinopril Cough         Mammogram Screening - Patient over age 75, has elected to discontinue screenings.  Pertinent mammograms are reviewed under the imaging tab.    Review of Systems  Constitutional, neuro, ENT, endocrine, pulmonary, cardiac, gastrointestinal, genitourinary, musculoskeletal, integument and psychiatric systems are negative, except as otherwise noted.     OBJECTIVE:   /84   Pulse 64   Temp 98.6  F (37  C) (Tympanic)   Resp 16   Ht 1.683 m (5' 6.25\")   Wt 78.9 kg (174 lb)   LMP  (LMP Unknown)   SpO2 94%   BMI 27.87 kg/m   Estimated body mass index is 27.87 kg/m  as calculated from the following:    Height as of this encounter: 1.683 m (5' 6.25\").    Weight as of this encounter: 78.9 kg (174 lb).  Physical Exam  GENERAL APPEARANCE: healthy, alert and no distress  EYES: Eyes grossly normal to inspection, PERRL and conjunctivae and sclerae normal  HENT: ear canals and TM's normal  NECK: no adenopathy, no " "asymmetry, masses, or scars and thyroid normal to palpation  RESP: lungs clear to auscultation - no rales, rhonchi or wheezes  BREAST: normal without masses, tenderness or nipple discharge and no palpable axillary masses or adenopathy  CV: regular rate and rhythm, normal S1 S2, no S3 or S4, no murmur, click or rub, no peripheral edema and peripheral pulses strong  ABDOMEN: soft, nontender, no hepatosplenomegaly, no masses and bowel sounds normal  MS: no musculoskeletal defects are noted and gait is age appropriate without ataxia  SKIN: no suspicious lesions or rashes  NEURO: Normal strength and tone, sensory exam grossly normal, mentation intact and speech normal  PSYCH: mentation appears normal and affect normal/bright    Diagnostic Test Results:  Labs reviewed in Epic    ASSESSMENT / PLAN:   Encounter for Medicare annual wellness exam    Essential hypertension, benign  Well controlled. Refilled medication. Check labs.    - losartan-hydrochlorothiazide (HYZAAR) 100-12.5 MG tablet; Take 1 tablet by mouth daily  - atenolol (TENORMIN) 100 MG tablet; Take 1 tablet (100 mg) by mouth daily    Urgency incontinence  Well controlled. Refilled medication.     - oxybutynin ER (DITROPAN XL) 5 MG 24 hr tablet; Take 1 tablet (5 mg) by mouth daily    Age-related osteoporosis without current pathological fracture  Stable. Refilled medication.    - IBANdronate (BONIVA) 150 MG tablet; Take 1 tablet (150 mg) by mouth every 30 days    GENERALIZED ANXIETY DIS  Well controlled. Refilled medication.     - escitalopram (LEXAPRO) 10 MG tablet; Take 1 tablet (10 mg) by mouth daily        Patient has been advised of split billing requirements and indicates understanding: Yes      COUNSELING:  Reviewed preventive health counseling, as reflected in patient instructions      BMI:   Estimated body mass index is 27.87 kg/m  as calculated from the following:    Height as of this encounter: 1.683 m (5' 6.25\").    Weight as of this encounter: 78.9 " kg (174 lb).         She reports that she has never smoked. She has never used smokeless tobacco.      Appropriate preventive services were discussed with this patient, including applicable screening as appropriate for cardiovascular disease, diabetes, osteopenia/osteoporosis, and glaucoma.  As appropriate for age/gender, discussed screening for colorectal cancer, prostate cancer, breast cancer, and cervical cancer. Checklist reviewing preventive services available has been given to the patient.    Reviewed patients plan of care and provided an AVS. The Intermediate Care Plan ( asthma action plan, low back pain action plan, and migraine action plan) for Bonita meets the Care Plan requirement. This Care Plan has been established and reviewed with the Patient.      Dewayne Santana MD  [facility]    Identified Health Risks:

## 2022-12-02 NOTE — PROGRESS NOTES
"    The patient was provided with written information regarding signs of hearing loss.  She is at risk for falling and has been provided with information to reduce the risk of falling at home.  Answers for HPI/ROS submitted by the patient on 12/2/2022  In general, how would you rate your overall physical health?: good  Frequency of exercise:: 4-5 days/week  Do you usually eat at least 4 servings of fruit and vegetables a day, include whole grains & fiber, and avoid regularly eating high fat or \"junk\" foods? : Yes  Taking medications regularly:: Yes  Medication side effects:: None  Activities of Daily Living: no assistance needed  Home safety: no safety concerns identified  Hearing Impairment:: feel that people are mumbling or not speaking clearly  In the past 6 months, have you been bothered by leaking of urine?: No  In general, how would you rate your overall mental or emotional health?: good  Additional concerns today:: No  Duration of exercise:: 15-30 minutes      "

## 2022-12-02 NOTE — NURSING NOTE
"Initial /84   Pulse 64   Temp 98.6  F (37  C) (Tympanic)   Resp 16   Ht 1.683 m (5' 6.25\")   Wt 78.9 kg (174 lb)   LMP  (LMP Unknown)   SpO2 94%   BMI 27.87 kg/m   Estimated body mass index is 27.87 kg/m  as calculated from the following:    Height as of this encounter: 1.683 m (5' 6.25\").    Weight as of this encounter: 78.9 kg (174 lb). .      "

## 2022-12-02 NOTE — PATIENT INSTRUCTIONS
Patient Education   Personalized Prevention Plan  You are due for the preventive services outlined below.  Your care team is available to assist you in scheduling these services.  If you have already completed any of these items, please share that information with your care team to update in your medical record.  Health Maintenance Due   Topic Date Due     Zoster (Shingles) Vaccine (2 of 3) 01/12/2010     Annual Wellness Visit  09/09/2022       Signs of Hearing Loss      Hearing much better with one ear can be a sign of hearing loss.   Hearing loss is a problem shared by many people. In fact, it is one of the most common health problems, particularly as people age. Most people age 65 and older have some hearing loss. By age 80, almost everyone does. Hearing loss often occurs slowly over the years. So you may not realize your hearing has gotten worse.  Have your hearing checked  Call your healthcare provider if you:    Have to strain to hear normal conversation    Have to watch other people s faces very carefully to follow what they re saying    Need to ask people to repeat what they ve said    Often misunderstand what people are saying    Turn the volume of the television or radio up so high that others complain    Feel that people are mumbling when they re talking to you    Find that the effort to hear leaves you feeling tired and irritated    Notice, when using the phone, that you hear better with one ear than the other  [employer] last reviewed this educational content on 1/1/2020 2000-2021 [employer]. All rights reserved. This information is not intended as a substitute for professional medical care. Always follow your healthcare professional's instructions.          Preventing Falls at Home  A person can fall for many reasons. Older adults may fall because reaction time slows down as we age. Your muscles and joints may get stiff, weak, or less flexible because of illness, medicines, or a  physical condition.   Other health problems that make falls more likely include:     Arthritis    Dizziness or lightheadedness when you stand up (orthostatic hypotension)    History of a stroke    Dizziness    Anemia    Certain medicines taken for mental illness or to control blood pressure.    Problems with balance or gait    Bladder or urinary problems    History of falling    Changes in vision (vision impairment)    Changes in thinking skills and memory (cognitive impairment)  Injuries from a fall can include serious injuries such as broken bones, dislocated joints, internal bleeding and cuts. Injuries like these can limit your independence.   Prevention tips  To help prevent falls and fall-related injuries, follow the tips below.    Floors  To make floors safer:     Put nonskid pads under area rugs.    Remove small rugs.    Replace worn floor coverings.    Tack carpets firmly to each step on carpeted stairs. Put nonskid strips on the edges of uncarpeted stairs.    Keep floors and stairs free of clutter and cords.    Arrange furniture so there are clear pathways.    Clean up any spills right away.  Bathrooms    To make bathrooms safer:     Install grab bars in the tub or shower.    Apply nonskid strips or put a nonskid rubber mat in the tub or shower.    Sit on a bath chair to bathe.    Use bathmats with nonskid backing.  Lighting  To improve visibility in your home:      Keep a flashlight in each room. Or put a lamp next to the bed within easy reach.    Put nightlights in the bedrooms, hallways, kitchen, and bathrooms.    Make sure all stairways have good lighting.    Take your time when going up and down stairs.    Put handrails on both sides of stairs and in walkways for more support. To prevent injury to your wrist or arm, don t use handrails to pull yourself up.    Install grab bars to pull yourself up.    Move or rearrange items that you use often. This will make them easier to find or reach.    Look at  your home to find any safety hazards. Especially look at doorways, walkways, and the driveway. Remove or repair any safety problems that you find.  Other changes to make    Look around to find any safety hazards. Look closely at doorways, walkways, and the driveway. Remove or repair any safety problems that you find.    Wear shoes that fit well.    Take your time when going up and down stairs.    Put handrails on both sides of stairs and in walkways for more support. To prevent injury to your wrist or arm, don t use handrails to pull yourself up.    Install grab bars wherever needed to pull yourself up.    Arrange items that you use often. This will make them easier to find or reach.    [employer] last reviewed this educational content on 3/1/2020    4484-0215 [employer]. All rights reserved. This information is not intended as a substitute for professional medical care. Always follow your healthcare professional's instructions.

## 2022-12-06 ENCOUNTER — TELEPHONE (OUTPATIENT)
Dept: FAMILY MEDICINE | Facility: CLINIC | Age: 87
End: 2022-12-06

## 2022-12-06 NOTE — TELEPHONE ENCOUNTER
Reason for call:  Patient reporting a symptom    Symptom or request: Pt presented to clinic today to have her ears flushed.  She states that MD told her that a CMA can flush her ears.  Charting from recent visit is not done yet by MD, so I am unsure if she should have CMA, RN or provider appt to have ears flushed?  Please call patient and advise.      Duration (how long have symptoms been present): ongoing    Have you been treated for this before? Yes    Additional comments:     Phone Number patient can be reached at:  Home number on file [phone] (home)    Best Time:  any    Can we leave a detailed message on this number:  YES    Call taken on 12/6/2022 at 11:36 AM by Pao Roberts

## 2022-12-06 NOTE — TELEPHONE ENCOUNTER
Dr Santana,   As you note is not finished, ok to schedule ear lavage with CMA? Thank you!    Ellie Callejas RN

## 2022-12-12 ENCOUNTER — ALLIED HEALTH/NURSE VISIT (OUTPATIENT)
Dept: FAMILY MEDICINE | Facility: CLINIC | Age: 87
End: 2022-12-12
Payer: COMMERCIAL

## 2022-12-12 DIAGNOSIS — H93.8X3 CLOGGED EAR, BILATERAL: Primary | ICD-10-CM

## 2022-12-12 PROCEDURE — 99207 PR NO CHARGE NURSE ONLY: CPT

## 2022-12-21 ENCOUNTER — OFFICE VISIT (OUTPATIENT)
Dept: FAMILY MEDICINE | Facility: CLINIC | Age: 87
End: 2022-12-21
Payer: COMMERCIAL

## 2022-12-21 VITALS
HEART RATE: 66 BPM | RESPIRATION RATE: 16 BRPM | SYSTOLIC BLOOD PRESSURE: 130 MMHG | BODY MASS INDEX: 28.7 KG/M2 | OXYGEN SATURATION: 98 % | DIASTOLIC BLOOD PRESSURE: 76 MMHG | HEIGHT: 66 IN | TEMPERATURE: 97.5 F | WEIGHT: 178.6 LBS

## 2022-12-21 DIAGNOSIS — H61.23 BILATERAL IMPACTED CERUMEN: ICD-10-CM

## 2022-12-21 DIAGNOSIS — L30.4 INTERTRIGO: Primary | ICD-10-CM

## 2022-12-21 PROCEDURE — 99213 OFFICE O/P EST LOW 20 MIN: CPT | Mod: 25 | Performed by: FAMILY MEDICINE

## 2022-12-21 PROCEDURE — 69209 REMOVE IMPACTED EAR WAX UNI: CPT | Performed by: FAMILY MEDICINE

## 2022-12-21 RX ORDER — NYSTATIN 100000 U/G
CREAM TOPICAL DAILY PRN
Qty: 30 G | Refills: 4 | Status: SHIPPED | OUTPATIENT
Start: 2022-12-21 | End: 2023-04-21

## 2022-12-21 ASSESSMENT — PAIN SCALES - GENERAL: PAINLEVEL: NO PAIN (0)

## 2022-12-21 NOTE — PATIENT INSTRUCTIONS
I recommend that you apply the nystatin cream under both breasts twice a day for 1 week minimum. You can continue longer and it would not be unusual to need a couple more weeks, and you can stop when it's resolved. Some people do apply this daily, long term, and that's ok and safe.

## 2022-12-21 NOTE — PROGRESS NOTES
"  Assessment & Plan     Intertrigo  - nystatin (MYCOSTATIN) 994018 UNIT/GM external cream  Dispense: 30 g; Refill: 4    Bilateral impacted cerumen  - AK REMOVAL IMPACTED CERUMEN IRRIGATION/LVG UNILAT    Return if symptoms worsen or fail to improve.    Renetta Calloway MD  [facility]          Darlene Mesa is a 87 year old accompanied by her self, presenting for the following health issues:  Ear Problem and Health Maintenance (Advised patient of . Still has shingles from Sept, should she still get the shot?/)    Patient still has shingles under her breast from September that hare bothering her. Would like to know if there is something she can do about it.    Patient states that her ears are plugged and would like them looked at today. Got them cleaned in the beginning for December. Has since used earwax softener.    History of Present Illness       Reason for visit:  Ears    She eats 4 or more servings of fruits and vegetables daily.She consumes 0 sweetened beverage(s) daily.She exercises with enough effort to increase her heart rate 10 to 19 minutes per day.  She exercises with enough effort to increase her heart rate 7 days per week.   She is taking medications regularly.             Review of Systems   Constitutional, HEENT, cardiovascular, pulmonary, gi and gu systems are negative, except as otherwise noted.      Objective    /76 (BP Location: Right arm, Patient Position: Sitting, Cuff Size: Adult Regular)   Pulse 66   Temp 97.5  F (36.4  C) (Tympanic)   Resp 16   Ht 1.683 m (5' 6.25\")   Wt 81 kg (178 lb 9.6 oz)   LMP  (LMP Unknown)   SpO2 98%   BMI 28.61 kg/m    Body mass index is 28.61 kg/m .  Physical Exam   GENERAL: healthy, alert and no distress  EARS: Cerumenosis is noted bilaterally but visible part of canal is normal.  Wax is removed by syringing by CMA and patient tolerated well. Follow-up exam was not done by this writer  RESP: normal respiratory effort, " speaking in complete sentences  MS: no gross musculoskeletal defects noted, no edema  SKIN: red irregularly bordered and with satellite lesions -> intertrigo under both breast skin folds.  PSYCH: mentation appears normal, affect normal/bright

## 2023-03-14 ENCOUNTER — HOSPITAL ENCOUNTER (OUTPATIENT)
Dept: PHYSICAL THERAPY | Facility: CLINIC | Age: 88
Setting detail: THERAPIES SERIES
Discharge: HOME | End: 2023-03-14
Attending: ORTHOPAEDIC SURGERY
Payer: COMMERCIAL

## 2023-03-14 ENCOUNTER — TRANSCRIBE ORDERS (OUTPATIENT)
Dept: OTHER | Age: 88
End: 2023-03-14

## 2023-03-14 DIAGNOSIS — Z96.651 S/P TOTAL KNEE ARTHROPLASTY, RIGHT: Primary | ICD-10-CM

## 2023-03-14 PROCEDURE — 97140 MANUAL THERAPY 1/> REGIONS: CPT | Mod: GP | Performed by: PHYSICAL THERAPIST

## 2023-03-14 PROCEDURE — 97161 PT EVAL LOW COMPLEX 20 MIN: CPT | Mod: GP | Performed by: PHYSICAL THERAPIST

## 2023-03-14 PROCEDURE — 97110 THERAPEUTIC EXERCISES: CPT | Mod: GP | Performed by: PHYSICAL THERAPIST

## 2023-03-14 NOTE — PROGRESS NOTES
[facility]    OUTPATIENT PHYSICAL THERAPY ORTHOPEDIC EVALUATION  PLAN OF TREATMENT FOR OUTPATIENT REHABILITATION  (COMPLETE FOR INITIAL CLAIMS ONLY)  Patient's Last Name, First Name, M.I.  YOB: 1935  Bonita Villarreal    Provider s Name:  [facility]   Medical Record No.  [id number]   Start of Care Date:  03/14/23   Onset Date:  03/08/23   Type:     _X__PT   ___OT   ___SLP Medical Diagnosis:  status post right total knee arthroplasty; history of right knee femur fracture and right femur ORIF     PT Diagnosis:  R hip and knee pain   Visits from SOC:  1      _________________________________________________________________________________  Plan of Treatment/Functional Goals:  balance training, gait training, joint mobilization, manual therapy, ROM, strengthening, stretching           Goals  Goal Identifier: 1.  Goal Description: Patient will demonstrate functional squat and picking up objects from floor without pain.  Target Date: 04/11/23    Goal Identifier: 2.  Goal Description: Patient will demonstrate ability to put on socks/shoes on R side without pain.  Target Date: 04/25/23    Goal Identifier: 3.  Goal Description: Patient will demosntrate the TUG without AD in <13 seconds to reduce risk for falls.  Target Date: 05/09/23        Therapy Frequency:  1 time/week  Predicted Duration of Therapy Intervention:  8 weeks    Inge Lynch PT                 I CERTIFY THE NEED FOR THESE SERVICES FURNISHED UNDER        THIS PLAN OF TREATMENT AND WHILE UNDER MY CARE .             Physician Signature               Date    X_____________________________________________________                             Certification Date From:  03/14/23   Certification Date To:  05/09/23    Referring Provider:  Anton Grover MD    Initial Assessment        See Epic Evaluation Start  of Care Date: 03/14/23

## 2023-03-14 NOTE — PROGRESS NOTES
03/14/23 1100   General Information   Type of Visit Initial OP Ortho PT Evaluation   Start of Care Date 03/14/23   Referring Physician Anton Grover MD   Orders Evaluate and Treat   Date of Order 03/08/23   Certification Required? Yes   Medical Diagnosis status post right total knee arthroplasty; history of right knee femur fracture and right femur ORIF   Surgical/Medical history reviewed Yes   Precautions/Limitations no known precautions/limitations   General Information Comments R TKA 2/28/18; R proximal femur fracture with ORIF August 2022, L TKA 2020   Body Part(s)   Body Part(s) Knee   Presentation and Etiology   Pertinent history of current problem (include personal factors and/or comorbidities that impact the POC) Patient states that in October 2022, patient went to TCU at [facility] and had some therapy there and was DC home with home health PT. After this, patient has been doing things on her own. More recently having distal femur/knee pain upon standing and having difficulty with putting socks and shoes on.   Impairments A. Pain;B. Decreased WB tolerance;G. Impaired balance;H. Impaired gait;D. Decreased ROM;E. Decreased flexibility;F. Decreased strength and endurance;K. Numbness   Functional Limitations perform activities of daily living;perform desired leisure / sports activities   Symptom Location distal femur,  R knee   How/Where did it occur With a fall   Onset date of current episode/exacerbation 03/08/23   Chronicity Chronic   Pain rating (0-10 point scale) Best (/10);Worst (/10)   Best (/10) 3   Worst (/10) 6   Pain quality C. Aching;B. Dull   Frequency of pain/symptoms C. With activity   Pain/symptoms are: The same all the time   Pain/symptoms exacerbated by B. Walking;G. Certain positions;I. Bending   Pain/symptoms eased by F. Certain positions;A. Sitting   Progression of symptoms since onset: Improved   Prior Level of Function   Prior Level of Function-Mobility ind   Prior Level of  Function-ADLs ind   Functional Level Prior Comment ind   Current Level of Function   Current Community Support Family/friend caregiver  (lives in MIL suite attached to her sons home)   Patient role/employment history F. Retired   Living environment House/townhome   Home/community accessibility 4 DEBRA with use of railing   Current equipment-Gait/Locomotion Standard cane  (doesn't use SEC at home)   Fall Risk Screen   Fall screen completed by PT   Have you fallen 2 or more times in the past year? No   Have you fallen and had an injury in the past year? Yes   Timed Up and Go score (seconds) 15 seconds without AD   Is patient a fall risk? Yes   Abuse Screen (yes response referral indicated)   Feels Unsafe at Home or Work/School no   Feels Threatened by Someone no   Does Anyone Try to Keep You From Having Contact with Others or Doing Things Outside Your Home? no   Physical Signs of Abuse Present no   Patient needs abuse support services and resources No   System Outcome Measures   Outcome Measures   (LEFS: 39/80)   Knee Objective Findings   Gait/Locomotion ambulates into therapy with SEC, slight antalgic gait, unsteady   Balance/Proprioception (Single Leg Stance) poor bilaterally, unable to without support   Knee/Hip Strength Comments hip extension/abduction 3+/5   Palpation TTP distal ITB   Accessory Motion/Joint Mobility slightly limited hip flexion ROM with pain at end range   Observation bilateral genu valgus with active squat   Side (if bilateral, select both right and left) Right   Knee Special Test Comments Bernice's test +, travis test +   Right Knee Flexion Strength 5   Right Knee Extension Strength 4   Right Hip Abduction Strength 3+   Right Hamstring Flexibility slightly limited   Right Hip Flexor Flexibility moderately limited   Right ITB Flexibility moderately limited   Planned Therapy Interventions   Planned Therapy Interventions balance training;gait training;joint mobilization;manual  therapy;ROM;strengthening;stretching   Clinical Impression   Criteria for Skilled Therapeutic Interventions Met yes, treatment indicated   PT Diagnosis R hip and knee pain   Influenced by the following impairments pain, strength, ROM   Functional limitations due to impairments walking, balance, certain positions   Clinical Presentation Stable/Uncomplicated   Clinical Presentation Rationale clinical judgement   Clinical Decision Making (Complexity) Low complexity   Therapy Frequency 1 time/week   Predicted Duration of Therapy Intervention (days/wks) 8 weeks   Risk & Benefits of therapy have been explained Yes   Patient, Family & other staff in agreement with plan of care Yes   Clinical Impression Comments Patient presents to therapy with c/o R lateral knee pain and concerns with balance. Upon examination, patient presents with unresolved R hip ROM and strength limiting mobility as well as R ITB dysfunction. Patient would benefit from continued skilled therapy to address functional limitations.   Education Assessment   Preferred Learning Style Listening   ORTHO GOALS   PT Ortho Eval Goals 1;2;3   Ortho Goal 1   Goal Identifier 1.   Goal Description Patient will demonstrate functional squat and picking up objects from floor without pain.   Target Date 04/11/23   Ortho Goal 2   Goal Identifier 2.   Goal Description Patient will demonstrate ability to put on socks/shoes on R side without pain.   Target Date 04/25/23   Ortho Goal 3   Goal Identifier 3.   Goal Description Patient will demosntrate the TUG without AD in <13 seconds to reduce risk for falls.   Target Date 05/09/23   Total Evaluation Time   PT Eval, Low Complexity Minutes (64124) 25   Therapy Certification   Certification date from 03/14/23   Certification date to 05/09/23   Medical Diagnosis status post right total knee arthroplasty; history of right knee femur fracture and right femur ORIF     Thank you,    Inge Lynch, PT, DPT

## 2023-03-21 ENCOUNTER — HOSPITAL ENCOUNTER (OUTPATIENT)
Dept: PHYSICAL THERAPY | Facility: CLINIC | Age: 88
Setting detail: THERAPIES SERIES
Discharge: HOME | End: 2023-03-21
Attending: ORTHOPAEDIC SURGERY
Payer: COMMERCIAL

## 2023-03-21 PROCEDURE — 97110 THERAPEUTIC EXERCISES: CPT | Mod: GP | Performed by: PHYSICAL THERAPIST

## 2023-03-21 PROCEDURE — 97112 NEUROMUSCULAR REEDUCATION: CPT | Mod: GP | Performed by: PHYSICAL THERAPIST

## 2023-03-31 ENCOUNTER — HOSPITAL ENCOUNTER (OUTPATIENT)
Dept: PHYSICAL THERAPY | Facility: CLINIC | Age: 88
Setting detail: THERAPIES SERIES
Discharge: HOME | End: 2023-03-31
Attending: ORTHOPAEDIC SURGERY
Payer: COMMERCIAL

## 2023-03-31 PROCEDURE — 97112 NEUROMUSCULAR REEDUCATION: CPT | Mod: GP | Performed by: PHYSICAL THERAPIST

## 2023-04-06 ENCOUNTER — HOSPITAL ENCOUNTER (OUTPATIENT)
Dept: PHYSICAL THERAPY | Facility: CLINIC | Age: 88
Setting detail: THERAPIES SERIES
Discharge: HOME | End: 2023-04-06
Attending: ORTHOPAEDIC SURGERY
Payer: COMMERCIAL

## 2023-04-06 PROCEDURE — 97112 NEUROMUSCULAR REEDUCATION: CPT | Mod: GP | Performed by: PHYSICAL THERAPIST

## 2023-04-06 PROCEDURE — 97110 THERAPEUTIC EXERCISES: CPT | Mod: GP | Performed by: PHYSICAL THERAPIST

## 2023-04-12 ENCOUNTER — HOSPITAL ENCOUNTER (OUTPATIENT)
Dept: PHYSICAL THERAPY | Facility: CLINIC | Age: 88
Setting detail: THERAPIES SERIES
Discharge: HOME | End: 2023-04-12
Attending: ORTHOPAEDIC SURGERY
Payer: COMMERCIAL

## 2023-04-12 PROCEDURE — 97110 THERAPEUTIC EXERCISES: CPT | Mod: GP | Performed by: PHYSICAL THERAPIST

## 2023-04-12 PROCEDURE — 97112 NEUROMUSCULAR REEDUCATION: CPT | Mod: GP | Performed by: PHYSICAL THERAPIST

## 2023-04-20 ENCOUNTER — HOSPITAL ENCOUNTER (OUTPATIENT)
Dept: PHYSICAL THERAPY | Facility: CLINIC | Age: 88
Setting detail: THERAPIES SERIES
Discharge: HOME | End: 2023-04-20
Attending: ORTHOPAEDIC SURGERY
Payer: COMMERCIAL

## 2023-04-20 PROCEDURE — 97110 THERAPEUTIC EXERCISES: CPT | Mod: GP | Performed by: PHYSICAL THERAPIST

## 2023-04-20 PROCEDURE — 97112 NEUROMUSCULAR REEDUCATION: CPT | Mod: GP | Performed by: PHYSICAL THERAPIST

## 2023-04-21 ENCOUNTER — OFFICE VISIT (OUTPATIENT)
Dept: FAMILY MEDICINE | Facility: CLINIC | Age: 88
End: 2023-04-21
Payer: COMMERCIAL

## 2023-04-21 VITALS
WEIGHT: 175.8 LBS | BODY MASS INDEX: 27.59 KG/M2 | DIASTOLIC BLOOD PRESSURE: 64 MMHG | HEART RATE: 78 BPM | OXYGEN SATURATION: 96 % | RESPIRATION RATE: 16 BRPM | TEMPERATURE: 97.3 F | SYSTOLIC BLOOD PRESSURE: 124 MMHG | HEIGHT: 67 IN

## 2023-04-21 DIAGNOSIS — R26.81 GAIT INSTABILITY: Primary | ICD-10-CM

## 2023-04-21 DIAGNOSIS — Z87.81 HISTORY OF FEMUR FRACTURE: ICD-10-CM

## 2023-04-21 PROCEDURE — 99213 OFFICE O/P EST LOW 20 MIN: CPT | Performed by: NURSE PRACTITIONER

## 2023-04-21 ASSESSMENT — PAIN SCALES - GENERAL: PAINLEVEL: NO PAIN (0)

## 2023-04-21 NOTE — PROGRESS NOTES
"  Assessment & Plan     Gait instability  History of femur fracture  [employer] handicap parking paperwork completed for patient today secondary to gait instability from femur fracture in July.  She has completed her therapies however continues to have significant gait instability with longer distances.  Determined patient would benefit from a long-term handicap parking given age and likely long-term need as full recovery is not expected.               BMI:   Estimated body mass index is 27.95 kg/m  as calculated from the following:    Height as of this encounter: 1.689 m (5' 6.5\").    Weight as of this encounter: 79.7 kg (175 lb 12.8 oz).           JESI Trejo CNP  [facility]    Darlene Mesa is a 87 year old, presenting for the following health issues:  Musculoskeletal Problem (Walking concern), Forms (Handicap parking), and Health Maintenance (Advised patient of .)        4/21/2023     9:15 AM   Additional Questions   Roomed by Aleksandra Plata CMA   Accompanied by self     Forms 4/21/2023   Any forms needing to be completed Yes     Musculoskeletal Problem    History of Present Illness       Reason for visit:  Handicap parking    She eats 4 or more servings of fruits and vegetables daily.She consumes 1 sweetened beverage(s) daily.She exercises with enough effort to increase her heart rate 30 to 60 minutes per day.  She exercises with enough effort to increase her heart rate 7 days per week.   She is taking medications regularly.       Vivi is here today to get  A handicap parking form. She broke her right femur last July and her leg gets sore from walking too far. She just finished 6 weeks of therapy in clinic. She completed home care physical therapy as well. She has been recovering well but now walks with a cane for any walking outside of her home. She is desiring a handicap parking for her mobility difficulties.       Review of Systems   Constitutional, HEENT, " "cardiovascular, pulmonary, gi and gu systems are negative, except as otherwise noted.      Objective    /64 (BP Location: Right arm, Patient Position: Sitting, Cuff Size: Adult Regular)   Pulse 78   Temp 97.3  F (36.3  C) (Tympanic)   Resp 16   Ht 1.689 m (5' 6.5\")   Wt 79.7 kg (175 lb 12.8 oz)   LMP  (LMP Unknown)   SpO2 96%   BMI 27.95 kg/m    Body mass index is 27.95 kg/m .  Physical Exam   GENERAL: healthy, alert and no distress  MS: no gross musculoskeletal defects noted, no edema. Gait steady with cane, some antalgia due to pain in right leg.   NEURO: Normal strength and tone, mentation intact and speech normal                    "

## 2023-07-07 ENCOUNTER — APPOINTMENT (OUTPATIENT)
Dept: GENERAL RADIOLOGY | Facility: CLINIC | Age: 88
End: 2023-07-07
Attending: PHYSICIAN ASSISTANT
Payer: COMMERCIAL

## 2023-07-07 ENCOUNTER — HOSPITAL ENCOUNTER (EMERGENCY)
Facility: CLINIC | Age: 88
Discharge: HOME | End: 2023-07-07
Attending: PHYSICIAN ASSISTANT | Admitting: PHYSICIAN ASSISTANT
Payer: COMMERCIAL

## 2023-07-07 VITALS
OXYGEN SATURATION: 96 % | SYSTOLIC BLOOD PRESSURE: 188 MMHG | TEMPERATURE: 98.1 F | RESPIRATION RATE: 20 BRPM | DIASTOLIC BLOOD PRESSURE: 64 MMHG | HEART RATE: 60 BPM

## 2023-07-07 DIAGNOSIS — T84.199A: ICD-10-CM

## 2023-07-07 DIAGNOSIS — M25.561 RIGHT KNEE PAIN: ICD-10-CM

## 2023-07-07 PROCEDURE — 99213 OFFICE O/P EST LOW 20 MIN: CPT | Performed by: PHYSICIAN ASSISTANT

## 2023-07-07 PROCEDURE — 73560 X-RAY EXAM OF KNEE 1 OR 2: CPT | Mod: RT

## 2023-07-07 PROCEDURE — G0463 HOSPITAL OUTPT CLINIC VISIT: HCPCS | Performed by: PHYSICIAN ASSISTANT

## 2023-07-07 PROCEDURE — 73552 X-RAY EXAM OF FEMUR 2/>: CPT | Mod: RT

## 2023-07-07 ASSESSMENT — ENCOUNTER SYMPTOMS
FEVER: 0
JOINT SWELLING: 0
ARTHRALGIAS: 1
BACK PAIN: 0
COLOR CHANGE: 0
WOUND: 0

## 2023-07-07 ASSESSMENT — ACTIVITIES OF DAILY LIVING (ADL): ADLS_ACUITY_SCORE: 35

## 2023-07-07 NOTE — DISCHARGE INSTRUCTIONS
Continue to treat symptomatically at home with elevation, ice, and rest.  Use a walker to assist with ambulation.  You may apply Voltaren gel to the knee for pain as well as taking Tylenol as needed.  Orthopedic referral was placed.  Please follow-up with them.

## 2023-07-07 NOTE — ED TRIAGE NOTES
Pt reports right knee pain that radiates up the thigh and hindering mobility x1 week. Pt declines any known injury.

## 2023-07-07 NOTE — ED PROVIDER NOTES
History     Chief Complaint   Patient presents with     Knee Pain     HPI  Bonita Villarreal is a 88 year old female who presents with right lateral knee pain when bearing weight beginning 1 week ago. Patient rates her pain a 0/10 when sitting or laying, but states its a 10/10 when walking or standing. Her pain radiates up her right hamstring when she stands. Patient does some at home stretching for physical activity, but denies any known injury. No fevers, swelling, redness, back pain, hip pain, or any other complaints at this time. Patient has a total knee on this side.  She also sustained a closed comminuted intertrochanteric fracture on this side one year ago.      Allergies:  Allergies   Allergen Reactions     Bactrim [Sulfamethoxazole-Trimethoprim] Nausea and Vomiting     Lisinopril Cough       Problem List:    Patient Active Problem List    Diagnosis Date Noted     Anemia, unspecified type 07/19/2022     Priority: Medium     Age-related osteoporosis with current pathological fracture 07/19/2022     Priority: Medium     Closed displaced intertrochanteric fracture of right femur with routine healing, subsequent encounter 07/19/2022     Priority: Medium     Closed comminuted intertrochanteric fracture of right femur (H) 07/17/2022     Priority: Medium     Urgency incontinence 09/23/2021     Priority: Medium     Status post total knee replacement 10/06/2020     Priority: Medium     Prediabetes 05/06/2020     Priority: Medium     Fracture of sacrum (H) 03/02/2020     Priority: Medium     Sustained 1/26/2020       Sacral pain 03/02/2020     Priority: Medium     Status post total right knee replacement 02/28/2018     Priority: Medium     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     Priority: Medium     CARDIOVASCULAR SCREENING; LDL GOAL LESS THAN 160 10/31/2010     Priority: Medium     LDL in 140s, HDL in 60s  [place] risk is 6% (average at her age is 14%)       Essential hypertension, benign 06/28/2005     Priority:  Medium     had coronary CT scan  --- no evidence of plaque  started on atenolol and ASA 2005 for /94  physician in [place] switched her to Lotrel 5/10 due to her having dizzy spells  HCTZ added 3/05, pt felt mouth too dried out   pt more anxious and BP still elevated -- switched back to Atenolol       Diverticulosis of large intestine 2005     Priority: Medium     colonoscopy , disease mod - severe  Problem list name updated by automated process. Provider to review       Osteopenia of spine 2005     Priority: Medium     T score -1.3    Risk calculator based on 2003 T-score and personal data showed 1% risk of hip fracture over next 10 years, 14% risk of any fracture over next ten years; consider recheck DEXA at age 77, continue calcium and exercise until then (current recommendations to consider treatment if hip fx risk reaches 3% or any fracture risk reaches 20%)  Problem list name updated by automated process. Provider to review       Family history of other cardiovascular diseases 2005     Priority: Medium     father  at 39 of MI  brother  at 51 of MI  another brother -- MI x 2  eldest sister -- MI x 2 with stents  mother and maternal aunts lived to 80's and mid 90's  Problem list name updated by automated process. Provider to review and confirm  Problem list name updated by automated process. Provider to review       Generalized anxiety disorder 2005     Priority: Medium     onset after patient was diagnosed with hypertension fall           Past Medical History:    Past Medical History:   Diagnosis Date     Advance Care Planning 2012     Arthritis      Diverticulitis of colon 2005     Diverticulosis of colon (without mention of hemorrhage)      Hypertension        Past Surgical History:    Past Surgical History:   Procedure Laterality Date     ARTHROPLASTY KNEE Right 2018    Procedure: ARTHROPLASTY KNEE;  Right total knee arthroplasty;   Surgeon: Anton Grover MD;  Location: [facility]     ARTHROPLASTY KNEE Left 10/6/2020    Procedure: Total Knee Arthroplasty;  Surgeon: Calixto Santana MD;  Location: [facility]     COLONOSCOPY  04/15/02     COLONOSCOPY  3/25/2013    Procedure: COLONOSCOPY;  Colonoscopy  ;  Surgeon: Jamil Porras MD;  Location: [facility]     FLEXIBLE SIGMOIDOSCOPY  96     OPEN REDUCTION INTERNAL FIXATION RODDING INTRAMEDULLARY FEMUR Right 2022    Procedure: OPEN REDUCTION INTERNAL FIXATION, FRACTURE, Right FEMUR, USING INTRAMEDULLARY LEENA;  Surgeon: Dante Alonso MD;  Location:  OR     ORTHOPEDIC SURGERY  22-23 YEARS AGO    BACK     SURGICAL HISTORY OF -       lumbar surgery     ZZC APPENDECTOMY         Family History:    Family History   Problem Relation Age of Onset     Lipids Brother      Heart Disease Brother          at 51 of an MI     Cancer Brother         bone, lung,  82     Prostate Cancer Brother      Cancer Sister         cervical     Lipids Sister      Heart Disease Sister         eldest sister, s/p MI x 2 with stents; diagnosed with AAA, postoperative complications and MI,  at 75     Heart Disease Brother         MI x 2     Lipids Brother      C.A.D. Father          at 39 of an MI     Family History Negative Mother         lived to age 95     Gynecology Sister         cervical Ca       Social History:  Marital Status:   [5]  Social History     Tobacco Use     Smoking status: Never     Smokeless tobacco: Never   Vaping Use     Vaping Use: Never used   Substance Use Topics     Alcohol use: Yes     Comment: rare     Drug use: No        Medications:    acetaminophen (TYLENOL) 325 MG tablet  atenolol (TENORMIN) 100 MG tablet  escitalopram (LEXAPRO) 10 MG tablet  IBANdronate (BONIVA) 150 MG tablet  ibuprofen (ADVIL/MOTRIN) 600 MG tablet  losartan-hydrochlorothiazide (HYZAAR) 100-12.5 MG tablet  multivitamin (CENTRUM SILVER) tablet  oxybutynin ER (DITROPAN XL) 5 MG 24 hr  tablet          Review of Systems   Constitutional: Negative for fever.   Musculoskeletal: Positive for arthralgias (R knee) and gait problem. Negative for back pain and joint swelling.   Skin: Negative for color change, rash and wound.   All other systems reviewed and are negative.      Physical Exam   BP: (!) 188/64  Pulse: 60  Temp: 98.1  F (36.7  C)  Resp: 20  SpO2: 96 %      Physical Exam  Constitutional:       General: She is not in acute distress.     Appearance: Normal appearance. She is not ill-appearing, toxic-appearing or diaphoretic.   HENT:      Head: Normocephalic and atraumatic.   Eyes:      Conjunctiva/sclera: Conjunctivae normal.   Pulmonary:      Effort: Pulmonary effort is normal.   Musculoskeletal:      Cervical back: Normal and neck supple.      Thoracic back: Normal.      Lumbar back: Normal.      Right hip: Normal.      Left hip: Normal.      Right knee: No swelling, effusion, erythema or ecchymosis. Normal range of motion. Tenderness present over the lateral joint line and LCL. Normal alignment. Normal pulse.      Left knee: Normal.      Right lower leg: Normal. No swelling, tenderness or bony tenderness. No edema.      Right ankle: Normal. No swelling or ecchymosis. No tenderness. Normal range of motion.      Comments: Right lateral knee tenderness to palpation.  No overlying erythema or swelling.   Skin:     General: Skin is warm and dry.   Neurological:      General: No focal deficit present.      Mental Status: She is alert and oriented to person, place, and time.      Motor: No weakness.      Deep Tendon Reflexes: Reflexes normal.         ED Course                 Procedures            Results for orders placed or performed during the hospital encounter of 07/07/23 (from the past 24 hour(s))   XR Knee Right 1/2 Views    Narrative    XR KNEE RIGHT 1/2 VIEWS   7/7/2023 1:23 PM     HISTORY: right lateral knee pain, no trauma  COMPARISON: 9/6/2022       Impression    IMPRESSION: Total knee  arthroplasty stable. No signs of loosening. No  acute bony fracture. No effusion.    There is interval fracturing of the 2 distal interlocking screws of  the previous intramedullary nail of the right femur, and mild lucency  surrounding the distal end of the farhad. Recommend right femur films for  more complete evaluation.    KAYLEE WOOD MD         SYSTEM ID:  [id number]   XR Femur Right 2 Views    Narrative    XR FEMUR RIGHT 2 VIEWS 7/7/2023 2:06 PM     HISTORY: right lateral knee pain, further eval of possible lucency to  distal femur noted to knee x-rays today  COMPARISON: Right knee x-rays 7/7/2023, 9/6/2022, right femur x-rays  10/11/2022       Impression    IMPRESSION: Previous intramedullary nail and interlocking femoral neck  screw fixation of proximal right femur fracture. There is likely  incomplete healing of the fracture with solid bony bridging laterally  and anteriorly and residual fracture lucency posteriorly and medially.  The distal interlocking screws are fractured, this was present on  femur x-rays of 10/11/2022. There is interval development of mild  lucency about the distal and proximal ends of the intramedullary nail  and the interlocking femoral neck screw which could represent  loosening. No acute bony fracture.    KAYLEE WOOD MD         SYSTEM ID:  [id number]       Medications - No data to display    Assessments & Plan (with Medical Decision Making)     I have reviewed the nursing notes.    I have reviewed the findings, diagnosis, plan and need for follow up with the patient.  A 88-year-old female presenting with new-onset right lateral knee pain for one week. Patient has a total knee on this side.  She also sustained a closed comminuted intertrochanteric fracture on this side one year ago. Patients pain is isolated to weight bearing, no pain at rest. Physical exam with full hip and knee ROM with no swelling or erythema overlying the knee joint. X-ray of right knee show a total  knee arthroplasty that is stable.  No signs of loosening.  No bony fracture or effusion.  There is interval fracturing of the 2 distal interlocking screws of the intramedullary nail of the right femur with possible lucency surrounding the distal end of the farhad.  Radiology recommended femur x-rays for closer evaluation.  Therefore femur x-rays were obtained and again show findings suggestive of possible intramedullary nail and interlocking femoral neck screw loosening.  I am not certain that her possible hardware loosening is causing her current pain.  Orthopedic follow-up and referral recommended.  Recommended Diclofenac or Voltaren gel for pain.  She will rest the area, elevate, and ice and use a walker to assist with ambulation.  No evidence of infection on exam.  Patient understands and is in agreement with the plan. Return precautions discussed.      I, Starla Dwyer, have reviewed and discussed with the advanced practice provider student, Tashia SAMUELS, their history, physical, and plan for Bonita Villarreal. I participated in a shared visit by interviewing and examining the patient as well.  I have reviewed, added to, and edited the note as necessary.    Starla Dwyer PA-C    Date of Service (when I saw the patient): 07/07/23  I personally evaluated this patient today.    Discharge Medication List as of 7/7/2023  2:37 PM          Final diagnoses:   Right knee pain   Loosening of intramedullary nail (H) - and femoral neck hardware       7/7/2023   [facility] EMERGENCY DEPT      Disclaimer:  This note consists of symbols derived from keyboarding, dictation and/or voice recognition software.  As a result, there may be errors in the script that have gone undetected.  Please consider this when interpreting information found in this chart.     Starla Dwyer PA-C  07/07/23 1942

## 2023-07-17 ENCOUNTER — OFFICE VISIT (OUTPATIENT)
Dept: ORTHOPEDICS | Facility: CLINIC | Age: 88
End: 2023-07-17
Payer: COMMERCIAL

## 2023-07-17 VITALS
SYSTOLIC BLOOD PRESSURE: 164 MMHG | DIASTOLIC BLOOD PRESSURE: 85 MMHG | WEIGHT: 175 LBS | BODY MASS INDEX: 28.12 KG/M2 | HEART RATE: 97 BPM | HEIGHT: 66 IN

## 2023-07-17 DIAGNOSIS — M25.561 ACUTE PAIN OF RIGHT KNEE: ICD-10-CM

## 2023-07-17 DIAGNOSIS — M76.31 ILIOTIBIAL BAND SYNDROME OF RIGHT SIDE: Primary | ICD-10-CM

## 2023-07-17 DIAGNOSIS — T85.848A PAIN FROM IMPLANTED HARDWARE, INITIAL ENCOUNTER: ICD-10-CM

## 2023-07-17 DIAGNOSIS — T84.199A: ICD-10-CM

## 2023-07-17 PROCEDURE — 99214 OFFICE O/P EST MOD 30 MIN: CPT | Performed by: ORTHOPAEDIC SURGERY

## 2023-07-17 ASSESSMENT — PAIN SCALES - GENERAL: PAINLEVEL: SEVERE PAIN (6)

## 2023-07-17 NOTE — LETTER
7/17/2023         RE: Bonita Villarreal  [address] [address] [place] [id number]        Dear Colleague,    Thank you for referring your patient, Bonita Villarreal, to the [facility]. Please see a copy of my visit note below.    Bonita Villarreal is a 88 year old female who is seen in follow-up for right knee and lateral thigh pain.  She had ORIF of a right intertrochanteric femur fracture with a long gamma farhad on 7/19/2022.  She healed the fracture, but did break the distal locking screws by October 2022.  She did fine through the winter and spring.  Just in the past month or so she has developed pain across the anterior knee and lateral and anterior thigh.  She does have a total knee arthroplasty in position.  Pain is mainly present when she stands.  It will occasionally bother her sleeping.  She does not have much pain sitting.    X-rays of the total knee show good position of components.  There is no sign of this becoming loose.  X-rays of the femur show good healing of the proximal fracture.  It is unclear if the lesser tuberosity is fully united.  There has been settling of the fracture early on with some prominence of the compression screw laterally.  Distally the locking screws are broken with slight distal migration of the farhad there.  There is no sign of stress fracture through the distal femur.    Past Medical History:   Diagnosis Date     Advance Care Planning 2/28/2012    Advance Care Planning 3/20/2016: Receipt of ACP document:  Received: Health Care Directive which was witnessed or notarized on 2/25/16.  Document not previously scanned.  Validation form completed and sent with document to be scanned.  Code Status needs to be updated to reflect choices in most recent ACP document.  Confirmed/documented designated decision maker(s).  Added by Cata Terrell RN, Advance Care Planning Liaison. Advance Care Planning 2/28/12: Patient does not have an Advance/Health  "Care Directive (HCD), given \"What is Advance Care Planning?\" flyer. Teena Peace       Arthritis      Diverticulitis of colon 2005    Patient keeps Rx for Augmentin to use PRN Problem list name updated by automated process. Provider to review     Diverticulosis of colon (without mention of hemorrhage)     history of recurrent diverticulitis     Hypertension        Past Surgical History:   Procedure Laterality Date     ARTHROPLASTY KNEE Right 2018    Procedure: ARTHROPLASTY KNEE;  Right total knee arthroplasty;  Surgeon: Anton Grover MD;  Location: [facility]     ARTHROPLASTY KNEE Left 10/6/2020    Procedure: Total Knee Arthroplasty;  Surgeon: Calixto Santana MD;  Location: [facility]     COLONOSCOPY  04/15/02     COLONOSCOPY  3/25/2013    Procedure: COLONOSCOPY;  Colonoscopy  ;  Surgeon: Jamil Porras MD;  Location: [facility]     FLEXIBLE SIGMOIDOSCOPY  96     OPEN REDUCTION INTERNAL FIXATION RODDING INTRAMEDULLARY FEMUR Right 2022    Procedure: OPEN REDUCTION INTERNAL FIXATION, FRACTURE, Right FEMUR, USING INTRAMEDULLARY LEENA;  Surgeon: Dante Alonso MD;  Location:  OR     ORTHOPEDIC SURGERY  22-23 YEARS AGO    BACK     SURGICAL HISTORY OF -       lumbar surgery     ZZC APPENDECTOMY         Family History   Problem Relation Age of Onset     Lipids Brother      Heart Disease Brother          at 51 of an MI     Cancer Brother         bone, lung,  82     Prostate Cancer Brother      Cancer Sister         cervical     Lipids Sister      Heart Disease Sister         eldest sister, s/p MI x 2 with stents; diagnosed with AAA, postoperative complications and MI,  at 75     Heart Disease Brother         MI x 2     Lipids Brother      C.A.D. Father          at 39 of an MI     Family History Negative Mother         lived to age 95     Gynecology Sister         cervical Ca       Social History     Socioeconomic History     Marital status:      Spouse name: Not on " file     Number of children: Not on file     Years of education: Not on file     Highest education level: Not on file   Occupational History     Not on file   Tobacco Use     Smoking status: Never     Smokeless tobacco: Never   Vaping Use     Vaping Use: Never used   Substance and Sexual Activity     Alcohol use: Yes     Comment: rare     Drug use: No     Sexual activity: Not Currently     Partners: Male   Other Topics Concern     Parent/sibling w/ CABG, MI or angioplasty before 65F 55M? Yes     Comment: sister bypass,brother bypass,brother MI   Social History Narrative     Not on file     Social Determinants of Health     Financial Resource Strain: Not on file   Food Insecurity: Not on file   Transportation Needs: Not on file   Physical Activity: Not on file   Stress: Not on file   Social Connections: Not on file   Intimate Partner Violence: Not on file   Housing Stability: Not on file       Current Outpatient Medications   Medication Sig Dispense Refill     acetaminophen (TYLENOL) 325 MG tablet Take 3 tablets (975 mg) by mouth every 8 hours       atenolol (TENORMIN) 100 MG tablet Take 1 tablet (100 mg) by mouth daily 90 tablet 4     escitalopram (LEXAPRO) 10 MG tablet Take 1 tablet (10 mg) by mouth daily 90 tablet 4     IBANdronate (BONIVA) 150 MG tablet Take 1 tablet (150 mg) by mouth every 30 days 3 tablet 4     ibuprofen (ADVIL/MOTRIN) 600 MG tablet Take 1 tablet (600 mg) by mouth every 8 hours as needed for pain (mild) 30 tablet 0     losartan-hydrochlorothiazide (HYZAAR) 100-12.5 MG tablet Take 1 tablet by mouth daily 90 tablet 4     multivitamin (CENTRUM SILVER) tablet Take 1 tablet by mouth daily       oxybutynin ER (DITROPAN XL) 5 MG 24 hr tablet Take 1 tablet (5 mg) by mouth daily 90 tablet 4       Allergies   Allergen Reactions     Bactrim [Sulfamethoxazole-Trimethoprim] Nausea and Vomiting     Lisinopril Cough       REVIEW OF SYSTEMS:  CONSTITUTIONAL:  NEGATIVE for fever, chills, change in weight, not  "feeling tired  SKIN:  NEGATIVE for worrisome rashes, no skin lumps, no skin ulcers and no non-healing wounds  EYES:  NEGATIVE for vision changes or irritation.  ENT/MOUTH:  NEGATIVE.  No hearing loss, no hoarseness, no difficulty swallowing.  RESP:  NEGATIVE. No cough or shortness of breath.  CV:  NEGATIVE for chest pain, palpitations or peripheral edema  GI:  NEGATIVE for nausea, abdominal pain, heartburn, or change in bowel habits  :  Negative. No dysuria, no hematuria  MUSCULOSKELETAL:  See HPI above  NEURO:  NEGATIVE . No headaches, no dizziness,  no numbness  ENDOCRINE:  NEGATIVE for temperature intolerance, skin/hair changes  HEME/ALLERGY/IMMUNE:  NEGATIVE for bleeding problems  PSYCHIATRIC:  NEGATIVE. no anxiety, no depression.     Exam:  Vitals: BP (!) 164/85   Pulse 97   Ht 1.683 m (5' 6.25\")   Wt 79.4 kg (175 lb)   LMP  (LMP Unknown)   BMI 28.03 kg/m    BMI= Body mass index is 28.03 kg/m .  Constitutional:  healthy, alert and no distress  Neuro: Alert and Oriented x 3, no focal defects.  Psych: Affect normal   Respiratory: Breathing not labored.  Cardiovascular: normal peripheral pulses  Lymph: no adenopathy  Skin: No rashes,worrisome lesions or skin problems  She complains of tenderness at the lateral knee.  This could be over the screw heads of the locking screws.  She had mild tenderness near the greater trochanter, but does not appear to have significant tenderness either above the greater trochanter or just below it over the prominent compression screw.  She has no pain with rotation of the hip at the hip.  She does have some pain down toward the knee with rotation of the hip.  She has tenderness at the sacroiliac joints bilaterally, worse on the right.  She had no tenderness at the sciatic notch on either side.    Assessment:  Right leg pain might be from iliotibial band irritation from prominent hardware.  The confusing thing is that the prominent hardware has not changed position at all " since last October, but the pain in the leg only started at the beginning of July.  Plan: We will have physical therapy work with her for stretching of the IT band.  If there is persistent pain that we think is due to the metal, we could remove the 2 distal screws at least the head portions.  We could also consider removal of the compression screw proximally but would likely leave the farhad in place as the 2 distal broken screws would make removal of this complicated.  She would have to decide if tenderness at the screws themselves is bad enough to justify another surgery for this.      Again, thank you for allowing me to participate in the care of your patient.        Sincerely,        Dante Alonso MD

## 2023-07-17 NOTE — PROGRESS NOTES
"Bonita Villarreal is a 88 year old female who is seen in follow-up for right knee and lateral thigh pain.  She had ORIF of a right intertrochanteric femur fracture with a long gamma farhad on 7/19/2022.  She healed the fracture, but did break the distal locking screws by October 2022.  She did fine through the winter and spring.  Just in the past month or so she has developed pain across the anterior knee and lateral and anterior thigh.  She does have a total knee arthroplasty in position.  Pain is mainly present when she stands.  It will occasionally bother her sleeping.  She does not have much pain sitting.    X-rays of the total knee show good position of components.  There is no sign of this becoming loose.  X-rays of the femur show good healing of the proximal fracture.  It is unclear if the lesser tuberosity is fully united.  There has been settling of the fracture early on with some prominence of the compression screw laterally.  Distally the locking screws are broken with slight distal migration of the farhad there.  There is no sign of stress fracture through the distal femur.    Past Medical History:   Diagnosis Date     Advance Care Planning 2/28/2012    Advance Care Planning 3/20/2016: Receipt of ACP document:  Received: Health Care Directive which was witnessed or notarized on 2/25/16.  Document not previously scanned.  Validation form completed and sent with document to be scanned.  Code Status needs to be updated to reflect choices in most recent ACP document.  Confirmed/documented designated decision maker(s).  Added by Cata Terrell RN, Advance Care Planning Liaison. Advance Care Planning 2/28/12: Patient does not have an Advance/Health Care Directive (HCD), given \"What is Advance Care Planning?\" flyer. Teena Peace       Arthritis      Diverticulitis of colon 6/28/2005    Patient keeps Rx for Augmentin to use PRN Problem list name updated by automated process. Provider to review     " Diverticulosis of colon (without mention of hemorrhage)     history of recurrent diverticulitis     Hypertension        Past Surgical History:   Procedure Laterality Date     ARTHROPLASTY KNEE Right 2018    Procedure: ARTHROPLASTY KNEE;  Right total knee arthroplasty;  Surgeon: Anton Grover MD;  Location: [facility]     ARTHROPLASTY KNEE Left 10/6/2020    Procedure: Total Knee Arthroplasty;  Surgeon: Calixto Santana MD;  Location: [facility]     COLONOSCOPY  04/15/02     COLONOSCOPY  3/25/2013    Procedure: COLONOSCOPY;  Colonoscopy  ;  Surgeon: Jamil Porras MD;  Location: [facility]     FLEXIBLE SIGMOIDOSCOPY  96     OPEN REDUCTION INTERNAL FIXATION RODDING INTRAMEDULLARY FEMUR Right 2022    Procedure: OPEN REDUCTION INTERNAL FIXATION, FRACTURE, Right FEMUR, USING INTRAMEDULLARY LEENA;  Surgeon: Dante Alonso MD;  Location:  OR     ORTHOPEDIC SURGERY  22-23 YEARS AGO    BACK     SURGICAL HISTORY OF -       lumbar surgery     ZZC APPENDECTOMY         Family History   Problem Relation Age of Onset     Lipids Brother      Heart Disease Brother          at 51 of an MI     Cancer Brother         bone, lung,  82     Prostate Cancer Brother      Cancer Sister         cervical     Lipids Sister      Heart Disease Sister         eldest sister, s/p MI x 2 with stents; diagnosed with AAA, postoperative complications and MI,  at 75     Heart Disease Brother         MI x 2     Lipids Brother      C.A.D. Father          at 39 of an MI     Family History Negative Mother         lived to age 95     Gynecology Sister         cervical Ca       Social History     Socioeconomic History     Marital status:      Spouse name: Not on file     Number of children: Not on file     Years of education: Not on file     Highest education level: Not on file   Occupational History     Not on file   Tobacco Use     Smoking status: Never     Smokeless tobacco: Never   Vaping Use     Vaping Use:  Never used   Substance and Sexual Activity     Alcohol use: Yes     Comment: rare     Drug use: No     Sexual activity: Not Currently     Partners: Male   Other Topics Concern     Parent/sibling w/ CABG, MI or angioplasty before 65F 55M? Yes     Comment: sister bypass,brother bypass,brother MI   Social History Narrative     Not on file     Social Determinants of Health     Financial Resource Strain: Not on file   Food Insecurity: Not on file   Transportation Needs: Not on file   Physical Activity: Not on file   Stress: Not on file   Social Connections: Not on file   Intimate Partner Violence: Not on file   Housing Stability: Not on file       Current Outpatient Medications   Medication Sig Dispense Refill     acetaminophen (TYLENOL) 325 MG tablet Take 3 tablets (975 mg) by mouth every 8 hours       atenolol (TENORMIN) 100 MG tablet Take 1 tablet (100 mg) by mouth daily 90 tablet 4     escitalopram (LEXAPRO) 10 MG tablet Take 1 tablet (10 mg) by mouth daily 90 tablet 4     IBANdronate (BONIVA) 150 MG tablet Take 1 tablet (150 mg) by mouth every 30 days 3 tablet 4     ibuprofen (ADVIL/MOTRIN) 600 MG tablet Take 1 tablet (600 mg) by mouth every 8 hours as needed for pain (mild) 30 tablet 0     losartan-hydrochlorothiazide (HYZAAR) 100-12.5 MG tablet Take 1 tablet by mouth daily 90 tablet 4     multivitamin (CENTRUM SILVER) tablet Take 1 tablet by mouth daily       oxybutynin ER (DITROPAN XL) 5 MG 24 hr tablet Take 1 tablet (5 mg) by mouth daily 90 tablet 4       Allergies   Allergen Reactions     Bactrim [Sulfamethoxazole-Trimethoprim] Nausea and Vomiting     Lisinopril Cough       REVIEW OF SYSTEMS:  CONSTITUTIONAL:  NEGATIVE for fever, chills, change in weight, not feeling tired  SKIN:  NEGATIVE for worrisome rashes, no skin lumps, no skin ulcers and no non-healing wounds  EYES:  NEGATIVE for vision changes or irritation.  ENT/MOUTH:  NEGATIVE.  No hearing loss, no hoarseness, no difficulty swallowing.  RESP:   "NEGATIVE. No cough or shortness of breath.  CV:  NEGATIVE for chest pain, palpitations or peripheral edema  GI:  NEGATIVE for nausea, abdominal pain, heartburn, or change in bowel habits  :  Negative. No dysuria, no hematuria  MUSCULOSKELETAL:  See HPI above  NEURO:  NEGATIVE . No headaches, no dizziness,  no numbness  ENDOCRINE:  NEGATIVE for temperature intolerance, skin/hair changes  HEME/ALLERGY/IMMUNE:  NEGATIVE for bleeding problems  PSYCHIATRIC:  NEGATIVE. no anxiety, no depression.     Exam:  Vitals: BP (!) 164/85   Pulse 97   Ht 1.683 m (5' 6.25\")   Wt 79.4 kg (175 lb)   LMP  (LMP Unknown)   BMI 28.03 kg/m    BMI= Body mass index is 28.03 kg/m .  Constitutional:  healthy, alert and no distress  Neuro: Alert and Oriented x 3, no focal defects.  Psych: Affect normal   Respiratory: Breathing not labored.  Cardiovascular: normal peripheral pulses  Lymph: no adenopathy  Skin: No rashes,worrisome lesions or skin problems  She complains of tenderness at the lateral knee.  This could be over the screw heads of the locking screws.  She had mild tenderness near the greater trochanter, but does not appear to have significant tenderness either above the greater trochanter or just below it over the prominent compression screw.  She has no pain with rotation of the hip at the hip.  She does have some pain down toward the knee with rotation of the hip.  She has tenderness at the sacroiliac joints bilaterally, worse on the right.  She had no tenderness at the sciatic notch on either side.    Assessment:  Right leg pain might be from iliotibial band irritation from prominent hardware.  The confusing thing is that the prominent hardware has not changed position at all since last October, but the pain in the leg only started at the beginning of July.  Plan: We will have physical therapy work with her for stretching of the IT band.  If there is persistent pain that we think is due to the metal, we could remove the 2 " distal screws at least the head portions.  We could also consider removal of the compression screw proximally but would likely leave the farhad in place as the 2 distal broken screws would make removal of this complicated.  She would have to decide if tenderness at the screws themselves is bad enough to justify another surgery for this.

## 2023-07-17 NOTE — PATIENT INSTRUCTIONS
Aleve 1-2 tabs twice daily.  Physical Therapy for iliotibial band stretching.  Consider screw removal.

## 2023-07-19 ENCOUNTER — TELEPHONE (OUTPATIENT)
Dept: FAMILY MEDICINE | Facility: CLINIC | Age: 88
End: 2023-07-19
Payer: COMMERCIAL

## 2023-07-19 NOTE — TELEPHONE ENCOUNTER
Symptoms    Describe your symptoms: Pt saw Ortho - Dr. Alonso 7/17 for right leg pain that goes up from her knee.  He recommended PT, but pt cannot get into PT until 8/4.  Pt is using ice and taking Aleve.  Pt can hardly walk due to both pain and weakness and is asking for Dr. Santana's opinion on what she should do?   Please call patient and advise.      Any pain: Yes:     How long have you been having symptoms: 2  weeks    Have you been seen for this:  Yes:     Appointment offered?: No    Triage offered?: Yes:     Home remedies tried: Aleve & Ice    Preferred Pharmacy:   [facility] - [facility] [address] [address] [address]  Phone: [phone] Fax: [phone]    Could we send this information to you in [facility] or would you prefer to receive a phone call?:   Patient would prefer a phone call   Okay to leave a detailed message?: Yes at Home number on file [phone] (home)

## 2023-07-19 NOTE — TELEPHONE ENCOUNTER
Left a message for Avinash to return our call. Has she spoken with Dr Alonso's office regarding this matter?     Ellie Callejas RN

## 2023-07-26 ENCOUNTER — TRANSFERRED RECORDS (OUTPATIENT)
Dept: HEALTH INFORMATION MANAGEMENT | Facility: CLINIC | Age: 88
End: 2023-07-26
Payer: COMMERCIAL

## 2023-08-03 ENCOUNTER — THERAPY VISIT (OUTPATIENT)
Dept: PHYSICAL THERAPY | Facility: CLINIC | Age: 88
End: 2023-08-03
Attending: ORTHOPAEDIC SURGERY
Payer: COMMERCIAL

## 2023-08-03 DIAGNOSIS — M76.31 ILIOTIBIAL BAND SYNDROME OF RIGHT SIDE: ICD-10-CM

## 2023-08-03 PROCEDURE — 97110 THERAPEUTIC EXERCISES: CPT | Mod: GP | Performed by: PHYSICAL MEDICINE & REHABILITATION

## 2023-08-03 PROCEDURE — 97161 PT EVAL LOW COMPLEX 20 MIN: CPT | Mod: GP | Performed by: PHYSICAL MEDICINE & REHABILITATION

## 2023-08-03 NOTE — PROGRESS NOTES
PHYSICAL THERAPY EVALUATION  Type of Visit: Evaluation    See electronic medical record for Abuse and Falls Screening details.    Subjective       Presenting condition or subjective complaint: Patient reports she has a history of ORIF and TKA on right leg that has pain. Reports she has no pain at rest but having difficulty a position to sleep in at night and putting weight through it. States she has radiating pain down to the lateral knee.  Date of onset: 07/07/23    Relevant medical history:   Arthritis, HBP, hx of fractures, implanted device  Dates & types of surgery:   TKA and 7/19/2023 OPEN REDUCTION INTERNAL FIXATION, FRACTURE, Right FEMUR, USING INTRAMEDULLARY LEENA    Prior diagnostic imaging/testing results: X-ray   IMPRESSION: Previous intramedullary nail and interlocking femoral neck screw fixation of proximal right femur fracture. There is likely  incomplete healing of the fracture with solid bony bridging laterally and anteriorly and residual fracture lucency posteriorly and medially. The distal interlocking screws are fractured, this was present on femur x-rays of 10/11/2022. There is interval development of mild lucency about the distal and proximal ends of the intramedullary nail and the interlocking femoral neck screw which could represent loosening. No acute bony fracture.  Prior therapy history for the same diagnosis, illness or injury: Yes      Prior Level of Function  Transfers: Independent  Ambulation: Assistive equipment  ADL: Independent    Living Environment  Social support: With family members   Type of home:   mother in law suite  Stairs to enter the home: No       Stairs inside the home: No       Help at home: None  Equipment owned:   FWW and SPC; raised toilet seat    Employment: No    Hobbies/Interests: cooking, baking    Patient goals for therapy: walk without AD and reduce pain in leg       Objective   HIP EVALUATION  PAIN: Pain Level at Rest: 0/10  Pain Level with Use: 8/10  Pain  Location: hip and knee  Pain Quality: Aching and Dull  Pain Frequency: intermittent or daily  Pain is Worst: daytime  Pain is Exacerbated By: walking, standing, bending  Pain is Relieved By: cold, NSAIDs, otc medications, and rest  INTEGUMENTARY (edema, incisions):  mild edema present at lateral knee  POSTURE: Standing Posture: Rounded shoulders, Forward head, Thoracic kyphosis increased  GAIT:   Weightbearing Status: WBAT  Assistive Device(s): Walker (front wheeled)  Gait Deviations: Antalgic  Stance time decreased  Stride length decreased  Valentine decreased  Toe in R  BALANCE/PROPRIOCEPTION:  unable to determine due to pain  WEIGHTBEARING ALIGNMENT: Knee WB Alignment:Genu valgus R  ROM:  R knee flexion: 105* with pain; R knee extension WNL with pain at end range; R hip ER: 30*; R hip IR: 20*  PELVIC/SI SCREEN: WNL  STRENGTH:  R knee extension: 3/5; R knee flexion: 4/5; R hip abduction 3-/5; R hip flexion: 3-/5  LE FLEXIBILITY: Decreased hip IR R, Decreased hip ER R, Decreased hip flexors R, Decreased IT band R, Decreased quadriceps R  SPECIAL TESTS:  not needed and unable to due to significant pain  FUNCTIONAL TESTS: Double Leg Squat: unable to perform fully due to pain  PALPATION:  TTP at R sided TFL/ITB/vastus lateralis  JOINT MOBILITY: guarded lateral patellar glides    Assessment & Plan   CLINICAL IMPRESSIONS  Medical Diagnosis: Iliotibial band syndrome of right side (M76.31)    Treatment Diagnosis: right hip and knee pain/weakness   Impression/Assessment: Patient is a 88 year old female with right lower extremity complaints.  The following significant findings have been identified: Pain, Decreased ROM/flexibility, Decreased joint mobility, Decreased strength, Impaired gait, Impaired muscle performance, and Decreased activity tolerance. These impairments interfere with their ability to perform self care tasks, household chores, driving , household mobility, and community mobility as compared to previous level  of function.     Clinical Decision Making (Complexity):  Clinical Presentation: Evolving/Changing  Clinical Presentation Rationale: based on medical and personal factors listed in PT evaluation  Clinical Decision Making (Complexity): Low complexity    PLAN OF CARE  Treatment Interventions:  Modalities: Biofeedback  Interventions: Gait Training, Manual Therapy, Neuromuscular Re-education, Therapeutic Activity, Therapeutic Exercise    Long Term Goals     PT Goal 1  Goal Identifier: STG  Goal Description: Patient to ambulate over 100 feet with SPC demonstrating good stability and reduced antalgic pattern.  Target Date: 09/07/23  PT Goal 2  Goal Identifier: STG  Goal Description: Patient to get up from standard height chair with minimal use of UE support to safely transfer.  Target Date: 09/14/23  PT Goal 3  Goal Identifier: LTG  Goal Description: Patient to ambulate community distances using SPC and <2/10 RLE pain  Target Date: 10/26/23  PT Goal 4  Goal Identifier: LTG  Goal Description: Patient to be IND with HEP to aid functional recovery and improve ability to self manage symptoms if they arise in the future.  Target Date: 10/26/23      Frequency of Treatment: 1x/week  Duration of Treatment: 12 weeks    Education Assessment:   Learner/Method: Patient;Listening;Reading;Demonstration;Pictures/Video    Risks and benefits of evaluation/treatment have been explained.   Patient/Family/caregiver agrees with Plan of Care.     Evaluation Time:     PT Eval, Low Complexity Minutes (50449): 10     Signing Clinician: Sampson Lynch, PT      [facility]                                                                                   OUTPATIENT PHYSICAL THERAPY      PLAN OF TREATMENT FOR OUTPATIENT REHABILITATION   Patient's Last Name, First Name, Bonita Aceves YOB: 1935   Provider's Name   [facility]   Medical Record No.  [id number]      Onset Date: 07/07/23  Start of Care Date: 08/03/23     Medical Diagnosis:  Iliotibial band syndrome of right side (M76.31)      PT Treatment Diagnosis:  right hip and knee pain/weakness Plan of Treatment  Frequency/Duration: 1x/week/ 12 weeks    Certification date from 08/03/23 to 10/26/23         See note for plan of treatment details and functional goals     Sampson Lynch, PT                         I CERTIFY THE NEED FOR THESE SERVICES FURNISHED UNDER        THIS PLAN OF TREATMENT AND WHILE UNDER MY CARE .             Physician Signature               Date    X_____________________________________________________                    Referring Provider:  Dante Alonso      Initial Assessment  See Epic Evaluation- Start of Care Date: 08/03/23

## 2023-08-10 ENCOUNTER — THERAPY VISIT (OUTPATIENT)
Dept: PHYSICAL THERAPY | Facility: CLINIC | Age: 88
End: 2023-08-10
Attending: ORTHOPAEDIC SURGERY
Payer: COMMERCIAL

## 2023-08-10 DIAGNOSIS — M76.31 ILIOTIBIAL BAND SYNDROME OF RIGHT SIDE: Primary | ICD-10-CM

## 2023-08-10 PROCEDURE — 97140 MANUAL THERAPY 1/> REGIONS: CPT | Mod: GP | Performed by: PHYSICAL THERAPIST

## 2023-08-10 PROCEDURE — 97110 THERAPEUTIC EXERCISES: CPT | Mod: GP | Performed by: PHYSICAL THERAPIST

## 2023-08-17 ENCOUNTER — THERAPY VISIT (OUTPATIENT)
Dept: PHYSICAL THERAPY | Facility: CLINIC | Age: 88
End: 2023-08-17
Attending: ORTHOPAEDIC SURGERY
Payer: COMMERCIAL

## 2023-08-17 DIAGNOSIS — M76.31 ILIOTIBIAL BAND SYNDROME OF RIGHT SIDE: Primary | ICD-10-CM

## 2023-08-17 PROCEDURE — 97110 THERAPEUTIC EXERCISES: CPT | Mod: GP | Performed by: PHYSICAL THERAPIST

## 2023-08-17 PROCEDURE — 97140 MANUAL THERAPY 1/> REGIONS: CPT | Mod: GP | Performed by: PHYSICAL THERAPIST

## 2023-08-23 ENCOUNTER — THERAPY VISIT (OUTPATIENT)
Dept: PHYSICAL THERAPY | Facility: CLINIC | Age: 88
End: 2023-08-23
Attending: ORTHOPAEDIC SURGERY
Payer: COMMERCIAL

## 2023-08-23 DIAGNOSIS — M76.31 ILIOTIBIAL BAND SYNDROME OF RIGHT SIDE: Primary | ICD-10-CM

## 2023-08-23 PROCEDURE — 97140 MANUAL THERAPY 1/> REGIONS: CPT | Mod: GP | Performed by: PHYSICAL THERAPIST

## 2023-08-23 PROCEDURE — 97110 THERAPEUTIC EXERCISES: CPT | Mod: GP | Performed by: PHYSICAL THERAPIST

## 2023-08-30 ENCOUNTER — THERAPY VISIT (OUTPATIENT)
Dept: PHYSICAL THERAPY | Facility: CLINIC | Age: 88
End: 2023-08-30
Attending: ORTHOPAEDIC SURGERY
Payer: COMMERCIAL

## 2023-08-30 DIAGNOSIS — M76.31 ILIOTIBIAL BAND SYNDROME OF RIGHT SIDE: Primary | ICD-10-CM

## 2023-08-30 PROCEDURE — 97110 THERAPEUTIC EXERCISES: CPT | Mod: GP | Performed by: PHYSICAL THERAPIST

## 2023-08-30 PROCEDURE — 97140 MANUAL THERAPY 1/> REGIONS: CPT | Mod: GP | Performed by: PHYSICAL THERAPIST

## 2023-09-06 ENCOUNTER — THERAPY VISIT (OUTPATIENT)
Dept: PHYSICAL THERAPY | Facility: CLINIC | Age: 88
End: 2023-09-06
Attending: ORTHOPAEDIC SURGERY
Payer: COMMERCIAL

## 2023-09-06 DIAGNOSIS — M76.31 ILIOTIBIAL BAND SYNDROME OF RIGHT SIDE: Primary | ICD-10-CM

## 2023-09-06 PROCEDURE — 97140 MANUAL THERAPY 1/> REGIONS: CPT | Mod: GP | Performed by: PHYSICAL THERAPIST

## 2023-09-06 PROCEDURE — 97110 THERAPEUTIC EXERCISES: CPT | Mod: GP | Performed by: PHYSICAL THERAPIST

## 2023-09-18 ENCOUNTER — OFFICE VISIT (OUTPATIENT)
Dept: ORTHOPEDICS | Facility: CLINIC | Age: 88
End: 2023-09-18
Payer: COMMERCIAL

## 2023-09-18 VITALS
HEART RATE: 78 BPM | DIASTOLIC BLOOD PRESSURE: 82 MMHG | WEIGHT: 175 LBS | HEIGHT: 66 IN | RESPIRATION RATE: 18 BRPM | SYSTOLIC BLOOD PRESSURE: 132 MMHG | BODY MASS INDEX: 28.12 KG/M2

## 2023-09-18 DIAGNOSIS — M76.31 ILIOTIBIAL BAND SYNDROME OF RIGHT SIDE: Primary | ICD-10-CM

## 2023-09-18 PROCEDURE — 99213 OFFICE O/P EST LOW 20 MIN: CPT | Performed by: ORTHOPAEDIC SURGERY

## 2023-09-18 NOTE — LETTER
9/18/2023         RE: Bonita Villarreal  [address] [address] [place] [id number]        Dear Colleague,    Thank you for referring your patient, Bonita Villarreal, to the [facility]. Please see a copy of my visit note below.    Bonita Villarreal is a 88 year old female who is seen in follow-up for right knee and lateral thigh pain.  She had ORIF of a right intertrochanteric femur fracture with a long gamma farhad on 7/19/2022.  She healed the fracture, but did break the distal locking screws by October 2022.  She did fine through the winter and spring.  Just in the past month or so she has developed pain across the anterior knee and lateral and anterior thigh.  She does have a total knee arthroplasty in position.  Pain is mainly present when she stands.  It will occasionally bother her sleeping.  She does not have much pain sitting.  We tried Physical Therapy for iliotibial band stretching, but it has not helped much.  She does get relief with massaging of lateral knee and thigh.    X-rays of the total knee show good position of components.  There is no sign of this becoming loose.  X-rays of the femur show good healing of the proximal fracture.  It is unclear if the lesser tuberosity is fully united.  There has been settling of the fracture early on with some prominence of the compression screw laterally.  Distally the locking screws are broken with slight distal migration of the farhad there.  There is no sign of stress fracture through the distal femur.    Past Medical History:   Diagnosis Date     Advance Care Planning 2/28/2012    Advance Care Planning 3/20/2016: Receipt of ACP document:  Received: Health Care Directive which was witnessed or notarized on 2/25/16.  Document not previously scanned.  Validation form completed and sent with document to be scanned.  Code Status needs to be updated to reflect choices in most recent ACP document.  Confirmed/documented designated  "decision maker(s).  Added by Cata Terrell RN, Advance Care Planning Liaison. Advance Care Planning 12: Patient does not have an Advance/Health Care Directive (HCD), given \"What is Advance Care Planning?\" flyer. Teena Peace       Arthritis      Diverticulitis of colon 2005    Patient keeps Rx for Augmentin to use PRN Problem list name updated by automated process. Provider to review     Diverticulosis of colon (without mention of hemorrhage)     history of recurrent diverticulitis     Hypertension        Past Surgical History:   Procedure Laterality Date     ARTHROPLASTY KNEE Right 2018    Procedure: ARTHROPLASTY KNEE;  Right total knee arthroplasty;  Surgeon: Anton Grover MD;  Location: [facility]     ARTHROPLASTY KNEE Left 10/6/2020    Procedure: Total Knee Arthroplasty;  Surgeon: Calixto Santana MD;  Location: [facility]     COLONOSCOPY  04/15/02     COLONOSCOPY  3/25/2013    Procedure: COLONOSCOPY;  Colonoscopy  ;  Surgeon: Jamil Porras MD;  Location: [facility]     FLEXIBLE SIGMOIDOSCOPY  96     OPEN REDUCTION INTERNAL FIXATION RODDING INTRAMEDULLARY FEMUR Right 2022    Procedure: OPEN REDUCTION INTERNAL FIXATION, FRACTURE, Right FEMUR, USING INTRAMEDULLARY LEENA;  Surgeon: Dante Alonso MD;  Location:  OR     ORTHOPEDIC SURGERY  22-23 YEARS AGO    BACK     SURGICAL HISTORY OF -       lumbar surgery     ZZC APPENDECTOMY         Family History   Problem Relation Age of Onset     Lipids Brother      Heart Disease Brother          at 51 of an MI     Cancer Brother         bone, lung,  82     Prostate Cancer Brother      Cancer Sister         cervical     Lipids Sister      Heart Disease Sister         eldest sister, s/p MI x 2 with stents; diagnosed with AAA, postoperative complications and MI,  at 75     Heart Disease Brother         MI x 2     Lipids Brother      C.A.D. Father          at 39 of an MI     Family History Negative Mother         lived " to age 95     Gynecology Sister         cervical Ca       Social History     Socioeconomic History     Marital status:      Spouse name: Not on file     Number of children: Not on file     Years of education: Not on file     Highest education level: Not on file   Occupational History     Not on file   Tobacco Use     Smoking status: Never     Smokeless tobacco: Never   Vaping Use     Vaping Use: Never used   Substance and Sexual Activity     Alcohol use: Yes     Comment: rare     Drug use: No     Sexual activity: Not Currently     Partners: Male   Other Topics Concern     Parent/sibling w/ CABG, MI or angioplasty before 65F 55M? Yes     Comment: sister bypass,brother bypass,brother MI   Social History Narrative     Not on file     Social Determinants of Health     Financial Resource Strain: Not on file   Food Insecurity: Not on file   Transportation Needs: Not on file   Physical Activity: Not on file   Stress: Not on file   Social Connections: Not on file   Intimate Partner Violence: Not on file   Housing Stability: Not on file       Current Outpatient Medications   Medication Sig Dispense Refill     acetaminophen (TYLENOL) 325 MG tablet Take 3 tablets (975 mg) by mouth every 8 hours       atenolol (TENORMIN) 100 MG tablet Take 1 tablet (100 mg) by mouth daily 90 tablet 4     escitalopram (LEXAPRO) 10 MG tablet Take 1 tablet (10 mg) by mouth daily 90 tablet 4     IBANdronate (BONIVA) 150 MG tablet Take 1 tablet (150 mg) by mouth every 30 days 3 tablet 4     ibuprofen (ADVIL/MOTRIN) 600 MG tablet Take 1 tablet (600 mg) by mouth every 8 hours as needed for pain (mild) 30 tablet 0     losartan-hydrochlorothiazide (HYZAAR) 100-12.5 MG tablet Take 1 tablet by mouth daily 90 tablet 4     multivitamin (CENTRUM SILVER) tablet Take 1 tablet by mouth daily       oxybutynin ER (DITROPAN XL) 5 MG 24 hr tablet Take 1 tablet (5 mg) by mouth daily 90 tablet 4       Allergies   Allergen Reactions     Bactrim  "[Sulfamethoxazole-Trimethoprim] Nausea and Vomiting     Lisinopril Cough       REVIEW OF SYSTEMS:  CONSTITUTIONAL:  NEGATIVE for fever, chills, change in weight, not feeling tired  SKIN:  NEGATIVE for worrisome rashes, no skin lumps, no skin ulcers and no non-healing wounds  EYES:  NEGATIVE for vision changes or irritation.  ENT/MOUTH:  NEGATIVE.  No hearing loss, no hoarseness, no difficulty swallowing.  RESP:  NEGATIVE. No cough or shortness of breath.  CV:  NEGATIVE for chest pain, palpitations or peripheral edema  GI:  NEGATIVE for nausea, abdominal pain, heartburn, or change in bowel habits  :  Negative. No dysuria, no hematuria  MUSCULOSKELETAL:  See HPI above  NEURO:  NEGATIVE . No headaches, no dizziness,  no numbness  ENDOCRINE:  NEGATIVE for temperature intolerance, skin/hair changes  HEME/ALLERGY/IMMUNE:  NEGATIVE for bleeding problems  PSYCHIATRIC:  NEGATIVE. no anxiety, no depression.     Exam:  Vitals: /82   Pulse 78   Resp 18   Ht 1.683 m (5' 6.25\")   Wt 79.4 kg (175 lb)   LMP  (LMP Unknown)   BMI 28.03 kg/m    BMI= Body mass index is 28.03 kg/m .  Constitutional:  healthy, alert and no distress  Neuro: Alert and Oriented x 3, no focal defects.  Psych: Affect normal   Respiratory: Breathing not labored.  Cardiovascular: normal peripheral pulses  Lymph: no adenopathy  Skin: No rashes,worrisome lesions or skin problems  She complains of tenderness at the lateral knee.  This could be over the screw heads of the locking screws, but is very diffuse.  She had mild tenderness near the greater trochanter, but does not appear to have significant tenderness either above the greater trochanter or just below it over the prominent compression screw.  She has no pain with rotation of the hip at the hip.  She does have some pain down toward the knee with rotation of the hip.  She has tenderness at the sacroiliac joints bilaterally, worse on the right.  She had no tenderness at the sciatic notch on " either side.    Assessment:  Right leg pain might be from iliotibial band irritation from prominent hardware.  The confusing thing is that the prominent hardware has not changed position at all since last October, but the pain in the leg only started at the beginning of July.  She does not have much pain over the prominent compression screw at the hip.    Plan: we discussed possible screw removal distally, but I am not confident it would help.  She will continue to try massage, as pain may be from scar tissue laterally.    Again, thank you for allowing me to participate in the care of your patient.        Sincerely,        Dante Alonso MD

## 2023-09-18 NOTE — PROGRESS NOTES
"Bonita Villarreal is a 88 year old female who is seen in follow-up for right knee and lateral thigh pain.  She had ORIF of a right intertrochanteric femur fracture with a long gamma farhad on 7/19/2022.  She healed the fracture, but did break the distal locking screws by October 2022.  She did fine through the winter and spring.  Just in the past month or so she has developed pain across the anterior knee and lateral and anterior thigh.  She does have a total knee arthroplasty in position.  Pain is mainly present when she stands.  It will occasionally bother her sleeping.  She does not have much pain sitting.  We tried Physical Therapy for iliotibial band stretching, but it has not helped much.  She does get relief with massaging of lateral knee and thigh.    X-rays of the total knee show good position of components.  There is no sign of this becoming loose.  X-rays of the femur show good healing of the proximal fracture.  It is unclear if the lesser tuberosity is fully united.  There has been settling of the fracture early on with some prominence of the compression screw laterally.  Distally the locking screws are broken with slight distal migration of the farhad there.  There is no sign of stress fracture through the distal femur.    Past Medical History:   Diagnosis Date    Advance Care Planning 2/28/2012    Advance Care Planning 3/20/2016: Receipt of ACP document:  Received: Health Care Directive which was witnessed or notarized on 2/25/16.  Document not previously scanned.  Validation form completed and sent with document to be scanned.  Code Status needs to be updated to reflect choices in most recent ACP document.  Confirmed/documented designated decision maker(s).  Added by Cata Terrell RN, Advance Care Planning Liaison. Advance Care Planning 2/28/12: Patient does not have an Advance/Health Care Directive (HCD), given \"What is Advance Care Planning?\" flyer. Teena Peace      Arthritis     Diverticulitis " of colon 2005    Patient keeps Rx for Augmentin to use PRN Problem list name updated by automated process. Provider to review    Diverticulosis of colon (without mention of hemorrhage)     history of recurrent diverticulitis    Hypertension        Past Surgical History:   Procedure Laterality Date    ARTHROPLASTY KNEE Right 2018    Procedure: ARTHROPLASTY KNEE;  Right total knee arthroplasty;  Surgeon: Anton Grover MD;  Location: [facility]    ARTHROPLASTY KNEE Left 10/6/2020    Procedure: Total Knee Arthroplasty;  Surgeon: Calixto Santana MD;  Location: [facility]    COLONOSCOPY  04/15/02    COLONOSCOPY  3/25/2013    Procedure: COLONOSCOPY;  Colonoscopy  ;  Surgeon: Jamil Porras MD;  Location: [facility]    FLEXIBLE SIGMOIDOSCOPY  96    OPEN REDUCTION INTERNAL FIXATION RODDING INTRAMEDULLARY FEMUR Right 2022    Procedure: OPEN REDUCTION INTERNAL FIXATION, FRACTURE, Right FEMUR, USING INTRAMEDULLARY LEENA;  Surgeon: Dante Alonso MD;  Location:  OR    ORTHOPEDIC SURGERY  22-23 YEARS AGO    BACK    SURGICAL HISTORY OF -       lumbar surgery    ZZC APPENDECTOMY         Family History   Problem Relation Age of Onset    Lipids Brother     Heart Disease Brother          at 51 of an MI    Cancer Brother         bone, lung,  82    Prostate Cancer Brother     Cancer Sister         cervical    Lipids Sister     Heart Disease Sister         eldest sister, s/p MI x 2 with stents; diagnosed with AAA, postoperative complications and MI,  at 75    Heart Disease Brother         MI x 2    Lipids Brother     C.A.D. Father          at 39 of an MI    Family History Negative Mother         lived to age 95    Gynecology Sister         cervical Ca       Social History     Socioeconomic History    Marital status:      Spouse name: Not on file    Number of children: Not on file    Years of education: Not on file    Highest education level: Not on file   Occupational History    Not  on file   Tobacco Use    Smoking status: Never    Smokeless tobacco: Never   Vaping Use    Vaping Use: Never used   Substance and Sexual Activity    Alcohol use: Yes     Comment: rare    Drug use: No    Sexual activity: Not Currently     Partners: Male   Other Topics Concern    Parent/sibling w/ CABG, MI or angioplasty before 65F 55M? Yes     Comment: sister bypass,brother bypass,brother MI   Social History Narrative    Not on file     Social Determinants of Health     Financial Resource Strain: Not on file   Food Insecurity: Not on file   Transportation Needs: Not on file   Physical Activity: Not on file   Stress: Not on file   Social Connections: Not on file   Intimate Partner Violence: Not on file   Housing Stability: Not on file       Current Outpatient Medications   Medication Sig Dispense Refill    acetaminophen (TYLENOL) 325 MG tablet Take 3 tablets (975 mg) by mouth every 8 hours      atenolol (TENORMIN) 100 MG tablet Take 1 tablet (100 mg) by mouth daily 90 tablet 4    escitalopram (LEXAPRO) 10 MG tablet Take 1 tablet (10 mg) by mouth daily 90 tablet 4    IBANdronate (BONIVA) 150 MG tablet Take 1 tablet (150 mg) by mouth every 30 days 3 tablet 4    ibuprofen (ADVIL/MOTRIN) 600 MG tablet Take 1 tablet (600 mg) by mouth every 8 hours as needed for pain (mild) 30 tablet 0    losartan-hydrochlorothiazide (HYZAAR) 100-12.5 MG tablet Take 1 tablet by mouth daily 90 tablet 4    multivitamin (CENTRUM SILVER) tablet Take 1 tablet by mouth daily      oxybutynin ER (DITROPAN XL) 5 MG 24 hr tablet Take 1 tablet (5 mg) by mouth daily 90 tablet 4       Allergies   Allergen Reactions    Bactrim [Sulfamethoxazole-Trimethoprim] Nausea and Vomiting    Lisinopril Cough       REVIEW OF SYSTEMS:  CONSTITUTIONAL:  NEGATIVE for fever, chills, change in weight, not feeling tired  SKIN:  NEGATIVE for worrisome rashes, no skin lumps, no skin ulcers and no non-healing wounds  EYES:  NEGATIVE for vision changes or  "irritation.  ENT/MOUTH:  NEGATIVE.  No hearing loss, no hoarseness, no difficulty swallowing.  RESP:  NEGATIVE. No cough or shortness of breath.  CV:  NEGATIVE for chest pain, palpitations or peripheral edema  GI:  NEGATIVE for nausea, abdominal pain, heartburn, or change in bowel habits  :  Negative. No dysuria, no hematuria  MUSCULOSKELETAL:  See HPI above  NEURO:  NEGATIVE . No headaches, no dizziness,  no numbness  ENDOCRINE:  NEGATIVE for temperature intolerance, skin/hair changes  HEME/ALLERGY/IMMUNE:  NEGATIVE for bleeding problems  PSYCHIATRIC:  NEGATIVE. no anxiety, no depression.     Exam:  Vitals: /82   Pulse 78   Resp 18   Ht 1.683 m (5' 6.25\")   Wt 79.4 kg (175 lb)   LMP  (LMP Unknown)   BMI 28.03 kg/m    BMI= Body mass index is 28.03 kg/m .  Constitutional:  healthy, alert and no distress  Neuro: Alert and Oriented x 3, no focal defects.  Psych: Affect normal   Respiratory: Breathing not labored.  Cardiovascular: normal peripheral pulses  Lymph: no adenopathy  Skin: No rashes,worrisome lesions or skin problems  She complains of tenderness at the lateral knee.  This could be over the screw heads of the locking screws, but is very diffuse.  She had mild tenderness near the greater trochanter, but does not appear to have significant tenderness either above the greater trochanter or just below it over the prominent compression screw.  She has no pain with rotation of the hip at the hip.  She does have some pain down toward the knee with rotation of the hip.  She has tenderness at the sacroiliac joints bilaterally, worse on the right.  She had no tenderness at the sciatic notch on either side.    Assessment:  Right leg pain might be from iliotibial band irritation from prominent hardware.  The confusing thing is that the prominent hardware has not changed position at all since last October, but the pain in the leg only started at the beginning of July.  She does not have much pain over the " prominent compression screw at the hip.    Plan: we discussed possible screw removal distally, but I am not confident it would help.  She will continue to try massage, as pain may be from scar tissue laterally.

## 2023-09-20 ENCOUNTER — THERAPY VISIT (OUTPATIENT)
Dept: PHYSICAL THERAPY | Facility: CLINIC | Age: 88
End: 2023-09-20
Attending: ORTHOPAEDIC SURGERY
Payer: COMMERCIAL

## 2023-09-20 DIAGNOSIS — M76.31 ILIOTIBIAL BAND SYNDROME OF RIGHT SIDE: Primary | ICD-10-CM

## 2023-09-20 PROCEDURE — 97116 GAIT TRAINING THERAPY: CPT | Mod: GP | Performed by: PHYSICAL THERAPIST

## 2023-09-20 PROCEDURE — 97110 THERAPEUTIC EXERCISES: CPT | Mod: GP | Performed by: PHYSICAL THERAPIST

## 2023-09-21 ENCOUNTER — OFFICE VISIT (OUTPATIENT)
Dept: FAMILY MEDICINE | Facility: CLINIC | Age: 88
End: 2023-09-21
Payer: COMMERCIAL

## 2023-09-21 ENCOUNTER — LAB (OUTPATIENT)
Dept: LAB | Facility: CLINIC | Age: 88
End: 2023-09-21
Payer: COMMERCIAL

## 2023-09-21 VITALS
RESPIRATION RATE: 16 BRPM | OXYGEN SATURATION: 94 % | TEMPERATURE: 97.2 F | SYSTOLIC BLOOD PRESSURE: 98 MMHG | HEART RATE: 65 BPM | HEIGHT: 66 IN | BODY MASS INDEX: 28.12 KG/M2 | DIASTOLIC BLOOD PRESSURE: 56 MMHG | WEIGHT: 175 LBS

## 2023-09-21 DIAGNOSIS — F41.1 GENERALIZED ANXIETY DISORDER: ICD-10-CM

## 2023-09-21 DIAGNOSIS — M81.0 AGE-RELATED OSTEOPOROSIS WITHOUT CURRENT PATHOLOGICAL FRACTURE: ICD-10-CM

## 2023-09-21 DIAGNOSIS — I10 ESSENTIAL HYPERTENSION, BENIGN: Primary | ICD-10-CM

## 2023-09-21 DIAGNOSIS — Z13.220 LIPID SCREENING: ICD-10-CM

## 2023-09-21 DIAGNOSIS — N39.41 URGENCY INCONTINENCE: ICD-10-CM

## 2023-09-21 DIAGNOSIS — I10 ESSENTIAL HYPERTENSION, BENIGN: ICD-10-CM

## 2023-09-21 LAB
ANION GAP SERPL CALCULATED.3IONS-SCNC: 12 MMOL/L (ref 7–15)
BUN SERPL-MCNC: 26.7 MG/DL (ref 8–23)
CALCIUM SERPL-MCNC: 10.1 MG/DL (ref 8.8–10.2)
CHLORIDE SERPL-SCNC: 96 MMOL/L (ref 98–107)
CHOLEST SERPL-MCNC: 209 MG/DL
CREAT SERPL-MCNC: 0.85 MG/DL (ref 0.51–0.95)
DEPRECATED HCO3 PLAS-SCNC: 28 MMOL/L (ref 22–29)
EGFRCR SERPLBLD CKD-EPI 2021: 66 ML/MIN/1.73M2
GLUCOSE SERPL-MCNC: 105 MG/DL (ref 70–99)
HDLC SERPL-MCNC: 64 MG/DL
LDLC SERPL CALC-MCNC: 114 MG/DL
NONHDLC SERPL-MCNC: 145 MG/DL
POTASSIUM SERPL-SCNC: 5 MMOL/L (ref 3.4–5.3)
SODIUM SERPL-SCNC: 136 MMOL/L (ref 136–145)
TRIGL SERPL-MCNC: 154 MG/DL

## 2023-09-21 PROCEDURE — 99214 OFFICE O/P EST MOD 30 MIN: CPT | Mod: 25 | Performed by: FAMILY MEDICINE

## 2023-09-21 PROCEDURE — 80048 BASIC METABOLIC PNL TOTAL CA: CPT

## 2023-09-21 PROCEDURE — 36415 COLL VENOUS BLD VENIPUNCTURE: CPT

## 2023-09-21 PROCEDURE — G0008 ADMIN INFLUENZA VIRUS VAC: HCPCS | Performed by: FAMILY MEDICINE

## 2023-09-21 PROCEDURE — 80061 LIPID PANEL: CPT

## 2023-09-21 PROCEDURE — 90662 IIV NO PRSV INCREASED AG IM: CPT | Performed by: FAMILY MEDICINE

## 2023-09-21 PROCEDURE — 82306 VITAMIN D 25 HYDROXY: CPT

## 2023-09-21 RX ORDER — ESCITALOPRAM OXALATE 10 MG/1
10 TABLET ORAL DAILY
Qty: 90 TABLET | Refills: 4 | Status: SHIPPED | OUTPATIENT
Start: 2023-09-21 | End: 2024-04-18

## 2023-09-21 RX ORDER — OXYBUTYNIN CHLORIDE 10 MG/1
10 TABLET, EXTENDED RELEASE ORAL DAILY
Qty: 90 TABLET | Refills: 4 | Status: SHIPPED | OUTPATIENT
Start: 2023-09-21 | End: 2024-04-25

## 2023-09-21 RX ORDER — ATENOLOL 100 MG/1
100 TABLET ORAL DAILY
Qty: 90 TABLET | Refills: 4 | Status: SHIPPED | OUTPATIENT
Start: 2023-09-21 | End: 2024-07-10

## 2023-09-21 RX ORDER — LOSARTAN POTASSIUM AND HYDROCHLOROTHIAZIDE 12.5; 1 MG/1; MG/1
TABLET ORAL
Qty: 90 TABLET | Refills: 4 | Status: CANCELLED | OUTPATIENT
Start: 2023-09-21

## 2023-09-21 RX ORDER — IBANDRONATE SODIUM 150 MG/1
150 TABLET, FILM COATED ORAL
Qty: 3 TABLET | Refills: 4 | Status: ON HOLD | OUTPATIENT
Start: 2023-09-21 | End: 2024-08-01

## 2023-09-21 RX ORDER — LOSARTAN POTASSIUM 100 MG/1
100 TABLET ORAL DAILY
Qty: 90 TABLET | Refills: 4 | Status: SHIPPED | OUTPATIENT
Start: 2023-09-21 | End: 2024-09-18

## 2023-09-21 ASSESSMENT — PAIN SCALES - GENERAL: PAINLEVEL: SEVERE PAIN (6)

## 2023-09-21 NOTE — NURSING NOTE
"Initial BP 98/56   Pulse 65   Temp 97.2  F (36.2  C) (Tympanic)   Resp 16   Ht 1.683 m (5' 6.25\")   Wt 79.4 kg (175 lb)   LMP  (LMP Unknown)   SpO2 94%   BMI 28.03 kg/m   Estimated body mass index is 28.03 kg/m  as calculated from the following:    Height as of this encounter: 1.683 m (5' 6.25\").    Weight as of this encounter: 79.4 kg (175 lb). .    "

## 2023-09-21 NOTE — PROGRESS NOTES
"  Assessment & Plan     Essential hypertension, benign  She notices urinary frequency.  Was on losartan/hydrochlorothiazide 100/25.  Blood pressure low normal at 98/56.  Discussed discontinuing Hyzaar and starting just losartan without the hydrochlorothiazide component which may help a little bit with urinary frequency and hopefully also bring her blood pressure up a little bit.  She denies any symptoms of hypotension currently.  Refilled atenolol at current dose.  We will also check labs.  - atenolol (TENORMIN) 100 MG tablet; Take 1 tablet (100 mg) by mouth daily  - Basic metabolic panel; Future  - losartan (COZAAR) 100 MG tablet; Take 1 tablet (100 mg) by mouth daily    GENERALIZED ANXIETY DIS  Slight increase in anxiety recently.  She does not want to adjust dose of medication yet.  She has a wellness exam in December and would like to revisit this then.  - escitalopram (LEXAPRO) 10 MG tablet; Take 1 tablet (10 mg) by mouth daily    Age-related osteoporosis without current pathological fracture  Well controlled. Refilled medication.   DEXA due.  Labs due.  - IBANdronate (BONIVA) 150 MG tablet; Take 1 tablet (150 mg) by mouth every 30 days  - DX Hip/Pelvis/Spine; Future  - Vitamin D Deficiency; Future    Urgency incontinence  Given urinary urgency and frequency we did adjust hydrochlorothiazide dose but I do not think she is on enough to cause the degree of symptoms that she is having.  We discussed increasing oxybutynin to 10 mg daily.  Discussed use and side effects.  Follow-up as needed.  - oxyBUTYnin ER (DITROPAN XL) 10 MG 24 hr tablet; Take 1 tablet (10 mg) by mouth daily    Lipid screening  - Lipid panel reflex to direct LDL Fasting; Future             BMI:   Estimated body mass index is 28.03 kg/m  as calculated from the following:    Height as of this encounter: 1.683 m (5' 6.25\").    Weight as of this encounter: 79.4 kg (175 lb).           Dewayne Santana MD  [facility] " "PONCHO Da Silva is a 88 year old, presenting for the following health issues:  Recheck Medication      History of Present Illness       Reason for visit:  Meds    She eats 4 or more servings of fruits and vegetables daily.She consumes 1 sweetened beverage(s) daily.She exercises with enough effort to increase her heart rate 10 to 19 minutes per day.  She exercises with enough effort to increase her heart rate 5 days per week.   She is taking medications regularly.         Patient would like to discuss her medications and discuss the best way to use control her leg pain.        Review of Systems   Constitutional, neuro, ENT, endocrine, pulmonary, cardiac, gastrointestinal, genitourinary, musculoskeletal, integument and psychiatric systems are negative, except as otherwise noted.       Objective    BP 98/56   Pulse 65   Temp 97.2  F (36.2  C) (Tympanic)   Resp 16   Ht 1.683 m (5' 6.25\")   Wt 79.4 kg (175 lb)   LMP  (LMP Unknown)   SpO2 94%   BMI 28.03 kg/m    Body mass index is 28.03 kg/m .  Physical Exam   GENERAL: Pleasant, well appearing female.  HEENT: PERRL, EOMI.  NECK: supple, no thyromegaly or thyroid masses, no lymphadenopathy.  CV: RRR, no murmurs, rubs or gallops.  LUNGS: Clear to auscultation bilaterally, normal effort.  ABDOMEN: Soft, non-distended, non-tender.  No hepatosplenomegaly or palpable masses.    SKIN: warm and dry without obvious rashes.   EXTREMITIES: No edema, normal pulses.                   "

## 2023-09-22 LAB — DEPRECATED CALCIDIOL+CALCIFEROL SERPL-MC: 29 UG/L (ref 20–75)

## 2023-09-29 ENCOUNTER — THERAPY VISIT (OUTPATIENT)
Dept: PHYSICAL THERAPY | Facility: CLINIC | Age: 88
End: 2023-09-29
Attending: ORTHOPAEDIC SURGERY
Payer: COMMERCIAL

## 2023-09-29 DIAGNOSIS — M76.31 ILIOTIBIAL BAND SYNDROME OF RIGHT SIDE: Primary | ICD-10-CM

## 2023-09-29 PROCEDURE — 97110 THERAPEUTIC EXERCISES: CPT | Mod: GP | Performed by: PHYSICAL THERAPIST

## 2023-10-02 ENCOUNTER — TELEPHONE (OUTPATIENT)
Dept: FAMILY MEDICINE | Facility: CLINIC | Age: 88
End: 2023-10-02

## 2023-10-02 DIAGNOSIS — R26.81 GAIT INSTABILITY: Primary | ICD-10-CM

## 2023-10-02 NOTE — TELEPHONE ENCOUNTER
Order/Referral Request    Who is requesting: Patient    Orders being requested: Orders for a cane and a walker    Reason service is needed/diagnosis: Right Femur fracture in 2022. Would like help with walking    When are orders needed by: ASAP    Does patient have a preference on a Group/Provider/Facility? [facility]       Where to send orders: Place orders within Epic    Could we send this information to you in [facility] or would you prefer to receive a phone call?:   Patient would prefer a phone call   Okay to leave a detailed message?: Yes at Home number on file [phone] (home)

## 2023-10-03 NOTE — TELEPHONE ENCOUNTER
Pt requesting cane and walker orders, pended orders. Will route to PCP for further recommendation.     Luba Guy RN

## 2023-10-04 ENCOUNTER — THERAPY VISIT (OUTPATIENT)
Dept: PHYSICAL THERAPY | Facility: CLINIC | Age: 88
End: 2023-10-04
Attending: ORTHOPAEDIC SURGERY
Payer: COMMERCIAL

## 2023-10-04 DIAGNOSIS — M76.31 ILIOTIBIAL BAND SYNDROME OF RIGHT SIDE: Primary | ICD-10-CM

## 2023-10-04 PROCEDURE — 97110 THERAPEUTIC EXERCISES: CPT | Mod: GP | Performed by: PHYSICAL THERAPIST

## 2023-10-06 NOTE — TELEPHONE ENCOUNTER
Pt called wondering if getting a walker was approved. Pt would like a call back with any info regarding this.  Renetta Lares on 10/6/2023 at 9:48 AM

## 2023-10-09 ENCOUNTER — TELEPHONE (OUTPATIENT)
Dept: FAMILY MEDICINE | Facility: CLINIC | Age: 88
End: 2023-10-09
Payer: COMMERCIAL

## 2023-10-10 NOTE — TELEPHONE ENCOUNTER
Avinash returned call. She did not have her voicemail turned on so did not know she missed calls. She has been using a borrowed walker from [facility] for the past year since her femur fracture. She thought she should have her own. No recent falls. She is using a 4 wheeled walker. She has difficulty walking without it.   I did forget to ask her if she wanted a seat, tried calling her back with no answer.   She soul like the Rx sent to the ortho supply store in [place] at the [facility].   She has a physical therapy appt in [facility] tomorrow and will inquire about the status of this at the  then.   Tiffanie BENNETT RN

## 2023-10-11 ENCOUNTER — THERAPY VISIT (OUTPATIENT)
Dept: PHYSICAL THERAPY | Facility: CLINIC | Age: 88
End: 2023-10-11
Attending: ORTHOPAEDIC SURGERY
Payer: COMMERCIAL

## 2023-10-11 DIAGNOSIS — M76.31 ILIOTIBIAL BAND SYNDROME OF RIGHT SIDE: Primary | ICD-10-CM

## 2023-10-11 PROCEDURE — 97110 THERAPEUTIC EXERCISES: CPT | Mod: GP | Performed by: PHYSICAL THERAPIST

## 2023-10-11 NOTE — TELEPHONE ENCOUNTER
Left message for the patient to call back to see if she wants a seat on the wheelchair.      AFIA Miller

## 2023-10-18 ENCOUNTER — THERAPY VISIT (OUTPATIENT)
Dept: PHYSICAL THERAPY | Facility: CLINIC | Age: 88
End: 2023-10-18
Attending: ORTHOPAEDIC SURGERY
Payer: COMMERCIAL

## 2023-10-18 DIAGNOSIS — M76.31 ILIOTIBIAL BAND SYNDROME OF RIGHT SIDE: Primary | ICD-10-CM

## 2023-10-18 PROCEDURE — 97110 THERAPEUTIC EXERCISES: CPT | Mod: GP | Performed by: PHYSICAL THERAPIST

## 2023-10-18 PROCEDURE — 97140 MANUAL THERAPY 1/> REGIONS: CPT | Mod: GP | Performed by: PHYSICAL THERAPIST

## 2023-12-06 ENCOUNTER — OFFICE VISIT (OUTPATIENT)
Dept: FAMILY MEDICINE | Facility: CLINIC | Age: 88
End: 2023-12-06
Payer: COMMERCIAL

## 2023-12-06 VITALS
HEART RATE: 76 BPM | HEIGHT: 66 IN | TEMPERATURE: 97.2 F | SYSTOLIC BLOOD PRESSURE: 136 MMHG | BODY MASS INDEX: 27.32 KG/M2 | WEIGHT: 170 LBS | OXYGEN SATURATION: 96 % | RESPIRATION RATE: 16 BRPM | DIASTOLIC BLOOD PRESSURE: 72 MMHG

## 2023-12-06 DIAGNOSIS — Z00.00 ENCOUNTER FOR MEDICARE ANNUAL WELLNESS EXAM: Primary | ICD-10-CM

## 2023-12-06 DIAGNOSIS — I10 ESSENTIAL HYPERTENSION, BENIGN: Chronic | ICD-10-CM

## 2023-12-06 PROCEDURE — G0439 PPPS, SUBSEQ VISIT: HCPCS | Performed by: FAMILY MEDICINE

## 2023-12-06 ASSESSMENT — ENCOUNTER SYMPTOMS
CHILLS: 0
SHORTNESS OF BREATH: 0
HEMATOCHEZIA: 0
ARTHRALGIAS: 1
HEARTBURN: 0
DIARRHEA: 0
FEVER: 0
PARESTHESIAS: 0
WEAKNESS: 0
COUGH: 0
MYALGIAS: 0
PALPITATIONS: 0
HEADACHES: 0
ABDOMINAL PAIN: 0
EYE PAIN: 0
HEMATURIA: 0
JOINT SWELLING: 0
NERVOUS/ANXIOUS: 0
BREAST MASS: 0
SORE THROAT: 0
DIZZINESS: 0
CONSTIPATION: 0
DYSURIA: 0
NAUSEA: 0
FREQUENCY: 1

## 2023-12-06 ASSESSMENT — ACTIVITIES OF DAILY LIVING (ADL): CURRENT_FUNCTION: NO ASSISTANCE NEEDED

## 2023-12-06 ASSESSMENT — PAIN SCALES - GENERAL: PAINLEVEL: MODERATE PAIN (5)

## 2023-12-06 NOTE — PROGRESS NOTES
"SUBJECTIVE:   Avinash is a 88 year old, presenting for the following:  Wellness Visit        9/21/2023     9:37 AM   Additional Questions   Roomed by Megan ORTIZ CMA   Accompanied by self       Are you in the first 12 months of your Medicare coverage?  No    Healthy Habits:     In general, how would you rate your overall health?  Fair    Frequency of exercise:  4-5 days/week    Duration of exercise:  Less than 15 minutes    Do you usually eat at least 4 servings of fruit and vegetables a day, include whole grains    & fiber and avoid regularly eating high fat or \"junk\" foods?  Yes    Taking medications regularly:  Yes    Medication side effects:  None    Ability to successfully perform activities of daily living:  No assistance needed    Home Safety:  No safety concerns identified    Hearing Impairment:  No hearing concerns    In the past 6 months, have you been bothered by leaking of urine?  No    In general, how would you rate your overall mental or emotional health?  Good    Additional concerns today:  Yes      Today's PHQ-2 Score:       12/6/2023    10:13 AM   PHQ-2 ( 1999 [employer])   Q1: Little interest or pleasure in doing things 0   Q2: Feeling down, depressed or hopeless 0   PHQ-2 Score 0   Q1: Little interest or pleasure in doing things Not at all   Q2: Feeling down, depressed or hopeless Not at all   PHQ-2 Score 0           Have you ever done Advance Care Planning? (For example, a Health Directive, POLST, or a discussion with a medical provider or your loved ones about your wishes): Yes, advance care planning is on file.       Fall risk  Fallen 2 or more times in the past year?: No  Any fall with injury in the past year?: No    Cognitive Screening   1) Repeat 3 items (Leader, Season, Table)    2) Clock draw: NORMAL  3) 3 item recall: Recalls 3 objects  Results: 3 items recalled: COGNITIVE IMPAIRMENT LESS LIKELY    Mini-CogTM Copyright S Jonny. Licensed by the author for use in [facility]; reprinted " with permission (juany@[facility]). All rights reserved.          Reviewed and updated as needed this visit by clinical staff                  Reviewed and updated as needed this visit by Provider                 Social History     Tobacco Use     Smoking status: Never     Smokeless tobacco: Never   Substance Use Topics     Alcohol use: Yes     Comment: rare             12/6/2023    10:13 AM   Alcohol Use   Prescreen: >3 drinks/day or >7 drinks/week? No     Do you have a current opioid prescription? No  Do you use any other controlled substances or medications that are not prescribed by a provider? None              Current providers sharing in care for this patient include:   Patient Care Team:  Dewayne Santana MD as PCP - General (Family Practice)  Dante Alonso MD as Assigned Musculoskeletal Provider  Dewayne Santana MD as Assigned PCP    The following health maintenance items are reviewed in Epic and correct as of today:  Health Maintenance   Topic Date Due     RSV VACCINE (Pregnancy & 60+) (1 - 1-dose 60+ series) Never done     MEDICARE ANNUAL WELLNESS VISIT  12/02/2023     ANNUAL REVIEW OF HM ORDERS  09/21/2024     FALL RISK ASSESSMENT  12/06/2024     DTAP/TDAP/TD IMMUNIZATION (2 - Td or Tdap) 03/14/2026     ADVANCE CARE PLANNING  12/14/2027     PHQ-2 (once per calendar year)  Completed     INFLUENZA VACCINE  Completed     Pneumococcal Vaccine: 65+ Years  Completed     ZOSTER IMMUNIZATION  Completed     COVID-19 Vaccine  Completed     IPV IMMUNIZATION  Aged Out     HPV IMMUNIZATION  Aged Out     MENINGITIS IMMUNIZATION  Aged Out     RSV MONOCLONAL ANTIBODY  Aged Out     Labs reviewed in EPIC  Patient Active Problem List   Diagnosis     Essential hypertension, benign     Generalized anxiety disorder     Diverticulosis of large intestine     Osteopenia of spine     Family history of other cardiovascular diseases     CARDIOVASCULAR SCREENING; LDL GOAL LESS THAN 160     Status post total  right knee replacement     Right knee DJD     Fracture of sacrum (H)     Sacral pain     Prediabetes     Status post total knee replacement     Urgency incontinence     Closed comminuted intertrochanteric fracture of right femur (H)     Anemia, unspecified type     Age-related osteoporosis with current pathological fracture     Closed displaced intertrochanteric fracture of right femur with routine healing, subsequent encounter     Pain from implanted hardware, initial encounter     Iliotibial band syndrome of right side     Past Surgical History:   Procedure Laterality Date     ARTHROPLASTY KNEE Right 2018    Procedure: ARTHROPLASTY KNEE;  Right total knee arthroplasty;  Surgeon: Anton Grover MD;  Location: [facility]     ARTHROPLASTY KNEE Left 10/6/2020    Procedure: Total Knee Arthroplasty;  Surgeon: Calixto Santana MD;  Location: [facility] OR     COLONOSCOPY  04/15/02     COLONOSCOPY  3/25/2013    Procedure: COLONOSCOPY;  Colonoscopy  ;  Surgeon: Jamil Porras MD;  Location: [facility] GI     FLEXIBLE SIGMOIDOSCOPY  96     OPEN REDUCTION INTERNAL FIXATION RODDING INTRAMEDULLARY FEMUR Right 2022    Procedure: OPEN REDUCTION INTERNAL FIXATION, FRACTURE, Right FEMUR, USING INTRAMEDULLARY LEENA;  Surgeon: Dante Alonso MD;  Location:  OR     ORTHOPEDIC SURGERY  22-23 YEARS AGO    BACK     SURGICAL HISTORY OF -       lumbar surgery     ZZC APPENDECTOMY         Social History     Tobacco Use     Smoking status: Never     Smokeless tobacco: Never   Substance Use Topics     Alcohol use: Yes     Comment: rare     Family History   Problem Relation Age of Onset     Lipids Brother      Heart Disease Brother          at 51 of an MI     Cancer Brother         bone, lung,  82     Prostate Cancer Brother      Cancer Sister         cervical     Lipids Sister      Heart Disease Sister         eldest sister, s/p MI x 2 with stents; diagnosed with AAA, postoperative complications and MI,  at 75      Heart Disease Brother         MI x 2     Lipids Brother      C.A.D. Father          at 39 of an MI     Family History Negative Mother         lived to age 95     Gynecology Sister         cervical Ca         Current Outpatient Medications   Medication Sig Dispense Refill     acetaminophen (TYLENOL) 325 MG tablet Take 3 tablets (975 mg) by mouth every 8 hours       atenolol (TENORMIN) 100 MG tablet Take 1 tablet (100 mg) by mouth daily 90 tablet 4     escitalopram (LEXAPRO) 10 MG tablet Take 1 tablet (10 mg) by mouth daily 90 tablet 4     IBANdronate (BONIVA) 150 MG tablet Take 1 tablet (150 mg) by mouth every 30 days 3 tablet 4     ibuprofen (ADVIL/MOTRIN) 600 MG tablet Take 1 tablet (600 mg) by mouth every 8 hours as needed for pain (mild) 30 tablet 0     losartan (COZAAR) 100 MG tablet Take 1 tablet (100 mg) by mouth daily 90 tablet 4     multivitamin (CENTRUM SILVER) tablet Take 1 tablet by mouth daily       oxyBUTYnin ER (DITROPAN XL) 10 MG 24 hr tablet Take 1 tablet (10 mg) by mouth daily 90 tablet 4     Allergies   Allergen Reactions     Bactrim [Sulfamethoxazole-Trimethoprim] Nausea and Vomiting     Lisinopril Cough         Mammogram Screening - Patient over age 75, has elected to discontinue screenings.  Pertinent mammograms are reviewed under the imaging tab.    Review of Systems   Constitutional:  Negative for chills and fever.   HENT:  Negative for congestion, ear pain, hearing loss and sore throat.    Eyes:  Negative for pain and visual disturbance.   Respiratory:  Negative for cough and shortness of breath.    Cardiovascular:  Negative for chest pain, palpitations and peripheral edema.   Gastrointestinal:  Negative for abdominal pain, constipation, diarrhea, heartburn, hematochezia and nausea.   Breasts:  Negative for tenderness, breast mass and discharge.   Genitourinary:  Positive for frequency and urgency. Negative for dysuria, genital sores, hematuria, pelvic pain, vaginal bleeding and  "vaginal discharge.   Musculoskeletal:  Positive for arthralgias. Negative for joint swelling and myalgias.   Skin:  Negative for rash.   Neurological:  Negative for dizziness, weakness, headaches and paresthesias.   Psychiatric/Behavioral:  Negative for mood changes. The patient is not nervous/anxious.          OBJECTIVE:   /72   Pulse 76   Temp 97.2  F (36.2  C) (Tympanic)   Resp 16   Ht 1.683 m (5' 6.25\")   Wt 77.1 kg (170 lb)   LMP  (LMP Unknown)   SpO2 96%   BMI 27.23 kg/m   Estimated body mass index is 27.23 kg/m  as calculated from the following:    Height as of this encounter: 1.683 m (5' 6.25\").    Weight as of this encounter: 77.1 kg (170 lb).  Physical Exam  GENERAL APPEARANCE: healthy, alert and no distress  EYES: Eyes grossly normal to inspection, PERRL and conjunctivae and sclerae normal  HENT: ear canals and TM's normal  NECK: no adenopathy, no asymmetry, masses, or scars and thyroid normal to palpation  RESP: lungs clear to auscultation - no rales, rhonchi or wheezes  BREAST: normal without masses, tenderness or nipple discharge and no palpable axillary masses or adenopathy  CV: regular rate and rhythm, normal S1 S2, no S3 or S4, no murmur, click or rub, no peripheral edema and peripheral pulses strong  ABDOMEN: soft, nontender, no hepatosplenomegaly, no masses and bowel sounds normal  MS: no musculoskeletal defects are noted and gait is age appropriate without ataxia  SKIN: no suspicious lesions or rashes  NEURO: Normal strength and tone, sensory exam grossly normal, mentation intact and speech normal  PSYCH: mentation appears normal and affect normal/bright    Diagnostic Test Results:  Labs reviewed in Epic    ASSESSMENT / PLAN:   Encounter for Medicare annual wellness exam    Essential hypertension, benign  Well-controlled on losartan and atenolol.  Refills and labs are up-to-date.        Patient has been advised of split billing requirements and indicates understanding: " "Yes      COUNSELING:  Reviewed preventive health counseling, as reflected in patient instructions      BMI:   Estimated body mass index is 27.23 kg/m  as calculated from the following:    Height as of this encounter: 1.683 m (5' 6.25\").    Weight as of this encounter: 77.1 kg (170 lb).         She reports that she has never smoked. She has never used smokeless tobacco.      Appropriate preventive services were discussed with this patient, including applicable screening as appropriate for fall prevention, nutrition, physical activity, Tobacco-use cessation, weight loss and cognition.  Checklist reviewing preventive services available has been given to the patient.    Reviewed patients plan of care and provided an AVS. The Basic Care Plan (routine screening as documented in Health Maintenance) for Bonita meets the Care Plan requirement. This Care Plan has been established and reviewed with the Patient.          Dewayne Santana MD  [facility]    Identified Health Risks:  I have reviewed Opioid Use Disorder and Substance Use Disorder risk factors and made any needed referrals.   "

## 2023-12-06 NOTE — NURSING NOTE
"Initial /72   Pulse 76   Temp 97.2  F (36.2  C) (Tympanic)   Resp 16   Ht 1.683 m (5' 6.25\")   Wt 77.1 kg (170 lb)   LMP  (LMP Unknown)   SpO2 96%   BMI 27.23 kg/m   Estimated body mass index is 27.23 kg/m  as calculated from the following:    Height as of this encounter: 1.683 m (5' 6.25\").    Weight as of this encounter: 77.1 kg (170 lb). .    "

## 2023-12-06 NOTE — PROGRESS NOTES
"The patient was provided with suggestions to help her develop a healthy physical lifestyle.  Answers submitted by the patient for this visit:  Annual Preventive Visit (Submitted on 12/6/2023)  Chief Complaint: Annual Exam:  In general, how would you rate your overall physical health?: fair  Frequency of exercise:: 4-5 days/week  Do you usually eat at least 4 servings of fruit and vegetables a day, include whole grains & fiber, and avoid regularly eating high fat or \"junk\" foods? : Yes  Taking medications regularly:: Yes  Medication side effects:: None  Activities of Daily Living: no assistance needed  Home safety: no safety concerns identified  Hearing Impairment:: no hearing concerns  In the past 6 months, have you been bothered by leaking of urine?: No  abdominal pain: No  Blood in stool: No  Blood in urine: No  chest pain: No  chills: No  congestion: No  constipation: No  cough: No  diarrhea: No  dizziness: No  ear pain: No  eye pain: No  nervous/anxious: No  fever: No  frequency: Yes  genital sores: No  headaches: No  hearing loss: No  heartburn: No  arthralgias: Yes  joint swelling: No  peripheral edema: No  mood changes: No  myalgias: No  nausea: No  dysuria: No  palpitations: No  Skin sensation changes: No  sore throat: No  urgency: Yes  rash: No  shortness of breath: No  visual disturbance: No  weakness: No  pelvic pain: No  vaginal bleeding: No  vaginal discharge: No  tenderness: No  breast mass: No  breast discharge: No  In general, how would you rate your overall mental or emotional health?: good  Additional concerns today:: Yes  Exercise outside of work (Submitted on 12/6/2023)  Chief Complaint: Annual Exam:  Duration of exercise:: Less than 15 minutes    "

## 2023-12-06 NOTE — PATIENT INSTRUCTIONS
Patient Education   Personalized Prevention Plan  You are due for the preventive services outlined below.  Your care team is available to assist you in scheduling these services.  If you have already completed any of these items, please share that information with your care team to update in your medical record.  Health Maintenance Due   Topic Date Due     RSV VACCINE (Pregnancy & 60+) (1 - 1-dose 60+ series) Never done     Annual Wellness Visit  12/02/2023     Your Health Risk Assessment indicates you feel you are not in good health    A healthy lifestyle helps keep the body fit and the mind alert. It helps protect you from disease, helps you fight disease, and helps prevent chronic disease (disease that doesn't go away) from getting worse. This is important as you get older and begin to notice twinges in muscles and joints and a decline in the strength and stamina you once took for granted. A healthy lifestyle includes good healthcare, good nutrition, weight control, recreation, and regular exercise. Avoid harmful substances and do what you can to keep safe. Another part of a healthy lifestyle is stay mentally active and socially involved.    Good healthcare   Have a wellness visit every year.   If you have new symptoms, let us know right away. Don't wait until the next checkup.   Take medicines exactly as prescribed and keep your medicines in a safe place. Tell us if your medicine causes problems.   Healthy diet and weight control   Eat 3 or 4 small, nutritious, low-fat, high-fiber meals a day. Include a variety of fruits, vegetables, and whole-grain foods.   Make sure you get enough calcium in your diet. Calcium, vitamin D, and exercise help prevent osteoporosis (bone thinning).   If you live alone, try eating with others when you can. That way you get a good meal and have company while you eat it.   Try to keep a healthy weight. If you eat more calories than your body uses for energy, it will be stored as fat  and you will gain weight.     Recreation   Recreation is not limited to sports and team events. It includes any activity that provides relaxation, interest, enjoyment, and exercise. Recreation provides an outlet for physical, mental, and social energy. It can give a sense of worth and achievement. It can help you stay healthy.    Mental Exercise and Social Involvement  Mental and emotional health is as important as physical health. Keep in touch with friends and family. Stay as active as possible. Continue to learn and challenge yourself.   Things you can do to stay mentally active are:  Learn something new, like a foreign language or musical instrument.   Play SCRABBLE or do crossword puzzles. If you cannot find people to play these games with you at home, you can play them with others on your computer through the Internet.   Join a games club--anything from card games to chess or checkers or lawn bowling.   Start a new hobby.   Go back to school.   Volunteer.   Read.   Keep up with world events.

## 2023-12-19 DIAGNOSIS — I10 ESSENTIAL HYPERTENSION, BENIGN: ICD-10-CM

## 2023-12-19 RX ORDER — LOSARTAN POTASSIUM AND HYDROCHLOROTHIAZIDE 12.5; 1 MG/1; MG/1
TABLET ORAL
Qty: 90 TABLET | Refills: 4 | OUTPATIENT
Start: 2023-12-19

## 2023-12-27 PROBLEM — M76.31 ILIOTIBIAL BAND SYNDROME OF RIGHT SIDE: Status: RESOLVED | Noted: 2023-08-10 | Resolved: 2023-12-27

## 2023-12-27 NOTE — PROGRESS NOTES
10/18/23 0500   Appointment Info   Signing clinician's name / credentials Inge Lynch, PT   Total/Authorized Visits 12   Visits Used 11   Medical Diagnosis Iliotibial band syndrome of right side (M76.31)   PT Tx Diagnosis right hip and knee pain/weakness   Quick Adds Certification   Progress Note/Certification   Start of Care Date 08/03/23   Onset of illness/injury or Date of Surgery 07/07/23   Therapy Frequency 1x/week   Predicted Duration 12 weeks   Certification date from 08/03/23   Certification date to 10/26/23   Progress Note Due Date 10/12/23   GOALS   PT Goals 2;3;4   PT Goal 1   Goal Identifier STG   Goal Description Patient to ambulate over 100 feet with SPC demonstrating good stability and reduced antalgic pattern.   Goal Progress met, tends to use rolling walker if walking in community for prolonged period of time   Target Date 09/07/23   Date Met 09/29/23   PT Goal 2   Goal Identifier STG   Goal Description Patient to get up from standard height chair with minimal use of UE support to safely transfer.   Goal Progress met   Target Date 09/14/23   Date Met 09/06/23   PT Goal 3   Goal Identifier LTG   Goal Description Patient to ambulate community distances using SPC and <2/10 RLE pain   Goal Progress progressing, is able to tolerate household distances with SPC.   Target Date 10/26/23   PT Goal 4   Goal Identifier LTG   Goal Description Patient to be IND with HEP to aid functional recovery and improve ability to self manage symptoms if they arise in the future.   Goal Progress met   Target Date 10/26/23   Date Met 10/18/23   Subjective Report   Subjective Report This last week has been good and tolerate walking activities better.   Treatment Interventions (PT)   Interventions Therapeutic Procedure/Exercise;Manual Therapy   Therapeutic Procedure/Exercise   Therapeutic Procedures: strength, endurance, ROM, flexibillity minutes (18962) 15   Ther Proc 1 light ROM/mobility   Ther Proc 1 - Details nustep  8 min level 4 working on strength, supine quad sets 1x15, reviewed HEP   Skilled Intervention cues for dosage tolerance, body positioning, and slowing pace down to maximize benefit and keep pain managed   Patient Response/Progress fatigue requiring seated rest   Manual Therapy   Manual Therapy: Mobilization, MFR, MLD, friction massage minutes (41562) 8   Manual Therapy 1 STM   Manual Therapy 1 - Details STM ITB, quad, and TFL, prolonged hip flexion and quad stretch, pin and stretch ITB   Education   Learner/Method Patient;Listening;Reading;Demonstration;Pictures/Video   Plan   Home program ptrx -- printout provided   Updates to plan of care DC next visit   Plan for next session R hip strengthening, BLE strengthening with resistance, step ups as tolerated   Total Session Time   Timed Code Treatment Minutes 23   Total Treatment Time (sum of timed and untimed services) 23         DISCHARGE  Reason for Discharge: No further expectation of progress.  Patient has failed to schedule further appointments.    Equipment Issued: [facility]    Discharge Plan: Patient to continue home program.    Referring Provider:  Dante Alonso

## 2024-04-17 ENCOUNTER — MYC MEDICAL ADVICE (OUTPATIENT)
Dept: FAMILY MEDICINE | Facility: CLINIC | Age: 89
End: 2024-04-17
Payer: COMMERCIAL

## 2024-04-17 ENCOUNTER — TELEPHONE (OUTPATIENT)
Dept: FAMILY MEDICINE | Facility: CLINIC | Age: 89
End: 2024-04-17
Payer: COMMERCIAL

## 2024-04-17 DIAGNOSIS — F41.1 GENERALIZED ANXIETY DISORDER: ICD-10-CM

## 2024-04-17 RX ORDER — ESCITALOPRAM OXALATE 10 MG/1
20 TABLET ORAL DAILY
Qty: 90 TABLET | Refills: 0 | Status: CANCELLED | OUTPATIENT
Start: 2024-04-17

## 2024-04-17 NOTE — TELEPHONE ENCOUNTER
General Call      Reason for Call:  Anxiety    What are your questions or concerns:  Pt calling because she has been having some increased anxiety for a couple weeks, worse today. She is currently taking Lexapro 10mg a day, wondering if she should start taking 2 of those a day to see if that will help. Or if she should make an appt to see Dr. Santana to try something else.      Could we send this information to you in [employer] or would you prefer to receive a phone call?:   Patient would prefer a phone call   Okay to leave a detailed message?: Yes at Cell number on file:    Telephone Information:   Mobile [phone]

## 2024-04-17 NOTE — TELEPHONE ENCOUNTER
Patient Contact     Attempt # 1     Was call answered?  No.  Left message on voicemail with information to call back.     When calling back please assess anxiety and schedule an appointment with Dr. Santana with appropriate level of care based on assessment.    Bethany Christie RN on 4/17/2024 at 9:58 AM

## 2024-04-17 NOTE — TELEPHONE ENCOUNTER
Patient returned call  States for the past two weeks her anxiety has gotten more severe  Last couple of days it has been so strong that she doubled her Lexapro from 10 to 20 mg  No known triggers, simple things increase anxiety and worry ie meeting friends for lunch  Denies thoughts of suicide or self harm, harm to others  States she has lost 15 pounds due to the anxiety  Patient scheduled virtually for appointment tomorrow 4/18    JAMSHID and PHQ sent via my chart    Please okay Lexapro dose increase and/or advise    Tiffanie Murdock RN on 4/17/2024 at 12:23 PM

## 2024-04-18 ENCOUNTER — VIRTUAL VISIT (OUTPATIENT)
Dept: FAMILY MEDICINE | Facility: CLINIC | Age: 89
End: 2024-04-18
Payer: COMMERCIAL

## 2024-04-18 DIAGNOSIS — F41.1 GENERALIZED ANXIETY DISORDER: ICD-10-CM

## 2024-04-18 PROCEDURE — 99441 PR PHYSICIAN TELEPHONE EVALUATION 5-10 MIN: CPT | Mod: 93 | Performed by: FAMILY MEDICINE

## 2024-04-18 RX ORDER — ESCITALOPRAM OXALATE 20 MG/1
20 TABLET ORAL DAILY
Qty: 90 TABLET | Refills: 1 | Status: SHIPPED | OUTPATIENT
Start: 2024-04-18

## 2024-04-18 ASSESSMENT — ENCOUNTER SYMPTOMS: NERVOUS/ANXIOUS: 1

## 2024-04-18 NOTE — PROGRESS NOTES
Avinash is a 88 year old who is being evaluated via a billable telephone visit.    What phone number would you like to be contacted at? [phone]    How would you like to obtain your AVS? MyChart  Originating Location (pt. Location): Home    Distant Location (provider location):  On-site    Assessment & Plan     GENERALIZED ANXIETY DIS  Uncontrolled. Increase to 20mg daily.   - escitalopram (LEXAPRO) 20 MG tablet; Take 1 tablet (20 mg) by mouth daily    She also notes unintentional 10# weight loss in the last 2 months.I scheduled her for an office visit for follow-up of this.                  Subjective   Avinash is a 88 year old, presenting for the following health issues:  Anxiety and Weight Loss    Anxiety  This is a chronic problem. The current episode started 1 to 4 weeks ago. The problem occurs intermittently. The problem has been waxing and waning.              ROS:   Constitutional, neuro, ENT, endocrine, pulmonary, cardiac, gastrointestinal, genitourinary, musculoskeletal, integument and psychiatric systems are negative, except as otherwise noted.       Objective           Vitals:  No vitals were obtained today due to virtual visit.    Physical Exam   General: Alert and no distress //Respiratory: No audible wheeze, cough, or shortness of breath // Psychiatric:  Appropriate affect, tone, and pace of words            Phone call duration: 8 minutes  Signed Electronically by: Dewayne Santana MD

## 2024-04-23 NOTE — TELEPHONE ENCOUNTER
Patient scheduled 4/25/24 with Dr. Santana in a same day appointment and will address this at that time.    Bethany Christie RN on 4/23/2024 at 10:07 AM

## 2024-04-23 NOTE — TELEPHONE ENCOUNTER
Patient called and wants to be seen by Dr. Santana to address her anxiety urgently. Patient reports she is safe to wait until Thursday 4/25/24 in a same day appointment.    Bethany Christie RN on 4/23/2024 at 10:07 AM

## 2024-04-25 ENCOUNTER — OFFICE VISIT (OUTPATIENT)
Dept: FAMILY MEDICINE | Facility: CLINIC | Age: 89
End: 2024-04-25
Payer: COMMERCIAL

## 2024-04-25 VITALS
HEIGHT: 66 IN | SYSTOLIC BLOOD PRESSURE: 132 MMHG | BODY MASS INDEX: 27 KG/M2 | TEMPERATURE: 98.2 F | OXYGEN SATURATION: 97 % | RESPIRATION RATE: 16 BRPM | WEIGHT: 168 LBS | HEART RATE: 68 BPM | DIASTOLIC BLOOD PRESSURE: 66 MMHG

## 2024-04-25 DIAGNOSIS — N39.41 URGENCY INCONTINENCE: ICD-10-CM

## 2024-04-25 DIAGNOSIS — F41.1 GENERALIZED ANXIETY DISORDER: Primary | ICD-10-CM

## 2024-04-25 PROCEDURE — 99214 OFFICE O/P EST MOD 30 MIN: CPT | Performed by: FAMILY MEDICINE

## 2024-04-25 RX ORDER — RESPIRATORY SYNCYTIAL VIRUS VACCINE 120MCG/0.5
0.5 KIT INTRAMUSCULAR ONCE
Qty: 1 EACH | Refills: 0 | Status: CANCELLED | OUTPATIENT
Start: 2024-04-25 | End: 2024-04-25

## 2024-04-25 RX ORDER — OXYBUTYNIN CHLORIDE 15 MG/1
15 TABLET, EXTENDED RELEASE ORAL DAILY
Qty: 90 TABLET | Refills: 3 | Status: SHIPPED | OUTPATIENT
Start: 2024-04-25

## 2024-04-25 RX ORDER — HYDROXYZINE HYDROCHLORIDE 25 MG/1
25 TABLET, FILM COATED ORAL 2 TIMES DAILY
Qty: 30 TABLET | Refills: 2 | Status: SHIPPED | OUTPATIENT
Start: 2024-04-25 | End: 2024-07-10

## 2024-04-25 ASSESSMENT — PAIN SCALES - GENERAL: PAINLEVEL: NO PAIN (0)

## 2024-04-25 ASSESSMENT — ANXIETY QUESTIONNAIRES
3. WORRYING TOO MUCH ABOUT DIFFERENT THINGS: SEVERAL DAYS
7. FEELING AFRAID AS IF SOMETHING AWFUL MIGHT HAPPEN: NOT AT ALL
5. BEING SO RESTLESS THAT IT IS HARD TO SIT STILL: NOT AT ALL
GAD7 TOTAL SCORE: 6
1. FEELING NERVOUS, ANXIOUS, OR ON EDGE: NEARLY EVERY DAY
7. FEELING AFRAID AS IF SOMETHING AWFUL MIGHT HAPPEN: NOT AT ALL
8. IF YOU CHECKED OFF ANY PROBLEMS, HOW DIFFICULT HAVE THESE MADE IT FOR YOU TO DO YOUR WORK, TAKE CARE OF THINGS AT HOME, OR GET ALONG WITH OTHER PEOPLE?: NOT DIFFICULT AT ALL
GAD7 TOTAL SCORE: 6
2. NOT BEING ABLE TO STOP OR CONTROL WORRYING: SEVERAL DAYS
IF YOU CHECKED OFF ANY PROBLEMS ON THIS QUESTIONNAIRE, HOW DIFFICULT HAVE THESE PROBLEMS MADE IT FOR YOU TO DO YOUR WORK, TAKE CARE OF THINGS AT HOME, OR GET ALONG WITH OTHER PEOPLE: NOT DIFFICULT AT ALL
GAD7 TOTAL SCORE: 6
4. TROUBLE RELAXING: SEVERAL DAYS
6. BECOMING EASILY ANNOYED OR IRRITABLE: NOT AT ALL

## 2024-04-25 NOTE — NURSING NOTE
"Initial /66   Pulse 68   Temp 98.2  F (36.8  C) (Tympanic)   Resp 16   Ht 1.683 m (5' 6.25\")   Wt 76.2 kg (168 lb)   LMP  (LMP Unknown)   SpO2 97%   BMI 26.91 kg/m   Estimated body mass index is 26.91 kg/m  as calculated from the following:    Height as of this encounter: 1.683 m (5' 6.25\").    Weight as of this encounter: 76.2 kg (168 lb). .    "

## 2024-04-25 NOTE — PROGRESS NOTES
"  Assessment & Plan     Generalized anxiety disorder  Lexapro 20 mg daily.  Still has episodic anxiety.  Discussed trial of hydroxyzine as needed.  Would prefer to avoid benzodiazepines given age.  - hydrOXYzine HCl (ATARAX) 25 MG tablet; Take 1 tablet (25 mg) by mouth 2 times daily    Urgency incontinence  Suboptimally controlled on 10 mg.  Increase to 15 mg of Ditropan. Discussed use and side effects. Follow-up if not improving or if worsening.   - oxyBUTYnin ER (DITROPAN XL) 15 MG 24 hr tablet; Take 1 tablet (15 mg) by mouth daily                  Darlene Da Silva is a 88 year old, presenting for the following health issues:  Anxiety    History of Present Illness       Mental Health Follow-up:  Patient presents to follow-up on Anxiety.    Patient's anxiety since last visit has been:  Medium  The patient is not having other symptoms associated with anxiety.  Any significant life events: No  Patient is feeling anxious or having panic attacks.  Patient has no concerns about alcohol or drug use.    She eats 2-3 servings of fruits and vegetables daily.She consumes 1 sweetened beverage(s) daily.She exercises with enough effort to increase her heart rate 10 to 19 minutes per day.  She exercises with enough effort to increase her heart rate 5 days per week.   She is taking medications regularly.               ROS:   Constitutional, neuro, ENT, endocrine, pulmonary, cardiac, gastrointestinal, genitourinary, musculoskeletal, integument and psychiatric systems are negative, except as otherwise noted.       Objective    /66   Pulse 68   Temp 98.2  F (36.8  C) (Tympanic)   Resp 16   Ht 1.683 m (5' 6.25\")   Wt 76.2 kg (168 lb)   LMP  (LMP Unknown)   SpO2 97%   BMI 26.91 kg/m    Body mass index is 26.91 kg/m .  Physical Exam   GENERAL: Pleasant, well appearing female.  PSYCH: Alert and oriented x3, neatly dressed and well groomed, makes good eye contact, fluid speech - non-pressured, no psychomotor agitation or " slowing, good memory, judgement and insight, no auditory or visual hallucinations, bright affect which is mood congruent. No suicidal ideation.             Signed Electronically by: Dewayne Santana MD

## 2024-05-21 ENCOUNTER — TELEPHONE (OUTPATIENT)
Dept: PODIATRY | Facility: CLINIC | Age: 89
End: 2024-05-21
Payer: COMMERCIAL

## 2024-05-21 NOTE — TELEPHONE ENCOUNTER
Let the patient know that I will be able to provide assessment along with a heel wedge that can go into her shoe.  If she requires modification of her shoes with lifts, I will refer her to [facility].

## 2024-05-21 NOTE — TELEPHONE ENCOUNTER
Spoke to patient discussed options that would OTC heel lift vs referral to [facility] for custom shoes vs orthotics.  Patient will keep visit for 5/22/24.    Garland Russell ATC

## 2024-05-21 NOTE — TELEPHONE ENCOUNTER
Pt is calling in because she is scheduled for an appt with Dr. Guaman on 05/22.  She is wanting to double check that she is scheduled appropriately with him.  She needs a lift for her uneven legs.  Please call her to confirm he can help with that.      Thank you.

## 2024-06-03 ENCOUNTER — TELEPHONE (OUTPATIENT)
Dept: FAMILY MEDICINE | Facility: CLINIC | Age: 89
End: 2024-06-03
Payer: COMMERCIAL

## 2024-06-03 NOTE — TELEPHONE ENCOUNTER
Received call from Patient.  States that she has been working with Dr Santana on her anxiety.  Is concerned that recent patient's balance has not been good.  Will be standing and then just toppled over.  This has happened x 2 recently.  Denies any symptoms.  Appointment scheduled with Dr Santana for 6/6/24 at 10:00.  Surendra Hills RN

## 2024-06-06 ENCOUNTER — ORDERS ONLY (AUTO-RELEASED) (OUTPATIENT)
Dept: FAMILY MEDICINE | Facility: CLINIC | Age: 89
End: 2024-06-06
Payer: COMMERCIAL

## 2024-06-06 ENCOUNTER — OFFICE VISIT (OUTPATIENT)
Dept: FAMILY MEDICINE | Facility: CLINIC | Age: 89
End: 2024-06-06
Payer: COMMERCIAL

## 2024-06-06 VITALS
DIASTOLIC BLOOD PRESSURE: 73 MMHG | TEMPERATURE: 98 F | HEIGHT: 66 IN | RESPIRATION RATE: 16 BRPM | SYSTOLIC BLOOD PRESSURE: 128 MMHG | OXYGEN SATURATION: 94 % | HEART RATE: 68 BPM | WEIGHT: 168 LBS | BODY MASS INDEX: 27 KG/M2

## 2024-06-06 DIAGNOSIS — R55 SYNCOPE, UNSPECIFIED SYNCOPE TYPE: ICD-10-CM

## 2024-06-06 DIAGNOSIS — E53.8 VITAMIN B12 DEFICIENCY (NON ANEMIC): ICD-10-CM

## 2024-06-06 DIAGNOSIS — R09.89 OTHER SPECIFIED SYMPTOMS AND SIGNS INVOLVING THE CIRCULATORY AND RESPIRATORY SYSTEMS: ICD-10-CM

## 2024-06-06 DIAGNOSIS — R55 SYNCOPE, UNSPECIFIED SYNCOPE TYPE: Primary | ICD-10-CM

## 2024-06-06 LAB
ALBUMIN SERPL BCG-MCNC: 4.2 G/DL (ref 3.5–5.2)
ALP SERPL-CCNC: 80 U/L (ref 40–150)
ALT SERPL W P-5'-P-CCNC: 9 U/L (ref 0–50)
ANION GAP SERPL CALCULATED.3IONS-SCNC: 12 MMOL/L (ref 7–15)
AST SERPL W P-5'-P-CCNC: 18 U/L (ref 0–45)
BASOPHILS # BLD AUTO: 0.1 10E3/UL (ref 0–0.2)
BASOPHILS NFR BLD AUTO: 1 %
BILIRUB SERPL-MCNC: 0.5 MG/DL
BUN SERPL-MCNC: 26.2 MG/DL (ref 8–23)
CALCIUM SERPL-MCNC: 8.8 MG/DL (ref 8.8–10.2)
CHLORIDE SERPL-SCNC: 99 MMOL/L (ref 98–107)
CREAT SERPL-MCNC: 0.88 MG/DL (ref 0.51–0.95)
DEPRECATED HCO3 PLAS-SCNC: 23 MMOL/L (ref 22–29)
EGFRCR SERPLBLD CKD-EPI 2021: 64 ML/MIN/1.73M2
EOSINOPHIL # BLD AUTO: 0.1 10E3/UL (ref 0–0.7)
EOSINOPHIL NFR BLD AUTO: 1 %
ERYTHROCYTE [DISTWIDTH] IN BLOOD BY AUTOMATED COUNT: 12.9 % (ref 10–15)
GLUCOSE SERPL-MCNC: 100 MG/DL (ref 70–99)
HCT VFR BLD AUTO: 40.9 % (ref 35–47)
HGB BLD-MCNC: 13.4 G/DL (ref 11.7–15.7)
IMM GRANULOCYTES # BLD: 0 10E3/UL
IMM GRANULOCYTES NFR BLD: 0 %
LYMPHOCYTES # BLD AUTO: 1.7 10E3/UL (ref 0.8–5.3)
LYMPHOCYTES NFR BLD AUTO: 18 %
MAGNESIUM SERPL-MCNC: 2.4 MG/DL (ref 1.7–2.3)
MCH RBC QN AUTO: 30.1 PG (ref 26.5–33)
MCHC RBC AUTO-ENTMCNC: 32.8 G/DL (ref 31.5–36.5)
MCV RBC AUTO: 92 FL (ref 78–100)
MONOCYTES # BLD AUTO: 0.7 10E3/UL (ref 0–1.3)
MONOCYTES NFR BLD AUTO: 8 %
NEUTROPHILS # BLD AUTO: 6.6 10E3/UL (ref 1.6–8.3)
NEUTROPHILS NFR BLD AUTO: 72 %
PLATELET # BLD AUTO: 292 10E3/UL (ref 150–450)
POTASSIUM SERPL-SCNC: 4.8 MMOL/L (ref 3.4–5.3)
PROT SERPL-MCNC: 6.9 G/DL (ref 6.4–8.3)
RBC # BLD AUTO: 4.45 10E6/UL (ref 3.8–5.2)
SODIUM SERPL-SCNC: 134 MMOL/L (ref 135–145)
TSH SERPL DL<=0.005 MIU/L-ACNC: 2.77 UIU/ML (ref 0.3–4.2)
WBC # BLD AUTO: 9.2 10E3/UL (ref 4–11)

## 2024-06-06 PROCEDURE — 84443 ASSAY THYROID STIM HORMONE: CPT | Performed by: FAMILY MEDICINE

## 2024-06-06 PROCEDURE — 85025 COMPLETE CBC W/AUTO DIFF WBC: CPT | Performed by: FAMILY MEDICINE

## 2024-06-06 PROCEDURE — 99214 OFFICE O/P EST MOD 30 MIN: CPT | Performed by: FAMILY MEDICINE

## 2024-06-06 PROCEDURE — 93000 ELECTROCARDIOGRAM COMPLETE: CPT | Performed by: FAMILY MEDICINE

## 2024-06-06 PROCEDURE — 36415 COLL VENOUS BLD VENIPUNCTURE: CPT | Performed by: FAMILY MEDICINE

## 2024-06-06 PROCEDURE — 82607 VITAMIN B-12: CPT | Performed by: FAMILY MEDICINE

## 2024-06-06 PROCEDURE — 83735 ASSAY OF MAGNESIUM: CPT | Performed by: FAMILY MEDICINE

## 2024-06-06 PROCEDURE — 80053 COMPREHEN METABOLIC PANEL: CPT | Performed by: FAMILY MEDICINE

## 2024-06-06 RX ORDER — RESPIRATORY SYNCYTIAL VIRUS VACCINE 120MCG/0.5
0.5 KIT INTRAMUSCULAR ONCE
Qty: 1 EACH | Refills: 0 | Status: CANCELLED | OUTPATIENT
Start: 2024-06-06 | End: 2024-06-06

## 2024-06-06 ASSESSMENT — PAIN SCALES - GENERAL: PAINLEVEL: NO PAIN (0)

## 2024-06-06 NOTE — PROGRESS NOTES
"  Assessment & Plan     Syncope, unspecified syncope type  In the last 2 weeks has had 2 syncopal episodes that resulted in a fall.  She did not have any prodrome prior to syncope such as lightheadedness, dizziness, etc.  EKG today shows right bundle branch block but no other worrisome findings or arrhythmias.  Recommend ZIO monitor and additional labs/neurologic workup as below.  Discussed cardiogenic versus neurogenic.  Suspect more likely to be cardiogenic.  Possible arrhythmia. Further work-up and management as dictated by results.   - MR Brain w/o & w Contrast; Future  - Comprehensive metabolic panel; Future  - CBC with Platelets & Differential; Future  - TSH with free T4 reflex; Future  - Vitamin B12; Future  - Magnesium; Future  - EKG 12-lead complete w/read - Clinics  - ZIO PATCH MAIL OUT; Future  - Comprehensive metabolic panel  - CBC with Platelets & Differential  - TSH with free T4 reflex  - Vitamin B12  - Magnesium    Other specified symptoms and signs involving the circulatory and respiratory systems  - TSH with free T4 reflex; Future  - TSH with free T4 reflex          BMI  Estimated body mass index is 26.9 kg/m  as calculated from the following:    Height as of this encounter: 1.683 m (5' 6.26\").    Weight as of this encounter: 76.2 kg (168 lb).             Subjective   Avinash is a 88 year old, presenting for the following health issues:  Consult      6/6/2024    10:39 AM   Additional Questions   Roomed by Virginia CRUMP   Accompanied by self     History of Present Illness       Reason for visit:  Balance  Symptom onset:  More than a month    She eats 4 or more servings of fruits and vegetables daily.She consumes 1 sweetened beverage(s) daily.She exercises with enough effort to increase her heart rate 10 to 19 minutes per day.  She exercises with enough effort to increase her heart rate 3 or less days per week.   She is taking medications regularly.               /ROS:   Constitutional, neuro, ENT, endocrine, " "pulmonary, cardiac, gastrointestinal, genitourinary, musculoskeletal, integument and psychiatric systems are negative, except as otherwise noted.       Objective    /73 (BP Location: Right arm, Patient Position: Sitting, Cuff Size: Adult Regular)   Pulse 68   Temp 98  F (36.7  C) (Tympanic)   Resp 16   Ht 1.683 m (5' 6.26\")   Wt 76.2 kg (168 lb)   LMP  (LMP Unknown)   SpO2 94%   BMI 26.90 kg/m    Body mass index is 26.9 kg/m .  Physical Exam   GENERAL: Pleasant, well appearing female.  HEENT: PERRL, EOMI.  NECK: supple, no thyromegaly or thyroid masses, no lymphadenopathy.  CV: RRR, no murmurs, rubs or gallops. No carotid bruits.   LUNGS: Clear to auscultation bilaterally, normal effort.  SKIN: warm and dry without obvious rashes.   EXTREMITIES: No edema, normal pulses.   NEURO: Cranial nerves II through XII grossly intact. No focal deficits.             Signed Electronically by: Dewayne Santana MD    "

## 2024-06-06 NOTE — LETTER
June 12, 2024      Avinash Villarreal  [address] [address] [place] [id number]        Dear ,    We are writing to inform you of your test results.    Your test results fall within the expected range(s) or remain unchanged from previous results.  Please continue with current treatment plan.    Resulted Orders   CBC with platelets and differential   Result Value Ref Range    WBC Count 9.2 4.0 - 11.0 10e3/uL    RBC Count 4.45 3.80 - 5.20 10e6/uL    Hemoglobin 13.4 11.7 - 15.7 g/dL    Hematocrit 40.9 35.0 - 47.0 %    MCV 92 78 - 100 fL    MCH 30.1 26.5 - 33.0 pg    MCHC 32.8 31.5 - 36.5 g/dL    RDW 12.9 10.0 - 15.0 %    Platelet Count 292 150 - 450 10e3/uL    % Neutrophils 72 %    % Lymphocytes 18 %    % Monocytes 8 %    % Eosinophils 1 %    % Basophils 1 %    % Immature Granulocytes 0 %    Absolute Neutrophils 6.6 1.6 - 8.3 10e3/uL    Absolute Lymphocytes 1.7 0.8 - 5.3 10e3/uL    Absolute Monocytes 0.7 0.0 - 1.3 10e3/uL    Absolute Eosinophils 0.1 0.0 - 0.7 10e3/uL    Absolute Basophils 0.1 0.0 - 0.2 10e3/uL    Absolute Immature Granulocytes 0.0 <=0.4 10e3/uL       If you have any questions or concerns, please call the clinic at the number listed above.       Sincerely,      Dewayne Santana MD

## 2024-06-06 NOTE — LETTER
June 19, 2024      Avinash Villarreal  [address] [address] [place] [phone]        Dear ,    We are writing to inform you of your test results.    B12 levels are very low.  Typically I like to see those leves above at least 800.  That would not have led to fainting episodes but can have other neurological side effects.  I would recommend starting an over-the-counter B12 supplement at a dose of 1000 mcg once daily.  Plan to recheck labs in 3 months. Future lab order placed. You do not need to fast for this lab.  You can schedule a lab visit via [employer] or by calling [phone].       Otherwise labs normal or within acceptable limits.       Resulted Orders   Comprehensive metabolic panel   Result Value Ref Range    Sodium 134 (L) 135 - 145 mmol/L      Comment:      Reference intervals for this test were updated on 09/26/2023 to more accurately reflect our healthy population. There may be differences in the flagging of prior results with similar values performed with this method. Interpretation of those prior results can be made in the context of the updated reference intervals.     Potassium 4.8 3.4 - 5.3 mmol/L    Carbon Dioxide (CO2) 23 22 - 29 mmol/L    Anion Gap 12 7 - 15 mmol/L    Urea Nitrogen 26.2 (H) 8.0 - 23.0 mg/dL    Creatinine 0.88 0.51 - 0.95 mg/dL    GFR Estimate 64 >60 mL/min/1.73m2    Calcium 8.8 8.8 - 10.2 mg/dL    Chloride 99 98 - 107 mmol/L    Glucose 100 (H) 70 - 99 mg/dL    Alkaline Phosphatase 80 40 - 150 U/L    AST 18 0 - 45 U/L      Comment:      Reference intervals for this test were updated on 6/12/2023 to more accurately reflect our healthy population. There may be differences in the flagging of prior results with similar values performed with this method. Interpretation of those prior results can be made in the context of the updated reference intervals.    ALT 9 0 - 50 U/L      Comment:      Reference intervals for this test were updated on 6/12/2023 to more accurately  reflect our healthy population. There may be differences in the flagging of prior results with similar values performed with this method. Interpretation of those prior results can be made in the context of the updated reference intervals.      Protein Total 6.9 6.4 - 8.3 g/dL    Albumin 4.2 3.5 - 5.2 g/dL    Bilirubin Total 0.5 <=1.2 mg/dL   TSH with free T4 reflex   Result Value Ref Range    TSH 2.77 0.30 - 4.20 uIU/mL   Vitamin B12   Result Value Ref Range    Vitamin B12 275 232 - 1,245 pg/mL   Magnesium   Result Value Ref Range    Magnesium 2.4 (H) 1.7 - 2.3 mg/dL   CBC with platelets and differential   Result Value Ref Range    WBC Count 9.2 4.0 - 11.0 10e3/uL    RBC Count 4.45 3.80 - 5.20 10e6/uL    Hemoglobin 13.4 11.7 - 15.7 g/dL    Hematocrit 40.9 35.0 - 47.0 %    MCV 92 78 - 100 fL    MCH 30.1 26.5 - 33.0 pg    MCHC 32.8 31.5 - 36.5 g/dL    RDW 12.9 10.0 - 15.0 %    Platelet Count 292 150 - 450 10e3/uL    % Neutrophils 72 %    % Lymphocytes 18 %    % Monocytes 8 %    % Eosinophils 1 %    % Basophils 1 %    % Immature Granulocytes 0 %    Absolute Neutrophils 6.6 1.6 - 8.3 10e3/uL    Absolute Lymphocytes 1.7 0.8 - 5.3 10e3/uL    Absolute Monocytes 0.7 0.0 - 1.3 10e3/uL    Absolute Eosinophils 0.1 0.0 - 0.7 10e3/uL    Absolute Basophils 0.1 0.0 - 0.2 10e3/uL    Absolute Immature Granulocytes 0.0 <=0.4 10e3/uL       If you have any questions or concerns, please call the clinic at the number listed above.       Sincerely,      Dewayne Santana MD

## 2024-06-07 LAB — VIT B12 SERPL-MCNC: 275 PG/ML (ref 232–1245)

## 2024-06-23 ENCOUNTER — HOSPITAL ENCOUNTER (OUTPATIENT)
Dept: MRI IMAGING | Facility: CLINIC | Age: 89
Discharge: HOME | End: 2024-06-23
Attending: FAMILY MEDICINE | Admitting: FAMILY MEDICINE
Payer: COMMERCIAL

## 2024-06-23 DIAGNOSIS — R55 SYNCOPE, UNSPECIFIED SYNCOPE TYPE: ICD-10-CM

## 2024-06-23 PROCEDURE — 70553 MRI BRAIN STEM W/O & W/DYE: CPT

## 2024-06-23 PROCEDURE — A9585 GADOBUTROL INJECTION: HCPCS | Performed by: FAMILY MEDICINE

## 2024-06-23 PROCEDURE — 255N000002 HC RX 255 OP 636: Performed by: FAMILY MEDICINE

## 2024-06-23 RX ORDER — GADOBUTROL 604.72 MG/ML
7.5 INJECTION INTRAVENOUS ONCE
Status: COMPLETED | OUTPATIENT
Start: 2024-06-23 | End: 2024-06-23

## 2024-06-23 RX ADMIN — GADOBUTROL 7.5 ML: 604.72 INJECTION INTRAVENOUS at 13:15

## 2024-06-25 DIAGNOSIS — R55 SYNCOPE, UNSPECIFIED SYNCOPE TYPE: Primary | ICD-10-CM

## 2024-06-25 DIAGNOSIS — I47.10 SVT (SUPRAVENTRICULAR TACHYCARDIA) (H): ICD-10-CM

## 2024-06-25 PROCEDURE — 93248 EXT ECG>7D<15D REV&INTERPJ: CPT | Performed by: INTERNAL MEDICINE

## 2024-06-25 NOTE — RESULT ENCOUNTER NOTE
Baljinder Santana is out of the office this week. Your heart monitor does not show any pauses or heart blocks to cause the syncopal episodes. There were some runs of SVT that are mostly considered benign but given you are already on a beta blocker I will put a Cardiology referral in for you to see if they recommend further evaluation. You will get a call to schedule. Please let us know if you have any questions.     Take care,    JESI Irwin CNP, Covering for primary/ordering provider:  Dr. Santana

## 2024-07-10 ENCOUNTER — OFFICE VISIT (OUTPATIENT)
Dept: CARDIOLOGY | Facility: CLINIC | Age: 89
End: 2024-07-10
Attending: NURSE PRACTITIONER
Payer: COMMERCIAL

## 2024-07-10 ENCOUNTER — TELEPHONE (OUTPATIENT)
Dept: CARDIOLOGY | Facility: CLINIC | Age: 89
End: 2024-07-10

## 2024-07-10 VITALS
BODY MASS INDEX: 26.84 KG/M2 | HEIGHT: 66 IN | DIASTOLIC BLOOD PRESSURE: 62 MMHG | SYSTOLIC BLOOD PRESSURE: 120 MMHG | WEIGHT: 167 LBS | RESPIRATION RATE: 14 BRPM | HEART RATE: 62 BPM

## 2024-07-10 DIAGNOSIS — I10 ESSENTIAL HYPERTENSION, BENIGN: ICD-10-CM

## 2024-07-10 DIAGNOSIS — I47.10 SVT (SUPRAVENTRICULAR TACHYCARDIA) (H): ICD-10-CM

## 2024-07-10 DIAGNOSIS — R55 SYNCOPE, UNSPECIFIED SYNCOPE TYPE: ICD-10-CM

## 2024-07-10 PROCEDURE — 99204 OFFICE O/P NEW MOD 45 MIN: CPT | Performed by: INTERNAL MEDICINE

## 2024-07-10 PROCEDURE — G2211 COMPLEX E/M VISIT ADD ON: HCPCS | Performed by: INTERNAL MEDICINE

## 2024-07-10 RX ORDER — ATENOLOL 25 MG/1
25 TABLET ORAL DAILY
Qty: 30 TABLET | Refills: 5 | Status: SHIPPED | OUTPATIENT
Start: 2024-07-10

## 2024-07-10 NOTE — TELEPHONE ENCOUNTER
Dr. Shine- please review Dr. Gatica's office visit from today.  He recommended ILR for syncope.    Pt has not had a 30 day monitor completed, but has worn an 8 day zio patch 6/25/24.      Please let us know your recommendations.    Thanks,    Sangeetha

## 2024-07-10 NOTE — PATIENT INSTRUCTIONS
Bonita Villarreal,    It was a pleasure to see you today at [facility].     My recommendations after this visit include:    Change atenolol to 25 mg a day  Loop recorder   do not drive until we sort out what causes fainting    SUSAN Gatica MD, FACC, [place]

## 2024-07-10 NOTE — H&P (VIEW-ONLY)
Thank you, Dr. Parson, for asking [facility] to evaluate Ms. Bonita Villarreal.      Assessment/Recommendations   Assessment:    Syncope, recurrent, suspicious for arrhythmic etiology/most likely bradycardia  IVCD left bundle branch block type  Short runs of atrial tachycardia detected by Zio patch, asymptomatic, epiphenomenon  Hypertension, good control on high-dose atenolol    Plan:  Reduce atenolol to 25 mg a day  Continue to monitor blood pressure at home    Implantable loop recorder to ensure absence of significant symptomatic bradycardia    May require pacemaker if symptomatic bradycardia documented    She was advised not to drive    I reassured her that short runs of SVTs are not responsible for fainting.  They do not require any treatment at this point.       History of Present Illness    Ms. Bonita Villarreal is a 89 year old female who comes in for cardiac evaluation.  In the last months she had 2 episodes of loss of consciousness.  The first time she was walking to bathroom when she lost consciousness without any warning symptoms.  She denies any chest pain shortness of breath or heart palpitations.  The second episode occurred when she was sitting on the edge of the bed and suddenly found herself lying on the bed.  She denies any heart palpitations.  She has not had chest pain or shortness of breath.  There has not been any recent medication changes.  She underwent 8 days event monitor that showed no significant bradycardia.  She did not have any symptoms during the period of heart rhythm monitoring.  She has no history of known heart disease  She has been taking antihypertensive medications for at least 15 years    ECG: Personally reviewed.  Normal sinus rhythm IVCD left bundle branch type.    Zio patch: June 2024  Baseline sinus rhythm with heart rates ranging , average 70 bpm.  35 runs of SVT, longest lasting 11 seconds.  Of note, the report recorded one episode  "as NSVT for 7 beats however this appears to be SVT.  PVC burden of 2.8%.     Physical Examination Review of Systems   /62   Pulse 62   Resp 14   Ht 1.683 m (5' 6.26\")   Wt 75.8 kg (167 lb)   LMP  (LMP Unknown)   BMI 26.74 kg/m    Body mass index is 26.74 kg/m .  Wt Readings from Last 3 Encounters:   07/10/24 75.8 kg (167 lb)   06/06/24 76.2 kg (168 lb)   04/25/24 76.2 kg (168 lb)     General Appearance:   Alert, cooperative, no distress, appears stated age   Head/ENT: Normocephalic, without obvious abnormality. Membranes moist      EYES:  no scleral icterus, normal conjunctivae   Neck: Supple, symmetrical, trachea midline, no adenopathy, thyroid: not enlarged, symmetric, no carotid bruit or JVD   Chest/Lungs:   Lungs are clear to auscultation, respirations unlabored. No tenderness or deformity    Cardiovascular:   Regular rhythm, S1, S2 normal, no murmur, rub or gallop.   Abdomen:  Soft, non-tender, bowel sounds active all four quadrants,  no masses, no organomegaly   Extremities: no cyanosis or clubbing. No edema   Skin: Skin color, texture, turgor normal, no rashes or lesions.    Psychiatric: Normal affect, calm   Neurologic: Alert and oriented x 3, moving all four extremities.     Enc Vitals  BP: 120/62  Pulse: 62  Resp: 14  Weight: 75.8 kg (167 lb) (With shoes.)  Height: 168.3 cm (5' 6.26\")                                          Lab Results    Chemistry/lipid CBC Cardiac Enzymes/BNP/TSH/INR   Recent Labs   Lab Test 09/21/23  1000   CHOL 209*   HDL 64   *   TRIG 154*     Recent Labs   Lab Test 09/21/23  1000 09/09/21  1147 05/30/19  0937   * 142* 131*     Recent Labs   Lab Test 06/06/24  1102   *   POTASSIUM 4.8   CHLORIDE 99   CO2 23   *   BUN 26.2*   CR 0.88   GFRESTIMATED 64   JEFF 8.8     Recent Labs   Lab Test 06/06/24  1102 09/21/23  1000 07/21/22  0532   CR 0.88 0.85 0.66     Recent Labs   Lab Test 07/19/22  0617 05/06/20  1028   A1C 5.5 5.7*          Recent Labs " "  Lab Test 06/06/24  1102   WBC 9.2   HGB 13.4   HCT 40.9   MCV 92        Recent Labs   Lab Test 06/06/24  1102 07/27/22  0904 07/21/22  1015   HGB 13.4 9.1* 8.7*    No results for input(s): \"TROPONINI\" in the last 74483 hours.  No results for input(s): \"BNP\", \"NTBNPI\", \"NTBNP\" in the last 00678 hours.  Recent Labs   Lab Test 06/06/24  1102   TSH 2.77     Recent Labs   Lab Test 07/19/22  0617 07/17/22  2036 02/28/18  1349   INR 0.99 0.92 0.94        Medical History  Surgical History Family History Social History   Past Medical History:   Diagnosis Date    Advance Care Planning 2/28/2012    Advance Care Planning 3/20/2016: Receipt of ACP document:  Received: Health Care Directive which was witnessed or notarized on 2/25/16.  Document not previously scanned.  Validation form completed and sent with document to be scanned.  Code Status needs to be updated to reflect choices in most recent ACP document.  Confirmed/documented designated decision maker(s).  Added by Cata Terrell RN, Advance Care Planning Liaison. Advance Care Planning 2/28/12: Patient does not have an Advance/Health Care Directive (HCD), given \"What is Advance Care Planning?\" flyer. Teena Pecae      Arthritis     Diverticulitis of colon 6/28/2005    Patient keeps Rx for Augmentin to use PRN Problem list name updated by automated process. Provider to review    Diverticulosis of colon (without mention of hemorrhage)     history of recurrent diverticulitis    Hypertension      Past Surgical History:   Procedure Laterality Date    ARTHROPLASTY KNEE Right 2/28/2018    Procedure: ARTHROPLASTY KNEE;  Right total knee arthroplasty;  Surgeon: Anton Grover MD;  Location: [facility]    ARTHROPLASTY KNEE Left 10/6/2020    Procedure: Total Knee Arthroplasty;  Surgeon: Calixto Santana MD;  Location: [facility]    COLONOSCOPY  04/15/02    COLONOSCOPY  3/25/2013    Procedure: COLONOSCOPY;  Colonoscopy  ;  Surgeon: Jamil Porras MD;  Location: [facility] " GI    FLEXIBLE SIGMOIDOSCOPY  96    OPEN REDUCTION INTERNAL FIXATION RODDING INTRAMEDULLARY FEMUR Right 2022    Procedure: OPEN REDUCTION INTERNAL FIXATION, FRACTURE, Right FEMUR, USING INTRAMEDULLARY LEENA;  Surgeon: Dante Alonso MD;  Location: [facility]    ORTHOPEDIC SURGERY  22-23 YEARS AGO    BACK    SURGICAL HISTORY OF -       lumbar surgery    ZZC APPENDECTOMY       Father Brother  from heart attacks in their 40s and 50s   Social History     Socioeconomic History    Marital status:      Spouse name: Not on file    Number of children: Not on file    Years of education: Not on file    Highest education level: Not on file   Occupational History    Not on file   Tobacco Use    Smoking status: Never    Smokeless tobacco: Never   Vaping Use    Vaping status: Never Used   Substance and Sexual Activity    Alcohol use: Yes     Comment: rare    Drug use: No    Sexual activity: Not Currently     Partners: Male   Other Topics Concern    Parent/sibling w/ CABG, MI or angioplasty before 65F 55M? Yes     Comment: sister bypass,brother bypass,brother MI   Social History Narrative    Not on file     Social Determinants of Health     Financial Resource Strain: Low Risk  (2023)    Financial Resource Strain     Within the past 12 months, have you or your family members you live with been unable to get utilities (heat, electricity) when it was really needed?: No   Food Insecurity: Low Risk  (2023)    Food Insecurity     Within the past 12 months, did you worry that your food would run out before you got money to buy more?: No     Within the past 12 months, did the food you bought just not last and you didn t have money to get more?: No   Transportation Needs: Low Risk  (2023)    Transportation Needs     Within the past 12 months, has lack of transportation kept you from medical appointments, getting your medicines, non-medical meetings or appointments, work, or from getting things that you  need?: No   Physical Activity: Not on file   Stress: Not on file   Social Connections: Not on file   Interpersonal Safety: Low Risk  (12/6/2023)    Interpersonal Safety     Do you feel physically and emotionally safe where you currently live?: Yes     Within the past 12 months, have you been hit, slapped, kicked or otherwise physically hurt by someone?: No     Within the past 12 months, have you been humiliated or emotionally abused in other ways by your partner or ex-partner?: No   Housing Stability: Low Risk  (12/6/2023)    Housing Stability     Do you have housing? : Yes     Are you worried about losing your housing?: No         Medications  Allergies   Current Outpatient Medications   Medication Sig Dispense Refill    acetaminophen (TYLENOL) 325 MG tablet Take 3 tablets (975 mg) by mouth every 8 hours      atenolol (TENORMIN) 25 MG tablet Take 1 tablet (25 mg) by mouth daily 30 tablet 5    escitalopram (LEXAPRO) 20 MG tablet Take 1 tablet (20 mg) by mouth daily 90 tablet 1    IBANdronate (BONIVA) 150 MG tablet Take 1 tablet (150 mg) by mouth every 30 days 3 tablet 4    ibuprofen (ADVIL/MOTRIN) 600 MG tablet Take 1 tablet (600 mg) by mouth every 8 hours as needed for pain (mild) 30 tablet 0    losartan (COZAAR) 100 MG tablet Take 1 tablet (100 mg) by mouth daily 90 tablet 4    multivitamin (CENTRUM SILVER) tablet Take 1 tablet by mouth daily      oxyBUTYnin ER (DITROPAN XL) 15 MG 24 hr tablet Take 1 tablet (15 mg) by mouth daily 90 tablet 3        Allergies   Allergen Reactions    Bactrim [Sulfamethoxazole-Trimethoprim] Nausea and Vomiting    Lisinopril Cough

## 2024-07-10 NOTE — PROGRESS NOTES
Thank you, Dr. Parson, for asking [facility] to evaluate Ms. Bonita Villarreal.      Assessment/Recommendations   Assessment:    Syncope, recurrent, suspicious for arrhythmic etiology/most likely bradycardia  IVCD left bundle branch block type  Short runs of atrial tachycardia detected by Zio patch, asymptomatic, epiphenomenon  Hypertension, good control on high-dose atenolol    Plan:  Reduce atenolol to 25 mg a day  Continue to monitor blood pressure at home    Implantable loop recorder to ensure absence of significant symptomatic bradycardia    May require pacemaker if symptomatic bradycardia documented    She was advised not to drive    I reassured her that short runs of SVTs are not responsible for fainting.  They do not require any treatment at this point.       History of Present Illness    Ms. Bonita Villarreal is a 89 year old female who comes in for cardiac evaluation.  In the last months she had 2 episodes of loss of consciousness.  The first time she was walking to bathroom when she lost consciousness without any warning symptoms.  She denies any chest pain shortness of breath or heart palpitations.  The second episode occurred when she was sitting on the edge of the bed and suddenly found herself lying on the bed.  She denies any heart palpitations.  She has not had chest pain or shortness of breath.  There has not been any recent medication changes.  She underwent 8 days event monitor that showed no significant bradycardia.  She did not have any symptoms during the period of heart rhythm monitoring.  She has no history of known heart disease  She has been taking antihypertensive medications for at least 15 years    ECG: Personally reviewed.  Normal sinus rhythm IVCD left bundle branch type.    Zio patch: June 2024  Baseline sinus rhythm with heart rates ranging , average 70 bpm.  35 runs of SVT, longest lasting 11 seconds.  Of note, the report recorded one episode  "as NSVT for 7 beats however this appears to be SVT.  PVC burden of 2.8%.     Physical Examination Review of Systems   /62   Pulse 62   Resp 14   Ht 1.683 m (5' 6.26\")   Wt 75.8 kg (167 lb)   LMP  (LMP Unknown)   BMI 26.74 kg/m    Body mass index is 26.74 kg/m .  Wt Readings from Last 3 Encounters:   07/10/24 75.8 kg (167 lb)   06/06/24 76.2 kg (168 lb)   04/25/24 76.2 kg (168 lb)     General Appearance:   Alert, cooperative, no distress, appears stated age   Head/ENT: Normocephalic, without obvious abnormality. Membranes moist      EYES:  no scleral icterus, normal conjunctivae   Neck: Supple, symmetrical, trachea midline, no adenopathy, thyroid: not enlarged, symmetric, no carotid bruit or JVD   Chest/Lungs:   Lungs are clear to auscultation, respirations unlabored. No tenderness or deformity    Cardiovascular:   Regular rhythm, S1, S2 normal, no murmur, rub or gallop.   Abdomen:  Soft, non-tender, bowel sounds active all four quadrants,  no masses, no organomegaly   Extremities: no cyanosis or clubbing. No edema   Skin: Skin color, texture, turgor normal, no rashes or lesions.    Psychiatric: Normal affect, calm   Neurologic: Alert and oriented x 3, moving all four extremities.     Enc Vitals  BP: 120/62  Pulse: 62  Resp: 14  Weight: 75.8 kg (167 lb) (With shoes.)  Height: 168.3 cm (5' 6.26\")                                          Lab Results    Chemistry/lipid CBC Cardiac Enzymes/BNP/TSH/INR   Recent Labs   Lab Test 09/21/23  1000   CHOL 209*   HDL 64   *   TRIG 154*     Recent Labs   Lab Test 09/21/23  1000 09/09/21  1147 05/30/19  0937   * 142* 131*     Recent Labs   Lab Test 06/06/24  1102   *   POTASSIUM 4.8   CHLORIDE 99   CO2 23   *   BUN 26.2*   CR 0.88   GFRESTIMATED 64   JEFF 8.8     Recent Labs   Lab Test 06/06/24  1102 09/21/23  1000 07/21/22  0532   CR 0.88 0.85 0.66     Recent Labs   Lab Test 07/19/22  0617 05/06/20  1028   A1C 5.5 5.7*          Recent Labs " "  Lab Test 06/06/24  1102   WBC 9.2   HGB 13.4   HCT 40.9   MCV 92        Recent Labs   Lab Test 06/06/24  1102 07/27/22  0904 07/21/22  1015   HGB 13.4 9.1* 8.7*    No results for input(s): \"TROPONINI\" in the last 43057 hours.  No results for input(s): \"BNP\", \"NTBNPI\", \"NTBNP\" in the last 33523 hours.  Recent Labs   Lab Test 06/06/24  1102   TSH 2.77     Recent Labs   Lab Test 07/19/22  0617 07/17/22  2036 02/28/18  1349   INR 0.99 0.92 0.94        Medical History  Surgical History Family History Social History   Past Medical History:   Diagnosis Date    Advance Care Planning 2/28/2012    Advance Care Planning 3/20/2016: Receipt of ACP document:  Received: Health Care Directive which was witnessed or notarized on 2/25/16.  Document not previously scanned.  Validation form completed and sent with document to be scanned.  Code Status needs to be updated to reflect choices in most recent ACP document.  Confirmed/documented designated decision maker(s).  Added by Cata Terrell RN, Advance Care Planning Liaison. Advance Care Planning 2/28/12: Patient does not have an Advance/Health Care Directive (HCD), given \"What is Advance Care Planning?\" flyer. Teena Peace      Arthritis     Diverticulitis of colon 6/28/2005    Patient keeps Rx for Augmentin to use PRN Problem list name updated by automated process. Provider to review    Diverticulosis of colon (without mention of hemorrhage)     history of recurrent diverticulitis    Hypertension      Past Surgical History:   Procedure Laterality Date    ARTHROPLASTY KNEE Right 2/28/2018    Procedure: ARTHROPLASTY KNEE;  Right total knee arthroplasty;  Surgeon: Anton Grover MD;  Location: [facility]    ARTHROPLASTY KNEE Left 10/6/2020    Procedure: Total Knee Arthroplasty;  Surgeon: Calixto Santana MD;  Location: [facility]    COLONOSCOPY  04/15/02    COLONOSCOPY  3/25/2013    Procedure: COLONOSCOPY;  Colonoscopy  ;  Surgeon: Jamil Porras MD;  Location: [facility] " GI    FLEXIBLE SIGMOIDOSCOPY  96    OPEN REDUCTION INTERNAL FIXATION RODDING INTRAMEDULLARY FEMUR Right 2022    Procedure: OPEN REDUCTION INTERNAL FIXATION, FRACTURE, Right FEMUR, USING INTRAMEDULLARY LEENA;  Surgeon: Dante Alonso MD;  Location: [facility]    ORTHOPEDIC SURGERY  22-23 YEARS AGO    BACK    SURGICAL HISTORY OF -       lumbar surgery    ZZC APPENDECTOMY       Father Brother  from heart attacks in their 40s and 50s   Social History     Socioeconomic History    Marital status:      Spouse name: Not on file    Number of children: Not on file    Years of education: Not on file    Highest education level: Not on file   Occupational History    Not on file   Tobacco Use    Smoking status: Never    Smokeless tobacco: Never   Vaping Use    Vaping status: Never Used   Substance and Sexual Activity    Alcohol use: Yes     Comment: rare    Drug use: No    Sexual activity: Not Currently     Partners: Male   Other Topics Concern    Parent/sibling w/ CABG, MI or angioplasty before 65F 55M? Yes     Comment: sister bypass,brother bypass,brother MI   Social History Narrative    Not on file     Social Determinants of Health     Financial Resource Strain: Low Risk  (2023)    Financial Resource Strain     Within the past 12 months, have you or your family members you live with been unable to get utilities (heat, electricity) when it was really needed?: No   Food Insecurity: Low Risk  (2023)    Food Insecurity     Within the past 12 months, did you worry that your food would run out before you got money to buy more?: No     Within the past 12 months, did the food you bought just not last and you didn t have money to get more?: No   Transportation Needs: Low Risk  (2023)    Transportation Needs     Within the past 12 months, has lack of transportation kept you from medical appointments, getting your medicines, non-medical meetings or appointments, work, or from getting things that you  need?: No   Physical Activity: Not on file   Stress: Not on file   Social Connections: Not on file   Interpersonal Safety: Low Risk  (12/6/2023)    Interpersonal Safety     Do you feel physically and emotionally safe where you currently live?: Yes     Within the past 12 months, have you been hit, slapped, kicked or otherwise physically hurt by someone?: No     Within the past 12 months, have you been humiliated or emotionally abused in other ways by your partner or ex-partner?: No   Housing Stability: Low Risk  (12/6/2023)    Housing Stability     Do you have housing? : Yes     Are you worried about losing your housing?: No         Medications  Allergies   Current Outpatient Medications   Medication Sig Dispense Refill    acetaminophen (TYLENOL) 325 MG tablet Take 3 tablets (975 mg) by mouth every 8 hours      atenolol (TENORMIN) 25 MG tablet Take 1 tablet (25 mg) by mouth daily 30 tablet 5    escitalopram (LEXAPRO) 20 MG tablet Take 1 tablet (20 mg) by mouth daily 90 tablet 1    IBANdronate (BONIVA) 150 MG tablet Take 1 tablet (150 mg) by mouth every 30 days 3 tablet 4    ibuprofen (ADVIL/MOTRIN) 600 MG tablet Take 1 tablet (600 mg) by mouth every 8 hours as needed for pain (mild) 30 tablet 0    losartan (COZAAR) 100 MG tablet Take 1 tablet (100 mg) by mouth daily 90 tablet 4    multivitamin (CENTRUM SILVER) tablet Take 1 tablet by mouth daily      oxyBUTYnin ER (DITROPAN XL) 15 MG 24 hr tablet Take 1 tablet (15 mg) by mouth daily 90 tablet 3        Allergies   Allergen Reactions    Bactrim [Sulfamethoxazole-Trimethoprim] Nausea and Vomiting    Lisinopril Cough

## 2024-07-10 NOTE — TELEPHONE ENCOUNTER
----- Message from Keyon Gatica sent at 7/10/2024  2:15 PM CDT -----  This patient needs ILR implantation for syncope, please contact her to set up

## 2024-07-10 NOTE — LETTER
7/10/2024    Dewayne Santana MD  [address]  [facility] [address]    RE: Bonita Villarreal       Dear Colleague,     I had the pleasure of seeing Bonita Villarreal in the [facility].        Thank you, Dr. Parson, for asking [facility] to evaluate Ms. Bonita Villarreal.      Assessment/Recommendations   Assessment:    Syncope, recurrent, suspicious for arrhythmic etiology/most likely bradycardia  IVCD left bundle branch block type  Short runs of atrial tachycardia detected by Zio patch, asymptomatic, epiphenomenon  Hypertension, good control on high-dose atenolol    Plan:  Reduce atenolol to 25 mg a day  Continue to monitor blood pressure at home    Implantable loop recorder to ensure absence of significant symptomatic bradycardia    May require pacemaker if symptomatic bradycardia documented    She was advised not to drive    I reassured her that short runs of SVTs are not responsible for fainting.  They do not require any treatment at this point.       History of Present Illness    Ms. Bonita Villarreal is a 89 year old female who comes in for cardiac evaluation.  In the last months she had 2 episodes of loss of consciousness.  The first time she was walking to bathroom when she lost consciousness without any warning symptoms.  She denies any chest pain shortness of breath or heart palpitations.  The second episode occurred when she was sitting on the edge of the bed and suddenly found herself lying on the bed.  She denies any heart palpitations.  She has not had chest pain or shortness of breath.  There has not been any recent medication changes.  She underwent 8 days event monitor that showed no significant bradycardia.  She did not have any symptoms during the period of heart rhythm monitoring.  She has no history of known heart disease  She has been taking antihypertensive medications for at least 15 years    ECG: Personally reviewed.  Normal  "sinus rhythm IVCD left bundle branch type.    Zio patch: June 2024  Baseline sinus rhythm with heart rates ranging , average 70 bpm.  35 runs of SVT, longest lasting 11 seconds.  Of note, the report recorded one episode as NSVT for 7 beats however this appears to be SVT.  PVC burden of 2.8%.     Physical Examination Review of Systems   /62   Pulse 62   Resp 14   Ht 1.683 m (5' 6.26\")   Wt 75.8 kg (167 lb)   LMP  (LMP Unknown)   BMI 26.74 kg/m    Body mass index is 26.74 kg/m .  Wt Readings from Last 3 Encounters:   07/10/24 75.8 kg (167 lb)   06/06/24 76.2 kg (168 lb)   04/25/24 76.2 kg (168 lb)     General Appearance:   Alert, cooperative, no distress, appears stated age   Head/ENT: Normocephalic, without obvious abnormality. Membranes moist      EYES:  no scleral icterus, normal conjunctivae   Neck: Supple, symmetrical, trachea midline, no adenopathy, thyroid: not enlarged, symmetric, no carotid bruit or JVD   Chest/Lungs:   Lungs are clear to auscultation, respirations unlabored. No tenderness or deformity    Cardiovascular:   Regular rhythm, S1, S2 normal, no murmur, rub or gallop.   Abdomen:  Soft, non-tender, bowel sounds active all four quadrants,  no masses, no organomegaly   Extremities: no cyanosis or clubbing. No edema   Skin: Skin color, texture, turgor normal, no rashes or lesions.    Psychiatric: Normal affect, calm   Neurologic: Alert and oriented x 3, moving all four extremities.     Enc Vitals  BP: 120/62  Pulse: 62  Resp: 14  Weight: 75.8 kg (167 lb) (With shoes.)  Height: 168.3 cm (5' 6.26\")                                          Lab Results    Chemistry/lipid CBC Cardiac Enzymes/BNP/TSH/INR   Recent Labs   Lab Test 09/21/23  1000   CHOL 209*   HDL 64   *   TRIG 154*     Recent Labs   Lab Test 09/21/23  1000 09/09/21  1147 05/30/19  0937   * 142* 131*     Recent Labs   Lab Test 06/06/24  1102   *   POTASSIUM 4.8   CHLORIDE 99   CO2 23   *   BUN 26.2* " "  CR 0.88   GFRESTIMATED 64   JEFF 8.8     Recent Labs   Lab Test 06/06/24  1102 09/21/23  1000 07/21/22  0532   CR 0.88 0.85 0.66     Recent Labs   Lab Test 07/19/22  0617 05/06/20  1028   A1C 5.5 5.7*          Recent Labs   Lab Test 06/06/24  1102   WBC 9.2   HGB 13.4   HCT 40.9   MCV 92        Recent Labs   Lab Test 06/06/24  1102 07/27/22  0904 07/21/22  1015   HGB 13.4 9.1* 8.7*    No results for input(s): \"TROPONINI\" in the last 74034 hours.  No results for input(s): \"BNP\", \"NTBNPI\", \"NTBNP\" in the last 17608 hours.  Recent Labs   Lab Test 06/06/24  1102   TSH 2.77     Recent Labs   Lab Test 07/19/22  0617 07/17/22  2036 02/28/18  1349   INR 0.99 0.92 0.94        Medical History  Surgical History Family History Social History   Past Medical History:   Diagnosis Date    Advance Care Planning 2/28/2012    Advance Care Planning 3/20/2016: Receipt of ACP document:  Received: Health Care Directive which was witnessed or notarized on 2/25/16.  Document not previously scanned.  Validation form completed and sent with document to be scanned.  Code Status needs to be updated to reflect choices in most recent ACP document.  Confirmed/documented designated decision maker(s).  Added by Cata Terrell RN, Advance Care Planning Liaison. Advance Care Planning 2/28/12: Patient does not have an Advance/Health Care Directive (HCD), given \"What is Advance Care Planning?\" flyer. Teena Peace      Arthritis     Diverticulitis of colon 6/28/2005    Patient keeps Rx for Augmentin to use PRN Problem list name updated by automated process. Provider to review    Diverticulosis of colon (without mention of hemorrhage)     history of recurrent diverticulitis    Hypertension      Past Surgical History:   Procedure Laterality Date    ARTHROPLASTY KNEE Right 2/28/2018    Procedure: ARTHROPLASTY KNEE;  Right total knee arthroplasty;  Surgeon: Anton Grover MD;  Location: [facility]    ARTHROPLASTY KNEE Left 10/6/2020    Procedure: " Total Knee Arthroplasty;  Surgeon: Calixto Santana MD;  Location: [facility]    COLONOSCOPY  04/15/02    COLONOSCOPY  3/25/2013    Procedure: COLONOSCOPY;  Colonoscopy  ;  Surgeon: Jamil Porras MD;  Location: [facility]    FLEXIBLE SIGMOIDOSCOPY  96    OPEN REDUCTION INTERNAL FIXATION RODDING INTRAMEDULLARY FEMUR Right 2022    Procedure: OPEN REDUCTION INTERNAL FIXATION, FRACTURE, Right FEMUR, USING INTRAMEDULLARY LEENA;  Surgeon: Dante Alonso MD;  Location:  OR    ORTHOPEDIC SURGERY  22-23 YEARS AGO    BACK    SURGICAL HISTORY OF -       lumbar surgery    ZZC APPENDECTOMY       Father Brother  from heart attacks in their 40s and 50s   Social History     Socioeconomic History    Marital status:      Spouse name: Not on file    Number of children: Not on file    Years of education: Not on file    Highest education level: Not on file   Occupational History    Not on file   Tobacco Use    Smoking status: Never    Smokeless tobacco: Never   Vaping Use    Vaping status: Never Used   Substance and Sexual Activity    Alcohol use: Yes     Comment: rare    Drug use: No    Sexual activity: Not Currently     Partners: Male   Other Topics Concern    Parent/sibling w/ CABG, MI or angioplasty before 65F 55M? Yes     Comment: sister bypass,brother bypass,brother MI   Social History Narrative    Not on file     Social Determinants of Health     Financial Resource Strain: Low Risk  (2023)    Financial Resource Strain     Within the past 12 months, have you or your family members you live with been unable to get utilities (heat, electricity) when it was really needed?: No   Food Insecurity: Low Risk  (2023)    Food Insecurity     Within the past 12 months, did you worry that your food would run out before you got money to buy more?: No     Within the past 12 months, did the food you bought just not last and you didn t have money to get more?: No   Transportation Needs: Low Risk   (12/6/2023)    Transportation Needs     Within the past 12 months, has lack of transportation kept you from medical appointments, getting your medicines, non-medical meetings or appointments, work, or from getting things that you need?: No   Physical Activity: Not on file   Stress: Not on file   Social Connections: Not on file   Interpersonal Safety: Low Risk  (12/6/2023)    Interpersonal Safety     Do you feel physically and emotionally safe where you currently live?: Yes     Within the past 12 months, have you been hit, slapped, kicked or otherwise physically hurt by someone?: No     Within the past 12 months, have you been humiliated or emotionally abused in other ways by your partner or ex-partner?: No   Housing Stability: Low Risk  (12/6/2023)    Housing Stability     Do you have housing? : Yes     Are you worried about losing your housing?: No         Medications  Allergies   Current Outpatient Medications   Medication Sig Dispense Refill    acetaminophen (TYLENOL) 325 MG tablet Take 3 tablets (975 mg) by mouth every 8 hours      atenolol (TENORMIN) 25 MG tablet Take 1 tablet (25 mg) by mouth daily 30 tablet 5    escitalopram (LEXAPRO) 20 MG tablet Take 1 tablet (20 mg) by mouth daily 90 tablet 1    IBANdronate (BONIVA) 150 MG tablet Take 1 tablet (150 mg) by mouth every 30 days 3 tablet 4    ibuprofen (ADVIL/MOTRIN) 600 MG tablet Take 1 tablet (600 mg) by mouth every 8 hours as needed for pain (mild) 30 tablet 0    losartan (COZAAR) 100 MG tablet Take 1 tablet (100 mg) by mouth daily 90 tablet 4    multivitamin (CENTRUM SILVER) tablet Take 1 tablet by mouth daily      oxyBUTYnin ER (DITROPAN XL) 15 MG 24 hr tablet Take 1 tablet (15 mg) by mouth daily 90 tablet 3        Allergies   Allergen Reactions    Bactrim [Sulfamethoxazole-Trimethoprim] Nausea and Vomiting    Lisinopril Cough                        Thank you for allowing me to participate in the care of your patient.      Sincerely,     Keyon Gatica MD      [facility]  cc:   JESI Jones CNP  [address] [place] [address]

## 2024-07-11 ENCOUNTER — PREP FOR PROCEDURE (OUTPATIENT)
Dept: CARDIOLOGY | Facility: CLINIC | Age: 89
End: 2024-07-11
Payer: COMMERCIAL

## 2024-07-11 ENCOUNTER — DOCUMENTATION ONLY (OUTPATIENT)
Dept: CARDIOLOGY | Facility: CLINIC | Age: 89
End: 2024-07-11
Payer: COMMERCIAL

## 2024-07-11 DIAGNOSIS — R55 SYNCOPE, UNSPECIFIED SYNCOPE TYPE: Primary | ICD-10-CM

## 2024-07-11 RX ORDER — LIDOCAINE 40 MG/G
CREAM TOPICAL
Status: CANCELLED | OUTPATIENT
Start: 2024-07-11

## 2024-07-11 NOTE — TELEPHONE ENCOUNTER
Noted.  Orders placed, and allowed 3 wks for insurance verification prior to date.  7/11/2024 11:39 AM  AFIA Slade Kanishk, MD  P [facility] Ep Support Pool - [facility]  Caller: Unspecified (Yesterday,  2:20 PM)  If insurance is okay then [facility] otherwise follow whatever insurance stipulates

## 2024-07-11 NOTE — PROGRESS NOTES
H&P: PMD: []  Date: Card OV: [x]  Date: 7/10   Orders I [x] P  [x]      AC: None- NA Diuretics: None  DM Meds: None  GLP-1:None     Bonita Villarreal 1935 [id number]  Home:[phone] (home) Cell:[phone] (mobile)  Emergency Contact: Cirilo Villarreal [phone]  PCP: Dewayne Santana, [phone]      Important patient information for [facility]SC/Cath Lab staff : None    [facility] EP Cath Lab Procedure Order     Device Implant/Revision:  Procedure: New Implant  Current Device/Device Co Needed for Procedure: [employer] Single Loop- Schedule out at least 3 weeks for insurance approval for implants   Ordering Provider: Dr Shine (Generator Changes can be scheduled with any provider)  Date Ordered and Prepped: 7/11/2024 Kristie Zambrano RN  Diagnosis:  Syncope  Scheduling Timeframe:  Next Available  Scheduling Restrictions: None  Scheduling Contact: Please contact pt to schedule, if you are unable to schedule date within the next 24 hours please contact pt to update on scheduling process  Cardiology Follow Up Apt s/p:  Standard Device follow up General Card @ 6mo (does not include New CRT/HIS)  Pre-Procedural Testing needed: None  Anesthesia:  None    [facility] EP Cath Lab Prep   H&P:  Compled by cardiology on 7/10 if scheduled within 30 days, pt to schedule with PMD if procedure outside of this timeframe  Pre-Procedure Labs/T&S: For SICD & MICRA Devices only schedule lab visit at [facility] lab within 3 days prior to procedure for T&S, BMP, CBC, HcG is appropriate, and INR if on warfarin. All other Devices pre-procedure lab work will be done the morning of the procedure.  Medical Records Pertinent for Procedure: None  Iodinated Contrast Dye Allergies (Does not include Shellfish, Egg, and/or Iodine Allergy)- excludes ILR/SICD:None  Renal Protocol (GFR < 40ml/min, IV contrast past 2 days, EF < or = 25%)- excludes ILR/SICD: No  GLP-1 Protocol: If on Dulaglutide (Trulicity) (weekly)- Injection hold 7 days prior to  procedure  , Exenatide extended release (Bydureon bcise) (weekly)- Injection hold 7 days prior to procedure, Exenatide (Byetta) (twice daily)- Oral Tablet hold day prior and morning of procedure and for Injection hold 7 days prior to procedure, Semaglutide (Ozempic) (weekly)- Injection and Oral hold 7 days prior to procedure, Liraglutide (Victoza, Saxenda) (daily)- Injection hold day prior and morning of procedure    Allergies   Allergen Reactions    Bactrim [Sulfamethoxazole-Trimethoprim] Nausea and Vomiting    Lisinopril Cough       Current Outpatient Medications:     acetaminophen (TYLENOL) 325 MG tablet, Take 3 tablets (975 mg) by mouth every 8 hours, Disp: , Rfl:     atenolol (TENORMIN) 25 MG tablet, Take 1 tablet (25 mg) by mouth daily, Disp: 30 tablet, Rfl: 5    escitalopram (LEXAPRO) 20 MG tablet, Take 1 tablet (20 mg) by mouth daily, Disp: 90 tablet, Rfl: 1    IBANdronate (BONIVA) 150 MG tablet, Take 1 tablet (150 mg) by mouth every 30 days, Disp: 3 tablet, Rfl: 4    ibuprofen (ADVIL/MOTRIN) 600 MG tablet, Take 1 tablet (600 mg) by mouth every 8 hours as needed for pain (mild), Disp: 30 tablet, Rfl: 0    losartan (COZAAR) 100 MG tablet, Take 1 tablet (100 mg) by mouth daily, Disp: 90 tablet, Rfl: 4    multivitamin (CENTRUM SILVER) tablet, Take 1 tablet by mouth daily, Disp: , Rfl:     oxyBUTYnin ER (DITROPAN XL) 15 MG 24 hr tablet, Take 1 tablet (15 mg) by mouth daily, Disp: 90 tablet, Rfl: 3    Documentation Date:7/11/2024 11:35 AM  Kristie Zambrano RN

## 2024-07-14 DIAGNOSIS — I10 ESSENTIAL HYPERTENSION, BENIGN: Primary | ICD-10-CM

## 2024-07-14 RX ORDER — AMLODIPINE BESYLATE 2.5 MG/1
2.5 TABLET ORAL DAILY
Qty: 30 TABLET | Refills: 2 | Status: SHIPPED | OUTPATIENT
Start: 2024-07-14 | End: 2024-08-16

## 2024-07-15 ENCOUNTER — ALLIED HEALTH/NURSE VISIT (OUTPATIENT)
Dept: FAMILY MEDICINE | Facility: CLINIC | Age: 89
End: 2024-07-15
Payer: COMMERCIAL

## 2024-07-15 VITALS — SYSTOLIC BLOOD PRESSURE: 138 MMHG | DIASTOLIC BLOOD PRESSURE: 70 MMHG | OXYGEN SATURATION: 96 % | HEART RATE: 82 BPM

## 2024-07-15 DIAGNOSIS — Z01.30 BLOOD PRESSURE CHECK: Primary | ICD-10-CM

## 2024-07-15 PROCEDURE — 99207 PR NO CHARGE NURSE ONLY: CPT

## 2024-07-15 NOTE — PROGRESS NOTES
Bonita Villarreal is a 89 year old year old patient who comes in today for a Blood Pressure check because of medication change.  Vital Signs as repeated by /64 right 138/70 left HR 82 SPO2 96%  Patient is taking medication as prescribed  Patient is tolerating medications well.  Patient is monitoring Blood Pressure at home.  Average readings if yes are 160/90's  Current complaints: none  Disposition:  patient to continue with the same medication.     Patient will  and start Amlodipine today    Nasrin Sexton, phone call preferred if changes    Tiffanie Murdock RN on 7/15/2024 at 1:31 PM

## 2024-08-01 ENCOUNTER — HOSPITAL ENCOUNTER (OUTPATIENT)
Facility: HOSPITAL | Age: 89
Discharge: HOME | End: 2024-08-01
Attending: NURSE PRACTITIONER | Admitting: INTERNAL MEDICINE
Payer: COMMERCIAL

## 2024-08-01 VITALS
HEIGHT: 66 IN | BODY MASS INDEX: 26.36 KG/M2 | DIASTOLIC BLOOD PRESSURE: 84 MMHG | HEART RATE: 65 BPM | OXYGEN SATURATION: 98 % | WEIGHT: 164 LBS | SYSTOLIC BLOOD PRESSURE: 152 MMHG | TEMPERATURE: 98 F | RESPIRATION RATE: 16 BRPM

## 2024-08-01 DIAGNOSIS — R55 SYNCOPE, UNSPECIFIED SYNCOPE TYPE: ICD-10-CM

## 2024-08-01 PROCEDURE — 33285 INSJ SUBQ CAR RHYTHM MNTR: CPT | Performed by: NURSE PRACTITIONER

## 2024-08-01 PROCEDURE — C1764 EVENT RECORDER, CARDIAC: HCPCS | Performed by: NURSE PRACTITIONER

## 2024-08-01 PROCEDURE — 250N000009 HC RX 250: Performed by: NURSE PRACTITIONER

## 2024-08-01 DEVICE — MONITOR CARDIAC LUX-DX II+ M312: Type: IMPLANTABLE DEVICE | Status: FUNCTIONAL

## 2024-08-01 RX ORDER — LIDOCAINE 40 MG/G
CREAM TOPICAL
Status: DISCONTINUED | OUTPATIENT
Start: 2024-08-01 | End: 2024-08-01 | Stop reason: HOSPADM

## 2024-08-01 ASSESSMENT — ACTIVITIES OF DAILY LIVING (ADL)
ADLS_ACUITY_SCORE: 38
ADLS_ACUITY_SCORE: 38

## 2024-08-01 NOTE — PROGRESS NOTES
D/I/A: Patient is tolerating liquids and foods, ambulating, urinating, and patient has a .   A+O x 4 and making needs known.  CCL access sites C/D/I with no bleeding or hematoma.   VSSA.   NSR on monitor.   IV access removed.    Education completed and outlined in AVS.  Medication changes reviewed with patient. Questions answered prior to discharge. Belongings returned to patient at discharge  P: Discharged to self care.  Patient to follow up as per discharge instruction

## 2024-08-01 NOTE — DISCHARGE INSTRUCTIONS
[facility]  Cardiac Electrophysiology  [address]  [place] [address]   Office: [phone]  Fax: [phone]     Cardiac Electrophysiology - Loop Recorder Implantation Discharge Instructions      PROCEDURE   ILR (implantable loop recorder) placement         MEDICATION INSTRUCTIONS   Continue taking your prior to admission medications         WOUND CARE   Leave the large overlying dressing in place until 2 days after discharge - this dressing can thereafter be removed by gently pulling off.  Underneath the large dressing will be steri-strips. DO NOT REMOVE these strips; please leave these in place until you are seen in clinic.  DO NOT COVER the site with any bandages or dressings. The site should be kept dry for 3-5 days - please avoid traditional showers or baths during this time.  Keep the site clean and dry.  No swimming, sauna, or hot tub use until incision is completely healed.         ACTIVITY   It takes approximately 1-2 weeks for your incision to heal.  During this time use extra precautions - please avoid the following:  Raising your arm over your head or stretching behind your back (no hyperflexion)  Pushing or pulling (mowing the lawn, vacuuming)  Lifting anything heavier than 10 pounds or a gallon of milk  Sudden or extreme motions  Be physically active every day.  Moving your arm is important         REMOTE MONITORING   You will receive a remote transmission station to monitor your device from home  Please set the transmitter up as soon as you get home from the hospital.  Directions are included in the box, and you may call our office or the company technical support line if you need assistance connecting your transmitter.  Please send a transmission the day after you go home  Automated transmissions will be sent every 3 months or sooner if device issues are detected.  You may manually send in transmissions if you are having arrhythmia symptoms or think  there may be a problem with your device.  Please call our office at [phone] if you send in a transmission off-schedule         WHAT TO WATCH OUT FOR   Contact our office or seek emergency care for any of the following:  Drainage, bleeding or oozing from the site  Redness, increased swelling, or warmth around the device site  Pain not treated with prescribed pain medication  Temperature greater than 100.4F  Chest pain, shortness of breath, dizziness/fainting         FOLLOW-UP   Our office will coordinate a follow-up visit visit in clinic for a device interrogation and an incision check 7-14 days after your procedure.   Please contact us at [phone] any issues during your recovery.

## 2024-08-09 ENCOUNTER — DOCUMENTATION ONLY (OUTPATIENT)
Dept: CARDIOLOGY | Facility: CLINIC | Age: 89
End: 2024-08-09

## 2024-08-09 ENCOUNTER — ANCILLARY PROCEDURE (OUTPATIENT)
Dept: CARDIOLOGY | Facility: CLINIC | Age: 89
End: 2024-08-09
Attending: INTERNAL MEDICINE
Payer: COMMERCIAL

## 2024-08-09 DIAGNOSIS — Z95.818 STATUS POST PLACEMENT OF IMPLANTABLE LOOP RECORDER: ICD-10-CM

## 2024-08-09 LAB
MDC_IDC_EPISODE_DTM: NORMAL
MDC_IDC_EPISODE_ID: NORMAL
MDC_IDC_EPISODE_TYPE: NORMAL
MDC_IDC_MSMT_BATTERY_DTM: NORMAL
MDC_IDC_MSMT_BATTERY_STATUS: NORMAL
MDC_IDC_PG_IMPLANT_DTM: NORMAL
MDC_IDC_PG_MFG: NORMAL
MDC_IDC_PG_MODEL: NORMAL
MDC_IDC_PG_SERIAL: NORMAL
MDC_IDC_PG_TYPE: NORMAL
MDC_IDC_SESS_CLINIC_NAME: NORMAL
MDC_IDC_SESS_DTM: NORMAL
MDC_IDC_SESS_TYPE: NORMAL
MDC_IDC_STAT_AT_BURDEN_PERCENT: 0 %
MDC_IDC_STAT_AT_DTM_END: NORMAL
MDC_IDC_STAT_AT_DTM_START: NORMAL

## 2024-08-09 PROCEDURE — 93291 INTERROG DEV EVAL SCRMS IP: CPT | Performed by: INTERNAL MEDICINE

## 2024-08-09 NOTE — PROGRESS NOTES
During patient's 1 week PO ILR device check, she questions if she needs to continue to refrain from driving. Per Dr. Gatica's note, she was iadvised not to drive. Routed to Dr. Gatica for further clarification on driving restrictions.     Praneeth Short, RN  Device Nurse

## 2024-08-12 NOTE — PROGRESS NOTES
Artem Gatica MD Yang, Youa N RN  Caller: Unspecified (3 days ago,  2:42 PM)  She is not supposed to drive for 3 months after last unexplained syncope.    Dr. Gatica's recommendation to avoid driving for 3 month post syncope episode discussed with patient. Patient verbalized understanding. No other questions or concerns.    Praneeth Short, RN  Device Nurse

## 2024-08-15 ENCOUNTER — ALLIED HEALTH/NURSE VISIT (OUTPATIENT)
Dept: FAMILY MEDICINE | Facility: CLINIC | Age: 89
End: 2024-08-15
Payer: COMMERCIAL

## 2024-08-15 ENCOUNTER — TELEPHONE (OUTPATIENT)
Dept: FAMILY MEDICINE | Facility: CLINIC | Age: 89
End: 2024-08-15

## 2024-08-15 VITALS — HEART RATE: 77 BPM | SYSTOLIC BLOOD PRESSURE: 132 MMHG | DIASTOLIC BLOOD PRESSURE: 68 MMHG | OXYGEN SATURATION: 97 %

## 2024-08-15 DIAGNOSIS — Z01.30 BLOOD PRESSURE CHECK: Primary | ICD-10-CM

## 2024-08-15 DIAGNOSIS — I10 ESSENTIAL HYPERTENSION, BENIGN: ICD-10-CM

## 2024-08-15 DIAGNOSIS — N39.41 URGENCY INCONTINENCE: Primary | ICD-10-CM

## 2024-08-15 PROCEDURE — 99207 PR NO CHARGE NURSE ONLY: CPT

## 2024-08-15 NOTE — TELEPHONE ENCOUNTER
"Bonita Villarreal is a 89 year old year old patient who comes in today for a Blood Pressure check because of new medication.Amlodipine 2.5 mg  Vital Signs as repeated by /68 HR 77 SPO2 97% right arm  Patient is taking medication as prescribed  Patient is tolerating medications well.  Patient is monitoring Blood Pressure at home.  Average readings if yes are \"high\" does not know numbers  Current complaints: headaches, thinks it is from eye glasses. Appointment with optometry.   Disposition:  patient to continue with the same medication or as advised    Prefers a phone call and [employer] if changes    Patient reports she is up 3 times in the night to urinate. Wondering if Ditropan dose should be increased? Currently 15 mg.     Tiffanie Murdock RN on 8/15/2024 at 2:42 PM  "

## 2024-08-16 RX ORDER — AMLODIPINE BESYLATE 2.5 MG/1
2.5 TABLET ORAL DAILY
Qty: 90 TABLET | Refills: 3 | Status: SHIPPED | OUTPATIENT
Start: 2024-08-16

## 2024-08-16 RX ORDER — MIRABEGRON 25 MG/1
25 TABLET, FILM COATED, EXTENDED RELEASE ORAL DAILY
Qty: 30 TABLET | Refills: 1 | Status: CANCELLED | OUTPATIENT
Start: 2024-08-16

## 2024-08-16 NOTE — TELEPHONE ENCOUNTER
Spoke with pedro and updated her with plan. She will wait to start new medication until her appt on 09/6

## 2024-08-16 NOTE — TELEPHONE ENCOUNTER
Blood pressure looks excellent on the amlodipine.  Continue at this dose.  Refills faxed to pharmacy.    Unfortunately 15 mg is the max dose of Ditropan. We could try switching to a different medication to see if it works better for her.  Myrbetriq pended if she would like to try that instead.

## 2024-09-06 ENCOUNTER — OFFICE VISIT (OUTPATIENT)
Dept: FAMILY MEDICINE | Facility: CLINIC | Age: 89
End: 2024-09-06
Payer: COMMERCIAL

## 2024-09-06 VITALS
SYSTOLIC BLOOD PRESSURE: 124 MMHG | WEIGHT: 167 LBS | HEART RATE: 66 BPM | DIASTOLIC BLOOD PRESSURE: 70 MMHG | BODY MASS INDEX: 26.84 KG/M2 | RESPIRATION RATE: 16 BRPM | OXYGEN SATURATION: 99 % | TEMPERATURE: 96.9 F | HEIGHT: 66 IN

## 2024-09-06 DIAGNOSIS — R55 SYNCOPE, UNSPECIFIED SYNCOPE TYPE: Primary | ICD-10-CM

## 2024-09-06 DIAGNOSIS — I10 ESSENTIAL HYPERTENSION, BENIGN: Chronic | ICD-10-CM

## 2024-09-06 PROCEDURE — 99214 OFFICE O/P EST MOD 30 MIN: CPT | Performed by: FAMILY MEDICINE

## 2024-09-06 ASSESSMENT — PAIN SCALES - GENERAL: PAINLEVEL: NO PAIN (0)

## 2024-09-06 NOTE — NURSING NOTE
"Initial /70   Pulse 66   Temp 96.9  F (36.1  C) (Tympanic)   Resp 16   Ht 1.676 m (5' 6\")   Wt 75.8 kg (167 lb)   LMP  (LMP Unknown)   SpO2 99%   BMI 26.95 kg/m   Estimated body mass index is 26.95 kg/m  as calculated from the following:    Height as of this encounter: 1.676 m (5' 6\").    Weight as of this encounter: 75.8 kg (167 lb). .    "

## 2024-09-06 NOTE — PROGRESS NOTES
"  Assessment & Plan     Syncope, unspecified syncope type  No recurrent syncope since lowering atenolol dose. Continue with atenolol 25mg daily.    Essential hypertension, benign  Remained well controlled on amlodipine and losartan. Continue for now.          BMI  Estimated body mass index is 26.95 kg/m  as calculated from the following:    Height as of this encounter: 1.676 m (5' 6\").    Weight as of this encounter: 75.8 kg (167 lb).             Subjective   Avinash is a 89 year old, presenting for the following health issues:  Hypertension        9/6/2024     4:07 PM   Additional Questions   Roomed by Megan ORTIZ CMA     History of Present Illness       Reason for visit:  Bloodpressue and meds   She is taking medications regularly.         Patient would like to discuss recent medication changes.      ROS:   Constitutional, neuro, ENT, endocrine, pulmonary, cardiac, gastrointestinal, genitourinary, musculoskeletal, integument and psychiatric systems are negative, except as otherwise noted.       Objective    /70   Pulse 66   Temp 96.9  F (36.1  C) (Tympanic)   Resp 16   Ht 1.676 m (5' 6\")   Wt 75.8 kg (167 lb)   LMP  (LMP Unknown)   SpO2 99%   BMI 26.95 kg/m    Body mass index is 26.95 kg/m .  Physical Exam   GENERAL: Pleasant, well appearing female.  CV: RRR, no murmurs, rubs or gallops.  LUNGS: CTAB, normal effort.  EXTREMITIES: no edema.            Signed Electronically by: Dewayne Santana MD    "

## 2024-09-07 DIAGNOSIS — F41.1 GENERALIZED ANXIETY DISORDER: ICD-10-CM

## 2024-09-09 RX ORDER — ESCITALOPRAM OXALATE 20 MG/1
20 TABLET ORAL DAILY
Qty: 90 TABLET | Refills: 1 | OUTPATIENT
Start: 2024-09-09

## 2024-09-18 RX ORDER — LOSARTAN POTASSIUM 100 MG/1
100 TABLET ORAL DAILY
Qty: 90 TABLET | Refills: 4 | Status: SHIPPED | OUTPATIENT
Start: 2024-09-18

## 2024-10-08 ENCOUNTER — HOSPITAL ENCOUNTER (EMERGENCY)
Facility: CLINIC | Age: 89
Discharge: ANOTHER HOSPITAL | End: 2024-10-08
Attending: FAMILY MEDICINE | Admitting: FAMILY MEDICINE
Payer: COMMERCIAL

## 2024-10-08 ENCOUNTER — APPOINTMENT (OUTPATIENT)
Dept: GENERAL RADIOLOGY | Facility: CLINIC | Age: 89
End: 2024-10-08
Attending: FAMILY MEDICINE
Payer: COMMERCIAL

## 2024-10-08 VITALS
HEART RATE: 69 BPM | SYSTOLIC BLOOD PRESSURE: 141 MMHG | DIASTOLIC BLOOD PRESSURE: 73 MMHG | RESPIRATION RATE: 18 BRPM | OXYGEN SATURATION: 95 % | WEIGHT: 165 LBS | BODY MASS INDEX: 26.63 KG/M2 | TEMPERATURE: 98.1 F

## 2024-10-08 DIAGNOSIS — Z96.649 PERI-PROSTHETIC FRACTURE OF SHAFT OF FEMUR: ICD-10-CM

## 2024-10-08 DIAGNOSIS — M97.8XXA PERI-PROSTHETIC FRACTURE OF SHAFT OF FEMUR: ICD-10-CM

## 2024-10-08 PROCEDURE — 250N000013 HC RX MED GY IP 250 OP 250 PS 637: Performed by: FAMILY MEDICINE

## 2024-10-08 PROCEDURE — 99285 EMERGENCY DEPT VISIT HI MDM: CPT | Performed by: FAMILY MEDICINE

## 2024-10-08 PROCEDURE — 99284 EMERGENCY DEPT VISIT MOD MDM: CPT | Performed by: FAMILY MEDICINE

## 2024-10-08 PROCEDURE — 73560 X-RAY EXAM OF KNEE 1 OR 2: CPT | Mod: RT

## 2024-10-08 RX ORDER — LANOLIN ALCOHOL/MO/W.PET/CERES
1000 CREAM (GRAM) TOPICAL DAILY
COMMUNITY

## 2024-10-08 RX ORDER — SENNOSIDES 8.6 MG
650 CAPSULE ORAL EVERY 8 HOURS PRN
COMMUNITY

## 2024-10-08 RX ORDER — IBANDRONATE SODIUM 150 MG/1
1 TABLET, FILM COATED ORAL
COMMUNITY
Start: 2024-09-16

## 2024-10-08 RX ORDER — OXYCODONE HYDROCHLORIDE 5 MG/1
5 TABLET ORAL EVERY 4 HOURS PRN
Status: DISCONTINUED | OUTPATIENT
Start: 2024-10-08 | End: 2024-10-08 | Stop reason: HOSPADM

## 2024-10-08 RX ADMIN — OXYCODONE HYDROCHLORIDE 5 MG: 5 TABLET ORAL at 09:00

## 2024-10-08 RX ADMIN — OXYCODONE HYDROCHLORIDE 5 MG: 5 TABLET ORAL at 13:02

## 2024-10-08 RX ADMIN — OXYCODONE HYDROCHLORIDE 5 MG: 5 TABLET ORAL at 17:42

## 2024-10-08 ASSESSMENT — ACTIVITIES OF DAILY LIVING (ADL)
ADLS_ACUITY_SCORE: 38

## 2024-10-08 ASSESSMENT — COLUMBIA-SUICIDE SEVERITY RATING SCALE - C-SSRS
2. HAVE YOU ACTUALLY HAD ANY THOUGHTS OF KILLING YOURSELF IN THE PAST MONTH?: NO
6. HAVE YOU EVER DONE ANYTHING, STARTED TO DO ANYTHING, OR PREPARED TO DO ANYTHING TO END YOUR LIFE?: NO

## 2024-10-08 NOTE — ED TRIAGE NOTES
Up in a hurry from bed when family was at her door this morning and fell. Denies dizziness and reports that she wasn't fully awake when she got up quickly. Fell and hit her closet door. Her knee hurts and she doesn't think she hit anything else. Hypertensive, but states she did not get her BP meds this morning.     Triage Assessment (Adult)       Row Name 10/08/24 0834          Triage Assessment    Airway WDL WDL        Respiratory WDL    Respiratory WDL WDL        Skin Circulation/Temperature WDL    Skin Circulation/Temperature WDL WDL        Cardiac WDL    Cardiac WDL X  ambulance reported LBBB        Peripheral/Neurovascular WDL    Peripheral Neurovascular WDL WDL        Cognitive/Neuro/Behavioral WDL    Cognitive/Neuro/Behavioral WDL WDL

## 2024-10-08 NOTE — MEDICATION SCRIBE - ADMISSION MEDICATION HISTORY
Medication Scribe Admission Medication History    Admission medication history is complete. The information provided in this note is only as accurate as the sources available at the time of the update.    Information Source(s): Patient and CareEverywhere/SureScripts via  with patient in room and finished at desk.    Pertinent Information: She takes care of her own medicines at home.  She did take her own medicines from home this morning in ER room.  She has an implanted loop recorder.    Changes made to PTA medication list:  Added: Acetaminophen 650 mg, Vitamin B12 1000 mcg, Ibandronate 150 mg.  Deleted: Acetaminophen 325 mg.  Changed: None    Allergies reviewed with patient and updates made in EHR: yes, no change.    Medication History Completed By: Radha Mak 10/8/2024 12:32 PM    PTA Med List   Medication Sig Last Dose    acetaminophen (TYLENOL 8 HOUR) 650 MG CR tablet Take 650 mg by mouth every 8 hours as needed for mild pain or fever. Past Month at prn    amLODIPine (NORVASC) 2.5 MG tablet Take 1 tablet (2.5 mg) by mouth daily 10/8/2024 at am    atenolol (TENORMIN) 25 MG tablet Take 1 tablet (25 mg) by mouth daily 10/8/2024 at am    cyanocobalamin (VITAMIN B-12) 1000 MCG tablet Take 1,000 mcg by mouth daily. 10/8/2024 at am    escitalopram (LEXAPRO) 20 MG tablet Take 1 tablet (20 mg) by mouth daily 10/8/2024 at am    IBANdronate (BONIVA) 150 MG tablet Take 1 tablet by mouth every 30 days. 9/1/2024 at am    losartan (COZAAR) 100 MG tablet Take 1 tablet (100 mg) by mouth daily. 10/8/2024 at am    multivitamin (CENTRUM SILVER) tablet Take 1 tablet by mouth daily 10/8/2024 at am    oxyBUTYnin ER (DITROPAN XL) 15 MG 24 hr tablet Take 1 tablet (15 mg) by mouth daily 10/8/2024 at am

## 2024-10-08 NOTE — ED NOTES
Patient granddaughter took patients necklace.  EMS called stating there have been back to back 911 calls and they will be here to get patient as soon as they can.

## 2024-10-08 NOTE — ED PROVIDER NOTES
History     Chief Complaint   Patient presents with    Fall     HPI  Bonita Villarreal is a 89 year old female, past medical history reviewed below, presents to the emergency department concerns of fall and trauma to her right knee just prior to arrival.  History is obtained from the patient who arrives by EMS and has her son present in the room at the time of history taking.  She states that she got up quickly as family ring the doorbell and she lost her balance causing her to fall forward into a closet door and ultimately landed on her right knee where she has pain.  She had a right knee total arthroplasty about 5 years ago.  She denied any trauma to her head or neck and denies pain in the head neck chest back or abdomen.      Allergies:  Allergies   Allergen Reactions    Bactrim [Sulfamethoxazole-Trimethoprim] Nausea and Vomiting    Lisinopril Cough       Problem List:    Patient Active Problem List    Diagnosis Date Noted    Syncope, unspecified syncope type 08/01/2024     Priority: Medium    Pain from implanted hardware, initial encounter 07/17/2023     Priority: Medium    Anemia, unspecified type 07/19/2022     Priority: Medium    Age-related osteoporosis with current pathological fracture 07/19/2022     Priority: Medium    Closed displaced intertrochanteric fracture of right femur with routine healing, subsequent encounter 07/19/2022     Priority: Medium    Closed comminuted intertrochanteric fracture of right femur (H) 07/17/2022     Priority: Medium    Urgency incontinence 09/23/2021     Priority: Medium    Status post total knee replacement 10/06/2020     Priority: Medium    Prediabetes 05/06/2020     Priority: Medium    Fracture of sacrum (H) 03/02/2020     Priority: Medium     Sustained 1/26/2020      Sacral pain 03/02/2020     Priority: Medium    Status post total right knee replacement 02/28/2018     Priority: Medium    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     Priority: Medium    CARDIOVASCULAR SCREENING;  LDL GOAL LESS THAN 160 10/31/2010     Priority: Medium     LDL in 140s, HDL in 60s  [place] risk is 6% (average at her age is 14%)      Essential hypertension, benign 2005     Priority: Medium     had coronary CT scan  --- no evidence of plaque  started on atenolol and ASA 2005 for /94  physician in [place] switched her to Lotrel 5/10 due to her having dizzy spells  HCTZ added 3/05, pt felt mouth too dried out   pt more anxious and BP still elevated -- switched back to Atenolol      Diverticulosis of large intestine 2005     Priority: Medium     colonoscopy , disease mod - severe  Problem list name updated by automated process. Provider to review      Osteopenia of spine 2005     Priority: Medium     T score -1.3    Risk calculator based on 2003 T-score and personal data showed 1% risk of hip fracture over next 10 years, 14% risk of any fracture over next ten years; consider recheck DEXA at age 77, continue calcium and exercise until then (current recommendations to consider treatment if hip fx risk reaches 3% or any fracture risk reaches 20%)  Problem list name updated by automated process. Provider to review      Family history of other cardiovascular diseases 2005     Priority: Medium     father  at 39 of MI  brother  at 51 of MI  another brother -- MI x 2  eldest sister -- MI x 2 with stents  mother and maternal aunts lived to 80's and mid 90's  Problem list name updated by automated process. Provider to review and confirm  Problem list name updated by automated process. Provider to review      Generalized anxiety disorder 2005     Priority: Medium     onset after patient was diagnosed with hypertension fall           Past Medical History:    Past Medical History:   Diagnosis Date    Advance Care Planning 2012    Arthritis     Diverticulitis of colon 2005    Diverticulosis of colon (without mention of hemorrhage)     Hypertension         Past Surgical History:    Past Surgical History:   Procedure Laterality Date    ARTHROPLASTY KNEE Right 2018    Procedure: ARTHROPLASTY KNEE;  Right total knee arthroplasty;  Surgeon: Anton Grover MD;  Location: [facility] OR    ARTHROPLASTY KNEE Left 10/6/2020    Procedure: Total Knee Arthroplasty;  Surgeon: Calixto Santana MD;  Location: [facility] OR    COLONOSCOPY  04/15/02    COLONOSCOPY  3/25/2013    Procedure: COLONOSCOPY;  Colonoscopy  ;  Surgeon: Jamil Porras MD;  Location: [facility] GI    EP LOOP RECORDER IMPLANT N/A 2024    Procedure: Loop Recorder Implant;  Surgeon: Miguelina Welsh APRN CNP;  Location: [facility] CATH LAB CV    FLEXIBLE SIGMOIDOSCOPY  96    OPEN REDUCTION INTERNAL FIXATION RODDING INTRAMEDULLARY FEMUR Right 2022    Procedure: OPEN REDUCTION INTERNAL FIXATION, FRACTURE, Right FEMUR, USING INTRAMEDULLARY LEENA;  Surgeon: Dante Alonso MD;  Location:  OR    ORTHOPEDIC SURGERY  22-23 YEARS AGO    BACK    SURGICAL HISTORY OF -       lumbar surgery    ZZC APPENDECTOMY         Family History:    Family History   Problem Relation Age of Onset    Lipids Brother     Heart Disease Brother          at 51 of an MI    Cancer Brother         bone, lung,  82    Prostate Cancer Brother     Cancer Sister         cervical    Lipids Sister     Heart Disease Sister         eldest sister, s/p MI x 2 with stents; diagnosed with AAA, postoperative complications and MI,  at 75    Heart Disease Brother         MI x 2    Lipids Brother     C.A.D. Father          at 39 of an MI    Family History Negative Mother         lived to age 95    Gynecology Sister         cervical Ca       Social History:  Marital Status:   [5]  Social History     Tobacco Use    Smoking status: Never    Smokeless tobacco: Never   Vaping Use    Vaping status: Never Used   Substance Use Topics    Alcohol use: Yes     Comment: rare    Drug use: No        Medications:    acetaminophen  (TYLENOL 8 HOUR) 650 MG CR tablet  amLODIPine (NORVASC) 2.5 MG tablet  atenolol (TENORMIN) 25 MG tablet  cyanocobalamin (VITAMIN B-12) 1000 MCG tablet  escitalopram (LEXAPRO) 20 MG tablet  IBANdronate (BONIVA) 150 MG tablet  losartan (COZAAR) 100 MG tablet  multivitamin (CENTRUM SILVER) tablet  oxyBUTYnin ER (DITROPAN XL) 15 MG 24 hr tablet          Review of Systems   All other systems reviewed and are negative.      Physical Exam   BP: (!) 177/97  Pulse: 89  Temp: 98.1  F (36.7  C)  Resp: 18  Weight: 74.8 kg (165 lb)  SpO2: 94 %      Physical Exam  Vitals and nursing note reviewed.   Constitutional:       General: She is not in acute distress.     Appearance: Normal appearance. She is normal weight. She is not ill-appearing.   HENT:      Head: Normocephalic and atraumatic.      Right Ear: Tympanic membrane, ear canal and external ear normal.      Left Ear: Tympanic membrane, ear canal and external ear normal.      Nose: Nose normal.      Mouth/Throat:      Mouth: Mucous membranes are dry.      Pharynx: Oropharynx is clear.   Eyes:      Extraocular Movements: Extraocular movements intact.      Conjunctiva/sclera: Conjunctivae normal.      Pupils: Pupils are equal, round, and reactive to light.   Cardiovascular:      Rate and Rhythm: Normal rate and regular rhythm.      Pulses: Normal pulses.      Heart sounds: Normal heart sounds.   Pulmonary:      Effort: Pulmonary effort is normal.      Breath sounds: Normal breath sounds.   Abdominal:      General: Bowel sounds are normal.      Palpations: Abdomen is soft.   Musculoskeletal:      Cervical back: Normal range of motion and neck supple.        Legs:       Comments: Midline scar over the right knee from previous TKA, diffuse swelling and tenderness in the area diagram.  Intact neurovascular status right lower extremity.   Neurological:      Mental Status: She is alert.         ED Course        Procedures         Results for orders placed or performed during the  hospital encounter of 10/08/24 (from the past 24 hour(s))   XR Knee Right 1/2 Views    Narrative    EXAM: XR KNEE RIGHT 1/2 VIEWS  DATE/TIME: 10/8/2024 9:50 AM    INDICATION: Fall with trauma to right knee and pain.  TKA  approximately 5 years ago  COMPARISON: 7/7/2023      Impression    IMPRESSION: Comminuted apex anteriorly angulated, and medially and  proximally displaced spiral type fracture of the distal femoral  metadiaphysis.    Partial visualization of an intramedullary farhad of the right femur.  Fractured distal interlocking screws which have increased in  displacement since the comparison exam of July 2023.     Right total knee arthroplasty. No evidence of component loosening. The  fracture does not appear to extend to the total knee arthroplasty  components.       NORMAN MILLS DO         SYSTEM ID:  [id number]   11:35 AM  I reviewed this patient with on-call [facility] orthopedist Dr. Scott Nieto who felt that the patient should be transferred to [facility] and made that recommendation to me.  I subsequently spoke with Dr. Priest orthopedics at [facility] who agreed to accept the patient as a direct transfer to [facility].  Requested that we place knee immobilizer on the patientt's affected leg.  Bed should be available in the next few hours.  I informed the patient.  Her son has stepped out for the moment but I will certainly discuss the plan with him when he returns.    1:33 PM  I reviewed recommendations with the patient and her son, we tried to place a knee immobilizer but there is not 1 large enough to fit the patient and so we used Ace wrap's instead to give her some stability throughout the knee region for transfer.  Currently awaiting bed at [facility] for direct admission to orthopedics as discussed at the prior time stamp.    2:34 PM  At shift change this patient was discussed with my colleague  pending transfer.        Medications   oxyCODONE (ROXICODONE)  tablet 5 mg (5 mg Oral $Given 10/8/24 1840)       Assessments & Plan (with Medical Decision Making)   Assessment and plan with medical decision making at the time stamp above.      Disclaimer: This note consists of symbols derived from keyboarding, dictation and/or voice recognition software. As a result, there may be errors in the script that have gone undetected. Please consider this when interpreting information found in this chart.      I have reviewed the nursing notes.    I have reviewed the findings, diagnosis, plan and need for follow up with the patient.      New Prescriptions    No medications on file       Final diagnoses:   Sunita-prosthetic fracture of shaft of femur - Distal one third femur near knee prosthesis, with pre-existing femoral farhad       10/8/2024   [facility] EMERGENCY DEPT       Marc Dolan MD  10/08/24 7076

## 2024-10-11 ENCOUNTER — TELEPHONE (OUTPATIENT)
Dept: FAMILY MEDICINE | Facility: CLINIC | Age: 89
End: 2024-10-11
Payer: COMMERCIAL

## 2024-10-11 NOTE — TELEPHONE ENCOUNTER
[facility], [phone], is calling regarding an established patient with [facility].    Requesting orders from: Dewayne Santana  Provider is following patient: Yes  Is this a 60-day recertification request?  No    Orders Requested    Skilled Nursing  Request for initial evaluation and treatment (one time)   Physical Therapy  Request for initial evaluation and treatment (one time)  Occupational Therapy  Request for initial evaluation and treatment (one time)     Pt is being discharged to home from the hospital.  Discharging provider wrote home care orders for pt.    Verbal orders given.  Home Care will send orders for provider to sign.  Confirmed ok to leave a detailed message with call back.  Contact information confirmed and updated as needed.    Ana Sinha RN

## 2024-10-13 ENCOUNTER — MEDICAL CORRESPONDENCE (OUTPATIENT)
Dept: HEALTH INFORMATION MANAGEMENT | Facility: CLINIC | Age: 89
End: 2024-10-13
Payer: COMMERCIAL

## 2024-10-14 ENCOUNTER — TELEPHONE (OUTPATIENT)
Dept: FAMILY MEDICINE | Facility: CLINIC | Age: 89
End: 2024-10-14
Payer: COMMERCIAL

## 2024-10-14 ENCOUNTER — MEDICAL CORRESPONDENCE (OUTPATIENT)
Dept: HEALTH INFORMATION MANAGEMENT | Facility: CLINIC | Age: 89
End: 2024-10-14

## 2024-10-14 NOTE — TELEPHONE ENCOUNTER
Home Care is calling regarding an established patient with [facility].     Requesting orders from: Dewayne Santana  Provider is following patient: Yes  Is this a 60-day recertification request?  No    Orders Requested    HHA (Home Health Aide)  Request for initial certification (first set of orders)   Frequency:  1x/wk for 8 wks for help with showers      Information was gathered and will be sent to provider for review.  RN will contact Home Care with information after provider review.  Confirmed ok to leave a detailed message with call back.  Contact information confirmed and updated as needed.    Flora Pate RN

## 2024-10-14 NOTE — TELEPHONE ENCOUNTER
Home Care is calling regarding an established patient with [facility].     Requesting orders from: Dewayne Santana  Provider is following patient: Yes  Is this a 60-day recertification request?  No    Orders Requested    Skilled Nursing  Request for initial certification (first set of orders)   Frequency:  2x/wk for 10 wks  Plus 2 PRN visits in the 60 day period.    Physical Therapy  Request for initial evaluation and treatment (one time)     Occupational Therapy  Request for initial evaluation and treatment (one time)     HHA (Home Health Aide)  Request for initial certification (first set of orders)   Frequency:  2x/wk for 10 wks    Wound Care Orders - cleanse with NS, cover abd's, wrap leg with ace wrap. Mepilex on top hip after cleaning with NS.    Verbal orders given for PT and OT.  Information was gathered for Skilled nursing and Wound Care.  Provider review needed.  RN will contact Home Care with information after provider review.  Confirmed ok to leave a detailed message with call back.  Contact information confirmed and updated as needed.    Flora Pate RN

## 2024-10-15 ENCOUNTER — TELEPHONE (OUTPATIENT)
Dept: FAMILY MEDICINE | Facility: CLINIC | Age: 89
End: 2024-10-15
Payer: COMMERCIAL

## 2024-10-15 NOTE — TELEPHONE ENCOUNTER
Diana from [facility] calls looking for a verbal order for nurse eval and treat of a wound that is draining more today than yesterday and they want to go out to look at it as soon as possible. Thank you!    Ellie Callejas RN

## 2024-10-15 NOTE — TELEPHONE ENCOUNTER
I approve of requested home care orders.    Covering for your clinician.  Thank you,  Bart Guerrero MD  [facility]

## 2024-10-16 ENCOUNTER — MEDICAL CORRESPONDENCE (OUTPATIENT)
Dept: HEALTH INFORMATION MANAGEMENT | Facility: CLINIC | Age: 89
End: 2024-10-16
Payer: COMMERCIAL

## 2024-10-17 DIAGNOSIS — F41.1 GENERALIZED ANXIETY DISORDER: ICD-10-CM

## 2024-10-18 ENCOUNTER — MEDICAL CORRESPONDENCE (OUTPATIENT)
Dept: HEALTH INFORMATION MANAGEMENT | Facility: CLINIC | Age: 89
End: 2024-10-18
Payer: COMMERCIAL

## 2024-10-18 DIAGNOSIS — Z53.9 DIAGNOSIS NOT YET DEFINED: Primary | ICD-10-CM

## 2024-10-18 PROCEDURE — G0180 MD CERTIFICATION HHA PATIENT: HCPCS | Performed by: FAMILY MEDICINE

## 2024-10-18 NOTE — TELEPHONE ENCOUNTER
Called and left detailed message for verbal orders on confidential voice mail and to please send the orders by fax to sign.    Bethany Christie RN on 10/18/2024 at 3:06 PM

## 2024-10-18 NOTE — TELEPHONE ENCOUNTER
Requested Prescriptions   Pending Prescriptions Disp Refills    escitalopram (LEXAPRO) 20 MG tablet [Pharmacy Med Name: escitalopram 20 mg tablet] 90 tablet 1     Sig: Take 1 tablet (20 mg) by mouth daily       SSRIs Protocol Failed - 10/17/2024  3:29 PM        Failed - JAMSHID-7 score of less than 5 in past 6 months.     Please review last JAMSHID-7 score.           Passed - Medication is active on med list        Passed - Recent (12 mo) or future (90 days) visit within the authorizing provider's specialty     The patient must have completed an in-person or virtual visit within the past 12 months or has a future visit scheduled within the next 90 days with the authorizing provider s specialty.  Urgent care and e-visits do not quality as an office visit for this protocol.          Passed - Medication indicated for associated diagnosis     Medication is associated with one or more of the following diagnoses:              Anxiety             Bipolar  Depression  Obsessive-compulsive disorder             Panic disorder  Postmenopausal flushing             Premenstrual dysphoric disorder             Social phobia   Adjustment disorder with depressed mood   Mood disorder          Passed - Patient is age 18 or older        Passed - No active pregnancy on record        Passed - No positive pregnancy test in last 12 months

## 2024-10-18 NOTE — TELEPHONE ENCOUNTER
Attempted to call but Not able to leave a voice message due to mailbox is full.    Bethany Christie RN on 10/18/2024 at 3:03 PM

## 2024-10-21 NOTE — TELEPHONE ENCOUNTER
Left message to return call to clinic re: this pt, as VM message does not identify this as Catalina's number.    Sandra Meléndez RN  [facility]

## 2024-10-22 RX ORDER — ESCITALOPRAM OXALATE 20 MG/1
20 TABLET ORAL DAILY
Qty: 90 TABLET | Refills: 1 | Status: SHIPPED | OUTPATIENT
Start: 2024-10-22

## 2024-10-22 NOTE — TELEPHONE ENCOUNTER
Patient Contact     Attempt # 3     Was call answered?  No.  Left message on voicemail with information to call back due to no identification on Catalina's voicemail.    RN called [facility] [phone] to connect with staff to provide orders.    [facility] discharged patient due to patient wants care through [facility].       Bethany Christie RN on 10/22/2024 at 11:13 AM

## 2024-10-22 NOTE — TELEPHONE ENCOUNTER
Patient is out of this medication and requesting refill.     PCP is out of office today. Sending to provider in clinic for review per protocol.     Siobhan TORRES RN  [facility]  [phone]

## 2024-11-01 ENCOUNTER — MEDICAL CORRESPONDENCE (OUTPATIENT)
Dept: HEALTH INFORMATION MANAGEMENT | Facility: CLINIC | Age: 89
End: 2024-11-01
Payer: COMMERCIAL

## 2024-11-01 DIAGNOSIS — Z53.9 DIAGNOSIS NOT YET DEFINED: Primary | ICD-10-CM

## 2024-11-06 ENCOUNTER — MEDICAL CORRESPONDENCE (OUTPATIENT)
Dept: HEALTH INFORMATION MANAGEMENT | Facility: CLINIC | Age: 89
End: 2024-11-06
Payer: COMMERCIAL

## 2024-11-06 ENCOUNTER — PATIENT OUTREACH (OUTPATIENT)
Dept: CARE COORDINATION | Facility: CLINIC | Age: 89
End: 2024-11-06
Payer: COMMERCIAL

## 2024-11-11 ENCOUNTER — TELEPHONE (OUTPATIENT)
Dept: FAMILY MEDICINE | Facility: CLINIC | Age: 89
End: 2024-11-11
Payer: COMMERCIAL

## 2024-11-13 ENCOUNTER — MEDICAL CORRESPONDENCE (OUTPATIENT)
Dept: HEALTH INFORMATION MANAGEMENT | Facility: CLINIC | Age: 89
End: 2024-11-13

## 2024-11-13 ENCOUNTER — TELEPHONE (OUTPATIENT)
Dept: FAMILY MEDICINE | Facility: CLINIC | Age: 89
End: 2024-11-13

## 2024-11-13 ENCOUNTER — ALLIED HEALTH/NURSE VISIT (OUTPATIENT)
Dept: FAMILY MEDICINE | Facility: CLINIC | Age: 89
End: 2024-11-13
Payer: COMMERCIAL

## 2024-11-13 VITALS — HEART RATE: 75 BPM | DIASTOLIC BLOOD PRESSURE: 51 MMHG | OXYGEN SATURATION: 99 % | SYSTOLIC BLOOD PRESSURE: 111 MMHG

## 2024-11-13 DIAGNOSIS — Z01.30 BLOOD PRESSURE CHECK: Primary | ICD-10-CM

## 2024-11-13 PROCEDURE — 99207 PR NO CHARGE NURSE ONLY: CPT

## 2024-11-13 NOTE — TELEPHONE ENCOUNTER
Bonita Villarreal is a 89 year old year old patient who comes in today for a Blood Pressure check because of Home Care Physical therapy saw patient yesterday and Blood pressure was low 90/55. Home Care recommended to have patient check her blood pressure at the clinic. Patient has home care coming tomorrow and will check Blood pressure again.     Vital Signs as repeated by RN   Flowsheets   11/13/2024    2:49 PM 3:00 PM      /67 111/51   BP Location Right arm Right arm   Patient Position Sitting Sitting   Cuff Size Adult Regular Adult Regular   Pulse 75 75   SpO2 99 % 99 %     Patient is taking medication as prescribed  Patient is tolerating medications well.  Patient is not monitoring Blood Pressure at home.   Current complaints: none  Disposition:  patient to continue with the same medication.  RN encouraged to drink water and to call the clinic if she has dizziness, light headedness or other concerns.     Patient uses [facility].    11/27/24 patient is scheduled for Annual Wellness with Dr. Santana.     Bethany Christie RN on 11/13/2024 at 3:04 PM

## 2024-11-13 NOTE — PROGRESS NOTES
Bonita Villarreal is a 89 year old year old patient who comes in today for a Blood Pressure check because of Home Care Physical therapy saw patient yesterday and Blood pressure was low 90/55. Home Care recommended to have patient check her blood pressure at the clinic. Patient has home care coming tomorrow and will check Blood pressure again.     Vital Signs as repeated by RN   Flowsheets   11/13/2024    2:49 PM 3:00 PM      /67 111/51   BP Location Right arm Right arm   Patient Position Sitting Sitting   Cuff Size Adult Regular Adult Regular   Pulse 75 75   SpO2 99 % 99 %     Patient is taking medication as prescribed  Patient is tolerating medications well.  Patient is not monitoring Blood Pressure at home.   Current complaints: none  Disposition:  patient to continue with the same medication.  RN encouraged to drink water and to call the clinic if she has dizziness, light headedness or other concerns.     Patient uses [facility].    11/27/24 patient is scheduled for Annual Wellness with Dr. Santana.     Bethany Christie RN on 11/13/2024 at 3:04 PM

## 2024-11-27 ENCOUNTER — OFFICE VISIT (OUTPATIENT)
Dept: FAMILY MEDICINE | Facility: CLINIC | Age: 89
End: 2024-11-27
Payer: COMMERCIAL

## 2024-11-27 VITALS
BODY MASS INDEX: 26.36 KG/M2 | SYSTOLIC BLOOD PRESSURE: 120 MMHG | RESPIRATION RATE: 16 BRPM | WEIGHT: 164 LBS | HEART RATE: 74 BPM | TEMPERATURE: 96.9 F | OXYGEN SATURATION: 95 % | HEIGHT: 66 IN | DIASTOLIC BLOOD PRESSURE: 68 MMHG

## 2024-11-27 DIAGNOSIS — S72.91XS CLOSED FRACTURE OF RIGHT FEMUR, UNSPECIFIED FRACTURE MORPHOLOGY, UNSPECIFIED PORTION OF FEMUR, SEQUELA: ICD-10-CM

## 2024-11-27 DIAGNOSIS — F41.1 GENERALIZED ANXIETY DISORDER: ICD-10-CM

## 2024-11-27 DIAGNOSIS — Z78.0 ASYMPTOMATIC MENOPAUSAL STATE: ICD-10-CM

## 2024-11-27 DIAGNOSIS — R63.4 UNEXPLAINED WEIGHT LOSS: ICD-10-CM

## 2024-11-27 DIAGNOSIS — Z00.00 ENCOUNTER FOR MEDICARE ANNUAL WELLNESS EXAM: Primary | ICD-10-CM

## 2024-11-27 DIAGNOSIS — E53.8 VITAMIN B12 DEFICIENCY (NON ANEMIC): ICD-10-CM

## 2024-11-27 DIAGNOSIS — M85.88 OSTEOPENIA OF SPINE: Chronic | ICD-10-CM

## 2024-11-27 DIAGNOSIS — R26.89 IMPAIRED GAIT AND MOBILITY: ICD-10-CM

## 2024-11-27 DIAGNOSIS — N39.41 URGENCY INCONTINENCE: ICD-10-CM

## 2024-11-27 DIAGNOSIS — I10 ESSENTIAL HYPERTENSION, BENIGN: ICD-10-CM

## 2024-11-27 LAB
ALBUMIN SERPL BCG-MCNC: 4.5 G/DL (ref 3.5–5.2)
ALP SERPL-CCNC: 152 U/L (ref 40–150)
ALT SERPL W P-5'-P-CCNC: 10 U/L (ref 0–50)
ANION GAP SERPL CALCULATED.3IONS-SCNC: 10 MMOL/L (ref 7–15)
AST SERPL W P-5'-P-CCNC: 16 U/L (ref 0–45)
BASOPHILS # BLD AUTO: 0 10E3/UL (ref 0–0.2)
BASOPHILS NFR BLD AUTO: 0 %
BILIRUB SERPL-MCNC: 0.3 MG/DL
BUN SERPL-MCNC: 29.2 MG/DL (ref 8–23)
CALCIUM SERPL-MCNC: 9.7 MG/DL (ref 8.8–10.4)
CHLORIDE SERPL-SCNC: 99 MMOL/L (ref 98–107)
CHOLEST SERPL-MCNC: 210 MG/DL
CREAT SERPL-MCNC: 0.85 MG/DL (ref 0.51–0.95)
EGFRCR SERPLBLD CKD-EPI 2021: 66 ML/MIN/1.73M2
EOSINOPHIL # BLD AUTO: 0.1 10E3/UL (ref 0–0.7)
EOSINOPHIL NFR BLD AUTO: 1 %
ERYTHROCYTE [DISTWIDTH] IN BLOOD BY AUTOMATED COUNT: 12.8 % (ref 10–15)
FASTING STATUS PATIENT QL REPORTED: NO
FASTING STATUS PATIENT QL REPORTED: NO
GLUCOSE SERPL-MCNC: 104 MG/DL (ref 70–99)
HCO3 SERPL-SCNC: 27 MMOL/L (ref 22–29)
HCT VFR BLD AUTO: 42.9 % (ref 35–47)
HDLC SERPL-MCNC: 65 MG/DL
HGB BLD-MCNC: 13.9 G/DL (ref 11.7–15.7)
IMM GRANULOCYTES # BLD: 0 10E3/UL
IMM GRANULOCYTES NFR BLD: 0 %
LDLC SERPL CALC-MCNC: 120 MG/DL
LYMPHOCYTES # BLD AUTO: 2 10E3/UL (ref 0.8–5.3)
LYMPHOCYTES NFR BLD AUTO: 21 %
MCH RBC QN AUTO: 31.4 PG (ref 26.5–33)
MCHC RBC AUTO-ENTMCNC: 32.4 G/DL (ref 31.5–36.5)
MCV RBC AUTO: 97 FL (ref 78–100)
MONOCYTES # BLD AUTO: 0.7 10E3/UL (ref 0–1.3)
MONOCYTES NFR BLD AUTO: 7 %
NEUTROPHILS # BLD AUTO: 6.8 10E3/UL (ref 1.6–8.3)
NEUTROPHILS NFR BLD AUTO: 70 %
NONHDLC SERPL-MCNC: 145 MG/DL
PLATELET # BLD AUTO: 283 10E3/UL (ref 150–450)
POTASSIUM SERPL-SCNC: 5.3 MMOL/L (ref 3.4–5.3)
PROT SERPL-MCNC: 7.1 G/DL (ref 6.4–8.3)
RBC # BLD AUTO: 4.43 10E6/UL (ref 3.8–5.2)
SODIUM SERPL-SCNC: 136 MMOL/L (ref 135–145)
TRIGL SERPL-MCNC: 123 MG/DL
TSH SERPL DL<=0.005 MIU/L-ACNC: 4.09 UIU/ML (ref 0.3–4.2)
WBC # BLD AUTO: 9.6 10E3/UL (ref 4–11)

## 2024-11-27 PROCEDURE — 80053 COMPREHEN METABOLIC PANEL: CPT | Performed by: FAMILY MEDICINE

## 2024-11-27 PROCEDURE — 80061 LIPID PANEL: CPT | Performed by: FAMILY MEDICINE

## 2024-11-27 PROCEDURE — 82607 VITAMIN B-12: CPT | Performed by: FAMILY MEDICINE

## 2024-11-27 PROCEDURE — 36415 COLL VENOUS BLD VENIPUNCTURE: CPT | Performed by: FAMILY MEDICINE

## 2024-11-27 PROCEDURE — 84443 ASSAY THYROID STIM HORMONE: CPT | Performed by: FAMILY MEDICINE

## 2024-11-27 PROCEDURE — 85025 COMPLETE CBC W/AUTO DIFF WBC: CPT | Performed by: FAMILY MEDICINE

## 2024-11-27 RX ORDER — LOSARTAN POTASSIUM 100 MG/1
100 TABLET ORAL DAILY
Qty: 90 TABLET | Refills: 4 | Status: SHIPPED | OUTPATIENT
Start: 2024-11-27

## 2024-11-27 RX ORDER — OXYBUTYNIN CHLORIDE 15 MG/1
15 TABLET, EXTENDED RELEASE ORAL DAILY
Qty: 90 TABLET | Refills: 4 | Status: SHIPPED | OUTPATIENT
Start: 2024-11-27

## 2024-11-27 RX ORDER — IBANDRONATE SODIUM 150 MG/1
1 TABLET, FILM COATED ORAL
Qty: 3 TABLET | Refills: 4 | Status: SHIPPED | OUTPATIENT
Start: 2024-11-27

## 2024-11-27 RX ORDER — ESCITALOPRAM OXALATE 20 MG/1
20 TABLET ORAL DAILY
Qty: 90 TABLET | Refills: 4 | Status: SHIPPED | OUTPATIENT
Start: 2024-11-27

## 2024-11-27 RX ORDER — AMLODIPINE BESYLATE 2.5 MG/1
2.5 TABLET ORAL DAILY
Qty: 90 TABLET | Refills: 4 | Status: SHIPPED | OUTPATIENT
Start: 2024-11-27

## 2024-11-27 SDOH — HEALTH STABILITY: PHYSICAL HEALTH: ON AVERAGE, HOW MANY DAYS PER WEEK DO YOU ENGAGE IN MODERATE TO STRENUOUS EXERCISE (LIKE A BRISK WALK)?: 5 DAYS

## 2024-11-27 SDOH — HEALTH STABILITY: PHYSICAL HEALTH: ON AVERAGE, HOW MANY MINUTES DO YOU ENGAGE IN EXERCISE AT THIS LEVEL?: 10 MIN

## 2024-11-27 ASSESSMENT — SOCIAL DETERMINANTS OF HEALTH (SDOH): HOW OFTEN DO YOU GET TOGETHER WITH FRIENDS OR RELATIVES?: TWICE A WEEK

## 2024-11-27 ASSESSMENT — PAIN SCALES - GENERAL: PAINLEVEL_OUTOF10: NO PAIN (0)

## 2024-11-27 NOTE — PROGRESS NOTES
Preventive Care Visit  [facility]  Dewayne Santana MD, Family Medicine  Nov 27, 2024      Assessment & Plan     Encounter for Medicare annual wellness exam    Impaired gait and mobility  Needs a walker for safety and mobility.   - Walker Order    Closed fracture of right femur, unspecified fracture morphology, unspecified portion of femur, sequela  See above.  - Walker Order    Essential hypertension, benign  Well controlled. Refilled medication.     - amLODIPine (NORVASC) 2.5 MG tablet; Take 1 tablet (2.5 mg) by mouth daily.  - losartan (COZAAR) 100 MG tablet; Take 1 tablet (100 mg) by mouth daily.  - CMP    GENERALIZED ANXIETY DIS  Well controlled. Refilled medication.     - escitalopram (LEXAPRO) 20 MG tablet; Take 1 tablet (20 mg) by mouth daily.    Urgency incontinence  Well controlled. Refilled medication.     - oxyBUTYnin ER (DITROPAN XL) 15 MG 24 hr tablet; Take 1 tablet (15 mg) by mouth daily.    Osteopenia of spine  Well controlled. Refilled medication.  Boniva started 2021. Plan to continue through 2026 and then re-check DEXA.   - IBANdronate (BONIVA) 150 MG tablet; Take 1 tablet (150 mg) by mouth every 30 days.    Asymptomatic menopausal state  - DX Bone Density; Future    Unexplained weight loss  10lbs in the last year and 4 lbs in the last 6 months. She condones decreased PO intake but will also check labs as below. If ongoing weight loss without obvious etiology consider CT CAP. Discussed with patient.  - TSH with free T4 reflex; Future  - Lipid panel reflex to direct LDL Fasting; Future  - Comprehensive metabolic panel; Future  - CBC with Platelets & Differential; Future  - TSH with free T4 reflex  - Lipid panel reflex to direct LDL Fasting  - Comprehensive metabolic panel  - CBC with Platelets & Differential    Vitamin B12 deficiency (non anemic)  Check labs.   - Vitamin B12    Patient has been advised of split billing requirements and indicates understanding:  "Yes        BMI  Estimated body mass index is 26.47 kg/m  as calculated from the following:    Height as of this encounter: 1.676 m (5' 6\").    Weight as of this encounter: 74.4 kg (164 lb).       Counseling  Appropriate preventive services were addressed with this patient via screening, questionnaire, or discussion as appropriate for fall prevention, nutrition, physical activity, Tobacco-use cessation, social engagement, weight loss and cognition.  Checklist reviewing preventive services available has been given to the patient.          Darlene Da Silva is a 89 year old, presenting for the following:  Wellness Visit            HPI          Health Care Directive  Patient has a Health Care Directive on file        11/27/2024   General Health   How would you rate your overall physical health? (!) FAIR   Feel stress (tense, anxious, or unable to sleep) Only a little      (!) STRESS CONCERN      11/27/2024   Nutrition   Diet: Regular (no restrictions)            11/27/2024   Exercise   Days per week of moderate/strenous exercise 5 days   Average minutes spent exercising at this level 10 min            11/27/2024   Social Factors   Frequency of gathering with friends or relatives Twice a week   Worry food won't last until get money to buy more No   Food not last or not have enough money for food? No   Do you have housing? (Housing is defined as stable permanent housing and does not include staying ouside in a car, in a tent, in an abandoned building, in an overnight shelter, or couch-surfing.) Yes   Are you worried about losing your housing? No   Lack of transportation? No   Unable to get utilities (heat,electricity)? No            11/27/2024   Fall Risk   Fallen 2 or more times in the past year? Yes    Trouble with walking or balance? Yes        Patient-reported          11/27/2024   Activities of Daily Living- Home Safety   Needs help with the following daily activites Transportation   Safety concerns in the home None " of the above            11/27/2024   Dental   Dentist two times every year? Yes            11/27/2024   Hearing Screening   Hearing concerns? (!) IT'S HARD TO FOLLOW A CONVERSATION IN A NOISY RESTAURANT OR CROWDED ROOM.            11/27/2024   Driving Risk Screening   Patient/family members have concerns about driving No            11/27/2024   General Alertness/Fatigue Screening   Have you been more tired than usual lately? No            11/27/2024   Urinary Incontinence Screening   Bothered by leaking urine in past 6 months No            11/27/2024   TB Screening   Were you born outside of the [place]? No            Today's PHQ-2 Score:       11/27/2024    11:15 AM   PHQ-2 ( 1999 [employer])   Q1: Little interest or pleasure in doing things 0    Q2: Feeling down, depressed or hopeless 0    PHQ-2 Score 0    Q1: Little interest or pleasure in doing things Not at all   Q2: Feeling down, depressed or hopeless Not at all   PHQ-2 Score 0       Patient-reported           11/27/2024   Substance Use   Alcohol more than 3/day or more than 7/wk No   Do you have a current opioid prescription? No   How severe/bad is pain from 1 to 10? 2/10   Do you use any other substances recreationally? No        Social History     Tobacco Use    Smoking status: Never    Smokeless tobacco: Never   Vaping Use    Vaping status: Never Used   Substance Use Topics    Alcohol use: Yes     Comment: rare    Drug use: No          Mammogram Screening - After age 74- determine frequency with patient based on health status, life expectancy and patient goals          Reviewed and updated as needed this visit by Provider   Tobacco  Allergies  Meds  Problems  Med Hx  Surg Hx  Fam Hx            Past Medical History:   Diagnosis Date    Advance Care Planning 2/28/2012    Advance Care Planning 3/20/2016: Receipt of ACP document:  Received: Health Care Directive which was witnessed or notarized on 2/25/16.  Document not previously scanned.  Validation form  "completed and sent with document to be scanned.  Code Status needs to be updated to reflect choices in most recent ACP document.  Confirmed/documented designated decision maker(s).  Added by Cata Terrell RN, Advance Care Planning Liaison. Advance Care Planning 2/28/12: Patient does not have an Advance/Health Care Directive (HCD), given \"What is Advance Care Planning?\" flyer. Teena Peace      Arthritis     Diverticulitis of colon 6/28/2005    Patient keeps Rx for Augmentin to use PRN Problem list name updated by automated process. Provider to review    Diverticulosis of colon (without mention of hemorrhage)     history of recurrent diverticulitis    Hypertension      Past Surgical History:   Procedure Laterality Date    ARTHROPLASTY KNEE Right 2/28/2018    Procedure: ARTHROPLASTY KNEE;  Right total knee arthroplasty;  Surgeon: Anton Grover MD;  Location: [facility] OR    ARTHROPLASTY KNEE Left 10/6/2020    Procedure: Total Knee Arthroplasty;  Surgeon: Calixto Santana MD;  Location: [facility] OR    COLONOSCOPY  04/15/02    COLONOSCOPY  3/25/2013    Procedure: COLONOSCOPY;  Colonoscopy  ;  Surgeon: Jamil Porras MD;  Location: [facility] GI    EP LOOP RECORDER IMPLANT N/A 8/1/2024    Procedure: Loop Recorder Implant;  Surgeon: Miguelina Welsh APRN CNP;  Location: [facility] CATH LAB CV    FLEXIBLE SIGMOIDOSCOPY  07/29/96    OPEN REDUCTION INTERNAL FIXATION RODDING INTRAMEDULLARY FEMUR Right 7/19/2022    Procedure: OPEN REDUCTION INTERNAL FIXATION, FRACTURE, Right FEMUR, USING INTRAMEDULLARY LEENA;  Surgeon: Dante Alonso MD;  Location:  OR    ORTHOPEDIC SURGERY  22-23 YEARS AGO    BACK    SURGICAL HISTORY OF -       lumbar surgery    [facility] APPENDECTOMY       Labs reviewed in EPIC  Patient Active Problem List   Diagnosis    Essential hypertension, benign    Generalized anxiety disorder    Diverticulosis of large intestine    Osteopenia of spine    Family history of other cardiovascular diseases    " CARDIOVASCULAR SCREENING; LDL GOAL LESS THAN 160    Status post total right knee replacement    Right knee DJD    Fracture of sacrum (H)    Sacral pain    Prediabetes    Status post total knee replacement    Urgency incontinence    Closed comminuted intertrochanteric fracture of right femur (H)    Anemia, unspecified type    Age-related osteoporosis with current pathological fracture    Closed displaced intertrochanteric fracture of right femur with routine healing, subsequent encounter    Pain from implanted hardware, initial encounter    Syncope, unspecified syncope type     Past Surgical History:   Procedure Laterality Date    ARTHROPLASTY KNEE Right 2018    Procedure: ARTHROPLASTY KNEE;  Right total knee arthroplasty;  Surgeon: Anton Grover MD;  Location: [facility] OR    ARTHROPLASTY KNEE Left 10/6/2020    Procedure: Total Knee Arthroplasty;  Surgeon: Calixto Santana MD;  Location: [facility] OR    COLONOSCOPY  04/15/02    COLONOSCOPY  3/25/2013    Procedure: COLONOSCOPY;  Colonoscopy  ;  Surgeon: Jamil Porras MD;  Location: [facility] GI    EP LOOP RECORDER IMPLANT N/A 2024    Procedure: Loop Recorder Implant;  Surgeon: Miguelina Welsh APRN CNP;  [facility]: [facility] LAB CV    FLEXIBLE SIGMOIDOSCOPY  96    OPEN REDUCTION INTERNAL FIXATION RODDING INTRAMEDULLARY FEMUR Right 2022    Procedure: OPEN REDUCTION INTERNAL FIXATION, FRACTURE, Right FEMUR, USING INTRAMEDULLARY LEENA;  Surgeon: Dante Alonso MD;  Location:  OR    ORTHOPEDIC SURGERY  22-23 YEARS AGO    BACK    SURGICAL HISTORY OF -       lumbar surgery    [facility] APPENDECTOMY         Social History     Tobacco Use    Smoking status: Never    Smokeless tobacco: Never   Substance Use Topics    Alcohol use: Yes     Comment: rare     Family History   Problem Relation Age of Onset    Lipids Brother     Heart Disease Brother          at 51 of an MI    Cancer Brother         bone, lung,  82    Prostate Cancer Brother      Cancer Sister         cervical    Lipids Sister     Heart Disease Sister         eldest sister, s/p MI x 2 with stents; diagnosed with AAA, postoperative complications and MI,  at 75    Heart Disease Brother         MI x 2    Lipids Brother     C.A.D. Father          at 39 of an MI    Family History Negative Mother         lived to age 95    Gynecology Sister         cervical Ca         Current Outpatient Medications   Medication Sig Dispense Refill    acetaminophen (TYLENOL 8 HOUR) 650 MG CR tablet Take 650 mg by mouth every 8 hours as needed for mild pain or fever.      amLODIPine (NORVASC) 2.5 MG tablet Take 1 tablet (2.5 mg) by mouth daily. 90 tablet 4    cyanocobalamin (VITAMIN B-12) 1000 MCG tablet Take 1,000 mcg by mouth daily.      escitalopram (LEXAPRO) 20 MG tablet Take 1 tablet (20 mg) by mouth daily. 90 tablet 4    IBANdronate (BONIVA) 150 MG tablet Take 1 tablet (150 mg) by mouth every 30 days. 3 tablet 4    losartan (COZAAR) 100 MG tablet Take 1 tablet (100 mg) by mouth daily. 90 tablet 4    multivitamin (CENTRUM SILVER) tablet Take 1 tablet by mouth daily      oxyBUTYnin ER (DITROPAN XL) 15 MG 24 hr tablet Take 1 tablet (15 mg) by mouth daily. 90 tablet 4    atenolol (TENORMIN) 25 MG tablet Take 1 tablet (25 mg) by mouth daily 30 tablet 5     Allergies   Allergen Reactions    Bactrim [Sulfamethoxazole-Trimethoprim] Nausea and Vomiting    Lisinopril Cough     Current providers sharing in care for this patient include:  Patient Care Team:  Dewayne Santana MD as PCP - General (Family Practice)  Dante Alonso MD as Assigned Musculoskeletal Provider  Dewayne Santana MD as Assigned PCP  Artem Gatica MD as MD (Cardiovascular Disease)  Artem Gatica MD as Assigned Heart and Vascular Provider    The following health maintenance items are reviewed in Epic and correct as of today:  Health Maintenance   Topic Date Due    RSV VACCINE (1 - 1-dose 75+ series) Never done     "MEDICARE ANNUAL WELLNESS VISIT  11/27/2025    [facility]  11/27/2025    ANNUAL REVIEW OF HM ORDERS  11/27/2025    FALL RISK ASSESSMENT  11/27/2025    DTAP/TDAP/TD IMMUNIZATION (2 - Td or Tdap) 03/14/2026    ADVANCE CARE PLANNING  12/06/2028    DEXA  09/22/2036    PHQ-2 (once per calendar year)  Completed    INFLUENZA VACCINE  Completed    Pneumococcal Vaccine: 65+ Years  Completed    ZOSTER IMMUNIZATION  Completed    COVID-19 Vaccine  Completed    HPV IMMUNIZATION  Aged Out    MENINGITIS IMMUNIZATION  Aged Out    RSV MONOCLONAL ANTIBODY  Aged Out         Review of Systems  Constitutional, neuro, ENT, endocrine, pulmonary, cardiac, gastrointestinal, genitourinary, musculoskeletal, integument and psychiatric systems are negative, except as otherwise noted.     Objective    Exam  /68   Pulse 74   Temp 96.9  F (36.1  C) (Tympanic)   Resp 16   Ht 1.676 m (5' 6\")   Wt 74.4 kg (164 lb)   LMP  (LMP Unknown)   SpO2 95%   BMI 26.47 kg/m     Estimated body mass index is 26.47 kg/m  as calculated from the following:    Height as of this encounter: 1.676 m (5' 6\").    Weight as of this encounter: 74.4 kg (164 lb).    Physical Exam  GENERAL: alert and no distress  EYES: Eyes grossly normal to inspection, PERRL and conjunctivae and sclerae normal  HENT: normal cephalic/atraumatic and ear canals and TM's normal  NECK: no adenopathy, no asymmetry, masses, or scars  RESP: lungs clear to auscultation - no rales, rhonchi or wheezes  CV: regular rate and rhythm, normal S1 S2, no S3 or S4, no murmur, click or rub, no peripheral edema  ABDOMEN: soft, nontender, no hepatosplenomegaly, no masses and bowel sounds normal  MS: no gross musculoskeletal defects noted, no edema  SKIN: no suspicious lesions or rashes  NEURO: Normal strength and tone, mentation intact and speech normal  PSYCH: mentation appears normal, affect normal/bright        11/27/2024   Mini Cog   Clock Draw Score 2 Normal   3 Item Recall 3 objects recalled   Mini Cog " Total Score 5                 Signed Electronically by: Dewayne Santana MD

## 2024-11-27 NOTE — PATIENT INSTRUCTIONS
"Schedule follow-up with cardiology Dr. Gatica [phone].    * * *  If you have a [employer] account results will appear in your MyChart.  If you do not have a MyChart account results will be mailed or called to you.    Lab and imaging results are released \"real time\" into My Chart.  This may mean that you see the results before I have a chance to review them. My Chart will alert you again when I review the results and enter comments.  Sometimes with imaging or labs there may be serious or unexpected results. Critical results are paged to me or the after hours on-call provider so that they can be reviewed immediately.  This is not true of non-critical abnormal results. Unfortunately, this means that it's possible you may be alerted of a serious finding before I have a chance to review it.  If you ever receive a result that you are concerned about and I have not already contacted you, please feel free to reach out to me or the care team so that you get the answers you need. You can call the care team at [phone] and say \"Care Team\".    Additionally, it is my goal that you understand the care plan discussed at your visit and that any questions you have are answered.  Please feel free to reach out if you need clarification or explanation of any information addressed at your office visit.      Patient Education   Preventive Care Advice   This is general advice given by our system to help you stay healthy. However, your care team may have specific advice just for you. Please talk to your care team about your preventive care needs.  Nutrition  Eat 5 or more servings of fruits and vegetables each day.  Try wheat bread, brown rice and whole grain pasta (instead of white bread, rice, and pasta).  Get enough calcium and vitamin D. Check the label on foods and aim for 100% of the RDA (recommended daily allowance).  Lifestyle  Exercise at least 150 minutes each week  (30 minutes a day, 5 days a week).  Do muscle " strengthening activities 2 days a week. These help control your weight and prevent disease.  No smoking.  Wear sunscreen to prevent skin cancer.  Have a dental exam and cleaning every 6 months.  Yearly exams  See your health care team every year to talk about:  Any changes in your health.  Any medicines your care team has prescribed.  Preventive care, family planning, and ways to prevent chronic diseases.  Shots (vaccines)   HPV shots (up to age 26), if you've never had them before.  Hepatitis B shots (up to age 59), if you've never had them before.  COVID-19 shot: Get this shot when it's due.  Flu shot: Get a flu shot every year.  Tetanus shot: Get a tetanus shot every 10 years.  Pneumococcal, hepatitis A, and RSV shots: Ask your care team if you need these based on your risk.  Shingles shot (for age 50 and up)  General health tests  Diabetes screening:  Starting at age 35, Get screened for diabetes at least every 3 years.  If you are younger than age 35, ask your care team if you should be screened for diabetes.  Cholesterol test: At age 39, start having a cholesterol test every 5 years, or more often if advised.  Bone density scan (DEXA): At age 50, ask your care team if you should have this scan for osteoporosis (brittle bones).  Hepatitis C: Get tested at least once in your life.  STIs (sexually transmitted infections)  Before age 24: Ask your care team if you should be screened for STIs.  After age 24: Get screened for STIs if you're at risk. You are at risk for STIs (including HIV) if:  You are sexually active with more than one person.  You don't use condoms every time.  You or a partner was diagnosed with a sexually transmitted infection.  If you are at risk for HIV, ask about PrEP medicine to prevent HIV.  Get tested for HIV at least once in your life, whether you are at risk for HIV or not.  Cancer screening tests  Cervical cancer screening: If you have a cervix, begin getting regular cervical cancer  screening tests starting at age 21.  Breast cancer scan (mammogram): If you've ever had breasts, begin having regular mammograms starting at age 40. This is a scan to check for breast cancer.  Colon cancer screening: It is important to start screening for colon cancer at age 45.  Have a colonoscopy test every 10 years (or more often if you're at risk) Or, ask your provider about stool tests like a FIT test every year or Cologuard test every 3 years.  To learn more about your testing options, visit:   .  For help making a decision, visit:   https://bit.ly/ca68805.  Prostate cancer screening test: If you have a prostate, ask your care team if a prostate cancer screening test (PSA) at age 55 is right for you.  Lung cancer screening: If you are a current or former smoker ages 50 to 80, ask your care team if ongoing lung cancer screenings are right for you.  For informational purposes only. Not to replace the advice of your health care provider. Copyright   2023 [employer]. All rights reserved. Clinically reviewed by the [facility]itions Program. [employer] 785204 - REV 01/24.

## 2024-11-27 NOTE — NURSING NOTE
"Initial /68   Pulse 74   Temp 96.9  F (36.1  C) (Tympanic)   Resp 16   Ht 1.676 m (5' 6\")   Wt 74.4 kg (164 lb)   LMP  (LMP Unknown)   SpO2 95%   BMI 26.47 kg/m   Estimated body mass index is 26.47 kg/m  as calculated from the following:    Height as of this encounter: 1.676 m (5' 6\").    Weight as of this encounter: 74.4 kg (164 lb). .    "

## 2024-11-27 NOTE — LETTER
December 4, 2024      Avinash Villarreal  [id number] [address] [place] [id number]        Dear Avinash,    We are writing to inform you of your test results.      Cholesterol mildly elevated.  Not abnormal enough at this point to warrant medication changes but I would recommend: increasing daily exercise, increasing dietary fiber, eliminating trans fats and reducing saturated fats. Clinic weight today is down about 10 lbs from a year ago but stable over the last 6 months or so.  I would recommend watching this closely but no additional work-up at this time. I would recommend a nurse visit in 3 months for a repeat weight check.  If ongoing loss then we should pursue additional work-up.     Excellent improvement in B12.     Otherwise labs look good. No worrisome findings    Resulted Orders   TSH with free T4 reflex   Result Value Ref Range    TSH 4.09 0.30 - 4.20 uIU/mL   Lipid panel reflex to direct LDL Fasting   Result Value Ref Range    Cholesterol 210 (H) <200 mg/dL    Triglycerides 123 <150 mg/dL    Direct Measure HDL 65 >=50 mg/dL    LDL Cholesterol Calculated 120 (H) <100 mg/dL    Non HDL Cholesterol 145 (H) <130 mg/dL    Patient Fasting > 8hrs? No     Narrative    Cholesterol  Desirable: < 200 mg/dL  Borderline High: 200 - 239 mg/dL  High: >= 240 mg/dL    Triglycerides  Normal: < 150 mg/dL  Borderline High: 150 - 199 mg/dL  High: 200-499 mg/dL  Very High: >= 500 mg/dL    Direct Measure HDL  Female: >= 50 mg/dL   Male: >= 40 mg/dL    LDL Cholesterol  Desirable: < 100 mg/dL  Above Desirable: 100 - 129 mg/dL   Borderline High: 130 - 159 mg/dL   High:  160 - 189 mg/dL   Very High: >= 190 mg/dL    Non HDL Cholesterol  Desirable: < 130 mg/dL  Above Desirable: 130 - 159 mg/dL  Borderline High: 160 - 189 mg/dL  High: 190 - 219 mg/dL  Very High: >= 220 mg/dL   Comprehensive metabolic panel   Result Value Ref Range    Sodium 136 135 - 145 mmol/L    Potassium 5.3 3.4 - 5.3 mmol/L    Carbon Dioxide (CO2) 27 22 - 29 mmol/L     Anion Gap 10 7 - 15 mmol/L    Urea Nitrogen 29.2 (H) 8.0 - 23.0 mg/dL    Creatinine 0.85 0.51 - 0.95 mg/dL    GFR Estimate 66 >60 mL/min/1.73m2      Comment:      eGFR calculated using 2021 CKD-EPI equation.    Calcium 9.7 8.8 - 10.4 mg/dL      Comment:      Reference intervals for this test were updated on 7/16/2024 to reflect our healthy population more accurately. There may be differences in the flagging of prior results with similar values performed with this method. Those prior results can be interpreted in the context of the updated reference intervals.    Chloride 99 98 - 107 mmol/L    Glucose 104 (H) 70 - 99 mg/dL    Alkaline Phosphatase 152 (H) 40 - 150 U/L    AST 16 0 - 45 U/L    ALT 10 0 - 50 U/L    Protein Total 7.1 6.4 - 8.3 g/dL    Albumin 4.5 3.5 - 5.2 g/dL    Bilirubin Total 0.3 <=1.2 mg/dL    Patient Fasting > 8hrs? No    CBC with platelets and differential   Result Value Ref Range    WBC Count 9.6 4.0 - 11.0 10e3/uL    RBC Count 4.43 3.80 - 5.20 10e6/uL    Hemoglobin 13.9 11.7 - 15.7 g/dL    Hematocrit 42.9 35.0 - 47.0 %    MCV 97 78 - 100 fL    MCH 31.4 26.5 - 33.0 pg    MCHC 32.4 31.5 - 36.5 g/dL    RDW 12.8 10.0 - 15.0 %    Platelet Count 283 150 - 450 10e3/uL    % Neutrophils 70 %    % Lymphocytes 21 %    % Monocytes 7 %    % Eosinophils 1 %    % Basophils 0 %    % Immature Granulocytes 0 %    Absolute Neutrophils 6.8 1.6 - 8.3 10e3/uL    Absolute Lymphocytes 2.0 0.8 - 5.3 10e3/uL    Absolute Monocytes 0.7 0.0 - 1.3 10e3/uL    Absolute Eosinophils 0.1 0.0 - 0.7 10e3/uL    Absolute Basophils 0.0 0.0 - 0.2 10e3/uL    Absolute Immature Granulocytes 0.0 <=0.4 10e3/uL       If you have any questions or concerns, please call the clinic at the number listed above.       Sincerely,      Dewayne Santana MD

## 2024-11-28 LAB — VIT B12 SERPL-MCNC: 978 PG/ML (ref 232–1245)

## 2024-12-06 DIAGNOSIS — I10 ESSENTIAL HYPERTENSION, BENIGN: ICD-10-CM

## 2024-12-10 ENCOUNTER — TELEPHONE (OUTPATIENT)
Dept: CARDIOLOGY | Facility: CLINIC | Age: 89
End: 2024-12-10
Payer: COMMERCIAL

## 2024-12-10 RX ORDER — ATENOLOL 25 MG/1
25 TABLET ORAL DAILY
Qty: 30 TABLET | Refills: 5 | Status: SHIPPED | OUTPATIENT
Start: 2024-12-10

## 2024-12-10 NOTE — TELEPHONE ENCOUNTER
Received callback from pt who can't make the appointment on 12/16, but is okay with waiting for an appt in January. Pt transferred to scheduling to arrange EP ARVIND follow-up. leigh

## 2024-12-10 NOTE — TELEPHONE ENCOUNTER
[facility] Call Center    Phone Message    May a detailed message be left on voicemail: yes     Reason for Call: Other: pt was calling to get the ball rolling on removing her loop recorder device. Please call pt back to discuss next steps.      Action Taken: Other: cardiology     Travel Screening: Not Applicable    Thank you!  [facility]        Date of Service:

## 2024-12-10 NOTE — TELEPHONE ENCOUNTER
Attempted to call pt but was unable to reach. LVM with direct callback number. Hold placed on appt with Miguelina Welsh NP on 12/17 at 2:50pm. leigh

## 2024-12-11 PROBLEM — S72.91XS: Status: ACTIVE | Noted: 2024-12-11

## 2024-12-11 PROBLEM — E53.8 VITAMIN B12 DEFICIENCY (NON ANEMIC): Status: ACTIVE | Noted: 2024-12-11

## 2024-12-12 ENCOUNTER — MEDICAL CORRESPONDENCE (OUTPATIENT)
Dept: HEALTH INFORMATION MANAGEMENT | Facility: CLINIC | Age: 89
End: 2024-12-12
Payer: COMMERCIAL

## 2024-12-23 ENCOUNTER — NURSE TRIAGE (OUTPATIENT)
Dept: FAMILY MEDICINE | Facility: CLINIC | Age: 89
End: 2024-12-23
Payer: COMMERCIAL

## 2024-12-23 NOTE — TELEPHONE ENCOUNTER
Patient Contact    Attempt # 1    Was call answered?  No.  Left message on voicemail with information to call back.    Tiffanie Murdock RN on 12/23/2024 at 12:04 PM

## 2024-12-23 NOTE — TELEPHONE ENCOUNTER
Patient denies cardiac symptoms.    Advised recommendations below. Patient voiced understanding.    Eliu GLYNN RN  [facility]

## 2024-12-23 NOTE — TELEPHONE ENCOUNTER
I would call and triage and ensure no cardiac symptoms/chest pain with new heartburn. IF negative can try OTC omeprazole until her appt on 12/26. IF worsening symptoms can be seen sooner in [facility]. Thank you!    KAMLESH NATARAJAN, DO

## 2024-12-23 NOTE — TELEPHONE ENCOUNTER
S-(situation): Pt calls to follow up on weight loss, also reporting new symptom; says that she has a bad taste in her mouth.     B-(background): Pt says she was advised at last appt with PCP to follow up if continuing to lose weight. She says that about a week and a half ago, she started having a bad taste in her mouth; says it's affecting her intake, and she can hardly eat.     A-(assessment): See triage assessment below. Pt c/o bitter taste in mouth, dry mouth and tongue (especially in the morning, does report improvement after brushing her teeth), says that her food tastes bad/mealy. Says that it's been constant. Denies other s/s of illness, denies sore throat or white patches, denies any tooth or gum pain. Pt also says that she's been experiencing heartburn, which is unusual for her; says that it's hard to digest her food.     R-(recommendations): Per triage protocol recommendation, pt is scheduled for a clinic appt on Thursday 12/26/24. Routing to covering provider for further review; ok for this plan, as pt also c/o new heartburn along with the oral symptoms?     Reason for Disposition   Patient wants to be seen    Additional Information   Negative: SEVERE difficulty breathing (e.g., struggling for each breath, speaks in single words, stridor)   Negative: Sounds like a life-threatening emergency to the triager   Negative: Injury to tooth or teeth   Negative: Injury to mouth   Negative: Cold sore suspected (i.e., fever blister sore) on the outer lip   Negative: Tooth is painful or swelling around a tooth   Negative: Mouth is painful   Negative: Throat is painful   Negative: Electrical burn of mouth   Negative: Chemical burn of mouth   Negative: Drooling or spitting out saliva (because can't swallow) and new-onset   Negative: Bleeding from mouth and won't stop after 10 minutes of direct pressure   Negative: Difficulty breathing and not severe   Negative: Patient sounds very sick or weak to the triager    "Negative: Bloody crusts on lips or sores in mouth AND a rash anywhere else on body (back, chest, face, palms, soles)   Negative: Gum bleeding and taking Coumadin (warfarin) or other strong blood thinner, or known bleeding disorder (e.g., thrombocytopenia)   Negative: Dry mouth and urinating more frequently than usual (i.e., frequency)   Negative: Dry mouth and drinking more liquids than usual (thirsty) and present more than 1 day (24 hours)   Negative: Receiving chemotherapy or radiation therapy   Negative: White patches that stick to tongue or inner cheek, which can be wiped off   Negative: Weak immune system (e.g., HIV positive, cancer chemo, splenectomy, organ transplant, chronic steroids)    Answer Assessment - Initial Assessment Questions  1. SYMPTOM: \"What's the main symptom you're concerned about?\" (e.g., chapped lips, dry mouth, lump, sores)      C/O constant bitter taste in her mouth. Food tastes mealy, doesn't taste good. Says that it feels like she has a film in her mouth; says the roof of her mouth and tongue get really dry, especially in the morning. Says that when it first started, she noticed a lump on the roof of her mouth but that's gone now. Denies any sores or white patches, denies any tooth or gum pain, denies other signs of illness.    2. ONSET: \"When did the symptoms start?\"      Started about a week and a half ago.   3. PAIN: \"Is there any pain?\" If Yes, ask: \"How bad is it?\" (Scale: 1-10; mild, moderate, severe)      No  4. CAUSE: \"What do you think is causing the symptoms?\"      Unsure  5. OTHER SYMPTOMS: \"Do you have any other symptoms?\" (e.g., fever, sore throat, toothache, swelling)      Heartburn which she says is uncommon for her; says that it's hard to digest the food.   6. PREGNANCY: \"Is there any chance you are pregnant?\" \"When was your last menstrual period?\"      N/A    Protocols used: Mouth Symptoms-A-[place]      Sandra Meléndez RN  [facility]  "

## 2025-01-06 PROBLEM — Z96.649 PERI-PROSTHETIC FRACTURE OF FEMUR AT TIP OF PROSTHESIS: Status: ACTIVE | Noted: 2024-10-08

## 2025-01-06 PROBLEM — D62 ACUTE BLOOD LOSS ANEMIA: Status: ACTIVE | Noted: 2024-10-10

## 2025-01-06 PROBLEM — M97.8XXA PERI-PROSTHETIC FRACTURE OF FEMUR AT TIP OF PROSTHESIS: Status: ACTIVE | Noted: 2024-10-08

## 2025-02-11 ENCOUNTER — THERAPY VISIT (OUTPATIENT)
Dept: PHYSICAL THERAPY | Facility: CLINIC | Age: 89
End: 2025-02-11
Attending: FAMILY MEDICINE
Payer: COMMERCIAL

## 2025-02-11 DIAGNOSIS — M75.02 ADHESIVE CAPSULITIS OF LEFT SHOULDER: ICD-10-CM

## 2025-02-11 PROCEDURE — 97161 PT EVAL LOW COMPLEX 20 MIN: CPT | Mod: GP | Performed by: PHYSICAL THERAPIST

## 2025-02-11 PROCEDURE — 97110 THERAPEUTIC EXERCISES: CPT | Mod: GP | Performed by: PHYSICAL THERAPIST

## 2025-02-11 ASSESSMENT — ACTIVITIES OF DAILY LIVING (ADL)
PLACING_AN_OBJECT_ON_A_HIGH_SHELF: 4
PUTTING_ON_AN_UNDERSHIRT_OR_A_PULLOVER_SWEATER: 7
WASHING_YOUR_HAIR?: 8
REMOVING_SOMETHING_FROM_YOUR_BACK_POCKET: 5
AT_ITS_WORST?: 9
PUTTING_ON_A_SHIRT_THAT_BUTTONS_DOWN_THE_FRONT: 8
PUSHING_WITH_THE_INVOLVED_ARM: 8
REACHING_FOR_SOMETHING_ON_A_HIGH_SHELF: 9
CARRYING_A_HEAVY_OBJECT_OF_10_POUNDS: 8
PLEASE_INDICATE_YOR_PRIMARY_REASON_FOR_REFERRAL_TO_THERAPY:: SHOULDER
WASHING_YOUR_BACK: 10
WHEN_LYING_ON_THE_INVOLVED_SIDE: 10
PUTTING_ON_YOUR_PANTS: 2
TOUCHING_THE_BACK_OF_YOUR_NECK: 9

## 2025-02-11 NOTE — PROGRESS NOTES
PHYSICAL THERAPY EVALUATION  Type of Visit: Evaluation       Fall Risk Screen:  Fall screen completed by: PT  Have you fallen 2 or more times in the past year?: No  Have you fallen and had an injury in the past year?: Yes  Is patient a fall risk?: Yes; Department fall risk interventions implemented    Subjective   Has been having L shoulder pain for the past month across the top of the shoulder with insidious onset.  Now difficult to move the arm up overhead.  No numbness or tingling down the arm.  Wakes her up at night if she lays on left side.  No history of frozen shoulder on the right side.   Right handed and uses cane in right hand.         Presenting condition or subjective complaint: shoulder  Date of onset: 01/11/25    Relevant medical history: Arthritis; High blood pressure   Dates & types of surgery: broke my Femer    Prior diagnostic imaging/testing results: Other pain in shoulder   Prior therapy history for the same diagnosis, illness or injury: No      Prior Level of Function  Transfers:   Ambulation:   ADL:   IADL:     Living Environment  Social support: With family members   Type of home: House; 1 level   Stairs to enter the home: No       Ramp: No   Stairs inside the home: No       Help at home: None  Equipment owned:       Employment: No    Hobbies/Interests:   housework     Patient goals for therapy: raise my arm    Pain assessment:      Objective   SHOULDER EVALUATION  PAIN: Pain Level at Rest: 3/10  Pain Level with Use: 10/10  Pain Location: shoulder  Pain Quality: Sharp  Pain Frequency: intermittent  Pain is Worst: daytime  Pain is Exacerbated By: lifting and using arm.   Pain is Relieved By: rest  Pain Progression: Worsened  INTEGUMENTARY (edema, incisions):   POSTURE:   GAIT:   Weightbearing Status: WBAT  Assistive Device(s): Cane (single end)  Gait Deviations: Base of support increased  Valentine decreased  BALANCE/PROPRIOCEPTION:   WEIGHTBEARING ALIGNMENT:   ROM:   (Degrees) Left AROM Left  PROM Right AROM  Right PROM   Shoulder Flexion 70  (aching)  120     Shoulder Extension 40      Shoulder Abduction 30 degrees 90     Shoulder Adduction 0 painful       Shoulder Internal Rotation Inside of back pocket  In 30 degrees abd: 45     Shoulder External Rotation 10 degrees  In 30 degrees abd: 10 degrees       STRENGTH:   Pain: - none + mild ++ moderate +++ severe  Strength Scale: 0-5/5 Left Right   Shoulder Flexion     Shoulder Extension ++ (mod)    Shoulder Abduction ++ (mod)    Shoulder Adduction     Shoulder Internal Rotation - (none)    Shoulder External Rotation - (none)    Shoulder Horizontal Abduction     Shoulder Horizontal Adduction     Elbow Flexion 4-    Elbow Extension 4-      FLEXIBILITY:   SPECIAL TESTS:   PALPATION:   JOINT MOBILITY:  L shoulder GH joint: mild hypomobility AP glide no pain   CERVICAL SCREEN: WFL    Assessment & Plan   CLINICAL IMPRESSIONS  Medical Diagnosis: Adhesive capsulitis of left shoulde    Treatment Diagnosis: decreased L shoulder ROM   Impression/Assessment: Patient is a 89 year old female with Left shoulder pain and decreased ROM complaints.  The following significant findings have been identified: Pain, Decreased ROM/flexibility, Decreased strength, Impaired muscle performance, and Decreased activity tolerance. These impairments interfere with their ability to perform self care tasks, recreational activities, and household chores as compared to previous level of function.     Clinical Decision Making (Complexity):  Clinical Presentation: Stable/Uncomplicated  Clinical Presentation Rationale: based on medical and personal factors listed in PT evaluation  Clinical Decision Making (Complexity): Low complexity    PLAN OF CARE  Treatment Interventions:  Modalities: Cupping, Iontophoresis  Interventions: Manual Therapy, Neuromuscular Re-education, Therapeutic Activity, Therapeutic Exercise, Self-Care/Home Management    Long Term Goals     PT Goal 1  Goal Identifier:  1  Goal Description: Patient will improve L shoulder AROM flexion to 140 with no pain in order to reach overhead.  Target Date: 03/25/25  PT Goal 2  Goal Identifier: 2  Goal Description: Patient will be independent in HEP in order to improve strength, ROM and pain outside of PT.  Target Date: 03/25/25  PT Goal 3  Goal Identifier: 3  Goal Description: Patient will be able to complete all ADL's in home with less than 2/10 L shoulder pain  Rationale: to maximize safety and independence with performance of ADLs and functional tasks  Target Date: 05/06/25  PT Goal 4  Goal Identifier: 4  Goal Description: Patient will be able to sleep on L side without waking in order to improve resful sleep.  Target Date: 05/06/25      Frequency of Treatment: 1x/week  Duration of Treatment: 12 weeks    Recommended Referrals to Other Professionals:   Education Assessment:   Learner/Method: Patient  Education Comments: educated on role of PT, POC and HEP    Risks and benefits of evaluation/treatment have been explained.   Patient/Family/caregiver agrees with Plan of Care.     Evaluation Time:     PT Eval, Low Complexity Minutes (77641): 15       Signing Clinician: Estee Will, PT        [facility]                                                                                   OUTPATIENT PHYSICAL THERAPY      PLAN OF TREATMENT FOR OUTPATIENT REHABILITATION   Patient's Last Name, First Name, Bonita Aceves YOB: 1935   Provider's Name   [facility]   Medical Record No.  [id number]     Onset Date: 01/11/25  Start of Care Date: 02/11/25     Medical Diagnosis:  Adhesive capsulitis of left shoulde      PT Treatment Diagnosis:  decreased L shoulder ROM Plan of Treatment  Frequency/Duration: 1x/week/ 12 weeks    Certification date from 02/11/25 to 05/06/25         See note for plan of treatment details and functional goals     Estee Will, PT                          I CERTIFY THE NEED FOR THESE SERVICES FURNISHED UNDER        THIS PLAN OF TREATMENT AND WHILE UNDER MY CARE     (Physician attestation of this document indicates review and certification of the therapy plan).              Referring Provider:  Dewayne Santana    Initial Assessment  See Epic Evaluation- Start of Care Date: 02/11/25

## 2025-03-29 ENCOUNTER — HEALTH MAINTENANCE LETTER (OUTPATIENT)
Age: 89
End: 2025-03-29

## 2025-05-06 PROBLEM — M75.02 ADHESIVE CAPSULITIS OF LEFT SHOULDER: Status: RESOLVED | Noted: 2025-02-11 | Resolved: 2025-05-06

## 2025-06-09 ENCOUNTER — PATIENT OUTREACH (OUTPATIENT)
Dept: CARE COORDINATION | Facility: CLINIC | Age: 89
End: 2025-06-09
Payer: COMMERCIAL

## (undated) DEVICE — DRSG AQUACEL AG ADV 3.5X4" 422603

## (undated) DEVICE — BONE CEMENT KIT BOWL AND SPATULA STRK 6201-3-410

## (undated) DEVICE — SU PDO 1 STRATAFIX 36X36CM CTX TAPERPOINT SXPD2B405

## (undated) DEVICE — BLADE SAW SAGITTAL STRK 19.5X90X1.27MM 2108-109-000S11

## (undated) DEVICE — NDL 18GA 1.5" 305196

## (undated) DEVICE — DRILL BIT STRK SHORT 4.2X180MM  1806-4270S

## (undated) DEVICE — GLOVE PROTEXIS BLUE W/NEU-THERA 6.5  2D73EB65

## (undated) DEVICE — GLOVE PROTEXIS BLUE W/NEU-THERA 7.5  2D73EB75

## (undated) DEVICE — BLADE SAW SAGITTAL STRK 21X90X1.19MM HD SYS 6 6221-119-090

## (undated) DEVICE — PACK MINOR PROCEDURE CUSTOM

## (undated) DEVICE — DRSG ADAPTIC 3X8" 6113

## (undated) DEVICE — DRSG AQUACEL AG 3.5X9.75" HYDROFIBER 412011

## (undated) DEVICE — SOL WATER IRRIG 1000ML BOTTLE 07139-09

## (undated) DEVICE — BONE CEMENT MIXEVAC III HI VAC KIT  0206-015-000

## (undated) DEVICE — DRAPE IOBAN ISOLATION VERTICAL 320X21CM 6617

## (undated) DEVICE — SU VICRYL 2-0 CT-1 36" UND J945H

## (undated) DEVICE — SU MONOCRYL 2-0 CT-1 36" UND Y945H

## (undated) DEVICE — DRAPE STERI U 1015

## (undated) DEVICE — ESU PENCIL SMOKE EVAC W/ROCKER SWITCH 0703-047-000

## (undated) DEVICE — SU VICRYL 1 CT-1 36" UND J947H

## (undated) DEVICE — SUCTION IRR SYSTEM W/TIP INTERPULSE

## (undated) DEVICE — SU VICRYL 2-0 CT-1 27" UND J259H

## (undated) DEVICE — SUCTION TIP FLEXI CLEAR TIP DISP K62

## (undated) DEVICE — DRSG AQUACEL AG 3.5X6.0" HYDROFIBER 412010

## (undated) DEVICE — GOWN XLG DISP 9545

## (undated) DEVICE — SU STRATAFIX 3-0 MH 12" PS-2 SXMD1B103

## (undated) DEVICE — SOL NACL 0.9% IRRIG 3000ML BAG 07972-08

## (undated) DEVICE — KIT DRAIN CLOSED WOUND SUCTION MED 400ML RESVR

## (undated) DEVICE — BNDG COBAN 6"X5YDS STERILE

## (undated) DEVICE — SOL NACL 0.9% IRRIG 1000ML BOTTLE 07138-09

## (undated) DEVICE — GLOVE PROTEXIS W/NEU-THERA 8.0  2D73TE80

## (undated) DEVICE — HOOD T4 PROTECTIVE STERI FACE SHIELD 400-800

## (undated) DEVICE — GLOVE PROTEXIS W/NEU-THERA 7.5  2D73TE75

## (undated) DEVICE — DRAPE C-ARMOR 5 SIDED 5523

## (undated) DEVICE — GOWN IMPERVIOUS SPECIALTY XLG/XLONG 32474

## (undated) DEVICE — GLOVE PROTEXIS BLUE W/NEU-THERA 8.5  2D73EB85

## (undated) DEVICE — DRSG GAUZE 4X4" TRAY

## (undated) DEVICE — GUIDEWIRE BALL TIP 3.0X1000MM 1806-0085S

## (undated) DEVICE — Device

## (undated) DEVICE — BLANKET BAIR HUGGER UPPER BODY 42268

## (undated) DEVICE — SYR 50ML LL W/O NDL 309653

## (undated) DEVICE — PREP DURAPREP 26ML APL 8630

## (undated) DEVICE — DRSG AQUACEL AG 3.5X4" HYDROFIBER 412009

## (undated) DEVICE — DRSG AQUACEL AG HYDROFIBER 3.5X6" 422604

## (undated) DEVICE — STOCKING SLEEVE COMPRESSION CALF MED

## (undated) DEVICE — BONE CLEANING TIP INTERPULSE  0210-010-000

## (undated) DEVICE — SU MONOCRYL 3-0 PS-2 18" UND Y497G

## (undated) DEVICE — DRAPE SHEET REV FOLD 3/4 9349

## (undated) DEVICE — PREP CHLORAPREP 26ML TINTED ORANGE  260815

## (undated) DEVICE — GLOVE BIOGEL PI SZ 7.5 40875

## (undated) DEVICE — SOL NACL 0.9% IRRIG 1000ML BOTTLE 2F7124

## (undated) DEVICE — TOURNIQUET CUFF 34"

## (undated) DEVICE — SU VICRYL 2-0 CT-2 27" UND J269H

## (undated) DEVICE — GLOVE PROTEXIS W/NEU-THERA 6.5  2D73TE65

## (undated) DEVICE — CATH TRAY FOLEY 16FR SILICONE 907416

## (undated) DEVICE — BLADE CLIPPER 4406

## (undated) DEVICE — SOL WATER IRRIG 1000ML BOTTLE 2F7114

## (undated) DEVICE — STPL SKIN PROXIMATE 35 WIDE PMW35

## (undated) DEVICE — SU VICRYL 0 CT-1 36" J346H

## (undated) DEVICE — ADH SKIN CLOSURE PREMIERPRO EXOFIN 1.0ML 3470

## (undated) RX ORDER — CELECOXIB 200 MG/1
CAPSULE ORAL
Status: DISPENSED
Start: 2020-10-06

## (undated) RX ORDER — EPINEPHRINE 1 MG/ML(1)
AMPUL (ML) INJECTION
Status: DISPENSED
Start: 2020-10-06

## (undated) RX ORDER — BUPIVACAINE HYDROCHLORIDE AND EPINEPHRINE 2.5; 5 MG/ML; UG/ML
INJECTION, SOLUTION EPIDURAL; INFILTRATION; INTRACAUDAL; PERINEURAL
Status: DISPENSED
Start: 2022-07-19

## (undated) RX ORDER — PROPOFOL 10 MG/ML
INJECTION, EMULSION INTRAVENOUS
Status: DISPENSED
Start: 2018-02-28

## (undated) RX ORDER — BUPIVACAINE HYDROCHLORIDE 2.5 MG/ML
INJECTION, SOLUTION EPIDURAL; INFILTRATION; INTRACAUDAL
Status: DISPENSED
Start: 2022-07-19

## (undated) RX ORDER — PHENYLEPHRINE HYDROCHLORIDE 10 MG/ML
INJECTION INTRAVENOUS
Status: DISPENSED
Start: 2022-07-19

## (undated) RX ORDER — GABAPENTIN 300 MG/1
CAPSULE ORAL
Status: DISPENSED
Start: 2020-10-06

## (undated) RX ORDER — ACETAMINOPHEN 500 MG
TABLET ORAL
Status: DISPENSED
Start: 2018-02-28

## (undated) RX ORDER — EPHEDRINE SULFATE 50 MG/ML
INJECTION, SOLUTION INTRAMUSCULAR; INTRAVENOUS; SUBCUTANEOUS
Status: DISPENSED
Start: 2020-10-06

## (undated) RX ORDER — ONDANSETRON 2 MG/ML
INJECTION INTRAMUSCULAR; INTRAVENOUS
Status: DISPENSED
Start: 2018-02-28

## (undated) RX ORDER — CELECOXIB 200 MG/1
CAPSULE ORAL
Status: DISPENSED
Start: 2018-02-28

## (undated) RX ORDER — PHENYLEPHRINE HCL IN 0.9% NACL 1 MG/10 ML
SYRINGE (ML) INTRAVENOUS
Status: DISPENSED
Start: 2018-02-28

## (undated) RX ORDER — DEXAMETHASONE SODIUM PHOSPHATE 4 MG/ML
INJECTION, SOLUTION INTRA-ARTICULAR; INTRALESIONAL; INTRAMUSCULAR; INTRAVENOUS; SOFT TISSUE
Status: DISPENSED
Start: 2018-02-28

## (undated) RX ORDER — ACETAMINOPHEN 325 MG/1
TABLET ORAL
Status: DISPENSED
Start: 2020-10-06

## (undated) RX ORDER — ONDANSETRON 2 MG/ML
INJECTION INTRAMUSCULAR; INTRAVENOUS
Status: DISPENSED
Start: 2020-10-06

## (undated) RX ORDER — CEFAZOLIN SODIUM 2 G/100ML
INJECTION, SOLUTION INTRAVENOUS
Status: DISPENSED
Start: 2020-10-06

## (undated) RX ORDER — GABAPENTIN 100 MG/1
CAPSULE ORAL
Status: DISPENSED
Start: 2018-02-28

## (undated) RX ORDER — TRANEXAMIC ACID 650 MG/1
TABLET ORAL
Status: DISPENSED
Start: 2020-10-06

## (undated) RX ORDER — MAGNESIUM SULFATE HEPTAHYDRATE 40 MG/ML
INJECTION, SOLUTION INTRAVENOUS
Status: DISPENSED
Start: 2020-10-06

## (undated) RX ORDER — FENTANYL CITRATE 50 UG/ML
INJECTION, SOLUTION INTRAMUSCULAR; INTRAVENOUS
Status: DISPENSED
Start: 2020-10-06

## (undated) RX ORDER — FENTANYL CITRATE 50 UG/ML
INJECTION, SOLUTION INTRAMUSCULAR; INTRAVENOUS
Status: DISPENSED
Start: 2018-02-28

## (undated) RX ORDER — LIDOCAINE HYDROCHLORIDE 10 MG/ML
INJECTION, SOLUTION EPIDURAL; INFILTRATION; INTRACAUDAL; PERINEURAL
Status: DISPENSED
Start: 2018-02-28

## (undated) RX ORDER — FENTANYL CITRATE 50 UG/ML
INJECTION, SOLUTION INTRAMUSCULAR; INTRAVENOUS
Status: DISPENSED
Start: 2022-07-19

## (undated) RX ORDER — CEFAZOLIN SODIUM 1 G/3ML
INJECTION, POWDER, FOR SOLUTION INTRAMUSCULAR; INTRAVENOUS
Status: DISPENSED
Start: 2022-07-19

## (undated) RX ORDER — NEOSTIGMINE METHYLSULFATE 1 MG/ML
VIAL (ML) INJECTION
Status: DISPENSED
Start: 2020-10-06

## (undated) RX ORDER — FENTANYL CITRATE-0.9 % NACL/PF 10 MCG/ML
PLASTIC BAG, INJECTION (ML) INTRAVENOUS
Status: DISPENSED
Start: 2022-07-19